# Patient Record
Sex: MALE | Race: WHITE | NOT HISPANIC OR LATINO | Employment: OTHER | URBAN - METROPOLITAN AREA
[De-identification: names, ages, dates, MRNs, and addresses within clinical notes are randomized per-mention and may not be internally consistent; named-entity substitution may affect disease eponyms.]

---

## 2017-04-10 ENCOUNTER — TRANSFERRED RECORDS (OUTPATIENT)
Dept: HEALTH INFORMATION MANAGEMENT | Facility: CLINIC | Age: 72
End: 2017-04-10

## 2017-06-01 ENCOUNTER — TRANSFERRED RECORDS (OUTPATIENT)
Dept: HEALTH INFORMATION MANAGEMENT | Facility: CLINIC | Age: 72
End: 2017-06-01

## 2017-06-13 ENCOUNTER — TRANSFERRED RECORDS (OUTPATIENT)
Dept: HEALTH INFORMATION MANAGEMENT | Facility: CLINIC | Age: 72
End: 2017-06-13

## 2017-06-13 LAB — EJECTION FRACTION: 35

## 2017-07-31 ENCOUNTER — TRANSFERRED RECORDS (OUTPATIENT)
Dept: HEALTH INFORMATION MANAGEMENT | Facility: CLINIC | Age: 72
End: 2017-07-31

## 2017-07-31 LAB
CREAT SERPL-MCNC: 1.29 MG/DL (ref 0.72–1.25)
EJECTION FRACTION: 35
GFR SERPL CREATININE-BSD FRML MDRD: 55 ML/MIN/1.73M2
GLUCOSE SERPL-MCNC: 92 MG/DL (ref 70–100)
POTASSIUM SERPL-SCNC: 4.4 MMOL/L (ref 3.5–5.1)

## 2017-08-08 ENCOUNTER — TELEPHONE (OUTPATIENT)
Dept: FAMILY MEDICINE | Facility: CLINIC | Age: 72
End: 2017-08-08

## 2017-08-08 NOTE — TELEPHONE ENCOUNTER
Reason for Call: THIS WEEK  Day Appointment, Requested Provider:  Ky Major M.D.    PCP: Ky Major    Reason for visit: Patient states nurse at [facility] advised patient to fu on PCP, requesitng to be seen this week to fu on ICD implant and blood clot in shoulder    Duration of symptoms: implant on 7.31  Have you been treated for this in the past? No    Additional comments: Patient was advised that Dr Major is not in clinic today    Can we leave a detailed message on this number? YES    Phone number patient can be reached at: Home number on file [phone] (home)    Best Time:     Call taken on 8/8/2017 at 12:58 PM by Melissa Beaulieu

## 2017-08-08 NOTE — TELEPHONE ENCOUNTER
Spoke with pt an notified him Dr. Major was not able to work him in this week. Scheduled pt with Jeannette Paez 8/16/17. Uyen Gray, CMA

## 2017-08-16 ENCOUNTER — OFFICE VISIT (OUTPATIENT)
Dept: FAMILY MEDICINE | Facility: CLINIC | Age: 72
End: 2017-08-16
Payer: COMMERCIAL

## 2017-08-16 VITALS
TEMPERATURE: 97.2 F | WEIGHT: 282.12 LBS | HEIGHT: 72 IN | SYSTOLIC BLOOD PRESSURE: 132 MMHG | DIASTOLIC BLOOD PRESSURE: 80 MMHG | HEART RATE: 80 BPM | OXYGEN SATURATION: 98 % | BODY MASS INDEX: 38.21 KG/M2

## 2017-08-16 DIAGNOSIS — I82.622 ACUTE DEEP VEIN THROMBOSIS (DVT) OF OTHER VEIN OF LEFT UPPER EXTREMITY (H): ICD-10-CM

## 2017-08-16 DIAGNOSIS — Z95.810 S/P ICD (INTERNAL CARDIAC DEFIBRILLATOR) PROCEDURE: Primary | ICD-10-CM

## 2017-08-16 PROCEDURE — 99495 TRANSJ CARE MGMT MOD F2F 14D: CPT | Performed by: NURSE PRACTITIONER

## 2017-08-16 RX ORDER — ACETAMINOPHEN 325 MG/1
325 TABLET ORAL
Status: ON HOLD | COMMUNITY
Start: 2017-08-01 | End: 2024-06-18

## 2017-08-16 RX ORDER — ISOSORBIDE MONONITRATE 30 MG/1
30 TABLET, EXTENDED RELEASE ORAL
COMMUNITY
Start: 2017-04-10 | End: 2017-12-20

## 2017-08-16 RX ORDER — VALSARTAN 80 MG/1
80 TABLET ORAL
COMMUNITY
Start: 2017-04-10 | End: 2017-12-20

## 2017-08-16 RX ORDER — FUROSEMIDE 40 MG
40 TABLET ORAL
COMMUNITY
Start: 2017-04-10 | End: 2017-12-20

## 2017-08-16 RX ORDER — NITROGLYCERIN 0.4 MG/1
0.4 TABLET SUBLINGUAL
COMMUNITY
Start: 2017-03-23

## 2017-08-16 RX ORDER — METOPROLOL SUCCINATE 50 MG/1
50 TABLET, EXTENDED RELEASE ORAL DAILY
COMMUNITY
Start: 2017-04-10 | End: 2017-12-20

## 2017-08-16 RX ORDER — POTASSIUM CHLORIDE 1500 MG/1
20 TABLET, EXTENDED RELEASE ORAL
COMMUNITY
Start: 2017-04-10 | End: 2017-12-20

## 2017-08-16 NOTE — MR AVS SNAPSHOT
"              After Visit Summary   2017    Ky Martinez    MRN: [id number]           Patient Information     Date Of Birth          1945        Visit Information        Provider Department      2017 3:15 PM Jeannette Paez APRN CNP [facility]        Today's Diagnoses     S/P ICD (internal cardiac defibrillator) procedure    -  1    Acute deep vein thrombosis (DVT) of other vein of left upper extremity (H)           Follow-ups after your visit        Who to contact     If you have questions or need follow up information about today's clinic visit or your schedule please contact [facility] directly at [phone].  Normal or non-critical lab and imaging results will be communicated to you by 8x8 Inchart, letter or phone within 4 business days after the clinic has received the results. If you do not hear from us within 7 days, please contact the clinic through 8x8 Inchart or phone. If you have a critical or abnormal lab result, we will notify you by phone as soon as possible.  Submit refill requests through locr or call your pharmacy and they will forward the refill request to us. Please allow 3 business days for your refill to be completed.          Additional Information About Your Visit        MyChart Information     locr lets you send messages to your doctor, view your test results, renew your prescriptions, schedule appointments and more. To sign up, go to [email or web address] . Click on \"Log in\" on the left side of the screen, which will take you to the Welcome page. Then click on \"Sign up Now\" on the right side of the page.     You will be asked to enter the access code listed below, as well as some personal information. Please follow the directions to create your username and password.     Your access code is: [email or web address]  Expires: 2017  8:42 AM     Your access code will  in 90 days. If you need help or a new code, please call your [facility] [employer] or [phone].        Care EveryWhere ID     This is your Care EveryWhere ID. This could be used by other organizations to access your [place] medical records  [id number]        Your Vitals Were     Pulse Temperature Height Pulse Oximetry BMI (Body Mass Index)       80 97.2  F (36.2  C) (Tympanic) 6' (1.829 m) 98% 38.26 kg/m2        Blood Pressure from Last 3 Encounters:   08/16/17 132/80   11/23/16 124/70   03/21/16 110/76    Weight from Last 3 Encounters:   08/16/17 282 lb 1.9 oz (128 kg)   11/23/16 282 lb (127.9 kg)   03/21/16 279 lb 11.2 oz (126.9 kg)              Today, you had the following     No orders found for display       Primary Care Provider Office Phone # Fax #    Ky Major [phone]       [facility] [address] [place] [address]        Equal Access to Services     NIEVES SOTO : Hadii aad ku hadasho Soomaali, waaxda luqadaha, qaybta kaalmada adeegyada, waxay idiin haykayli tan . So [facility] [phone].    ATENCIÓN: Si habla español, tiene a kimble disposición servicios gratuitos de asistencia lingüística. Llame al [phone].    We comply with applicable federal civil rights laws and [place] laws. We do not discriminate on the basis of race, color, national origin, age, disability sex, sexual orientation or gender identity.            Thank you!     Thank you for choosing [facility]  for your care. Our goal is always to provide you with excellent care. Hearing back from our patients is one way we can continue to improve our services. Please take a few minutes to complete the written survey that you may receive in the mail after your visit with us. Thank you!             Your Updated Medication List - Protect others around you: Learn how to safely use, store and throw away your medicines at www.disposemymeds.org.          This list is accurate as of: 8/16/17 11:59 PM.  Always use your most recent med list.                    Brand Name Dispense Instructions for use Diagnosis    acetaminophen 325 MG tablet    TYLENOL     Take 325 mg by mouth 2 tablets daily        albuterol 108 (90 BASE) MCG/ACT Inhaler    albuterol    1 Inhaler    Inhale 1-2 puffs into the lungs every 4 hours as needed for shortness of breath / dyspnea    Intermittent asthma, uncomplicated       apixaban ANTICOAGULANT 5 MG tablet    ELIQUIS     Take 5 mg by mouth 1 tablet twice daily        atorvastatin 80 MG tablet    LIPITOR    90 tablet    Take 1 tablet (80 mg) by mouth daily    Hyperlipidemia LDL goal <100       DAILY MULTI PO      1 TABLET DAILY        finasteride 5 MG tablet    PROSCAR    90 tablet    Take 1 tablet (5 mg) by mouth daily    Abnormal PSA       furosemide 40 MG tablet    LASIX     Take 40 mg by mouth 1 tablet daily        ibuprofen 200 MG tablet    ADVIL/MOTRIN     Take 400 mg by mouth every 4 hours as needed.        isosorbide mononitrate 30 MG 24 hr tablet    IMDUR     Take 30 mg by mouth 1 tablet daily        metoprolol 50 MG 24 hr tablet    TOPROL-XL     Take 50 mg by mouth 1 tablet daily        nitroGLYcerin 0.4 MG sublingual tablet    NITROSTAT     0.4 mg As needed        potassium chloride 20 MEQ CR tablet   Generic drug:  potassium chloride SA      Take 20 mg by mouth 1 tablet daily        tamsulosin 0.4 MG capsule    FLOMAX    90 capsule    TAKE ONE CAPSULE BY MOUTH ONCE DAILY    Benign prostatic hyperplasia, presence of lower urinary tract symptoms unspecified, unspecified morphology       valsartan 80 MG tablet    DIOVAN     Take 80 mg by mouth 1 tablet daily

## 2017-08-16 NOTE — PROGRESS NOTES
SUBJECTIVE:                                                    Ky Martinez is a 71 year old male who presents to clinic today for the following health issues:          Hospital Follow-up Visit:    Hospital/Nursing Home/ Rehab Facility: [facility]  Date of Admission: 7/31/17  Date of Discharge: 8/1/17; readmit for evaluation of left upper extremity swelling/DVT 8/7/17  Reason(s) for Admission: implant ICD, DVT            Problems taking medications regularly:  None       Medication changes since discharge: did start Eliquis after initial discharge       Problems adhering to non-medication therapy:  None    Summary of hospitalization:  CareEverywhere information obtained and reviewed. The patient has a history of MI in 3/2008. In March 2017, while in [place], he was diagnosed with NSTEMI. He returned home for treatment, follows with [facility]. His echocardiogram revealed ejection fraction of 35%. On 7/31, he was admitted to the hospital for implantation of an ICD. He was kept overnight, there were no events, no arrhythmias. The next morning wound appeared normal, device interrogation was normal, chest x-ray was normal, and he was discharged home. On 8/7 he returned for a one-week follow-up with pain and swelling of the left upper extremity. Ultrasound revealed an extensive occlusive DVT from the left subclavian vein to the axillary vein, brachial and the basilic veins to the level of the antecubital fossa. His aspirin was discontinued, and he was started on Eliquis 5 mg b.i.d. all other medications were continued at same dose  Diagnostic Tests/Treatments reviewed.  Follow up needed: Recheck with primary care, interval follow-up with cardiology  Other Healthcare Providers Involved in Patient s Care:         [facility]  Update since discharge: improved. Pain in the  left upper extremity is minimal, swelling has significantly decreased, nearly completely resolved.    Post Discharge  Medication Reconciliation: discharge medications reconciled, continue medications without change.  Plan of care communicated with patient     Coding guidelines for this visit:  Type of Medical   Decision Making Face-to-Face Visit       within 7 Days of discharge Face-to-Face Visit        within 14 days of discharge   Moderate Complexity [id number]   High Complexity [id number]              Problem list and histories reviewed & adjusted, as indicated.  Additional history: as documented    Patient Active Problem List   Diagnosis     Other acute and subacute form of ischemic heart disease     Cough     Intermittent asthma     Hyperlipidemia LDL goal <100     BPH (benign prostatic hypertrophy)     Morbid obesity (H)     Coronary artery disease involving native coronary artery of native heart without angina pectoris     Acute deep vein thrombosis (DVT) of other vein of left upper extremity (H)     Past Surgical History:   Procedure Laterality Date     Cardiac stent       EXCISE GANGLION WRIST Left 11/26/2014    Procedure: EXCISE GANGLION WRIST;  Surgeon: Gian Haddad MD;  Location: [facility] OR     ICD DEVICE INTERROGATE      2017     ORTHOPEDIC SURGERY       RELEASE CARPAL TUNNEL Left 11/26/2014    Procedure: RELEASE CARPAL TUNNEL;  Surgeon: Gian Haddad MD;  Location: [facility] OR     RELEASE DEQUERVAINS WRIST Left 11/26/2014    Procedure: RELEASE DEQUERVAINS WRIST;  Surgeon: Gian Haddad MD;  Location: [facility] OR       Social History   Substance Use Topics     Smoking status: Never Smoker     Smokeless tobacco: Never Used     Alcohol use 2.0 oz/week     4 Glasses of wine per week     Family History   Problem Relation Age of Onset     Arthritis Mother      DIABETES Father      Allergies Father      sulfa     Eye Disorder Father      cataract     HEART DISEASE Father      by pass         Current Outpatient Prescriptions   Medication Sig Dispense Refill     acetaminophen (TYLENOL) 325 MG tablet Take 325 mg by mouth  2 tablets daily       furosemide (LASIX) 40 MG tablet Take 40 mg by mouth 1 tablet daily       isosorbide mononitrate (IMDUR) 30 MG 24 hr tablet Take 30 mg by mouth 1 tablet daily       potassium chloride SA (POTASSIUM CHLORIDE) 20 MEQ CR tablet Take 20 mg by mouth 1 tablet daily       metoprolol (TOPROL-XL) 50 MG 24 hr tablet Take 50 mg by mouth 1 tablet daily       nitroGLYcerin (NITROSTAT) 0.4 MG sublingual tablet 0.4 mg As needed       apixaban ANTICOAGULANT (ELIQUIS) 5 MG tablet Take 5 mg by mouth 1 tablet twice daily       valsartan (DIOVAN) 80 MG tablet Take 80 mg by mouth 1 tablet daily       finasteride (PROSCAR) 5 MG tablet Take 1 tablet (5 mg) by mouth daily 90 tablet 3     tamsulosin (FLOMAX) 0.4 MG 24 hr capsule TAKE ONE CAPSULE BY MOUTH ONCE DAILY 90 capsule 3     atorvastatin (LIPITOR) 80 MG tablet Take 1 tablet (80 mg) by mouth daily 90 tablet 3     albuterol (ALBUTEROL) 108 (90 BASE) MCG/ACT inhaler Inhale 1-2 puffs into the lungs every 4 hours as needed for shortness of breath / dyspnea 1 Inhaler 1     DAILY MULTI OR 1 TABLET DAILY       ibuprofen (ADVIL,MOTRIN) 200 MG tablet Take 400 mg by mouth every 4 hours as needed.       BP Readings from Last 3 Encounters:   08/16/17 132/80   11/23/16 124/70   03/21/16 110/76    Wt Readings from Last 3 Encounters:   08/16/17 282 lb 1.9 oz (128 kg)   11/23/16 282 lb (127.9 kg)   03/21/16 279 lb 11.2 oz (126.9 kg)                        Reviewed and updated as needed this visit by clinical staffTobacco  Meds  Med Hx  Surg Hx  Fam Hx  Soc Hx      Reviewed and updated as needed this visit by Provider         ROS:  Constitutional, HEENT, cardiovascular, pulmonary, gi and gu systems are negative, except as otherwise noted.      OBJECTIVE:   /80  Pulse 80  Temp 97.2  F (36.2  C) (Tympanic)  Ht 6' (1.829 m)  Wt 282 lb 1.9 oz (128 kg)  SpO2 98%  BMI 38.26 kg/m2  Body mass index is 38.26 kg/(m^2).   GENERAL: healthy, alert and no distress  NECK: no  adenopathy, no asymmetry, masses, or scars and thyroid normal to palpation  RESP: lungs clear to auscultation - no rales, rhonchi or wheezes  CV: regular rate and rhythm, normal S1 S2, no S3 or S4, no murmur, click or rub, no peripheral edema and peripheral pulses strong  MS: No significant edema of the left upper extremity. No increased warmth, some mild tenderness at the antecubital fossa. Easily palpated radial and ulnar pulses. Rapid capillary refill in the nailbeds. Normal range of motion of digits and at elbow.        ASSESSMENT/PLAN:     Problem List Items Addressed This Visit        Medium    Acute deep vein thrombosis (DVT) of other vein of left upper extremity (H)      Other Visit Diagnoses     S/P ICD (internal cardiac defibrillator) procedure    -  Primary         Minimal use of left upper extremity, no lifting more than 2 pounds for 6 weeks, per discharge instructions by cardiology  Low-sodium diet  Patient is advised to schedule appointment with his primary care provider in 1-2 weeks for ongoing surveillance. Follow-up with cardiology per their instructions.    SHANNON Gomez [facility]

## 2017-12-20 ENCOUNTER — OFFICE VISIT (OUTPATIENT)
Dept: FAMILY MEDICINE | Facility: CLINIC | Age: 72
End: 2017-12-20
Payer: COMMERCIAL

## 2017-12-20 VITALS
BODY MASS INDEX: 38.87 KG/M2 | DIASTOLIC BLOOD PRESSURE: 70 MMHG | SYSTOLIC BLOOD PRESSURE: 120 MMHG | OXYGEN SATURATION: 97 % | TEMPERATURE: 97.2 F | HEIGHT: 72 IN | WEIGHT: 287 LBS | HEART RATE: 82 BPM

## 2017-12-20 DIAGNOSIS — I25.10 CORONARY ARTERY DISEASE INVOLVING NATIVE CORONARY ARTERY OF NATIVE HEART WITHOUT ANGINA PECTORIS: Primary | ICD-10-CM

## 2017-12-20 DIAGNOSIS — I82.622 ACUTE DEEP VEIN THROMBOSIS (DVT) OF OTHER VEIN OF LEFT UPPER EXTREMITY (H): ICD-10-CM

## 2017-12-20 DIAGNOSIS — Z95.810 S/P ICD (INTERNAL CARDIAC DEFIBRILLATOR) PROCEDURE: ICD-10-CM

## 2017-12-20 DIAGNOSIS — N40.0 BENIGN PROSTATIC HYPERPLASIA, UNSPECIFIED WHETHER LOWER URINARY TRACT SYMPTOMS PRESENT: ICD-10-CM

## 2017-12-20 DIAGNOSIS — E78.5 HYPERLIPIDEMIA LDL GOAL <100: ICD-10-CM

## 2017-12-20 LAB
ALBUMIN SERPL-MCNC: 3.7 G/DL (ref 3.4–5)
ALP SERPL-CCNC: 109 U/L (ref 40–150)
ALT SERPL W P-5'-P-CCNC: 52 U/L (ref 0–70)
ANION GAP SERPL CALCULATED.3IONS-SCNC: 6 MMOL/L (ref 3–14)
AST SERPL W P-5'-P-CCNC: 27 U/L (ref 0–45)
BILIRUB SERPL-MCNC: 0.9 MG/DL (ref 0.2–1.3)
BUN SERPL-MCNC: 17 MG/DL (ref 7–30)
CALCIUM SERPL-MCNC: 8.5 MG/DL (ref 8.5–10.1)
CHLORIDE SERPL-SCNC: 109 MMOL/L (ref 94–109)
CHOLEST SERPL-MCNC: 119 MG/DL
CO2 SERPL-SCNC: 27 MMOL/L (ref 20–32)
CREAT SERPL-MCNC: 1.32 MG/DL (ref 0.66–1.25)
GFR SERPL CREATININE-BSD FRML MDRD: 53 ML/MIN/1.7M2
GLUCOSE SERPL-MCNC: 97 MG/DL (ref 70–99)
HDLC SERPL-MCNC: 50 MG/DL
LDLC SERPL CALC-MCNC: 44 MG/DL
NONHDLC SERPL-MCNC: 69 MG/DL
POTASSIUM SERPL-SCNC: 4.3 MMOL/L (ref 3.4–5.3)
PROT SERPL-MCNC: 7.3 G/DL (ref 6.8–8.8)
PSA SERPL-ACNC: 1.84 UG/L (ref 0–4)
SODIUM SERPL-SCNC: 142 MMOL/L (ref 133–144)
TRIGL SERPL-MCNC: 126 MG/DL

## 2017-12-20 PROCEDURE — 80053 COMPREHEN METABOLIC PANEL: CPT | Performed by: FAMILY MEDICINE

## 2017-12-20 PROCEDURE — 36415 COLL VENOUS BLD VENIPUNCTURE: CPT | Performed by: FAMILY MEDICINE

## 2017-12-20 PROCEDURE — 99214 OFFICE O/P EST MOD 30 MIN: CPT | Performed by: FAMILY MEDICINE

## 2017-12-20 PROCEDURE — G0103 PSA SCREENING: HCPCS | Performed by: FAMILY MEDICINE

## 2017-12-20 PROCEDURE — 80061 LIPID PANEL: CPT | Performed by: FAMILY MEDICINE

## 2017-12-20 RX ORDER — POTASSIUM CHLORIDE 1500 MG/1
20 TABLET, EXTENDED RELEASE ORAL DAILY
Qty: 90 TABLET | Refills: 3 | Status: SHIPPED | OUTPATIENT
Start: 2017-12-20 | End: 2018-12-24

## 2017-12-20 RX ORDER — VALSARTAN 80 MG/1
80 TABLET ORAL DAILY
Qty: 90 TABLET | Refills: 3 | Status: SHIPPED | OUTPATIENT
Start: 2017-12-20 | End: 2018-12-24 | Stop reason: ALTCHOICE

## 2017-12-20 RX ORDER — METOPROLOL SUCCINATE 50 MG/1
50 TABLET, EXTENDED RELEASE ORAL DAILY
Qty: 90 TABLET | Refills: 3 | Status: SHIPPED | OUTPATIENT
Start: 2017-12-20 | End: 2018-12-24

## 2017-12-20 RX ORDER — ATORVASTATIN CALCIUM 80 MG/1
80 TABLET, FILM COATED ORAL DAILY
Qty: 90 TABLET | Refills: 3 | Status: SHIPPED | OUTPATIENT
Start: 2017-12-20 | End: 2018-12-24

## 2017-12-20 RX ORDER — FINASTERIDE 5 MG/1
5 TABLET, FILM COATED ORAL DAILY
Qty: 90 TABLET | Refills: 3 | Status: SHIPPED | OUTPATIENT
Start: 2017-12-20 | End: 2018-12-24

## 2017-12-20 RX ORDER — ISOSORBIDE MONONITRATE 30 MG/1
30 TABLET, EXTENDED RELEASE ORAL DAILY
Qty: 90 TABLET | Refills: 3 | Status: SHIPPED | OUTPATIENT
Start: 2017-12-20 | End: 2019-01-26

## 2017-12-20 RX ORDER — FUROSEMIDE 40 MG
40 TABLET ORAL DAILY
Qty: 90 TABLET | Refills: 3 | Status: SHIPPED | OUTPATIENT
Start: 2017-12-20 | End: 2018-12-24

## 2017-12-20 RX ORDER — TAMSULOSIN HYDROCHLORIDE 0.4 MG/1
CAPSULE ORAL
Qty: 90 CAPSULE | Refills: 3 | Status: SHIPPED | OUTPATIENT
Start: 2017-12-20 | End: 2018-12-24

## 2017-12-20 NOTE — PROGRESS NOTES
Labs show your PSA was fine at 1.84 it was 3.2 a year ago.  Cholesterol good at 119 with your LDL at 44.  Chemistry panel  is stable blood sugar was fine your kidney function test is off just slightly better than a year ago but a little off from 4 months ago.  I would avoid ibuprofen and Aleve.

## 2017-12-20 NOTE — NURSING NOTE
Chief Complaint   Patient presents with     Recheck Medication       Initial /70 (BP Location: Left arm, Patient Position: Chair, Cuff Size: Adult Large)  Pulse 82  Temp 97.2  F (36.2  C) (Temporal)  Ht 6' (1.829 m)  Wt 287 lb (130.2 kg)  SpO2 97%  BMI 38.92 kg/m2 Estimated body mass index is 38.92 kg/(m^2) as calculated from the following:    Height as of this encounter: 6' (1.829 m).    Weight as of this encounter: 287 lb (130.2 kg).  Medication Reconciliation: complete

## 2017-12-20 NOTE — PROGRESS NOTES
SUBJECTIVE:   Ky Martinez is a 72 year old male who presents to clinic today for the following health issues:  Patient usually sees Dr. Bhakta in [facility]. He was the provider managing his medications for a long time. He is hoping Dr. Major can refill all of his medications for him before he goes to [place]. He toney there.     Medication Followup of All of his medications     Taking Medication as prescribed: yes    Side Effects:  None    Medication Helping Symptoms:  yes             Problem list and histories reviewed & adjusted, as indicated.  Additional history: as documented        Reviewed and updated as needed this visit by clinical staff     Reviewed and updated as needed this visit by Provider        SUBJECTIVE:  Ky  is a 72 year old male who presents for: Follow-up of multiple medical conditions.  He has hypertension, hyperlipidemia, benign prostatic hypertrophy, coronary artery disease including stenting and most recently an ICD implanted.  Had a DVT after his ICD implant last summer in his upper extremity and is on Eliquis.  No bleeding problems.  Eyes any chest pain.  No shortness of breath with activity.  He is on several medications for his prostatic hypertrophy and they seem to be helping.  Needs all of his medications refilled before going to [place] for the winter    Past Medical History:   Diagnosis Date     Hypertension      Myocardial infarction      Past Surgical History:   Procedure Laterality Date     Cardiac stent       EXCISE GANGLION WRIST Left 11/26/2014    Procedure: EXCISE GANGLION WRIST;  Surgeon: Gian Haddad MD;  [facility]     ICD DEVICE INTERROGATE      2017     ORTHOPEDIC SURGERY       RELEASE CARPAL TUNNEL Left 11/26/2014    Procedure: RELEASE CARPAL TUNNEL;  Surgeon: Gian Haddad MD;  Location[facility]     RELEASE DEQUERVAINS WRIST Left 11/26/2014    Procedure: RELEASE DEQUERVAINS WRIST;  Surgeon: Gian Haddad MD;  Location: [facility] OR      Social History   Substance Use Topics     Smoking status: Never Smoker     Smokeless tobacco: Never Used     Alcohol use 2.0 oz/week     4 Glasses of wine per week      Comment: occasional      Current Outpatient Prescriptions   Medication Sig Dispense Refill     furosemide (LASIX) 40 MG tablet Take 1 tablet (40 mg) by mouth daily 1 tablet daily 90 tablet 3     isosorbide mononitrate (IMDUR) 30 MG 24 hr tablet Take 1 tablet (30 mg) by mouth daily 1 tablet daily 90 tablet 3     potassium chloride SA (KLOR-CON) 20 MEQ CR tablet Take 1 tablet (20 mEq) by mouth daily 1 tablet daily 90 tablet 3     metoprolol (TOPROL-XL) 50 MG 24 hr tablet Take 1 tablet (50 mg) by mouth daily 1 tablet daily 90 tablet 3     valsartan (DIOVAN) 80 MG tablet Take 1 tablet (80 mg) by mouth daily 1 tablet daily 90 tablet 3     finasteride (PROSCAR) 5 MG tablet Take 1 tablet (5 mg) by mouth daily 90 tablet 3     tamsulosin (FLOMAX) 0.4 MG capsule TAKE ONE CAPSULE BY MOUTH ONCE DAILY 90 capsule 3     atorvastatin (LIPITOR) 80 MG tablet Take 1 tablet (80 mg) by mouth daily 90 tablet 3     nitroGLYcerin (NITROSTAT) 0.4 MG sublingual tablet 0.4 mg As needed       albuterol (ALBUTEROL) 108 (90 BASE) MCG/ACT inhaler Inhale 1-2 puffs into the lungs every 4 hours as needed for shortness of breath / dyspnea 1 Inhaler 1     ibuprofen (ADVIL,MOTRIN) 200 MG tablet Take 400 mg by mouth every 4 hours as needed.       DAILY MULTI OR 1 TABLET DAILY       acetaminophen (TYLENOL) 325 MG tablet Take 325 mg by mouth 2 tablets daily       [DISCONTINUED] furosemide (LASIX) 40 MG tablet Take 40 mg by mouth 1 tablet daily       [DISCONTINUED] isosorbide mononitrate (IMDUR) 30 MG 24 hr tablet Take 30 mg by mouth 1 tablet daily       [DISCONTINUED] metoprolol (TOPROL-XL) 50 MG 24 hr tablet Take 50 mg by mouth daily 1 tablet daily       [DISCONTINUED] valsartan (DIOVAN) 80 MG tablet Take 80 mg by mouth 1 tablet daily       [DISCONTINUED] atorvastatin (LIPITOR) 80 MG  tablet Take 1 tablet (80 mg) by mouth daily 90 tablet 3       REVIEW OF SYSTEMS:   5 point ROS negative except as noted above in HPI, including Gen., Resp, CV, GI &  system review.     OBJECTIVE:  Vitals: /70 (BP Location: Left arm, Patient Position: Chair, Cuff Size: Adult Large)  Pulse 82  Temp 97.2  F (36.2  C) (Temporal)  Ht 6' (1.829 m)  Wt 287 lb (130.2 kg)  SpO2 97%  BMI 38.92 kg/m2  BMI= Body mass index is 38.92 kg/(m^2).  He is alert and appears well.  Eyes PERRLA.  Neck supple no carotid bruits.  Lungs are clear to auscultation.  Heart regular rhythm S1-S2 no murmur.  Extremities with no edema.  Skin clear.    ASSESSMENT:  #1 coronary artery disease status post stenting #2 status post ICD #3 hyperlipidemia #4 prostatic hypertrophy #5 DVT of the left upper extremity    PLAN:  We will obtain labs today and notify with results.  Renewed all of his medications.  He recently had an ICD interrogation which was good.  We will check his PSA along with his labs he will continue on Proscar and Flomax.        Ky Major MD  [facility]

## 2017-12-20 NOTE — MR AVS SNAPSHOT
"              After Visit Summary   12/20/2017    Ky Martinez    MRN: [id number]           Patient Information     Date Of Birth          1945        Visit Information        Provider Department      12/20/2017 9:20 AM Ky Major MD [facility]        Today's Diagnoses     Coronary artery disease involving native coronary artery of native heart without angina pectoris    -  1    S/P ICD (internal cardiac defibrillator) procedure        Benign prostatic hyperplasia, unspecified whether lower urinary tract symptoms present        Hyperlipidemia LDL goal <100        Acute deep vein thrombosis (DVT) of other vein of left upper extremity (H)           Follow-ups after your visit        Who to contact     If you have questions or need follow up information about today's clinic visit or your schedule please contact [facility] directly at [phone].  Normal or non-critical lab and imaging results will be communicated to you by MyChart, letter or phone within 4 business days after the clinic has received the results. If you do not hear from us within 7 days, please contact the clinic through MyChart or phone. If you have a critical or abnormal lab result, we will notify you by phone as soon as possible.  Submit refill requests through [employer] or call your pharmacy and they will forward the refill request to us. Please allow 3 business days for your refill to be completed.          Additional Information About Your Visit        MyChart Information     [employer] lets you send messages to your doctor, view your test results, renew your prescriptions, schedule appointments and more. To sign up, go to [email or web address] . Click on \"Log in\" on the left side of the screen, which will take you to the Welcome page. Then click on \"Sign up Now\" on the right side of the page.     You will be asked to enter the access code listed below, as well as some personal information. Please " follow the directions to create your username and password.     Your access code is: [email or web address]  Expires: 3/20/2018 11:26 AM     Your access code will  in 90 days. If you need help or a new code, please call your [place] clinic or [phone].        Care EveryWhere ID     This is your Care EveryWhere ID. This could be used by other organizations to access your [place] medical records  [id number]        Your Vitals Were     Pulse Temperature Height Pulse Oximetry BMI (Body Mass Index)       82 97.2  F (36.2  C) (Temporal) 6' (1.829 m) 97% 38.92 kg/m2        Blood Pressure from Last 3 Encounters:   17 120/70   17 132/80   16 124/70    Weight from Last 3 Encounters:   17 287 lb (130.2 kg)   17 282 lb 1.9 oz (128 kg)   16 282 lb (127.9 kg)              We Performed the Following     Comprehensive metabolic panel (BMP + Alb, Alk Phos, ALT, AST, Total. Bili, TP)     Lipid Profile     Prostate spec antigen screen          Today's Medication Changes          These changes are accurate as of: 17 11:26 AM.  If you have any questions, ask your nurse or doctor.               These medicines have changed or have updated prescriptions.        Dose/Directions    furosemide 40 MG tablet   Commonly known as:  LASIX   This may have changed:  when to take this   Used for:  Hyperlipidemia LDL goal <100   Changed by:  Ky Major MD        Dose:  40 mg   Take 1 tablet (40 mg) by mouth daily 1 tablet daily   Quantity:  90 tablet   Refills:  3       isosorbide mononitrate 30 MG 24 hr tablet   Commonly known as:  IMDUR   This may have changed:  when to take this   Used for:  Hyperlipidemia LDL goal <100   Changed by:  Ky Major MD        Dose:  30 mg   Take 1 tablet (30 mg) by mouth daily 1 tablet daily   Quantity:  90 tablet   Refills:  3       potassium chloride SA 20 MEQ CR tablet   Commonly known as:  KLOR-CON   This may have changed:    - how much to take  - when  to take this   Used for:  Hyperlipidemia LDL goal <100   Changed by:  Ky Major MD        Dose:  20 mEq   Take 1 tablet (20 mEq) by mouth daily 1 tablet daily   Quantity:  90 tablet   Refills:  3       valsartan 80 MG tablet   Commonly known as:  DIOVAN   This may have changed:  when to take this   Used for:  Hyperlipidemia LDL goal <100   Changed by:  Ky Major MD        Dose:  80 mg   Take 1 tablet (80 mg) by mouth daily 1 tablet daily   Quantity:  90 tablet   Refills:  3            Where to get your medicines      These medications were sent to [facility] [address] [address]  [address] [facility] [address]     Phone:  [phone]     atorvastatin 80 MG tablet    finasteride 5 MG tablet    furosemide 40 MG tablet    isosorbide mononitrate 30 MG 24 hr tablet    metoprolol 50 MG 24 hr tablet    potassium chloride SA 20 MEQ CR tablet    tamsulosin 0.4 MG capsule    valsartan 80 MG tablet                Primary Care Provider Office Phone # Fax #    Ky Major [phone]       [facility] [address] [place] [address]        Equal Access to Services     [facility] AH: Hadii aad ku hadasho Soomaali, waaxda luqadaha, qaybta kaalmada adeegyada, waxay idiin hayaan adeeg kharash la'kayli . So [facility] [phone].    ATENCIÓN: Si habla español, tiene a kimble disposición servicios gratuitos de asistencia lingüística. [facility] [phone].    We comply with applicable federal civil rights laws and [place] laws. We do not discriminate on the basis of race, color, national origin, age, disability, sex, sexual orientation, or gender identity.            Thank you!     Thank you for choosing [facility]  for your care. Our goal is always to provide you with excellent care. Hearing back from our patients is one way we can continue to improve our services. Please take a few minutes to complete the written survey that  you may receive in the mail after your visit with us. Thank you!             Your Updated Medication List - Protect others around you: Learn how to safely use, store and throw away your medicines at www.disposemymeds.org.          This list is accurate as of: 12/20/17 11:26 AM.  Always use your most recent med list.                   Brand Name Dispense Instructions for use Diagnosis    acetaminophen 325 MG tablet    TYLENOL     Take 325 mg by mouth 2 tablets daily        albuterol 108 (90 BASE) MCG/ACT Inhaler    PROAIR HFA    1 Inhaler    Inhale 1-2 puffs into the lungs every 4 hours as needed for shortness of breath / dyspnea    Intermittent asthma, uncomplicated       atorvastatin 80 MG tablet    LIPITOR    90 tablet    Take 1 tablet (80 mg) by mouth daily    Hyperlipidemia LDL goal <100       DAILY MULTI PO      1 TABLET DAILY        finasteride 5 MG tablet    PROSCAR    90 tablet    Take 1 tablet (5 mg) by mouth daily        furosemide 40 MG tablet    LASIX    90 tablet    Take 1 tablet (40 mg) by mouth daily 1 tablet daily    Hyperlipidemia LDL goal <100       ibuprofen 200 MG tablet    ADVIL/MOTRIN     Take 400 mg by mouth every 4 hours as needed.        isosorbide mononitrate 30 MG 24 hr tablet    IMDUR    90 tablet    Take 1 tablet (30 mg) by mouth daily 1 tablet daily    Hyperlipidemia LDL goal <100       metoprolol 50 MG 24 hr tablet    TOPROL-XL    90 tablet    Take 1 tablet (50 mg) by mouth daily 1 tablet daily    Hyperlipidemia LDL goal <100       nitroGLYcerin 0.4 MG sublingual tablet    NITROSTAT     0.4 mg As needed        potassium chloride SA 20 MEQ CR tablet    KLOR-CON    90 tablet    Take 1 tablet (20 mEq) by mouth daily 1 tablet daily    Hyperlipidemia LDL goal <100       tamsulosin 0.4 MG capsule    FLOMAX    90 capsule    TAKE ONE CAPSULE BY MOUTH ONCE DAILY        valsartan 80 MG tablet    DIOVAN    90 tablet    Take 1 tablet (80 mg) by mouth daily 1 tablet daily    Hyperlipidemia LDL goal  <100

## 2017-12-21 ASSESSMENT — ASTHMA QUESTIONNAIRES: ACT_TOTALSCORE: 25

## 2018-08-30 DIAGNOSIS — I10 BENIGN ESSENTIAL HYPERTENSION: ICD-10-CM

## 2018-08-30 DIAGNOSIS — E78.5 HYPERLIPIDEMIA LDL GOAL <100: Primary | ICD-10-CM

## 2018-08-30 RX ORDER — VALSARTAN 80 MG/1
80 TABLET ORAL DAILY
Qty: 90 TABLET | Refills: 3 | Status: CANCELLED | OUTPATIENT
Start: 2018-08-30

## 2018-08-30 NOTE — TELEPHONE ENCOUNTER
Requested Prescriptions   Pending Prescriptions Disp Refills     valsartan (DIOVAN) 80 MG tablet  Last Written Prescription Date:  12/20/17  Last Fill Quantity: 90,  # refills: 3   Last office visit: 12/20/2017 with prescribing provider:  12/20/17   Future Office Visit:     90 tablet 3     Sig: Take 1 tablet (80 mg) by mouth daily 1 tablet daily    There is no refill protocol information for this order

## 2018-08-31 NOTE — TELEPHONE ENCOUNTER
"Requested Prescriptions   Pending Prescriptions Disp Refills     valsartan (DIOVAN) 80 MG tablet 90 tablet 3     Sig: Take 1 tablet (80 mg) by mouth daily 1 tablet daily    Angiotensin-II Receptors Failed    8/30/2018  2:13 PM       Failed - Normal serum creatinine on file in past 12 months    Recent Labs   Lab Test  12/20/17   1000   12/13/10   1410   CR  1.32*   < >   --    CREAT   --    --   1.1    < > = values in this interval not displayed.            Passed - Blood pressure under 140/90 in past 12 months    BP Readings from Last 3 Encounters:   12/20/17 120/70   08/16/17 132/80   11/23/16 124/70                Passed - Recent (12 mo) or future (30 days) visit within the authorizing provider's specialty    Patient had office visit in the last 12 months or has a visit in the next 30 days with authorizing provider or within the authorizing provider's specialty.  See \"Patient Info\" tab in inbasket, or \"Choose Columns\" in Meds & Orders section of the refill encounter.           Passed - Patient is age 18 or older       Passed - Normal serum potassium on file in past 12 months    Recent Labs   Lab Test  12/20/17   1000   POTASSIUM  4.3                    Patient should have refills to 12/20/2018.  Please call pharmacy to confirm.  Declining.  JACQUE ZieglerN, RN    "

## 2018-09-05 ENCOUNTER — TELEPHONE (OUTPATIENT)
Dept: FAMILY MEDICINE | Facility: CLINIC | Age: 73
End: 2018-09-05

## 2018-09-05 DIAGNOSIS — H91.93 BILATERAL HEARING LOSS, UNSPECIFIED HEARING LOSS TYPE: Primary | ICD-10-CM

## 2018-09-05 NOTE — TELEPHONE ENCOUNTER
Reason for Call: Request for an order or referral:    Order or referral being requested: audiologist to discuss hearing loss and possible hearing aids    Date needed: before my next appointment    Has the patient been seen by the PCP for this problem? YES    Additional comments: none    Phone number Patient can be reached at:  Home number on file [phone] (home)    Best Time:  any    Can we leave a detailed message on this number?  YES    Call taken on 9/5/2018 at 12:01 PM by Skylar Mcclellan

## 2018-09-11 ENCOUNTER — OFFICE VISIT (OUTPATIENT)
Dept: AUDIOLOGY | Facility: CLINIC | Age: 73
End: 2018-09-11
Payer: COMMERCIAL

## 2018-09-11 DIAGNOSIS — H90.3 SENSORINEURAL HEARING LOSS, BILATERAL: Primary | ICD-10-CM

## 2018-09-11 PROCEDURE — 92557 COMPREHENSIVE HEARING TEST: CPT | Performed by: AUDIOLOGIST

## 2018-09-11 PROCEDURE — 99207 ZZC NO CHARGE LOS: CPT | Performed by: AUDIOLOGIST

## 2018-09-11 PROCEDURE — 92550 TYMPANOMETRY & REFLEX THRESH: CPT | Performed by: AUDIOLOGIST

## 2018-09-11 NOTE — MR AVS SNAPSHOT
"              After Visit Summary   2018    Ky Martinez    MRN: [id number]           Patient Information     Date Of Birth          1945        Visit Information        Provider Department      2018 2:00 PM Estela Orozco [facility]        Today's Diagnoses     Sensorineural hearing loss, bilateral    -  1       Follow-ups after your visit        Who to contact     If you have questions or need follow up information about today's clinic visit or your schedule please contact [facility] directly at [phone].  Normal or non-critical lab and imaging results will be communicated to you by [employer], letter or phone within 4 business days after the clinic has received the results. If you do not hear from us within 7 days, please contact the clinic through [employer] or phone. If you have a critical or abnormal lab result, we will notify you by phone as soon as possible.  Submit refill requests through [employer] or call your pharmacy and they will forward the refill request to us. Please allow 3 business days for your refill to be completed.          Additional Information About Your Visit        MyChart Information     [employer] lets you send messages to your doctor, view your test results, renew your prescriptions, schedule appointments and more. To sign up, go to [email or web address] . Click on \"Log in\" on the left side of the screen, which will take you to the Welcome page. Then click on \"Sign up Now\" on the right side of the page.     You will be asked to enter the access code listed below, as well as some personal information. Please follow the directions to create your username and password.     Your access code is: [email or web address]  Expires: 12/10/2018  4:56 PM     Your access code will  in 90 days. If you need help or a new code, please call your [facility] or [phone].        Care EveryWhere ID     This is your Care EveryWhere ID. This " could be used by other organizations to access your [place] medical records  [id number]         Blood Pressure from Last 3 Encounters:   12/20/17 120/70   08/16/17 132/80   11/23/16 124/70    Weight from Last 3 Encounters:   12/20/17 287 lb (130.2 kg)   08/16/17 282 lb 1.9 oz (128 kg)   11/23/16 282 lb (127.9 kg)              We Performed the Following     AUDIOGRAM/TYMPANOGRAM - INTERFACE     COMPREHENSIVE HEARING TEST     TYMPANOMETRY AND REFLEX THRESHOLD MEASUREMENTS        Primary Care Provider Office Phone # Fax #    Ky Major [phone]       [address] [facility] [address]        Equal Access to Services     NIEVES SOTO : Hadii gisele arellanoo Soyvonne, waaxda luqadaha, qaybta kaalmada adeegyada, blayne odonnell. So [facility] [phone].    ATENCIÓN: Si habla español, tiene a kimble disposición servicios gratuitos de asistencia lingüística. Llame al [phone].    We comply with applicable federal civil rights laws and [place] laws. We do not discriminate on the basis of race, color, national origin, age, disability, sex, sexual orientation, or gender identity.            Thank you!     Thank you for choosing [facility]  for your care. Our goal is always to provide you with excellent care. Hearing back from our patients is one way we can continue to improve our services. Please take a few minutes to complete the written survey that you may receive in the mail after your visit with us. Thank you!             Your Updated Medication List - Protect others around you: Learn how to safely use, store and throw away your medicines at www.disposemymeds.org.          This list is accurate as of 9/11/18  4:56 PM.  Always use your most recent med list.                   Brand Name Dispense Instructions for use Diagnosis    acetaminophen 325 MG tablet    TYLENOL     Take 325 mg by mouth 2 tablets daily        albuterol 108 (90 Base) MCG/ACT  inhaler    PROAIR HFA    1 Inhaler    Inhale 1-2 puffs into the lungs every 4 hours as needed for shortness of breath / dyspnea    Intermittent asthma, uncomplicated       atorvastatin 80 MG tablet    LIPITOR    90 tablet    Take 1 tablet (80 mg) by mouth daily    Hyperlipidemia LDL goal <100       DAILY MULTI PO      1 TABLET DAILY        finasteride 5 MG tablet    PROSCAR    90 tablet    Take 1 tablet (5 mg) by mouth daily        furosemide 40 MG tablet    LASIX    90 tablet    Take 1 tablet (40 mg) by mouth daily 1 tablet daily    Hyperlipidemia LDL goal <100       ibuprofen 200 MG tablet    ADVIL/MOTRIN     Take 400 mg by mouth every 4 hours as needed.        isosorbide mononitrate 30 MG 24 hr tablet    IMDUR    90 tablet    Take 1 tablet (30 mg) by mouth daily 1 tablet daily    Hyperlipidemia LDL goal <100       metoprolol succinate 50 MG 24 hr tablet    TOPROL-XL    90 tablet    Take 1 tablet (50 mg) by mouth daily 1 tablet daily    Hyperlipidemia LDL goal <100       nitroGLYcerin 0.4 MG sublingual tablet    NITROSTAT     0.4 mg As needed        potassium chloride SA 20 MEQ CR tablet    KLOR-CON    90 tablet    Take 1 tablet (20 mEq) by mouth daily 1 tablet daily    Hyperlipidemia LDL goal <100       tamsulosin 0.4 MG capsule    FLOMAX    90 capsule    TAKE ONE CAPSULE BY MOUTH ONCE DAILY        valsartan 80 MG tablet    DIOVAN    90 tablet    Take 1 tablet (80 mg) by mouth daily 1 tablet daily    Hyperlipidemia LDL goal <100

## 2018-09-11 NOTE — PROGRESS NOTES
"AUDIOLOGY REPORT    SUBJECTIVE:  Ky Martinez is a 72 year old male who was seen in the Audiology Clinic at the [facility] for audiologic evaluation, referred by LAURENT Major M.D. The patient reports a gradual decrease in hearing \"for a long time\", he observes the left ear is worse than the right ear and reported a long history of firearm exposure without hearing protection while duck hunting.  He reported the most difficulty hearing in groups, background noise, and social situations. The patient denies  bilateral tinnitus, bilateral otalgia, bilateral drainage, bilateral aural fullness and vertigo.  The patient notes difficulty with communication in a variety of listening situations. They were accompanied today by their self.    OBJECTIVE:  Otoscopic exam indicates ears are clear of cerumen bilaterally     Pure Tone Thresholds assessed using conventional audiometry with good  reliability from 250-8000 Hz bilaterally using insert earphones     RIGHT:   normal, mild and rising to normal 3-4k then sloping to moderate hearing loss at 6-8k loss is sensorineural    LEFT:    mild and moderate sensorineural hearing loss    Tympanogram:    RIGHT: normal eardrum mobility    LEFT:   normal eardrum mobility    Reflexes (reported by stimulus ear):  RIGHT: Ipsilateral is present at normal levels  RIGHT: Contralateral is present at normal levels  LEFT:   Ipsilateral is present at normal levels  LEFT:   Contralateral is present at normal levels      Speech Reception Threshold:    RIGHT: 15 dB HL    LEFT:   45 dB HL  Word Recognition Score:     RIGHT: 96% at 55 dB HL using NU-6 recorded word list.    LEFT:   60% at 95 dB HL using NU-6 recorded word list.  QuickSIN:  Speech in Noise Loss: 5.5 dB mild degree of communication difficulty in noise      ASSESSMENT:   Asymmetric sensorineural hearing loss left ear worse than right for tones and speech.    Today s results were discussed " with the patient in detail.     PLAN:  Patient was counseled regarding hearing loss and impact on communication.  Patient is a good candidate for amplification at this time. Handout on good communication strategies, and hearing aid use was given to patient. It is recommended that the patient schedule an ENT consult given difference between ears for tones and speech, then schedule a hearing aid consultation.  Please call this clinic with questions regarding these results or recommendations.        Mallika Gonzales Licensed Audiologist #[id number]

## 2018-09-12 RX ORDER — LOSARTAN POTASSIUM 50 MG/1
50 TABLET ORAL DAILY
Qty: 90 TABLET | Refills: 3 | Status: SHIPPED | OUTPATIENT
Start: 2018-09-12 | End: 2018-12-24

## 2018-09-25 ENCOUNTER — OFFICE VISIT (OUTPATIENT)
Dept: AUDIOLOGY | Facility: CLINIC | Age: 73
End: 2018-09-25
Payer: COMMERCIAL

## 2018-09-25 DIAGNOSIS — H90.3 SENSORINEURAL HEARING LOSS, BILATERAL: Primary | ICD-10-CM

## 2018-09-25 PROCEDURE — 99207 ZZC NO CHARGE LOS: CPT | Performed by: AUDIOLOGIST

## 2018-09-25 PROCEDURE — V5299 HEARING SERVICE: HCPCS | Performed by: AUDIOLOGIST

## 2018-09-25 NOTE — PROGRESS NOTES
Mr. Martinez was scheduled for a hearing aid consultation, but needed ENT consult for medical clearance prior to hearing aid consultation appointment.  His hearing was evaluated 9/11/2018 and showed asymmetric hearing loss for tones and speech.  This was discussed, I apologized for the confusion and accompanied the patient to specialty scheduling where an appointment was scheduled 10/4/2018 with Dr. Godoy with hearing aid consult to follow if medically appropriate.  Mr. Martinez was very patient with the confusion.    Consuelo Gonzales.  [place] Licensed Audiologist #[id number]

## 2018-09-25 NOTE — MR AVS SNAPSHOT
"              After Visit Summary   9/25/2018    Ky Martinez    MRN: [id number]           Patient Information     Date Of Birth          1945        Visit Information        Provider Department      9/25/2018 2:00 PM Estela Orozco, [facility]        Today's Diagnoses     Sensorineural hearing loss, bilateral    -  1       Follow-ups after your visit        Your next 10 appointments already scheduled     Oct 04, 2018 11:30 AM CDT   New Visit with Casa Godoy MD   [facility]    [address] [place] [id number] [phone]            Oct 04, 2018 12:00 PM CDT   Return Visit with Mariza Blanco   [facility] ([address] [id number] [phone]              Who to contact     If you have questions or need follow up information about today's clinic visit or your schedule please contact [facility] directly at [phone].  Normal or non-critical lab and imaging results will be communicated to you by Plurchasehart, letter or phone within 4 business days after the clinic has received the results. If you do not hear from us within 7 days, please contact the clinic through [employer] or phone. If you have a critical or abnormal lab result, we will notify you by phone as soon as possible.  Submit refill requests through [employer] or call your pharmacy and they will forward the refill request to us. Please allow 3 business days for your refill to be completed.          Additional Information About Your Visit        Plurchasehart Information     MaPS lets you send messages to your doctor, view your test results, renew your prescriptions, schedule appointments and more. To sign up, go to [email or web address] . Click on \"Log in\" on the left side of the screen, which will take you to the Welcome page. Then click on \"Sign up Now\" on the right " side of the page.     You will be asked to enter the access code listed below, as well as some personal information. Please follow the directions to create your username and password.     Your access code is: [email or web address]  Expires: 12/10/2018  4:56 PM     Your access code will  in 90 days. If you need help or a new code, please call your [place] clinic or [phone].        Care EveryWhere ID     This is your Care EveryWhere ID. This could be used by other organizations to access your [place] medical records  [id number]         Blood Pressure from Last 3 Encounters:   17 120/70   17 132/80   16 124/70    Weight from Last 3 Encounters:   17 287 lb (130.2 kg)   17 282 lb 1.9 oz (128 kg)   16 282 lb (127.9 kg)              We Performed the Following     HEARING AID CHECK/NO CHARGE        Primary Care Provider Office Phone # Fax #    Ky Major [phone]       [address] [facility] [phone]        Equal Access to Services     [facility]: Hadii aad ku hadasho Sojeannetteali, waaxda luqadaha, qaybta kaalmada adeegyada, blayne ortegan angela tan . So [facility] [phone].    ATENCIÓN: Si habla español, tiene a kimble disposición servicios gratuitos de asistencia lingüística. [facility] [phone].    We comply with applicable federal civil rights laws and [place] laws. We do not discriminate on the basis of race, color, national origin, age, disability, sex, sexual orientation, or gender identity.            Thank you!     Thank you for choosing [facility]  for your care. Our goal is always to provide you with excellent care. Hearing back from our patients is one way we can continue to improve our services. Please take a few minutes to complete the written survey that you may receive in the mail after your visit with us. Thank you!             Your Updated Medication List - Protect others around you: Learn how to  safely use, store and throw away your medicines at www.disposemymeds.org.          This list is accurate as of 9/25/18  2:44 PM.  Always use your most recent med list.                   Brand Name Dispense Instructions for use Diagnosis    acetaminophen 325 MG tablet    TYLENOL     Take 325 mg by mouth 2 tablets daily        albuterol 108 (90 Base) MCG/ACT inhaler    PROAIR HFA    1 Inhaler    Inhale 1-2 puffs into the lungs every 4 hours as needed for shortness of breath / dyspnea    Intermittent asthma, uncomplicated       atorvastatin 80 MG tablet    LIPITOR    90 tablet    Take 1 tablet (80 mg) by mouth daily    Hyperlipidemia LDL goal <100       DAILY MULTI PO      1 TABLET DAILY        finasteride 5 MG tablet    PROSCAR    90 tablet    Take 1 tablet (5 mg) by mouth daily        furosemide 40 MG tablet    LASIX    90 tablet    Take 1 tablet (40 mg) by mouth daily 1 tablet daily    Hyperlipidemia LDL goal <100       ibuprofen 200 MG tablet    ADVIL/MOTRIN     Take 400 mg by mouth every 4 hours as needed.        isosorbide mononitrate 30 MG 24 hr tablet    IMDUR    90 tablet    Take 1 tablet (30 mg) by mouth daily 1 tablet daily    Hyperlipidemia LDL goal <100       losartan 50 MG tablet    COZAAR    90 tablet    Take 1 tablet (50 mg) by mouth daily    Benign essential hypertension       metoprolol succinate 50 MG 24 hr tablet    TOPROL-XL    90 tablet    Take 1 tablet (50 mg) by mouth daily 1 tablet daily    Hyperlipidemia LDL goal <100       nitroGLYcerin 0.4 MG sublingual tablet    NITROSTAT     0.4 mg As needed        potassium chloride SA 20 MEQ CR tablet    KLOR-CON    90 tablet    Take 1 tablet (20 mEq) by mouth daily 1 tablet daily    Hyperlipidemia LDL goal <100       tamsulosin 0.4 MG capsule    FLOMAX    90 capsule    TAKE ONE CAPSULE BY MOUTH ONCE DAILY        valsartan 80 MG tablet    DIOVAN    90 tablet    Take 1 tablet (80 mg) by mouth daily 1 tablet daily    Hyperlipidemia LDL goal <100

## 2018-10-03 NOTE — PROGRESS NOTES
ENT Consultation    Ky Martinez who is a 72 year old male seen in consultation at the request of Mariza Agudelo.      History of Present Illness - Ky Martinez is a 72 year old male who presents for medical clearance for hearing aids. Patient reports that he has had a gradual hearing loss. He has not noticed any tinnitus, vertigo, or dizziness. Patient has done a lot of hunting, he did not wear hearing protection until recently. He is right handed.       Body mass index is 40.14 kg/(m^2).    Weight management plan: Patient was referred to their PCP to discuss a diet and exercise plan.    BP Readings from Last 1 Encounters:   10/04/18 140/78     BP noted to be well controlled today in office.     Ky IS NOT a smoker/uses chewing tobacco.      Past Medical History -   Past Medical History:   Diagnosis Date     Hypertension      Myocardial infarction        Current Medications -   Current Outpatient Prescriptions:      acetaminophen (TYLENOL) 325 MG tablet, Take 325 mg by mouth 2 tablets daily, Disp: , Rfl:      albuterol (ALBUTEROL) 108 (90 BASE) MCG/ACT inhaler, Inhale 1-2 puffs into the lungs every 4 hours as needed for shortness of breath / dyspnea, Disp: 1 Inhaler, Rfl: 1     atorvastatin (LIPITOR) 80 MG tablet, Take 1 tablet (80 mg) by mouth daily, Disp: 90 tablet, Rfl: 3     DAILY MULTI OR, 1 TABLET DAILY, Disp: , Rfl:      finasteride (PROSCAR) 5 MG tablet, Take 1 tablet (5 mg) by mouth daily, Disp: 90 tablet, Rfl: 3     furosemide (LASIX) 40 MG tablet, Take 1 tablet (40 mg) by mouth daily 1 tablet daily, Disp: 90 tablet, Rfl: 3     ibuprofen (ADVIL,MOTRIN) 200 MG tablet, Take 400 mg by mouth every 4 hours as needed., Disp: , Rfl:      isosorbide mononitrate (IMDUR) 30 MG 24 hr tablet, Take 1 tablet (30 mg) by mouth daily 1 tablet daily, Disp: 90 tablet, Rfl: 3     losartan (COZAAR) 50 MG tablet, Take 1 tablet (50 mg) by mouth daily, Disp: 90 tablet, Rfl: 3     metoprolol (TOPROL-XL) 50 MG 24 hr  tablet, Take 1 tablet (50 mg) by mouth daily 1 tablet daily, Disp: 90 tablet, Rfl: 3     nitroGLYcerin (NITROSTAT) 0.4 MG sublingual tablet, 0.4 mg As needed, Disp: , Rfl:      potassium chloride SA (KLOR-CON) 20 MEQ CR tablet, Take 1 tablet (20 mEq) by mouth daily 1 tablet daily, Disp: 90 tablet, Rfl: 3     tamsulosin (FLOMAX) 0.4 MG capsule, TAKE ONE CAPSULE BY MOUTH ONCE DAILY, Disp: 90 capsule, Rfl: 3     valsartan (DIOVAN) 80 MG tablet, Take 1 tablet (80 mg) by mouth daily 1 tablet daily, Disp: 90 tablet, Rfl: 3    Allergies -   Allergies   Allergen Reactions     Pine Shortness Of Breath     Pine Pollen     Lisinopril Cough     No Clinical Screening - See Comments Rash       Social History -   Social History     Social History     Marital status:      Spouse name: N/A     Number of children: N/A     Years of education: N/A     Social History Main Topics     Smoking status: Never Smoker     Smokeless tobacco: Never Used     Alcohol use 2.0 oz/week     4 Glasses of wine per week      Comment: occasional      Drug use: No     Sexual activity: Yes     Partners: Female     Other Topics Concern     Not on file     Social History Narrative       Family History -   Family History   Problem Relation Age of Onset     Arthritis Mother      Diabetes Father      Allergies Father      sulfa     Eye Disorder Father      cataract     HEART DISEASE Father      by pass       Review of Systems - As per HPI and PMHx, otherwise review of system review of the head and neck negative.    Physical Exam  /78  Pulse 86  Wt 134.3 kg (296 lb)  SpO2 96%  BMI 40.14 kg/m2  BMI: Body mass index is 40.14 kg/(m^2).    General - The patient is well nourished and well developed, and appears to have good nutritional status.  Alert and oriented to person and place, answers questions and cooperates with examination appropriately.    SKIN - No suspicious lesions or rashes.  Respiration - No respiratory distress.  Head and Face -  Normocephalic and atraumatic, with no gross asymmetry noted of the contour of the facial features.  The facial nerve is intact, with strong symmetric movements.    Voice and Breathing - The patient was breathing comfortably without the use of accessory muscles. The patients voice was clear and strong, and had appropriate pitch and quality.    Ears - Bilateral pinna and EACs with normal appearing overlying skin. Tympanic membrane intact with good mobility on pneumatic otoscopy bilaterally. Bony landmarks of the ossicular chain are normal. The tympanic membranes are normal in appearance. No retraction, perforation, or masses.  No fluid or purulence was seen in the external canal or the middle ear.     Eyes - Extraocular movements intact.  Sclera were not icteric or injected, conjunctiva were pink and moist.    Mouth - Examination of the oral cavity showed pink, healthy oral mucosa. No lesions or ulcerations noted.  The tongue was mobile and midline, and the dentition were in good condition.      Throat - The walls of the oropharynx were smooth, pink, moist, symmetric, and had no lesions or ulcerations.  The tonsillar pillars and soft palate were symmetric.  The uvula was midline on elevation.    Neck - Normal midline excursion of the laryngotracheal complex during swallowing.  Full range of motion on passive movement.  Palpation of the occipital, submental, submandibular, internal jugular chain, and supraclavicular nodes did not demonstrate any abnormal lymph nodes or masses.  The carotid pulse was palpable bilaterally.  Palpation of the thyroid was soft and smooth, with no nodules or goiter appreciated.  The trachea was mobile and midline.    Nose - External contour is symmetric, no gross deflection or scars.  Nasal mucosa is pink and moist with no abnormal mucus.  The septum was midline and non-obstructive, turbinates of normal size and position.  No polyps, masses, or purulence noted on examination.    Neuro -  Nonfocal neuro exam is normal, CN 2 through 12 intact, normal gait and muscle tone.      Performed in clinic 09/11/2018:  Audiologic Studies - An audiogram and tympanogram were performed today as part of the evaluation and personally reviewed. The tympanogram shows Type A curves on the right and Type A curves on the left, with normal canal volumes and middle ear pressures.  The audiogram showed mild to moderate SNHL with 96% word recognition on the right and moderate SNHL with 60% word recognition on the left.        A/P - Ky Martinez is a 72 year old male with hearing loss. I highly recommend hearing aids, especially on the left. Patient reports that he wishes to pursue hearing aids at this time. He was educated on the small possibility of ocular pathology due to asymmetry on the left side even though there is strong history of noise trauma especially on the left due to hunting shooting without protection.  However due to word recognition difference and pure-tone difference we discussed small possibility of acoustic neuroma.  He understands that at this point does not wish to pursue MRI or any other studies.  We shall therefore follow him carefully he will get another audiogram in a year.      Casa Godoy MD

## 2018-10-04 ENCOUNTER — OFFICE VISIT (OUTPATIENT)
Dept: OTOLARYNGOLOGY | Facility: CLINIC | Age: 73
End: 2018-10-04
Payer: COMMERCIAL

## 2018-10-04 ENCOUNTER — OFFICE VISIT (OUTPATIENT)
Dept: AUDIOLOGY | Facility: CLINIC | Age: 73
End: 2018-10-04
Payer: COMMERCIAL

## 2018-10-04 VITALS
OXYGEN SATURATION: 96 % | WEIGHT: 296 LBS | HEART RATE: 86 BPM | BODY MASS INDEX: 40.14 KG/M2 | SYSTOLIC BLOOD PRESSURE: 140 MMHG | DIASTOLIC BLOOD PRESSURE: 78 MMHG

## 2018-10-04 DIAGNOSIS — H90.3 ASYMMETRICAL SENSORINEURAL HEARING LOSS: Primary | ICD-10-CM

## 2018-10-04 DIAGNOSIS — H90.3 SENSORINEURAL HEARING LOSS, BILATERAL: Primary | ICD-10-CM

## 2018-10-04 PROCEDURE — V5275 EAR IMPRESSION: HCPCS | Mod: LT | Performed by: AUDIOLOGIST

## 2018-10-04 PROCEDURE — 99203 OFFICE O/P NEW LOW 30 MIN: CPT | Performed by: OTOLARYNGOLOGY

## 2018-10-04 PROCEDURE — 99207 ZZC NO CHARGE LOS: CPT | Performed by: AUDIOLOGIST

## 2018-10-04 PROCEDURE — 92590 HC HEARING AID EXAM MONAURAL: CPT | Mod: LT | Performed by: AUDIOLOGIST

## 2018-10-04 NOTE — PROGRESS NOTES
AUDIOLOGY REPORT    SUBJECTIVE: Ky Martinez is a 72 year old male was seen in the Audiology Clinic at  [facility] on 10/04/18 to discuss concerns with hearing and functional communication difficulties. The patient was accompanied by their self. Ky has been seen previously on 09/11/2018, and results revealed a mild rising to normal sensorineural hearing loss 1500 Hz to 3000 Hz right ear and mild to moderate sensorineural hearing loss left ear.  The patient was medically evaluated and determined to be cleared for binaural hearing aids by FE Godoy M.D. Ky notes difficulty with communication in a variety of listening situations, including understanding conversation in background noise, increased volume on the TV, and hearing soft speech in general.    OBJECTIVE:  Patient is a hearing aid candidate. Patient would like to move forward with a hearing aid evaluation today. Therefore, the patient was presented with different options for amplification to help aid in communication. Discussed styles, levels of technology and monaural vs. binaural fitting.     The hearing aid(s) mutually chosen were:  Left: ReSound Linx 3D 5 CHRISTINA  COLOR: Black  BATTERY SIZE: 13  EARMOLD/TIPS: canal  CANAL/ LENGTH: 2    Otoscopy revealed ears are clear of cerumen bilaterally. A left earmold was taken without incident.    ASSESSMENT:   Sensorineural hearing loss bilaterally, left ear worse than right.    Reviewed purchase information and warranty information with patient. The 45 day trial period was explained to patient. The patient was given a copy of the [place] Department of Health consumer brochure on purchasing hearing instruments. Patient risk factors have been provided to the patient in writing prior to the sale of the hearing aid per FDA regulation. The risk factors are also available in the User Instructional Booklet to be presented on the day of the hearing aid fitting. Hearing  aid(s) ordered. Hearing aid evaluation completed.    PLAN: Ky is scheduled to return in 2-3 weeks for a hearing aid fitting and programming. Purchase agreement will be completed on that date. Please contact this clinic with any questions or concerns.      Consuelo Gonzales.  [place] Licensed Audiologist #[id number]

## 2018-10-04 NOTE — MR AVS SNAPSHOT
"              After Visit Summary   10/4/2018    Ky Martinez    MRN: [id number]           Patient Information     Date Of Birth          1945        Visit Information        Provider Department      10/4/2018 11:30 AM Casa Godoy MD [facility]         Follow-ups after your visit        Your next 10 appointments already scheduled     Oct 18, 2018  3:00 PM CDT   Hearing Aid Fitting with Mariza Blanco   [facility] ([facility]    [address] [place] [id number] [phone]              Who to contact     If you have questions or need follow up information about today's clinic visit or your schedule please contact [facility] directly at [phone].  Normal or non-critical lab and imaging results will be communicated to you by MyChart, letter or phone within 4 business days after the clinic has received the results. If you do not hear from us within 7 days, please contact the clinic through MyChart or phone. If you have a critical or abnormal lab result, we will notify you by phone as soon as possible.  Submit refill requests through [employer] or call your pharmacy and they will forward the refill request to us. Please allow 3 business days for your refill to be completed.          Additional Information About Your Visit        Smart Platehart Information     [employer] lets you send messages to your doctor, view your test results, renew your prescriptions, schedule appointments and more. To sign up, go to [email or web address] . Click on \"Log in\" on the left side of the screen, which will take you to the Welcome page. Then click on \"Sign up Now\" on the right side of the page.     You will be asked to enter the access code listed below, as well as some personal information. Please follow the directions to create your username and password.     Your access code is: [email or web address]  Expires: 12/10/2018  4:56 PM     Your access " code will  in 90 days. If you need help or a new code, please call your [place] clinic or [phone].        Care EveryWhere ID     This is your Care EveryWhere ID. This could be used by other organizations to access your [place] medical records  [id number]        Your Vitals Were     Pulse Pulse Oximetry BMI (Body Mass Index)             86 96% 40.14 kg/m2          Blood Pressure from Last 3 Encounters:   10/04/18 140/78   17 120/70   17 132/80    Weight from Last 3 Encounters:   10/04/18 134.3 kg (296 lb)   17 130.2 kg (287 lb)   17 128 kg (282 lb 1.9 oz)              Today, you had the following     No orders found for display       Primary Care Provider Office Phone # Fax #    Ky Major [phone]        [facility] [id number]        Equal Access to Services     [facility] : Hadii aad ku hadasho Soomaali, waaxda luqadaha, qaybta kaalmada adeegyada, waxay jjin hayvalen angela tan . So [facility] [phone].    ATENCIÓN: Si habla español, tiene a kimble disposición servicios gratuitos de asistencia lingüística. Llame al [phone].    We comply with applicable federal civil rights laws and [place] laws. We do not discriminate on the basis of race, color, national origin, age, disability, sex, sexual orientation, or gender identity.            Thank you!     Thank you for choosing [facility]  for your care. Our goal is always to provide you with excellent care. Hearing back from our patients is one way we can continue to improve our services. Please take a few minutes to complete the written survey that you may receive in the mail after your visit with us. Thank you!             Your Updated Medication List - Protect others around you: Learn how to safely use, store and throw away your medicines at www.disposemymeds.org.          This list is accurate as of 10/4/18  1:01 PM.  Always use your most recent med  list.                   Brand Name Dispense Instructions for use Diagnosis    acetaminophen 325 MG tablet    TYLENOL     Take 325 mg by mouth 2 tablets daily        albuterol 108 (90 Base) MCG/ACT inhaler    PROAIR HFA    1 Inhaler    Inhale 1-2 puffs into the lungs every 4 hours as needed for shortness of breath / dyspnea    Intermittent asthma, uncomplicated       atorvastatin 80 MG tablet    LIPITOR    90 tablet    Take 1 tablet (80 mg) by mouth daily    Hyperlipidemia LDL goal <100       DAILY MULTI PO      1 TABLET DAILY        finasteride 5 MG tablet    PROSCAR    90 tablet    Take 1 tablet (5 mg) by mouth daily        furosemide 40 MG tablet    LASIX    90 tablet    Take 1 tablet (40 mg) by mouth daily 1 tablet daily    Hyperlipidemia LDL goal <100       ibuprofen 200 MG tablet    ADVIL/MOTRIN     Take 400 mg by mouth every 4 hours as needed.        isosorbide mononitrate 30 MG 24 hr tablet    IMDUR    90 tablet    Take 1 tablet (30 mg) by mouth daily 1 tablet daily    Hyperlipidemia LDL goal <100       losartan 50 MG tablet    COZAAR    90 tablet    Take 1 tablet (50 mg) by mouth daily    Benign essential hypertension       metoprolol succinate 50 MG 24 hr tablet    TOPROL-XL    90 tablet    Take 1 tablet (50 mg) by mouth daily 1 tablet daily    Hyperlipidemia LDL goal <100       nitroGLYcerin 0.4 MG sublingual tablet    NITROSTAT     0.4 mg As needed        potassium chloride SA 20 MEQ CR tablet    KLOR-CON    90 tablet    Take 1 tablet (20 mEq) by mouth daily 1 tablet daily    Hyperlipidemia LDL goal <100       tamsulosin 0.4 MG capsule    FLOMAX    90 capsule    TAKE ONE CAPSULE BY MOUTH ONCE DAILY        valsartan 80 MG tablet    DIOVAN    90 tablet    Take 1 tablet (80 mg) by mouth daily 1 tablet daily    Hyperlipidemia LDL goal <100

## 2018-10-04 NOTE — MR AVS SNAPSHOT
"              After Visit Summary   10/4/2018    Ky Martinez    MRN: [id number]           Patient Information     Date Of Birth          1945        Visit Information        Provider Department      10/4/2018 12:00 PM Estela Orozco AuD [facility]        Today's Diagnoses     Sensorineural hearing loss, bilateral    -  1       Follow-ups after your visit        Your next 10 appointments already scheduled     Oct 18, 2018  3:00 PM CDT   Hearing Aid Fitting with Mariza Blanco   [facility]    [address] [place] [address] [id number] [phone]              Who to contact     If you have questions or need follow up information about today's clinic visit or your schedule please contact [facility] directly at [phone].  Normal or non-critical lab and imaging results will be communicated to you by Zoomyhart, letter or phone within 4 business days after the clinic has received the results. If you do not hear from us within 7 days, please contact the clinic through Zoomyhart or phone. If you have a critical or abnormal lab result, we will notify you by phone as soon as possible.  Submit refill requests through Qalendra or call your pharmacy and they will forward the refill request to us. Please allow 3 business days for your refill to be completed.          Additional Information About Your Visit        MyChart Information     Qalendra lets you send messages to your doctor, view your test results, renew your prescriptions, schedule appointments and more. To sign up, go to [email or web address] . Click on \"Log in\" on the left side of the screen, which will take you to the Welcome page. Then click on \"Sign up Now\" on the right side of the page.     You will be asked to enter the access code listed below, as well as some personal information. Please follow the directions to create your username and password.   "   Your access code is: [email or web address]  Expires: 12/10/2018  4:56 PM     Your access code will  in 90 days. If you need help or a new code, please call your [place] clinic or [phone].        Care EveryWhere ID     This is your Care EveryWhere ID. This could be used by other organizations to access your [facility] medical records  [id number]         Blood Pressure from Last 3 Encounters:   10/04/18 140/78   17 120/70   17 132/80    Weight from Last 3 Encounters:   10/04/18 296 lb (134.3 kg)   17 287 lb (130.2 kg)   17 282 lb 1.9 oz (128 kg)              We Performed the Following     EAR IMPRESSION, EACH     HEARING AID EXAM MONAURAL        Primary Care Provider Office Phone # Fax #    Ky Major [phone]       [facility] [id number]        Equal Access to Services     HANS SOTO : Hadii aad ku hadasho Soomaali, waaxda luqadaha, qaybta kaalmada adeegyada, waxay idiin hayaan judieeg dawoodaramartin tan . So [facility] [phone].    ATENCIÓN: Si habla español, tiene a kimble disposición servicios gratuitos de asistencia lingüística. Llame al [phone].    We comply with applicable federal civil rights laws and [place] laws. We do not discriminate on the basis of race, color, national origin, age, disability, sex, sexual orientation, or gender identity.            Thank you!     Thank you for choosing [facility]  for your care. Our goal is always to provide you with excellent care. Hearing back from our patients is one way we can continue to improve our services. Please take a few minutes to complete the written survey that you may receive in the mail after your visit with us. Thank you!             Your Updated Medication List - Protect others around you: Learn how to safely use, store and throw away your medicines at www.disposemymeds.org.          This list is accurate as of 10/4/18  4:36 PM.  Always use your most recent med  list.                   Brand Name Dispense Instructions for use Diagnosis    acetaminophen 325 MG tablet    TYLENOL     Take 325 mg by mouth 2 tablets daily        albuterol 108 (90 Base) MCG/ACT inhaler    PROAIR HFA    1 Inhaler    Inhale 1-2 puffs into the lungs every 4 hours as needed for shortness of breath / dyspnea    Intermittent asthma, uncomplicated       atorvastatin 80 MG tablet    LIPITOR    90 tablet    Take 1 tablet (80 mg) by mouth daily    Hyperlipidemia LDL goal <100       DAILY MULTI PO      1 TABLET DAILY        finasteride 5 MG tablet    PROSCAR    90 tablet    Take 1 tablet (5 mg) by mouth daily        furosemide 40 MG tablet    LASIX    90 tablet    Take 1 tablet (40 mg) by mouth daily 1 tablet daily    Hyperlipidemia LDL goal <100       ibuprofen 200 MG tablet    ADVIL/MOTRIN     Take 400 mg by mouth every 4 hours as needed.        isosorbide mononitrate 30 MG 24 hr tablet    IMDUR    90 tablet    Take 1 tablet (30 mg) by mouth daily 1 tablet daily    Hyperlipidemia LDL goal <100       losartan 50 MG tablet    COZAAR    90 tablet    Take 1 tablet (50 mg) by mouth daily    Benign essential hypertension       metoprolol succinate 50 MG 24 hr tablet    TOPROL-XL    90 tablet    Take 1 tablet (50 mg) by mouth daily 1 tablet daily    Hyperlipidemia LDL goal <100       nitroGLYcerin 0.4 MG sublingual tablet    NITROSTAT     0.4 mg As needed        potassium chloride SA 20 MEQ CR tablet    KLOR-CON    90 tablet    Take 1 tablet (20 mEq) by mouth daily 1 tablet daily    Hyperlipidemia LDL goal <100       tamsulosin 0.4 MG capsule    FLOMAX    90 capsule    TAKE ONE CAPSULE BY MOUTH ONCE DAILY        valsartan 80 MG tablet    DIOVAN    90 tablet    Take 1 tablet (80 mg) by mouth daily 1 tablet daily    Hyperlipidemia LDL goal <100

## 2018-10-04 NOTE — LETTER
10/4/2018         RE: Ky Martinez  [id number]  [facility] [address]        Dear Colleague,    Thank you for referring your patient, Ky Martinez, to the [facility]. Please see a copy of my visit note below.    ENT Consultation    Ky Martinez who is a 72 year old male seen in consultation at the request of Mariza Agudelo.      History of Present Illness - Ky Martinez is a 72 year old male who presents for medical clearance for hearing aids. Patient reports that he has had a gradual hearing loss. He has not noticed any tinnitus, vertigo, or dizziness. Patient has done a lot of hunting, he did not wear hearing protection until recently. He is right handed.       Body mass index is 40.14 kg/(m^2).    Weight management plan: Patient was referred to their PCP to discuss a diet and exercise plan.    BP Readings from Last 1 Encounters:   10/04/18 140/78     BP noted to be well controlled today in office.     Ky IS NOT a smoker/uses chewing tobacco.      Past Medical History -   Past Medical History:   Diagnosis Date     Hypertension      Myocardial infarction        Current Medications -   Current Outpatient Prescriptions:      acetaminophen (TYLENOL) 325 MG tablet, Take 325 mg by mouth 2 tablets daily, Disp: , Rfl:      albuterol (ALBUTEROL) 108 (90 BASE) MCG/ACT inhaler, Inhale 1-2 puffs into the lungs every 4 hours as needed for shortness of breath / dyspnea, Disp: 1 Inhaler, Rfl: 1     atorvastatin (LIPITOR) 80 MG tablet, Take 1 tablet (80 mg) by mouth daily, Disp: 90 tablet, Rfl: 3     DAILY MULTI OR, 1 TABLET DAILY, Disp: , Rfl:      finasteride (PROSCAR) 5 MG tablet, Take 1 tablet (5 mg) by mouth daily, Disp: 90 tablet, Rfl: 3     furosemide (LASIX) 40 MG tablet, Take 1 tablet (40 mg) by mouth daily 1 tablet daily, Disp: 90 tablet, Rfl: 3     ibuprofen (ADVIL,MOTRIN) 200 MG tablet, Take 400 mg by mouth every 4 hours as needed., Disp: , Rfl:      isosorbide mononitrate  (IMDUR) 30 MG 24 hr tablet, Take 1 tablet (30 mg) by mouth daily 1 tablet daily, Disp: 90 tablet, Rfl: 3     losartan (COZAAR) 50 MG tablet, Take 1 tablet (50 mg) by mouth daily, Disp: 90 tablet, Rfl: 3     metoprolol (TOPROL-XL) 50 MG 24 hr tablet, Take 1 tablet (50 mg) by mouth daily 1 tablet daily, Disp: 90 tablet, Rfl: 3     nitroGLYcerin (NITROSTAT) 0.4 MG sublingual tablet, 0.4 mg As needed, Disp: , Rfl:      potassium chloride SA (KLOR-CON) 20 MEQ CR tablet, Take 1 tablet (20 mEq) by mouth daily 1 tablet daily, Disp: 90 tablet, Rfl: 3     tamsulosin (FLOMAX) 0.4 MG capsule, TAKE ONE CAPSULE BY MOUTH ONCE DAILY, Disp: 90 capsule, Rfl: 3     valsartan (DIOVAN) 80 MG tablet, Take 1 tablet (80 mg) by mouth daily 1 tablet daily, Disp: 90 tablet, Rfl: 3    Allergies -   Allergies   Allergen Reactions     Pine Shortness Of Breath     Pine Pollen     Lisinopril Cough     No Clinical Screening - See Comments Rash       Social History -   Social History     Social History     Marital status:      Spouse name: N/A     Number of children: N/A     Years of education: N/A     Social History Main Topics     Smoking status: Never Smoker     Smokeless tobacco: Never Used     Alcohol use 2.0 oz/week     4 Glasses of wine per week      Comment: occasional      Drug use: No     Sexual activity: Yes     Partners: Female     Other Topics Concern     Not on file     Social History Narrative       Family History -   Family History   Problem Relation Age of Onset     Arthritis Mother      Diabetes Father      Allergies Father      sulfa     Eye Disorder Father      cataract     HEART DISEASE Father      by pass       Review of Systems - As per HPI and PMHx, otherwise review of system review of the head and neck negative.    Physical Exam  /78  Pulse 86  Wt 134.3 kg (296 lb)  SpO2 96%  BMI 40.14 kg/m2  BMI: Body mass index is 40.14 kg/(m^2).    General - The patient is well nourished and well developed, and appears to  have good nutritional status.  Alert and oriented to person and place, answers questions and cooperates with examination appropriately.    SKIN - No suspicious lesions or rashes.  Respiration - No respiratory distress.  Head and Face - Normocephalic and atraumatic, with no gross asymmetry noted of the contour of the facial features.  The facial nerve is intact, with strong symmetric movements.    Voice and Breathing - The patient was breathing comfortably without the use of accessory muscles. The patients voice was clear and strong, and had appropriate pitch and quality.    Ears - Bilateral pinna and EACs with normal appearing overlying skin. Tympanic membrane intact with good mobility on pneumatic otoscopy bilaterally. Bony landmarks of the ossicular chain are normal. The tympanic membranes are normal in appearance. No retraction, perforation, or masses.  No fluid or purulence was seen in the external canal or the middle ear.     Eyes - Extraocular movements intact.  Sclera were not icteric or injected, conjunctiva were pink and moist.    Mouth - Examination of the oral cavity showed pink, healthy oral mucosa. No lesions or ulcerations noted.  The tongue was mobile and midline, and the dentition were in good condition.      Throat - The walls of the oropharynx were smooth, pink, moist, symmetric, and had no lesions or ulcerations.  The tonsillar pillars and soft palate were symmetric.  The uvula was midline on elevation.    Neck - Normal midline excursion of the laryngotracheal complex during swallowing.  Full range of motion on passive movement.  Palpation of the occipital, submental, submandibular, internal jugular chain, and supraclavicular nodes did not demonstrate any abnormal lymph nodes or masses.  The carotid pulse was palpable bilaterally.  Palpation of the thyroid was soft and smooth, with no nodules or goiter appreciated.  The trachea was mobile and midline.    Nose - External contour is symmetric, no  gross deflection or scars.  Nasal mucosa is pink and moist with no abnormal mucus.  The septum was midline and non-obstructive, turbinates of normal size and position.  No polyps, masses, or purulence noted on examination.    Neuro - Nonfocal neuro exam is normal, CN 2 through 12 intact, normal gait and muscle tone.      Performed in clinic 09/11/2018:  Audiologic Studies - An audiogram and tympanogram were performed today as part of the evaluation and personally reviewed. The tympanogram shows Type A curves on the right and Type A curves on the left, with normal canal volumes and middle ear pressures.  The audiogram showed mild to moderate SNHL with 96% word recognition on the right and moderate SNHL with 60% word recognition on the left.        A/P - Ky Martinez is a 72 year old male with hearing loss. I highly recommend hearing aids, especially on the left. Patient reports that he wishes to pursue hearing aids at this time. He was educated on the small possibility of ocular pathology due to asymmetry on the left side even though there is strong history of noise trauma especially on the left due to hunting shooting without protection.  However due to word recognition difference and pure-tone difference we discussed small possibility of acoustic neuroma.  He understands that at this point does not wish to pursue MRI or any other studies.  We shall therefore follow him carefully he will get another audiogram in a year.      Casa Godoy MD      Again, thank you for allowing me to participate in the care of your patient.        Sincerely,        Casa Godoy MD, MD

## 2018-10-23 ENCOUNTER — OFFICE VISIT (OUTPATIENT)
Dept: AUDIOLOGY | Facility: CLINIC | Age: 73
End: 2018-10-23
Payer: COMMERCIAL

## 2018-10-23 DIAGNOSIS — H90.3 SENSORINEURAL HEARING LOSS, BILATERAL: Primary | ICD-10-CM

## 2018-10-23 PROCEDURE — 92592 HC HEARING AID CHECK, MONAURAL: CPT | Mod: LT | Performed by: AUDIOLOGIST

## 2018-10-23 PROCEDURE — V5257 HEARING AID, DIGIT, MON, BTE: HCPCS | Mod: LT | Performed by: AUDIOLOGIST

## 2018-10-23 PROCEDURE — V5241 DISPENSING FEE, MONAURAL: HCPCS | Mod: LT | Performed by: AUDIOLOGIST

## 2018-10-23 PROCEDURE — V5011 HEARING AID FITTING/CHECKING: HCPCS | Mod: LT | Performed by: AUDIOLOGIST

## 2018-10-23 PROCEDURE — V5264 EAR MOLD/INSERT: HCPCS | Mod: LT | Performed by: AUDIOLOGIST

## 2018-10-23 PROCEDURE — V5020 CONFORMITY EVALUATION: HCPCS | Mod: LT | Performed by: AUDIOLOGIST

## 2018-10-23 PROCEDURE — 99207 ZZC NO CHARGE LOS: CPT | Performed by: AUDIOLOGIST

## 2018-10-23 NOTE — MR AVS SNAPSHOT
"              After Visit Summary   10/23/2018    Ky Martinez    MRN: [id number]           Patient Information     Date Of Birth          1945        Visit Information        Provider Department      10/23/2018 8:00 AM Estela Orozco AuD [facility]        Today's Diagnoses     Sensorineural hearing loss, bilateral    -  1       Follow-ups after your visit        Your next 10 appointments already scheduled     Nov 01, 2018  8:00 AM CDT   Hearing Aid Fitting with Mariza Blanco   [facility]    [address] [place] [address] [id number] [phone]              Who to contact     If you have questions or need follow up information about today's clinic visit or your schedule please contact [facility] directly at [phone].  Normal or non-critical lab and imaging results will be communicated to you by HotDeskhart, letter or phone within 4 business days after the clinic has received the results. If you do not hear from us within 7 days, please contact the clinic through HotDeskhart or phone. If you have a critical or abnormal lab result, we will notify you by phone as soon as possible.  Submit refill requests through [email or web address] or call your pharmacy and they will forward the refill request to us. Please allow 3 business days for your refill to be completed.          Additional Information About Your Visit        MyChart Information     [email or web address] lets you send messages to your doctor, view your test results, renew your prescriptions, schedule appointments and more. To sign up, go to [email or web address] . Click on \"Log in\" on the left side of the screen, which will take you to the Welcome page. Then click on \"Sign up Now\" on the right side of the page.     You will be asked to enter the access code listed below, as well as some personal information. Please follow the directions to create your username and password.   "   Your access code is: [email or web address]  Expires: 12/10/2018  4:56 PM     Your access code will  in 90 days. If you need help or a new code, please call your [facility] clinic or [phone].        Care EveryWhere ID     This is your Care EveryWhere ID. This could be used by other organizations to access your [facility] medical records  [id number]         Blood Pressure from Last 3 Encounters:   10/04/18 140/78   17 120/70   17 132/80    Weight from Last 3 Encounters:   10/04/18 296 lb (134.3 kg)   17 287 lb (130.2 kg)   17 282 lb 1.9 oz (128 kg)              We Performed the Following     DISPENSING FEE, MONAURAL HEARING AID     EAR MOLD/INSERT, NONDISPOSABLE, ANY TYPE     HEARING AID BTE DIGITAL, MONAURAL     HEARING AID CHECK, MONAURAL     HEARING AID CONFORMITY EVALUATION     HEARING AID FIT/ORIENTATION/CHECK        Primary Care Provider Office Phone # Fax #    Ky Major [phone]       [address] [facility] [address]        Equal Access to Services     [facility]: Hadii aad ku hadasho Soomaali, waaxda luqadaha, qaybta kaalmada adeegyada, waxay jjin hayvalen angela tan . So [facility] [phone].    ATENCIÓN: Si habla español, tiene a kimble disposición servicios gratuitos de asistencia lingüística. Llame al [phone].    We comply with applicable federal civil rights laws and [place] laws. We do not discriminate on the basis of race, color, national origin, age, disability, sex, sexual orientation, or gender identity.            Thank you!     Thank you for choosing [facility]  for your care. Our goal is always to provide you with excellent care. Hearing back from our patients is one way we can continue to improve our services. Please take a few minutes to complete the written survey that you may receive in the mail after your visit with us. Thank you!             Your Updated Medication List - Protect others around you:  Learn how to safely use, store and throw away your medicines at www.disposemymeds.org.          This list is accurate as of 10/23/18  9:04 AM.  Always use your most recent med list.                   Brand Name Dispense Instructions for use Diagnosis    acetaminophen 325 MG tablet    TYLENOL     Take 325 mg by mouth 2 tablets daily        albuterol 108 (90 Base) MCG/ACT inhaler    PROAIR HFA    1 Inhaler    Inhale 1-2 puffs into the lungs every 4 hours as needed for shortness of breath / dyspnea    Intermittent asthma, uncomplicated       atorvastatin 80 MG tablet    LIPITOR    90 tablet    Take 1 tablet (80 mg) by mouth daily    Hyperlipidemia LDL goal <100       DAILY MULTI PO      1 TABLET DAILY        finasteride 5 MG tablet    PROSCAR    90 tablet    Take 1 tablet (5 mg) by mouth daily        furosemide 40 MG tablet    LASIX    90 tablet    Take 1 tablet (40 mg) by mouth daily 1 tablet daily    Hyperlipidemia LDL goal <100       ibuprofen 200 MG tablet    ADVIL/MOTRIN     Take 400 mg by mouth every 4 hours as needed.        isosorbide mononitrate 30 MG 24 hr tablet    IMDUR    90 tablet    Take 1 tablet (30 mg) by mouth daily 1 tablet daily    Hyperlipidemia LDL goal <100       losartan 50 MG tablet    COZAAR    90 tablet    Take 1 tablet (50 mg) by mouth daily    Benign essential hypertension       metoprolol succinate 50 MG 24 hr tablet    TOPROL-XL    90 tablet    Take 1 tablet (50 mg) by mouth daily 1 tablet daily    Hyperlipidemia LDL goal <100       nitroGLYcerin 0.4 MG sublingual tablet    NITROSTAT     0.4 mg As needed        potassium chloride SA 20 MEQ CR tablet    KLOR-CON    90 tablet    Take 1 tablet (20 mEq) by mouth daily 1 tablet daily    Hyperlipidemia LDL goal <100       tamsulosin 0.4 MG capsule    FLOMAX    90 capsule    TAKE ONE CAPSULE BY MOUTH ONCE DAILY        valsartan 80 MG tablet    DIOVAN    90 tablet    Take 1 tablet (80 mg) by mouth daily 1 tablet daily    Hyperlipidemia LDL goal  <100

## 2018-10-23 NOTE — PROGRESS NOTES
AUDIOLOGY REPORT    SUBJECTIVE: Ky Martinez, a 72 year old male, was seen in the Audiology Clinic at [facility] today for a Left hearing aid fitting. Previous results have revealed a unilateral mil to moderate sensorineural hearing loss. The patient was given medical clearance to pursue amplification by  FE Godoy MD.      OBJECTIVE:  Prior to fitting, a hearing aid check was performed to ensure device functionality. The hearing aid conformity evaluation was completed.The hearing aids were placed and they provided a good fit. Real-ear-probe-microphone measurements were completed on the KAICORE system and were a good match to NAL-NL2 target with soft sounds audible, moderate sounds comfortable, and loud sounds below discomfort. UCLs are verified through maximum power output measures and demonstrate appropriate limiting of loud inputs. Mr. Martinez was oriented to proper hearing aid use, care, cleaning (no water, dry brush), batteries (size 13, insertion/removal, toxicity, low-battery signal), aid insertion/removal, user booklet, warranty information, storage cases, and other hearing aid details. The patient confirmed understanding of hearing aid use and care, and showed proper insertion of hearing aid and batteries while in the office today. Mr. Martinez reported good volume and sound quality today.    EAR(S) FIT: Left  HEARING AID MODEL NAME:  ReSound Linx 3D  CHRISTINA  HEARING AID STYLE: BTE  EARMOLDS/TIP/ LINK: Silicone canal   SERIAL NUMBERS: Left: [id number]  WARRANTY END DATE: 11/12/2020    CHARGES:   Earmold(s): , Qty 1, $80.00, LT (Left)  Hearing Aid Check: Monaural, 58751, $49.00  Dispensing Fee: Monaural, , $400.00,   Fit/Orientation: Monaural, , $161.00  Hearing Aid Conformity Evaluation: , Qty:1LT  Hearing Aid Digital: Monaural, BTE, .    Patient paid $250 on the day of service.     ASSESSMENT: Monaural ReSound Linx 3D 5  CHRISTINA hearing  aid fitting completed today. Verification measures were performed. The 45 day trial period was explained to patient, and they expressed understanding. Mr. Martinez signed the Hearing Aid Purchase Agreement and was given a copy, as well as details on his hearing aids. Patient was counseled that exact out of pocket amounts cannot be determined for hearing aid claims being sent to insurance. Any insurance coverage information presented to the patient is an estimate only, and is not a guarantee of payment. Patient has been advised to check with their own insurance.    PLAN: Mr. Martinez will return for follow-up in 2-3 weeks for a hearing aid review appointment. Please call this clinic with questions regarding today s appointment.    Mallika Gonzales Licensed Audiologist #[id number]

## 2018-11-01 ENCOUNTER — OFFICE VISIT (OUTPATIENT)
Dept: AUDIOLOGY | Facility: CLINIC | Age: 73
End: 2018-11-01
Payer: COMMERCIAL

## 2018-11-01 DIAGNOSIS — H90.3 SENSORINEURAL HEARING LOSS, BILATERAL: Primary | ICD-10-CM

## 2018-11-01 PROCEDURE — 99207 ZZC NO CHARGE LOS: CPT | Performed by: AUDIOLOGIST

## 2018-11-01 PROCEDURE — V5299 HEARING SERVICE: HCPCS | Performed by: AUDIOLOGIST

## 2018-11-01 NOTE — MR AVS SNAPSHOT
After Visit Summary   11/1/2018    Ky Martinez    MRN: [id number]           Patient Information     Date Of Birth          1945        Visit Information        Provider Department      11/1/2018 8:00 AM Estela Orozco [facility]        Today's Diagnoses     Sensorineural hearing loss, bilateral    -  1       Follow-ups after your visit        Your next 10 appointments already scheduled     Nov 26, 2018  8:00 AM CST   [facility]   [facility]    [address] [place] [address] [id number] [phone]              Who to contact     If you have questions or need follow up information about today's clinic visit or your schedule please contact [facility] directly at [phone].  Normal or non-critical lab and imaging results will be communicated to you by MyChart, letter or phone within 4 business days after the clinic has received the results. If you do not hear from us within 7 days, please contact the clinic through MyChart or phone. If you have a critical or abnormal lab result, we will notify you by phone as soon as possible.  Submit refill requests through CSID or call your pharmacy and they will forward the refill request to us. Please allow 3 business days for your refill to be completed.          Additional Information About Your Visit        Care EveryWhere ID     This is your Care EveryWhere ID. This could be used by other organizations to access your [facility] medical records  [id number]         Blood Pressure from Last 3 Encounters:   10/04/18 140/78   12/20/17 120/70   08/16/17 132/80    Weight from Last 3 Encounters:   10/04/18 296 lb (134.3 kg)   12/20/17 287 lb (130.2 kg)   08/16/17 282 lb 1.9 oz (128 kg)              We Performed the Following     HEARING AID CHECK/NO CHARGE        Primary Care Provider Office Phone # Fax #    Ky Major MD  [phone]       [address] [facility] [id number]        Equal Access to Services     NIEVES SOTO : Hadii aad ku hadnigelnallely Kevin, waarianda premjulia, shylata kajewellda tod, blayne rubio judietelly tse laRoselinekayli mary anne [facility] [phone].    ATENCIÓN: Si habla español, tiene a kimble disposición servicios gratuitos de asistencia lingüística. Adrienne al [phone].    We comply with applicable federal civil rights laws and [place] laws. We do not discriminate on the basis of race, color, national origin, age, disability, sex, sexual orientation, or gender identity.            Thank you!     Thank you for choosing [facility]  for your care. Our goal is always to provide you with excellent care. Hearing back from our patients is one way we can continue to improve our services. Please take a few minutes to complete the written survey that you may receive in the mail after your visit with us. Thank you!             Your Updated Medication List - Protect others around you: Learn how to safely use, store and throw away your medicines at www.disposemymeds.org.          This list is accurate as of 11/1/18 11:59 AM.  Always use your most recent med list.                   Brand Name Dispense Instructions for use Diagnosis    acetaminophen 325 MG tablet    TYLENOL     Take 325 mg by mouth 2 tablets daily        albuterol 108 (90 Base) MCG/ACT inhaler    PROAIR HFA    1 Inhaler    Inhale 1-2 puffs into the lungs every 4 hours as needed for shortness of breath / dyspnea    Intermittent asthma, uncomplicated       atorvastatin 80 MG tablet    LIPITOR    90 tablet    Take 1 tablet (80 mg) by mouth daily    Hyperlipidemia LDL goal <100       DAILY MULTI PO      1 TABLET DAILY        finasteride 5 MG tablet    PROSCAR    90 tablet    Take 1 tablet (5 mg) by mouth daily        furosemide 40 MG tablet    LASIX    90 tablet    Take 1 tablet (40 mg) by mouth daily 1 tablet daily     Hyperlipidemia LDL goal <100       ibuprofen 200 MG tablet    ADVIL/MOTRIN     Take 400 mg by mouth every 4 hours as needed.        isosorbide mononitrate 30 MG 24 hr tablet    IMDUR    90 tablet    Take 1 tablet (30 mg) by mouth daily 1 tablet daily    Hyperlipidemia LDL goal <100       losartan 50 MG tablet    COZAAR    90 tablet    Take 1 tablet (50 mg) by mouth daily    Benign essential hypertension       metoprolol succinate 50 MG 24 hr tablet    TOPROL-XL    90 tablet    Take 1 tablet (50 mg) by mouth daily 1 tablet daily    Hyperlipidemia LDL goal <100       nitroGLYcerin 0.4 MG sublingual tablet    NITROSTAT     0.4 mg As needed        potassium chloride SA 20 MEQ CR tablet    KLOR-CON    90 tablet    Take 1 tablet (20 mEq) by mouth daily 1 tablet daily    Hyperlipidemia LDL goal <100       tamsulosin 0.4 MG capsule    FLOMAX    90 capsule    TAKE ONE CAPSULE BY MOUTH ONCE DAILY        valsartan 80 MG tablet    DIOVAN    90 tablet    Take 1 tablet (80 mg) by mouth daily 1 tablet daily    Hyperlipidemia LDL goal <100

## 2018-11-01 NOTE — PROGRESS NOTES
"AUDIOLOGY REPORT    SUBJECTIVE:Ky Martinez is a 72 year old male who was seen in the Audiology Clinic at the [facility] on 11/1/2018  for a follow-up check regarding the fitting of new hearing aids. Previous results have revealed mild to moderate sensorineural hearing loss left ear (essentially normal hearing except for a notch at 2000 Hz right).  The patient has been seen previously in this clinic and was fit with a left ReSound Linx 3D 5  on 10/23/2018.  Ky reports overall much improved, improved hearing for TV (was missing half of the information before amplification) and in groups/social situations (\"I didn't ear a thing\" before, was at a grand-child's birthday party and \"I didn't miss anything\".). He reported he observes his wife's voice is loud, particularly when he has the hearing aid set so TV is comfortable, then her voice is particularly loud.  He was not able to report examples at this time of other sounds which are also too loud.    OBJECTIVE:   Based on patient report, the following changes were made; no programming changes were made at this visit..    Reviewed 45 day trial period, care, cleaning (no water, dry brush), batteries (size 312) insertion/removal, toxicity, low-battery signal), aid insertion/removal, volume adjustment (if applicable), user booklet, warranty information, storage cases, and other hearing aid details.     ASSESSMENT: A follow-up appointment for hearing aid fitting was completed today. Changes to hearing aid was completed as outlined above.     PLAN:Ky will return for follow-up as needed, in 2-3 weeks for another appointemnt to check on progress. Requested he keep track of situations where he thinks sounds are too loud to help guide programming changes to improve sound quality. Please call this clinic with any questions regarding today s appointment.    Consuelo Gonzales.  [place] Licensed Audiologist #[id number]'    "

## 2018-11-26 ENCOUNTER — OFFICE VISIT (OUTPATIENT)
Dept: AUDIOLOGY | Facility: CLINIC | Age: 73
End: 2018-11-26
Payer: COMMERCIAL

## 2018-11-26 DIAGNOSIS — H90.3 SENSORINEURAL HEARING LOSS, BILATERAL: Primary | ICD-10-CM

## 2018-11-26 PROCEDURE — 99207 ZZC NO CHARGE LOS: CPT | Performed by: AUDIOLOGIST

## 2018-11-26 PROCEDURE — V5299 HEARING SERVICE: HCPCS | Performed by: AUDIOLOGIST

## 2018-11-26 NOTE — MR AVS SNAPSHOT
After Visit Summary   11/26/2018    Ky Martinez    MRN: [id number]           Patient Information     Date Of Birth          1945        Visit Information        Provider Department      11/26/2018 7:30 AM Estela Orozco [facility]        Today's Diagnoses     Sensorineural hearing loss, bilateral    -  1       Follow-ups after your visit        Your next 10 appointments already scheduled     Dec 18, 2018  9:00 AM CST   [facility]    [address] [place] [address] [phone] [phone]              Who to contact     If you have questions or need follow up information about today's clinic visit or your schedule please contact [facility] directly at [phone].  Normal or non-critical lab and imaging results will be communicated to you by MyChart, letter or phone within 4 business days after the clinic has received the results. If you do not hear from us within 7 days, please contact the clinic through MyChart or phone. If you have a critical or abnormal lab result, we will notify you by phone as soon as possible.  Submit refill requests through CSS99 or call your pharmacy and they will forward the refill request to us. Please allow 3 business days for your refill to be completed.          Additional Information About Your Visit        Care EveryWhere ID     This is your Care EveryWhere ID. This could be used by other organizations to access your [facility] medical records  [id number]         Blood Pressure from Last 3 Encounters:   10/04/18 140/78   12/20/17 120/70   08/16/17 132/80    Weight from Last 3 Encounters:   10/04/18 296 lb (134.3 kg)   12/20/17 287 lb (130.2 kg)   08/16/17 282 lb 1.9 oz (128 kg)              We Performed the Following     HEARING AID CHECK/NO CHARGE        Primary Care Provider Office Phone # Fax #    Ky Major,  [phone]       [address] [facility] [id number]        Equal Access to Services     NIEVES SOTO : Hadii aad ku hadnigelnallely Brown, juniquinn lrshreyaha, julien davidmyla baron, blayne jjin hayaan judietelly tse laRoselinekayli odonnell. So [facility] [phone].    ATENCIÓN: Si habla español, tiene a kimble disposición servicios gratuitos de asistencia lingüística. Llame al [phone].    We comply with applicable federal civil rights laws and [place] laws. We do not discriminate on the basis of race, color, national origin, age, disability, sex, sexual orientation, or gender identity.            Thank you!     Thank you for choosing [facility]  for your care. Our goal is always to provide you with excellent care. Hearing back from our patients is one way we can continue to improve our services. Please take a few minutes to complete the written survey that you may receive in the mail after your visit with us. Thank you!             Your Updated Medication List - Protect others around you: Learn how to safely use, store and throw away your medicines at www.disposemymeds.org.          This list is accurate as of 11/26/18  5:11 PM.  Always use your most recent med list.                   Brand Name Dispense Instructions for use Diagnosis    acetaminophen 325 MG tablet    TYLENOL     Take 325 mg by mouth 2 tablets daily        albuterol 108 (90 Base) MCG/ACT inhaler    PROAIR HFA    1 Inhaler    Inhale 1-2 puffs into the lungs every 4 hours as needed for shortness of breath / dyspnea    Intermittent asthma, uncomplicated       atorvastatin 80 MG tablet    LIPITOR    90 tablet    Take 1 tablet (80 mg) by mouth daily    Hyperlipidemia LDL goal <100       DAILY MULTI PO      1 TABLET DAILY        finasteride 5 MG tablet    PROSCAR    90 tablet    Take 1 tablet (5 mg) by mouth daily        furosemide 40 MG tablet    LASIX    90 tablet    Take 1 tablet (40 mg) by mouth daily 1 tablet daily     Hyperlipidemia LDL goal <100       ibuprofen 200 MG tablet    ADVIL/MOTRIN     Take 400 mg by mouth every 4 hours as needed.        isosorbide mononitrate 30 MG 24 hr tablet    IMDUR    90 tablet    Take 1 tablet (30 mg) by mouth daily 1 tablet daily    Hyperlipidemia LDL goal <100       losartan 50 MG tablet    COZAAR    90 tablet    Take 1 tablet (50 mg) by mouth daily    Benign essential hypertension       metoprolol succinate 50 MG 24 hr tablet    TOPROL-XL    90 tablet    Take 1 tablet (50 mg) by mouth daily 1 tablet daily    Hyperlipidemia LDL goal <100       nitroGLYcerin 0.4 MG sublingual tablet    NITROSTAT     0.4 mg As needed        potassium chloride SA 20 MEQ CR tablet    KLOR-CON    90 tablet    Take 1 tablet (20 mEq) by mouth daily 1 tablet daily    Hyperlipidemia LDL goal <100       tamsulosin 0.4 MG capsule    FLOMAX    90 capsule    TAKE ONE CAPSULE BY MOUTH ONCE DAILY        valsartan 80 MG tablet    DIOVAN    90 tablet    Take 1 tablet (80 mg) by mouth daily 1 tablet daily    Hyperlipidemia LDL goal <100

## 2018-11-26 NOTE — PROGRESS NOTES
"AUDIOLOGY REPORT    SUBJECTIVE: Ky Martinez is a 72 year old male who was seen in the Audiology Clinic at the [facility] on 11/26/2018  for a follow-up check regarding the fitting of new hearing aids. Previous results have revealed mild to moderate sensorineural hearing loss left ear (essentially normal hearing except for a notch at 2000 Hz right).  The patient has been seen previously in this clinic and was fit with a left ReSound Linx 3D 5  on 10/23/2018.  Ky reports Thanksgiving with a group of 20 went really well, he intentionally did not have the hearing aid in for a bit to see how he did with and without (understanding speech and tolerance of loud situations.  He reported initially when he put the hearing aid it the room was a \"wow\", decreased volume 3 clicks and was \"much much better\".  As the group got smaller he was able to hear a soft talker across the room (he reported he would not have heard this person before the hearing aid). He reported he observes his wife's voice is loud, particularly when he has the hearing aid set so TV is comfortable, then her voice is particularly loud.  He is able to adjust volume for this and is adjusting overall like being able to hear TV and his wife likes that the volume is down.  The car remains a bit loud and noisy.    OBJECTIVE:   The International Outcome Inventory-Hearing Aids (IOI-HA) was administered today.  It is a questionnaire designed to be generally applicable in evaluating the effectiveness of hearing aid treatments.  The patient s responses to the 7 questions can be compared to normative data relative to how others are performing with their hearing aids, as well as focusing audiologic care and counseling.  This patient scored 29 out of 35 possible.  The patient s average per question score was 4.14 which indicates an above average outcome (average per question score is 3.5).  This patient s Quality of Life score " (Question 7) was 4, which is above average.      Based on patient report, the following changes were made; added a manual program for use in the car (fixed directional, decreased lows below 500 Hz and above 4k then decreased G50 and G80 5 increments added wind noise reduction, activated long hold to change program.    ASSESSMENT: A follow-up appointment for hearing aid fitting was completed today. IOI-HA administered today. Changes to hearing aid was completed as outlined above.     PLAN:Ky will return for follow-up as needebefore he leaves for [place] for the winter before he leaves for [place] for the winter . Please call this clinic with any questions regarding today s appointment.    Mallika Gonzales Licensed Audiologist #[id number]

## 2018-12-24 ENCOUNTER — OFFICE VISIT (OUTPATIENT)
Dept: FAMILY MEDICINE | Facility: CLINIC | Age: 73
End: 2018-12-24
Payer: COMMERCIAL

## 2018-12-24 VITALS
RESPIRATION RATE: 16 BRPM | DIASTOLIC BLOOD PRESSURE: 74 MMHG | HEART RATE: 85 BPM | TEMPERATURE: 97.4 F | HEIGHT: 71 IN | WEIGHT: 295.9 LBS | BODY MASS INDEX: 41.43 KG/M2 | OXYGEN SATURATION: 97 % | SYSTOLIC BLOOD PRESSURE: 136 MMHG

## 2018-12-24 DIAGNOSIS — Z12.5 SCREENING FOR PROSTATE CANCER: ICD-10-CM

## 2018-12-24 DIAGNOSIS — Z00.00 ROUTINE HISTORY AND PHYSICAL EXAMINATION OF ADULT: Primary | ICD-10-CM

## 2018-12-24 DIAGNOSIS — I25.10 CORONARY ARTERY DISEASE INVOLVING NATIVE CORONARY ARTERY OF NATIVE HEART WITHOUT ANGINA PECTORIS: ICD-10-CM

## 2018-12-24 DIAGNOSIS — E78.5 HYPERLIPIDEMIA LDL GOAL <100: ICD-10-CM

## 2018-12-24 DIAGNOSIS — I10 BENIGN ESSENTIAL HYPERTENSION: ICD-10-CM

## 2018-12-24 DIAGNOSIS — Z23 NEED FOR PROPHYLACTIC VACCINATION AND INOCULATION AGAINST INFLUENZA: ICD-10-CM

## 2018-12-24 DIAGNOSIS — N40.0 BENIGN PROSTATIC HYPERPLASIA, UNSPECIFIED WHETHER LOWER URINARY TRACT SYMPTOMS PRESENT: ICD-10-CM

## 2018-12-24 LAB
ALBUMIN SERPL-MCNC: 3.6 G/DL (ref 3.4–5)
ALP SERPL-CCNC: 98 U/L (ref 40–150)
ALT SERPL W P-5'-P-CCNC: 39 U/L (ref 0–70)
ANION GAP SERPL CALCULATED.3IONS-SCNC: 6 MMOL/L (ref 3–14)
AST SERPL W P-5'-P-CCNC: 25 U/L (ref 0–45)
BILIRUB SERPL-MCNC: 0.9 MG/DL (ref 0.2–1.3)
BUN SERPL-MCNC: 22 MG/DL (ref 7–30)
CALCIUM SERPL-MCNC: 8.5 MG/DL (ref 8.5–10.1)
CHLORIDE SERPL-SCNC: 109 MMOL/L (ref 94–109)
CHOLEST SERPL-MCNC: 135 MG/DL
CO2 SERPL-SCNC: 28 MMOL/L (ref 20–32)
CREAT SERPL-MCNC: 1.45 MG/DL (ref 0.66–1.25)
GFR SERPL CREATININE-BSD FRML MDRD: 48 ML/MIN/{1.73_M2}
GLUCOSE SERPL-MCNC: 105 MG/DL (ref 70–99)
HDLC SERPL-MCNC: 49 MG/DL
LDLC SERPL CALC-MCNC: 59 MG/DL
NONHDLC SERPL-MCNC: 86 MG/DL
POTASSIUM SERPL-SCNC: 4 MMOL/L (ref 3.4–5.3)
PROT SERPL-MCNC: 7.2 G/DL (ref 6.8–8.8)
PSA SERPL-ACNC: 1.82 UG/L (ref 0–4)
SODIUM SERPL-SCNC: 143 MMOL/L (ref 133–144)
TRIGL SERPL-MCNC: 134 MG/DL

## 2018-12-24 PROCEDURE — 36415 COLL VENOUS BLD VENIPUNCTURE: CPT | Performed by: FAMILY MEDICINE

## 2018-12-24 PROCEDURE — 99397 PER PM REEVAL EST PAT 65+ YR: CPT | Mod: 25 | Performed by: FAMILY MEDICINE

## 2018-12-24 PROCEDURE — 90662 IIV NO PRSV INCREASED AG IM: CPT | Performed by: FAMILY MEDICINE

## 2018-12-24 PROCEDURE — 80053 COMPREHEN METABOLIC PANEL: CPT | Performed by: FAMILY MEDICINE

## 2018-12-24 PROCEDURE — G0008 ADMIN INFLUENZA VIRUS VAC: HCPCS | Performed by: FAMILY MEDICINE

## 2018-12-24 PROCEDURE — 80061 LIPID PANEL: CPT | Performed by: FAMILY MEDICINE

## 2018-12-24 PROCEDURE — G0103 PSA SCREENING: HCPCS | Performed by: FAMILY MEDICINE

## 2018-12-24 RX ORDER — METOPROLOL SUCCINATE 50 MG/1
50 TABLET, EXTENDED RELEASE ORAL DAILY
Qty: 90 TABLET | Refills: 3 | Status: SHIPPED | OUTPATIENT
Start: 2018-12-24 | End: 2019-12-09

## 2018-12-24 RX ORDER — ISOSORBIDE MONONITRATE 30 MG/1
30 TABLET, EXTENDED RELEASE ORAL DAILY
Qty: 90 TABLET | Refills: 3 | Status: CANCELLED | OUTPATIENT
Start: 2018-12-24

## 2018-12-24 RX ORDER — TAMSULOSIN HYDROCHLORIDE 0.4 MG/1
CAPSULE ORAL
Qty: 90 CAPSULE | Refills: 3 | Status: SHIPPED | OUTPATIENT
Start: 2018-12-24 | End: 2019-12-09

## 2018-12-24 RX ORDER — POTASSIUM CHLORIDE 1500 MG/1
20 TABLET, EXTENDED RELEASE ORAL DAILY
Qty: 90 TABLET | Refills: 3 | Status: SHIPPED | OUTPATIENT
Start: 2018-12-24 | End: 2019-12-09

## 2018-12-24 RX ORDER — LOSARTAN POTASSIUM 50 MG/1
50 TABLET ORAL DAILY
Qty: 90 TABLET | Refills: 3 | Status: SHIPPED | OUTPATIENT
Start: 2018-12-24 | End: 2019-12-09

## 2018-12-24 RX ORDER — ATORVASTATIN CALCIUM 80 MG/1
80 TABLET, FILM COATED ORAL DAILY
Qty: 90 TABLET | Refills: 3 | Status: SHIPPED | OUTPATIENT
Start: 2018-12-24 | End: 2019-12-09

## 2018-12-24 RX ORDER — FUROSEMIDE 40 MG
40 TABLET ORAL DAILY
Qty: 90 TABLET | Refills: 3 | Status: SHIPPED | OUTPATIENT
Start: 2018-12-24 | End: 2019-12-09

## 2018-12-24 RX ORDER — FINASTERIDE 5 MG/1
5 TABLET, FILM COATED ORAL DAILY
Qty: 90 TABLET | Refills: 3 | Status: SHIPPED | OUTPATIENT
Start: 2018-12-24 | End: 2019-12-09

## 2018-12-24 ASSESSMENT — ENCOUNTER SYMPTOMS
HEMATURIA: 0
ABDOMINAL PAIN: 0
COUGH: 0
CONSTIPATION: 0
EYE PAIN: 0
DIARRHEA: 0
DIZZINESS: 0
HEMATOCHEZIA: 0
CHILLS: 0

## 2018-12-24 ASSESSMENT — MIFFLIN-ST. JEOR: SCORE: 2109.06

## 2018-12-24 ASSESSMENT — ACTIVITIES OF DAILY LIVING (ADL): CURRENT_FUNCTION: NO ASSISTANCE NEEDED

## 2018-12-24 NOTE — PROGRESS NOTES

## 2018-12-24 NOTE — PROGRESS NOTES
"SUBJECTIVE:   CC: Ky Martinze is an 73 year old male who presents for preventative health visit.     Annual Wellness Visit     In general, how would you rate your overall health?  Good    Frequency of exercise:  2-3 days/week    Do you usually eat at least 4 servings of fruit and vegetables a day, include whole grains    & fiber and avoid regularly eating high fat or \"junk\" foods?  No    Taking medications regularly:  Yes    Medication side effects:  None    Ability to successfully perform activities of daily living:  No assistance needed    Home Safety:  No safety concerns identified    Hearing Impairment:  Difficulty following a conversation in a noisy restaurant or crowded room    In the past 6 months, have you been bothered by leaking of urine?  No    In general, how would you rate your overall mental or emotional health?  Good    PHQ-2 Total Score: 0    Additional concerns today:  No          Patient would like to get flu shot today and is fasting labs have been placed.    Today's PHQ-2 Score:   PHQ-2 ( 1999 Pf[employer]) 12/24/2018   Q1: Little interest or pleasure in doing things 0   Q2: Feeling down, depressed or hopeless 0   PHQ-2 Score 0   Q1: Little interest or pleasure in doing things Not at all   Q2: Feeling down, depressed or hopeless Not at all   PHQ-2 Score 0       Abuse: Current or Past(Physical, Sexual or Emotional)- No  Do you feel safe in your environment? Yes    Social History     Tobacco Use     Smoking status: Never Smoker     Smokeless tobacco: Never Used   Substance Use Topics     Alcohol use: Yes     Alcohol/week: 2.0 oz     Types: 4 Glasses of wine per week     Comment: occasional      Alcohol Use 12/24/2018   If you drink alcohol do you typically have greater than 3 drinks per day OR greater than 7 drinks per week? No       Last PSA:   PSA   Date Value Ref Range Status   12/20/2017 1.84 0 - 4 ug/L Final     Comment:     Assay Method:  Chemiluminescence using Siemens Vista analyzer " "      Reviewed orders with patient. Reviewed health maintenance and updated orders accordingly - Yes      Reviewed and updated as needed this visit by clinical staff  Tobacco  Allergies  Meds  Med Hx  Surg Hx  Fam Hx  Soc Hx        Reviewed and updated as needed this visit by Provider        Past Medical History:   Diagnosis Date     Hypertension      Myocardial infarction (H)       Past Surgical History:   Procedure Laterality Date     Cardiac stent       EXCISE GANGLION WRIST Left 11/26/2014    Procedure: EXCISE GANGLION WRIST;  Surgeon: Gian Haddad MD;  Location: [facility] OR     ICD DEVICE INTERROGATE      2017     ORTHOPEDIC SURGERY       RELEASE CARPAL TUNNEL Left 11/26/2014    Procedure: RELEASE CARPAL TUNNEL;  Surgeon: Gian Haddad MD;  Location: [facility] OR     RELEASE DEQUERVAINS WRIST Left 11/26/2014    Procedure: RELEASE DEQUERVAINS WRIST;  Surgeon: Gian Haddad MD;  Location: [facility] OR       Review of Systems   Constitutional: Negative for chills.   HENT: Negative for congestion and ear pain.    Eyes: Negative for pain.   Respiratory: Negative for cough.    Cardiovascular: Negative for chest pain.   Gastrointestinal: Negative for abdominal pain, constipation, diarrhea and hematochezia.   Genitourinary: Negative for hematuria.   Neurological: Negative for dizziness.         OBJECTIVE:   /74   Pulse 85   Temp 97.4  F (36.3  C) (Temporal)   Resp 16   Ht 1.795 m (5' 10.67\")   Wt 134.2 kg (295 lb 14.4 oz)   SpO2 97%   BMI 41.66 kg/m      Physical Exam  GENERAL: healthy, alert and no distress  EYES: Eyes grossly normal to inspection, PERRL and conjunctivae and sclerae normal  HENT: ear canals and TM's normal, nose and mouth without ulcers or lesions  NECK: no adenopathy, no asymmetry, masses, or scars and thyroid normal to palpation  RESP: lungs clear to auscultation - no rales, rhonchi or wheezes  CV: regular rate and rhythm, normal S1 S2, no S3 or S4, no murmur, click or rub, no " peripheral edema and peripheral pulses strong  ABDOMEN: soft, nontender, no hepatosplenomegaly, no masses and bowel sounds normal  MS: no gross musculoskeletal defects noted, no edema  SKIN: no suspicious lesions or rashes  NEURO: Normal strength and tone, mentation intact and speech normal  PSYCH: mentation appears normal, affect normal/bright    Diagnostic Test Results:  Results for orders placed or performed in visit on 12/24/18 (from the past 24 hour(s))   Comprehensive metabolic panel (BMP + Alb, Alk Phos, ALT, AST, Total. Bili, TP)   Result Value Ref Range    Sodium 143 133 - 144 mmol/L    Potassium 4.0 3.4 - 5.3 mmol/L    Chloride 109 94 - 109 mmol/L    Carbon Dioxide 28 20 - 32 mmol/L    Anion Gap 6 3 - 14 mmol/L    Glucose 105 (H) 70 - 99 mg/dL    Urea Nitrogen 22 7 - 30 mg/dL    Creatinine 1.45 (H) 0.66 - 1.25 mg/dL    GFR Estimate 48 (L) >60 mL/min/[1.73_m2]    GFR Estimate If Black 55 (L) >60 mL/min/[1.73_m2]    Calcium 8.5 8.5 - 10.1 mg/dL    Bilirubin Total 0.9 0.2 - 1.3 mg/dL    Albumin 3.6 3.4 - 5.0 g/dL    Protein Total 7.2 6.8 - 8.8 g/dL    Alkaline Phosphatase 98 40 - 150 U/L    ALT 39 0 - 70 U/L    AST 25 0 - 45 U/L   Lipid panel reflex to direct LDL Fasting   Result Value Ref Range    Cholesterol 135 <200 mg/dL    Triglycerides 134 <150 mg/dL    HDL Cholesterol 49 >39 mg/dL    LDL Cholesterol Calculated 59 <100 mg/dL    Non HDL Cholesterol 86 <130 mg/dL   PSA, screen   Result Value Ref Range    PSA 1.82 0 - 4 ug/L       ASSESSMENT/PLAN:   1. Routine history and physical examination of adult  Generally healthy leaving for [place] for the winter and several days.  - Comprehensive metabolic panel (BMP + Alb, Alk Phos, ALT, AST, Total. Bili, TP)    2. Hyperlipidemia LDL goal <100  We will notify with results.  - Lipid panel reflex to direct LDL Fasting  - atorvastatin (LIPITOR) 80 MG tablet; Take 1 tablet (80 mg) by mouth daily  Dispense: 90 tablet; Refill: 3  - furosemide (LASIX) 40 MG tablet; Take 1  "tablet (40 mg) by mouth daily 1 tablet daily  Dispense: 90 tablet; Refill: 3  - potassium chloride ER (KLOR-CON) 20 MEQ CR tablet; Take 1 tablet (20 mEq) by mouth daily 1 tablet daily  Dispense: 90 tablet; Refill: 3  - metoprolol succinate ER (TOPROL-XL) 50 MG 24 hr tablet; Take 1 tablet (50 mg) by mouth daily 1 tablet daily  Dispense: 90 tablet; Refill: 3    3. Screening for prostate cancer  Notify with results.  - PSA, screen    4. Benign essential hypertension  Renewed medication will notify with results.  - losartan (COZAAR) 50 MG tablet; Take 1 tablet (50 mg) by mouth daily  Dispense: 90 tablet; Refill: 3    5. Benign prostatic hyperplasia, unspecified whether lower urinary tract symptoms present    - tamsulosin (FLOMAX) 0.4 MG capsule; TAKE ONE CAPSULE BY MOUTH ONCE DAILY  Dispense: 90 capsule; Refill: 3    6. Coronary artery disease involving native coronary artery of native heart without angina pectoris  Follows with cardiology has an ICD.  - finasteride (PROSCAR) 5 MG tablet; Take 1 tablet (5 mg) by mouth daily  Dispense: 90 tablet; Refill: 3    7. Need for prophylactic vaccination and inoculation against influenza    - FLU VACCINE, INCREASED ANTIGEN, PRESV FREE, AGE 65+ [76743]    COUNSELING:   Reviewed preventive health counseling, as reflected in patient instructions       Regular exercise       Healthy diet/nutrition       Colon cancer screening    BP Readings from Last 1 Encounters:   12/24/18 136/74     Estimated body mass index is 41.66 kg/m  as calculated from the following:    Height as of this encounter: 1.795 m (5' 10.67\").    Weight as of this encounter: 134.2 kg (295 lb 14.4 oz).           reports that  has never smoked. he has never used smokeless tobacco.      Counseling Resources:  ATP IV Guidelines  Pooled Cohorts Equation Calculator  FRAX Risk Assessment  ICSI Preventive Guidelines  Dietary Guidelines for Americans, 2010  USDA's MyPlate  ASA Prophylaxis  Lung CA Screening    Ky RON" MD [place]  [facility]

## 2018-12-24 NOTE — PATIENT INSTRUCTIONS
Preventive Health Recommendations:     See your health care provider every year to    Review health changes.     Discuss preventive care.      Review your medicines if your doctor has prescribed any.      Talk with your health care provider about whether you should have a test to screen for prostate cancer (PSA).    Every 3 years, have a diabetes test (fasting glucose). If you are at risk for diabetes, you should have this test more often.    Every 5 years, have a cholesterol test. Have this test more often if you are at risk for high cholesterol or heart disease.     Every 10 years, have a colonoscopy. Or, have a yearly FIT test (stool test). These exams will check for colon cancer.    Talk to with your health care provider about screening for Abdominal Aortic Aneurysm if you have a family history of AAA or have a history of smoking.    Shots:     Get a flu shot each year.     Get a tetanus shot every 10 years.     Talk to your doctor about your pneumonia vaccines. There are now two you should receive - Pneumovax (PPSV 23) and Prevnar (PCV 13).     Talk to your pharmacist about a shingles vaccine.     Talk to your doctor about the hepatitis B vaccine.  Nutrition:     Eat at least 5 servings of fruits and vegetables each day.     Eat whole-grain bread, whole-wheat pasta and brown rice instead of white grains and rice.     Get adequate Calcium and Vitamin D.   Lifestyle    Exercise for at least 150 minutes a week (30 minutes a day, 5 days a week). This will help you control your weight and prevent disease.     Limit alcohol to one drink per day.     No smoking.     Wear sunscreen to prevent skin cancer.    See your dentist every six months for an exam and cleaning.    See your eye doctor every 1 to 2 years to screen for conditions such as glaucoma, macular degeneration, cataracts, etc.    Personalized Prevention Plan  You are due for the preventive services outlined below.  Your care team is available to assist you  in scheduling these services.  If you have already completed any of these items, please share that information with your care team to update in your medical record.  Health Maintenance Due   Topic Date Due     Hepatitis C Screening  11/14/1964     Zoster (Chicken Pox) Vaccine (1 of 2) 11/14/1996     Discuss Advance Directive Planning  11/14/2001     Pneumococcal Vaccine (2 of 2 - PPSV23) 10/29/2016     Asthma Action Plan - yearly  11/23/2017     FALL RISK ASSESSMENT  11/23/2017     Asthma Control Test - every 6 months  06/20/2018     Depression Assessment 2 - yearly  08/16/2018     Flu Vaccine (1) 09/01/2018

## 2018-12-28 ENCOUNTER — TELEPHONE (OUTPATIENT)
Dept: FAMILY MEDICINE | Facility: CLINIC | Age: 73
End: 2018-12-28

## 2018-12-28 NOTE — LETTER
December 31, 2018      Ky Martinez  [address] [place] [address]        Dear ,    We are writing to inform you of your test results.    Labs showed your PSA was normal.  Chemistry panel showed a blood sugar of 105 just borderline.  Your kidney function showed your creatinine off a little bit again it has been that way over the last couple of years.  Cholesterol was good at 135.  Really watch the use of ibuprofen and Aleve probably should eliminate any of those as they can affect the kidney.  We should recheck that when you get back.    Resulted Orders   Comprehensive metabolic panel (BMP + Alb, Alk Phos, ALT, AST, Total. Bili, TP)   Result Value Ref Range    Sodium 143 133 - 144 mmol/L    Potassium 4.0 3.4 - 5.3 mmol/L    Chloride 109 94 - 109 mmol/L    Carbon Dioxide 28 20 - 32 mmol/L    Anion Gap 6 3 - 14 mmol/L    Glucose 105 (H) 70 - 99 mg/dL      Comment:      Fasting specimen    Urea Nitrogen 22 7 - 30 mg/dL    Creatinine 1.45 (H) 0.66 - 1.25 mg/dL    GFR Estimate 48 (L) >60 mL/min/[1.73_m2]      Comment:      Non  GFR Calc  Starting 12/18/2018, serum creatinine based estimated GFR (eGFR) will be   calculated using the Chronic Kidney Disease Epidemiology Collaboration   (CKD-EPI) equation.      GFR Estimate If Black 55 (L) >60 mL/min/[1.73_m2]      Comment:       GFR Calc  Starting 12/18/2018, serum creatinine based estimated GFR (eGFR) will be   calculated using the Chronic Kidney Disease Epidemiology Collaboration   (CKD-EPI) equation.      Calcium 8.5 8.5 - 10.1 mg/dL    Bilirubin Total 0.9 0.2 - 1.3 mg/dL    Albumin 3.6 3.4 - 5.0 g/dL    Protein Total 7.2 6.8 - 8.8 g/dL    Alkaline Phosphatase 98 40 - 150 U/L    ALT 39 0 - 70 U/L    AST 25 0 - 45 U/L   Lipid panel reflex to direct LDL Fasting   Result Value Ref Range    Cholesterol 135 <200 mg/dL    Triglycerides 134 <150 mg/dL      Comment:      Fasting specimen    HDL Cholesterol 49 >39 mg/dL    LDL  Cholesterol Calculated 59 <100 mg/dL      Comment:      Desirable:       <100 mg/dl    Non HDL Cholesterol 86 <130 mg/dL   PSA, screen   Result Value Ref Range    PSA 1.82 0 - 4 ug/L      Comment:      Assay Method:  Chemiluminescence using Siemens Vista analyzer       If you have any questions or concerns, please call the clinic at the number listed above.       Sincerely,        Ky Major MD

## 2018-12-28 NOTE — RESULT ENCOUNTER NOTE
Labs showed your PSA was normal.  Chemistry panel showed a blood sugar of 105 just borderline.  Your kidney function showed your creatinine off a little bit again it has been that way over the last couple of years.  Cholesterol was good at 135.  Really watch the use of ibuprofen and Aleve probably should eliminate any of those as they can affect the kidney.  We should recheck that when you get back.

## 2019-01-26 DIAGNOSIS — E78.5 HYPERLIPIDEMIA LDL GOAL <100: ICD-10-CM

## 2019-01-28 RX ORDER — ISOSORBIDE MONONITRATE 30 MG/1
TABLET, EXTENDED RELEASE ORAL
Qty: 90 TABLET | Refills: 3 | Status: SHIPPED | OUTPATIENT
Start: 2019-01-28 | End: 2019-12-09

## 2019-01-28 NOTE — TELEPHONE ENCOUNTER
"Isosorbide mononitrate  Last Written Prescription Date:  12/20/2017  Last Fill Quantity: 90,  # refills: 3   Last office visit: 12/24/2018 with prescribing provider:      Future Office Visit:      Requested Prescriptions   Pending Prescriptions Disp Refills     isosorbide mononitrate (IMDUR) 30 MG 24 hr tablet [Pharmacy Med Name: ISOSORB MONO ER 30MGTAB] 90 tablet 3     Sig: TAKE ONE TABLET BY MOUTH ONCE DAILY    Nitrates Passed - 1/26/2019 10:55 AM       Passed - Blood pressure under 140/90 in past 12 months    BP Readings from Last 3 Encounters:   12/24/18 136/74   10/04/18 140/78   12/20/17 120/70                Passed - Pt is not on erectile dysfunction medications       Passed - Recent (12 mo) or future (30 days) visit within the authorizing provider's specialty    Patient had office visit in the last 12 months or has a visit in the next 30 days with authorizing provider or within the authorizing provider's specialty.  See \"Patient Info\" tab in inbasket, or \"Choose Columns\" in Meds & Orders section of the refill encounter.             Passed - Medication is active on med list       Passed - Patient is age 18 or older          Prescription approved per [facility] Refill Protocol.      Swati Major RN on 1/28/2019 at 9:05 AM    "

## 2019-01-30 ENCOUNTER — TELEPHONE (OUTPATIENT)
Dept: FAMILY MEDICINE | Facility: CLINIC | Age: 74
End: 2019-01-30

## 2019-01-30 DIAGNOSIS — Z12.11 SPECIAL SCREENING FOR MALIGNANT NEOPLASMS, COLON: Primary | ICD-10-CM

## 2019-01-30 NOTE — TELEPHONE ENCOUNTER
Panel Management Review      Patient has the following on his problem list: None      Composite cancer screening  Chart review shows that this patient is due/due soon for the following Colonoscopy  Summary:    Patient is due/failing the following:   ACT and COLONOSCOPY    Action needed:   Patient needs referral/order: Colonoscopy    Type of outreach:    Phone, spoke to patient.  patient ok with referral placed for colonoscopy    Questions for provider review:    None                                                                                                                                    Ninoska Robb MA     Chart routed to Care Team .

## 2019-01-31 ASSESSMENT — ASTHMA QUESTIONNAIRES: ACT_TOTALSCORE: 24

## 2019-02-01 ENCOUNTER — TELEPHONE (OUTPATIENT)
Dept: FAMILY MEDICINE | Facility: CLINIC | Age: 74
End: 2019-02-01

## 2019-02-01 NOTE — TELEPHONE ENCOUNTER
Called to schedule scope. Patient states they are in [place] and not sure when they will return.  He will call a later day to schedule

## 2019-04-22 ENCOUNTER — OFFICE VISIT (OUTPATIENT)
Dept: FAMILY MEDICINE | Facility: CLINIC | Age: 74
End: 2019-04-22
Payer: COMMERCIAL

## 2019-04-22 VITALS
DIASTOLIC BLOOD PRESSURE: 82 MMHG | TEMPERATURE: 97.8 F | HEIGHT: 71 IN | WEIGHT: 297 LBS | SYSTOLIC BLOOD PRESSURE: 136 MMHG | OXYGEN SATURATION: 94 % | HEART RATE: 78 BPM | BODY MASS INDEX: 41.58 KG/M2 | RESPIRATION RATE: 14 BRPM

## 2019-04-22 DIAGNOSIS — J45.20 MILD INTERMITTENT ASTHMA WITHOUT COMPLICATION: ICD-10-CM

## 2019-04-22 DIAGNOSIS — Z12.11 SCREENING FOR COLON CANCER: ICD-10-CM

## 2019-04-22 DIAGNOSIS — E66.01 MORBID OBESITY (H): ICD-10-CM

## 2019-04-22 DIAGNOSIS — J20.9 ACUTE BRONCHITIS WITH SYMPTOMS > 10 DAYS: Primary | ICD-10-CM

## 2019-04-22 PROBLEM — I21.4 NON-STEMI (NON-ST ELEVATED MYOCARDIAL INFARCTION) (H): Status: ACTIVE | Noted: 2017-03-01

## 2019-04-22 PROCEDURE — 99213 OFFICE O/P EST LOW 20 MIN: CPT | Performed by: FAMILY MEDICINE

## 2019-04-22 RX ORDER — AZITHROMYCIN 250 MG/1
TABLET, FILM COATED ORAL
Qty: 6 TABLET | Refills: 0 | Status: SHIPPED | OUTPATIENT
Start: 2019-04-22 | End: 2019-12-09

## 2019-04-22 RX ORDER — ASPIRIN 81 MG/1
81 TABLET ORAL DAILY
Status: ON HOLD | COMMUNITY
End: 2020-03-29

## 2019-04-22 ASSESSMENT — PAIN SCALES - GENERAL: PAINLEVEL: NO PAIN (0)

## 2019-04-22 ASSESSMENT — MIFFLIN-ST. JEOR: SCORE: 2119.31

## 2019-04-22 NOTE — LETTER
My Asthma Action Plan  Name: Ky Martinez   YOB: 1945  Date: 4/22/2019   My doctor: Marielle Jimenez Mai, MD   My clinic: [facility]        My Control Medicine: { :916531}  My Rescue Medicine: { :631085}  {AAP include Oral Steroid:501078} My Asthma Severity: { :145962}  Avoid your asthma triggers: { :662652}        {Is patient a child or adult?:097155}       GREEN ZONE   Good Control    I feel good    No cough or wheeze    Can work, sleep and play without asthma symptoms       Take your asthma control medicine every day.     1. If exercise triggers your asthma, take your rescue medication    15 minutes before exercise or sports, and    During exercise if you have asthma symptoms  2. Spacer to use with inhaler: If you have a spacer, make sure to use it with your inhaler             YELLOW ZONE Getting Worse  I have ANY of these:    I do not feel good    Cough or wheeze    Chest feels tight    Wake up at night   1. Keep taking your Green Zone medications  2. Start taking your rescue medicine:    every 20 minutes for up to 1 hour. Then every 4 hours for 24-48 hours.  3. If you stay in the Yellow Zone for more than 12-24 hours, contact your doctor.  4. If you do not return to the Green Zone in 12-24 hours or you get worse, start taking your oral steroid medicine if prescribed by your provider.           RED ZONE Medical Alert - Get Help  I have ANY of these:    I feel awful    Medicine is not helping    Breathing getting harder    Trouble walking or talking    Nose opens wide to breathe       1. Take your rescue medicine NOW  2. If your provider has prescribed an oral steroid medicine, start taking it NOW  3. Call your doctor NOW  4. If you are still in the Red Zone after 20 minutes and you have not reached your doctor:    Take your rescue medicine again and    Call 911 or go to the emergency room right away    See your regular doctor within 2 weeks of an Emergency Room or Urgent Care visit for  follow-up treatment.          Annual Reminders:  Meet with Asthma Educator,  Flu Shot in the Fall, consider Pneumonia Vaccination for patients with asthma (aged 19 and older).    Pharmacy:    [facility] [place], [place] - [address]  [facility] [place], [place] - [address]  [facility] - [place], [place] - [address]                      Asthma Triggers  How To Control Things That Make Your Asthma Worse    Triggers are things that make your asthma worse.  Look at the list below to help you find your triggers and what you can do about them.  You can help prevent asthma flare-ups by staying away from your triggers.      Trigger                                                          What you can do   Cigarette Smoke  Tobacco smoke can make asthma worse. Do not allow smoking in your home, car or around you.  Be sure no one smokes at a child s day care or school.  If you smoke, ask your health care provider for ways to help you quit.  Ask family members to quit too.  Ask your health care provider for a referral to Quit Plan to help you quit smoking, or call 7-568-675PLAN.     Colds, Flu, Bronchitis  These are common triggers of asthma. Wash your hands often.  Don t touch your eyes, nose or mouth.  Get a flu shot every year.     Dust Mites  These are tiny bugs that live in cloth or carpet. They are too small to see. Wash sheets and blankets in hot water every week.   Encase pillows and mattress in dust mite proof covers.  Avoid having carpet if you can. If you have carpet, vacuum weekly.   Use a dust mask and HEPA vacuum.   Pollen and Outdoor Mold  Some people are allergic to trees, grass, or weed pollen, or molds. Try to keep your windows closed.  Limit time out doors when pollen count is high.   Ask you health care provider about taking medicine during allergy season.     Animal Dander  Some people are allergic to skin flakes, urine or saliva from pets with fur or  feathers. Keep pets with fur or feathers out of your home.    If you can t keep the pet outdoors, then keep the pet out of your bedroom.  Keep the bedroom door closed.  Keep pets off cloth furniture and away from stuffed toys.     Mice, Rats, and Cockroaches  Some people are allergic to the waste from these pests.   Cover food and garbage.  Clean up spills and food crumbs.  Store grease in the refrigerator.   Keep food out of the bedroom.   Indoor Mold  This can be a trigger if your home has high moisture. Fix leaking faucets, pipes, or other sources of water.   Clean moldy surfaces.  Dehumidify basement if it is damp and smelly.   Smoke, Strong Odors, and Sprays  These can reduce air quality. Stay away from strong odors and sprays, such as perfume, powder, hair spray, paints, smoke incense, paint, cleaning products, candles and new carpet.   Exercise or Sports  Some people with asthma have this trigger. Be active!  Ask your doctor about taking medicine before sports or exercise to prevent symptoms.    Warm up for 5-10 minutes before and after sports or exercise.     Other Triggers of Asthma  Cold air:  Cover your nose and mouth with a scarf.  Sometimes laughing or crying can be a trigger.  Some medicines and food can trigger asthma.

## 2019-04-22 NOTE — PROGRESS NOTES
"  SUBJECTIVE:   Ky Martinez is a 73 year old male who presents to clinic today for the following   health issues:      Acute Illness   Acute illness concerns: cough  Onset: 10 days     Fever: no     Chills/Sweats: no     Headache (location?): no     Sinus Pressure:YES    Conjunctivitis:  no    Ear Pain: no    Rhinorrhea: YES    Congestion: YES    Sore Throat: YES- alittle     Cough: YES    Wheeze: YES-     Decreased Appetite: YES    Nausea: no     Vomiting: no    Diarrhea:  no    Dysuria/Freq.: no    Fatigue/Achiness: YES    Sick/Strep Exposure: no      Therapies Tried and outcome:       Ky is here for a 10-day history is of cold symptoms that is getting worse.  Nursing notes above reviewed and confirmed with patient.  Cough is worse at night that keeps him up at night.  Over-the-counter medications so far have no effect.  Continued to have the runny nose and nasal congestion with scratchy sore throat. Also has some sinus pressure but no headache or dizziness.  No fever or chills.  Coughs are congested in chest and is productive with \"puddy and green\" sputum.  Started wheezing when he coughs however.  No nausea or vomiting.  Tolerate oral intake well and has been drinking a lot of water.  Feeling tired from lack of sleep and hard.  Decreased in appetite but been drinking a lot of water.  No history of asthma or COPD; never smoke.  No sick exposure.  No dyspnea or orthopnea.  Has had similar symptoms in the past and usually resolved with a course of Zithromax.  No other concerns.    Additional history: as documented    Reviewed  and updated as needed this visit by clinical staff  Tobacco  Allergies  Meds  Soc Hx        Reviewed and updated as needed this visit by Provider         Current Outpatient Medications   Medication Sig Dispense Refill     aspirin 81 MG EC tablet Take 81 mg by mouth daily       atorvastatin (LIPITOR) 80 MG tablet Take 1 tablet (80 mg) by mouth daily 90 tablet 3     azithromycin " "(ZITHROMAX) 250 MG tablet Two tablets first day, then one tablet daily for four days. 6 tablet 0     DAILY MULTI OR 1 TABLET DAILY       finasteride (PROSCAR) 5 MG tablet Take 1 tablet (5 mg) by mouth daily 90 tablet 3     furosemide (LASIX) 40 MG tablet Take 1 tablet (40 mg) by mouth daily 1 tablet daily 90 tablet 3     ibuprofen (ADVIL,MOTRIN) 200 MG tablet Take 400 mg by mouth every 4 hours as needed.       isosorbide mononitrate (IMDUR) 30 MG 24 hr tablet TAKE ONE TABLET BY MOUTH ONCE DAILY 90 tablet 3     losartan (COZAAR) 50 MG tablet Take 1 tablet (50 mg) by mouth daily 90 tablet 3     metoprolol succinate ER (TOPROL-XL) 50 MG 24 hr tablet Take 1 tablet (50 mg) by mouth daily 1 tablet daily 90 tablet 3     potassium chloride ER (KLOR-CON) 20 MEQ CR tablet Take 1 tablet (20 mEq) by mouth daily 1 tablet daily 90 tablet 3     tamsulosin (FLOMAX) 0.4 MG capsule TAKE ONE CAPSULE BY MOUTH ONCE DAILY 90 capsule 3     acetaminophen (TYLENOL) 325 MG tablet Take 325 mg by mouth 2 tablets daily       albuterol (ALBUTEROL) 108 (90 BASE) MCG/ACT inhaler Inhale 1-2 puffs into the lungs every 4 hours as needed for shortness of breath / dyspnea (Patient not taking: Reported on 12/24/2018) 1 Inhaler 1     nitroGLYcerin (NITROSTAT) 0.4 MG sublingual tablet 0.4 mg As needed       Allergies   Allergen Reactions     Pine Shortness Of Breath     Pine Pollen  Pine Pollen     Lisinopril Cough     No Clinical Screening - See Comments Rash       ROS:  Constitutional, HEENT, cardiovascular, pulmonary, gi and gu systems are negative, except as otherwise noted.    OBJECTIVE:     /82 (BP Location: Right arm, Patient Position: Sitting, Cuff Size: Adult Large)   Pulse 78   Temp 97.8  F (36.6  C) (Temporal)   Resp 14   Ht 1.803 m (5' 11\")   Wt 134.7 kg (297 lb)   SpO2 94%   BMI 41.42 kg/m    Body mass index is 41.42 kg/m .   GENERAL: healthy, alert and no distress.  Speak in full sentences.  HENT: ear canals and TM's normal.  " Nares are congested with clear drainage.  Oropharynx is pink and moist.  No tonsilar redness, exudate or hypertrophy.  No tender with palpation to the sinuses.  NECK: no adenopathy.  RESP: lungs clear to auscultation - no rales, rhonchi or wheezes.  Good respiratory effort throughout  CV: regular rate and rhythm, no murmur.  No leg swelling  ABDOMEN: soft, non-tender and bowel sounds normal      Diagnostic Test Results:  none     ASSESSMENT/PLAN:       ICD-10-CM    1. Acute bronchitis with symptoms > 10 days J20.9 azithromycin (ZITHROMAX) 250 MG tablet   2. Morbid obesity (H) E66.01    3. Mild intermittent asthma without complication J45.20    4. Screening for colon cancer Z12.11 Fecal colorectal cancer screen (FIT)     Discussed with patient about the nature of the condition.  Continue with OTC Zytec or Benadryl as needed for congestion.  Started him a 5 days course of Zithromax and encourage his to complete the whole course of antibiotic as prescribed.  Encouraged to drink a lot of water.  Call in if not improve or worsening of he symptoms.  ER if develop breathing problem.  All of his questions were answered.     No indication of asthma exacerbation on the physical exam today.  States that he had asthma when he was young and has not been using any inhaler for years.  Does not feel the need to have the inhaler at this time.    He is morbidly obese with a BMI of 41.  Discussed with him about the nature of the condition.  Healthy lifestyle modification with daily exercise and healthy diet discussed.  Recommended to follow-up with his primary care provider for his chronic medical conditions include congestive failure monitoring.    Discussed about colon cancer screening.  He does not want colonoscopy.  He is okay with the FIT test which was ordered.      Marielle Jimenez Mai, MD  [facility]

## 2019-05-02 ENCOUNTER — TELEPHONE (OUTPATIENT)
Dept: FAMILY MEDICINE | Facility: CLINIC | Age: 74
End: 2019-05-02

## 2019-05-02 DIAGNOSIS — R19.5 POSITIVE FIT (FECAL IMMUNOCHEMICAL TEST): Primary | ICD-10-CM

## 2019-05-02 DIAGNOSIS — Z12.11 SCREENING FOR COLON CANCER: ICD-10-CM

## 2019-05-02 LAB — HEMOCCULT STL QL IA: POSITIVE

## 2019-05-02 PROCEDURE — 82274 ASSAY TEST FOR BLOOD FECAL: CPT | Performed by: FAMILY MEDICINE

## 2019-05-02 NOTE — TELEPHONE ENCOUNTER
Patient calling back, gave message below. Patient would like to get the colonoscopy done. Please call when the order is in

## 2019-05-02 NOTE — TELEPHONE ENCOUNTER
----- Message from Marielle Jimenez Mai, MD sent at 5/2/2019  2:29 PM CDT -----  Please let patient know that his FIT test showed positive blood in the stool.  There are several possible causes for blood in the stool, including colon cancer which need to be ruled out.  The next step would be colonoscopy and I strongly recommended to get it done as soon as possible.  If he agrees, please help him to set up for a colonoscopy.  thanks

## 2019-05-02 NOTE — TELEPHONE ENCOUNTER
Lm for pt to call clinic back. Please give msg below. Pended colonoscopy order if pt is agreeable, they will contact him to schedule once order is signed. Uyen Gray CMA

## 2019-05-03 ENCOUNTER — TELEPHONE (OUTPATIENT)
Dept: FAMILY MEDICINE | Facility: CLINIC | Age: 74
End: 2019-05-03

## 2019-05-03 NOTE — LETTER
[address] [address]  [phone]        May 6, 2019    Ky Martinez  [address]  [place] [address]

## 2019-05-03 NOTE — TELEPHONE ENCOUNTER
Left message for patient to return call to schedule EGD/colonoscopy. If Wanda or Marcy are not available, please transfer to same day surgery

## 2019-05-03 NOTE — LETTER

## 2019-05-06 NOTE — TELEPHONE ENCOUNTER
Date of colonoscopy/EGD: 5/22/19  Surgeon: Dr. Encarnacion  Prep:Miralax  Packet:Colonoscopy/EGD instructions mailed to patient's home address.   Date: 5/6/2019      Surgery Scheduler

## 2019-05-22 ENCOUNTER — HOSPITAL ENCOUNTER (OUTPATIENT)
Facility: CLINIC | Age: 74
Discharge: HOME | End: 2019-05-22
Attending: SURGERY | Admitting: SURGERY
Payer: COMMERCIAL

## 2019-05-22 VITALS
TEMPERATURE: 98 F | HEART RATE: 70 BPM | RESPIRATION RATE: 16 BRPM | DIASTOLIC BLOOD PRESSURE: 81 MMHG | SYSTOLIC BLOOD PRESSURE: 129 MMHG | OXYGEN SATURATION: 95 %

## 2019-05-22 LAB — COLONOSCOPY: NORMAL

## 2019-05-22 PROCEDURE — G0500 MOD SEDAT ENDO SERVICE >5YRS: HCPCS | Performed by: SURGERY

## 2019-05-22 PROCEDURE — 99153 MOD SED SAME PHYS/QHP EA: CPT | Performed by: SURGERY

## 2019-05-22 PROCEDURE — 25000128 H RX IP 250 OP 636: Performed by: SURGERY

## 2019-05-22 PROCEDURE — 25000125 ZZHC RX 250: Performed by: SURGERY

## 2019-05-22 PROCEDURE — 88305 TISSUE EXAM BY PATHOLOGIST: CPT | Mod: 26 | Performed by: SURGERY

## 2019-05-22 PROCEDURE — 88305 TISSUE EXAM BY PATHOLOGIST: CPT | Performed by: SURGERY

## 2019-05-22 PROCEDURE — 45380 COLONOSCOPY AND BIOPSY: CPT | Mod: PT | Performed by: SURGERY

## 2019-05-22 RX ORDER — ONDANSETRON 2 MG/ML
4 INJECTION INTRAMUSCULAR; INTRAVENOUS
Status: DISCONTINUED | OUTPATIENT
Start: 2019-05-22 | End: 2019-05-22 | Stop reason: HOSPADM

## 2019-05-22 RX ORDER — NALOXONE HYDROCHLORIDE 0.4 MG/ML
.1-.4 INJECTION, SOLUTION INTRAMUSCULAR; INTRAVENOUS; SUBCUTANEOUS
Status: DISCONTINUED | OUTPATIENT
Start: 2019-05-22 | End: 2019-05-22 | Stop reason: HOSPADM

## 2019-05-22 RX ORDER — ONDANSETRON 2 MG/ML
4 INJECTION INTRAMUSCULAR; INTRAVENOUS EVERY 6 HOURS PRN
Status: DISCONTINUED | OUTPATIENT
Start: 2019-05-22 | End: 2019-05-22 | Stop reason: HOSPADM

## 2019-05-22 RX ORDER — LIDOCAINE 40 MG/G
CREAM TOPICAL
Status: DISCONTINUED | OUTPATIENT
Start: 2019-05-22 | End: 2019-05-22 | Stop reason: HOSPADM

## 2019-05-22 RX ORDER — ONDANSETRON 4 MG/1
4 TABLET, ORALLY DISINTEGRATING ORAL EVERY 6 HOURS PRN
Status: DISCONTINUED | OUTPATIENT
Start: 2019-05-22 | End: 2019-05-22 | Stop reason: HOSPADM

## 2019-05-22 RX ORDER — FENTANYL CITRATE 50 UG/ML
INJECTION, SOLUTION INTRAMUSCULAR; INTRAVENOUS PRN
Status: DISCONTINUED | OUTPATIENT
Start: 2019-05-22 | End: 2019-05-22 | Stop reason: HOSPADM

## 2019-05-22 RX ORDER — FLUMAZENIL 0.1 MG/ML
0.2 INJECTION, SOLUTION INTRAVENOUS
Status: DISCONTINUED | OUTPATIENT
Start: 2019-05-22 | End: 2019-05-22 | Stop reason: HOSPADM

## 2019-05-22 RX ADMIN — LIDOCAINE HYDROCHLORIDE 1 ML: 10 INJECTION, SOLUTION EPIDURAL; INFILTRATION; INTRACAUDAL; PERINEURAL at 10:13

## 2019-05-22 NOTE — LETTER
[facility]           [address] [place] [address]           Tel [phone]  Ky HENNY Martinez  [address] [place] [address]      May 28, 2019    Dear Ky,  This letter is to inform you of the results of your pathology report on your colonoscopy.  If you have questions please feel free to call my assistant  At [phone] .    Your pathology report was:  A. Cecal polyp:   - Tubular adenoma.   - Negative for high grade dysplasia or malignancy.     B. Splenic flexure polyp:   - Tubular adenoma.   - Negative for high grade dysplasia or malignancy.     C. Rectal polyp:   - Tubular adenoma.   - Negative for high grade dysplasia or malignancy.     Showed an Adenomatous polyp. This is a benign (not cancerous) growth; however these can become cancer over time. This polyp is usually removed completely at the time of the biopsy. Because it is an Adenomatous polyp you do have a slight higher risk for colon cancer. This is why you will need a repeat colonoscopy in approximately 5 years.  If you do have further questions please don t hesitate to call my assistant at  .  We do not have someone answering the phone all the time at my assistants number so if leave a message may take a day or so to get back to you.  So if more urgent then call the below number.    To make an appointment call [phone]: .   Sincerely,   Gian Horn D.O.

## 2019-05-22 NOTE — DISCHARGE INSTRUCTIONS
[facility]    Home Care Following Endoscopy          Activity:    You have just undergone an endoscopic procedure usually performed with conscious sedation.  Do not work or operate machinery (including a car) for at least 12 hours.      I encourage you to walk and attempt to pass this air as soon as possible.    Diet:    Return to the diet you were on before your procedure but eat lightly for the first 12-24 hours.    Drink plenty of water.    Resume any regular medications unless otherwise advised by your physician.  Please begin any new medication prescribed as a result of your procedure as directed by your physician.     If you had any biopsy or polyp removed please refrain from aspirin or aspirin products for 2 days.  If on Coumadin please restart as instructed by your physician.   Pain:    You may take Tylenol as needed for pain.  Expected Recovery:    You can expect some mild abdominal fullness and/or discomfort due to the air used to inflate your intestinal tract. It is also normal to have a mild sore throat after upper endoscopy.    Call Your Physician if You Have:      After Colonoscopy:  o Worsening persisting abdominal pain which is worse with activity.  o Fevers (>101 degrees F), chills or shakes.  o Passage of continued blood with bowel movements.     Any questions or concerns about your recovery, please call [phone] or after hours [phone] Nurse Advice Line.    Follow-up Care:    You should receive a call or letter with your results within 1 week. Please call if you have not received a notification of your results.  If asked to return to clinic please make an appointment 1 week after your procedure.  Call [phone].     [facility] Same-Day [facility]   Adult Discharge Orders & Instructions     For 24 hours after surgery    1. Get plenty of rest.  A responsible adult must stay with you for at least 24 hours after you leave the hospital.   2. Do not drive or use heavy  equipment.  If you have weakness or tingling, don't drive or use heavy equipment until this feeling goes away.  3. Do not drink alcohol.  4. Avoid strenuous or risky activities.  Ask for help when climbing stairs.   5. You may feel lightheaded.  If so, sit for a few minutes before standing.  Have someone help you get up.   6. You may have a slight fever. Call the doctor if your fever is over 100 F (37.7 C) (taken under the tongue) or lasts longer than 24 hours.  7. You may have a dry mouth, a sore throat, muscle aches or trouble sleeping.  These should go away after 24 hours.  8. Do not make important or legal decisions.  We don t expect you to have any problems from the surgery or treatment you had today. Just in case, here s what to do if you have pain, upset stomach (nausea), bleeding or infection:  Pain:  Take medicines your doctor has prescribed or over-the-counter medicine they have suggested. Resting and using ice packs can help, too. For surgery on an arm or leg, raise it on a pillow to ease swelling. Call your doctor if these methods don t work.  Copyright [employer], Licensed under [employer]  Upset stomach (nausea):  Take anti-nausea medicine approved by your doctor. Drink clear liquids like apple juice, ginger ale, broth or 7-Up. Be sure to drink enough fluids. Rest can help, too. Move to normal foods when you re ready. Bleeding:  In the first 24 hours, you may see a little blood on your dressing, about the size of a quarter. You don t need to worry about this much blood, but if the blood spot keeps getting bigger:    Put pressure on the wound if you can, AND    Call your doctor.  Copyright [employer], Licensed under [employer]  Fever/Infection: Please call your doctor if you have any of these signs:    Redness    Swelling    Wound feels warm    Pain gets worse    Bad-smelling fluid leaks from wound    Fever or chills  Call your doctor for any of the followin.  It has been  over 8 to 10 hours since surgery and you are still not able to urinate (pass water).    2.  Headache for over 24 hours.    3.  Numbness, tingling or weakness in your legs the day after surgery (if you had spinal anesthesia).    Nurse advice line: [phone]

## 2019-05-26 LAB — COPATH REPORT: NORMAL

## 2019-12-09 ENCOUNTER — OFFICE VISIT (OUTPATIENT)
Dept: FAMILY MEDICINE | Facility: CLINIC | Age: 74
End: 2019-12-09
Payer: COMMERCIAL

## 2019-12-09 VITALS
SYSTOLIC BLOOD PRESSURE: 120 MMHG | TEMPERATURE: 98 F | HEART RATE: 94 BPM | BODY MASS INDEX: 41.14 KG/M2 | WEIGHT: 295 LBS | OXYGEN SATURATION: 99 % | DIASTOLIC BLOOD PRESSURE: 72 MMHG | RESPIRATION RATE: 18 BRPM

## 2019-12-09 DIAGNOSIS — E78.5 HYPERLIPIDEMIA LDL GOAL <100: ICD-10-CM

## 2019-12-09 DIAGNOSIS — Z23 NEED FOR PROPHYLACTIC VACCINATION AND INOCULATION AGAINST INFLUENZA: ICD-10-CM

## 2019-12-09 DIAGNOSIS — I25.10 CORONARY ARTERY DISEASE INVOLVING NATIVE CORONARY ARTERY OF NATIVE HEART WITHOUT ANGINA PECTORIS: ICD-10-CM

## 2019-12-09 DIAGNOSIS — N40.0 BENIGN PROSTATIC HYPERPLASIA, UNSPECIFIED WHETHER LOWER URINARY TRACT SYMPTOMS PRESENT: ICD-10-CM

## 2019-12-09 DIAGNOSIS — I10 BENIGN ESSENTIAL HYPERTENSION: Primary | ICD-10-CM

## 2019-12-09 LAB
ANION GAP SERPL CALCULATED.3IONS-SCNC: 4 MMOL/L (ref 3–14)
BUN SERPL-MCNC: 22 MG/DL (ref 7–30)
CALCIUM SERPL-MCNC: 8.9 MG/DL (ref 8.5–10.1)
CHLORIDE SERPL-SCNC: 108 MMOL/L (ref 94–109)
CO2 SERPL-SCNC: 29 MMOL/L (ref 20–32)
CREAT SERPL-MCNC: 1.4 MG/DL (ref 0.66–1.25)
GFR SERPL CREATININE-BSD FRML MDRD: 49 ML/MIN/{1.73_M2}
GLUCOSE SERPL-MCNC: 94 MG/DL (ref 70–99)
POTASSIUM SERPL-SCNC: 4.2 MMOL/L (ref 3.4–5.3)
SODIUM SERPL-SCNC: 141 MMOL/L (ref 133–144)

## 2019-12-09 PROCEDURE — 90662 IIV NO PRSV INCREASED AG IM: CPT | Performed by: FAMILY MEDICINE

## 2019-12-09 PROCEDURE — 80048 BASIC METABOLIC PNL TOTAL CA: CPT | Performed by: FAMILY MEDICINE

## 2019-12-09 PROCEDURE — 36415 COLL VENOUS BLD VENIPUNCTURE: CPT | Performed by: FAMILY MEDICINE

## 2019-12-09 PROCEDURE — 99214 OFFICE O/P EST MOD 30 MIN: CPT | Mod: 25 | Performed by: FAMILY MEDICINE

## 2019-12-09 PROCEDURE — G0008 ADMIN INFLUENZA VIRUS VAC: HCPCS | Performed by: FAMILY MEDICINE

## 2019-12-09 RX ORDER — ATORVASTATIN CALCIUM 80 MG/1
80 TABLET, FILM COATED ORAL DAILY
Qty: 90 TABLET | Refills: 3 | Status: SHIPPED | OUTPATIENT
Start: 2019-12-09 | End: 2021-01-20

## 2019-12-09 RX ORDER — FINASTERIDE 5 MG/1
5 TABLET, FILM COATED ORAL DAILY
Qty: 90 TABLET | Refills: 3 | Status: SHIPPED | OUTPATIENT
Start: 2019-12-09 | End: 2021-01-20

## 2019-12-09 RX ORDER — ISOSORBIDE MONONITRATE 30 MG/1
30 TABLET, EXTENDED RELEASE ORAL DAILY
Qty: 90 TABLET | Refills: 3 | Status: SHIPPED | OUTPATIENT
Start: 2019-12-09 | End: 2021-01-20

## 2019-12-09 RX ORDER — TAMSULOSIN HYDROCHLORIDE 0.4 MG/1
CAPSULE ORAL
Qty: 90 CAPSULE | Refills: 3 | Status: SHIPPED | OUTPATIENT
Start: 2019-12-09 | End: 2021-01-20

## 2019-12-09 RX ORDER — POTASSIUM CHLORIDE 1500 MG/1
20 TABLET, EXTENDED RELEASE ORAL DAILY
Qty: 90 TABLET | Refills: 3 | Status: SHIPPED | OUTPATIENT
Start: 2019-12-09 | End: 2021-01-20

## 2019-12-09 RX ORDER — METOPROLOL SUCCINATE 50 MG/1
50 TABLET, EXTENDED RELEASE ORAL DAILY
Qty: 90 TABLET | Refills: 3 | Status: SHIPPED | OUTPATIENT
Start: 2019-12-09 | End: 2021-01-20

## 2019-12-09 RX ORDER — FUROSEMIDE 40 MG
40 TABLET ORAL DAILY
Qty: 90 TABLET | Refills: 3 | Status: SHIPPED | OUTPATIENT
Start: 2019-12-09 | End: 2021-01-20

## 2019-12-09 RX ORDER — LOSARTAN POTASSIUM 50 MG/1
50 TABLET ORAL DAILY
Qty: 90 TABLET | Refills: 3 | Status: SHIPPED | OUTPATIENT
Start: 2019-12-09 | End: 2021-01-20

## 2019-12-09 ASSESSMENT — PAIN SCALES - GENERAL: PAINLEVEL: NO PAIN (0)

## 2019-12-09 NOTE — PROGRESS NOTES
Subjective     yK Martinez is a 74 year old male who presents to clinic today for the following health issues:    HPI     Hyperlipidemia Follow-Up      Are you regularly taking any medication or supplement to lower your cholesterol?   yes    Are you having muscle aches or other side effects that you think could be caused by your cholesterol lowering medication?  No    Hypertension Follow-up      Do you check your blood pressure regularly outside of the clinic? Yes     Are you following a low salt diet? Yes    Are your blood pressures ever more than 140 on the top number (systolic) OR more   than 90 on the bottom number (diastolic), for example 140/90? No      SUBJECTIVE:  Ky  is a 74 year old male who presents for: Follow-up of his hypertension and hyperlipidemia needs some medication refills before traveling south to [place] for the winter.  He is too early for most of his blood work.  He is going to come in when he gets back in April for a physical.  He has an ICD in place and this was just interrogated.  He has been feeling well.  He does have dyspnea on exertion if he pushes himself too hard.  He is trying to do more exercise.  He had a coronary artery stent in the past as well.  This followed a non-STEMI.    Past Medical History:   Diagnosis Date     Hypertension      Myocardial infarction (H)      Past Surgical History:   Procedure Laterality Date     Cardiac stent       COLONOSCOPY N/A 5/22/2019    Procedure: Colonoscopy, Polypectomy by Snare;  Surgeon: Gian Horn DO;  Location: [facility]     EXCISE GANGLION WRIST Left 11/26/2014    Procedure: EXCISE GANGLION WRIST;  Surgeon: Gian Haddad MD;  Location: [facility] OR     ICD DEVICE INTERROGATE      2017     ORTHOPEDIC SURGERY       RELEASE CARPAL TUNNEL Left 11/26/2014    Procedure: RELEASE CARPAL TUNNEL;  Surgeon: Gian Haddad MD;  Location: [facility]     RELEASE DEQUERVAINS WRIST Left 11/26/2014    Procedure: RELEASE DEQUERVAINS  WRIST;  Surgeon: Gian Haddad MD;  Location: [facility] OR     Social History     Tobacco Use     Smoking status: Never Smoker     Smokeless tobacco: Never Used   Substance Use Topics     Alcohol use: Yes     Alcohol/week: 3.3 standard drinks     Types: 4 Glasses of wine per week     Comment: occasional      Current Outpatient Medications   Medication Sig Dispense Refill     acetaminophen (TYLENOL) 325 MG tablet Take 325 mg by mouth 2 tablets daily       albuterol (ALBUTEROL) 108 (90 BASE) MCG/ACT inhaler Inhale 1-2 puffs into the lungs every 4 hours as needed for shortness of breath / dyspnea 1 Inhaler 1     aspirin 81 MG EC tablet Take 81 mg by mouth daily       atorvastatin (LIPITOR) 80 MG tablet Take 1 tablet (80 mg) by mouth daily 90 tablet 3     DAILY MULTI OR 1 TABLET DAILY       finasteride (PROSCAR) 5 MG tablet Take 1 tablet (5 mg) by mouth daily 90 tablet 3     furosemide (LASIX) 40 MG tablet Take 1 tablet (40 mg) by mouth daily 1 tablet daily 90 tablet 3     isosorbide mononitrate (IMDUR) 30 MG 24 hr tablet Take 1 tablet (30 mg) by mouth daily 90 tablet 3     losartan (COZAAR) 50 MG tablet Take 1 tablet (50 mg) by mouth daily 90 tablet 3     metoprolol succinate ER (TOPROL-XL) 50 MG 24 hr tablet Take 1 tablet (50 mg) by mouth daily 1 tablet daily 90 tablet 3     potassium chloride ER (KLOR-CON) 20 MEQ CR tablet Take 1 tablet (20 mEq) by mouth daily 1 tablet daily 90 tablet 3     tamsulosin (FLOMAX) 0.4 MG capsule TAKE ONE CAPSULE BY MOUTH ONCE DAILY 90 capsule 3     ibuprofen (ADVIL,MOTRIN) 200 MG tablet Take 400 mg by mouth every 4 hours as needed.       nitroGLYcerin (NITROSTAT) 0.4 MG sublingual tablet 0.4 mg As needed         REVIEW OF SYSTEMS:   5 point ROS negative except as noted above in HPI, including Gen., Resp, CV, GI &  system review.     OBJECTIVE:  Vitals: /72   Pulse 94   Temp 98  F (36.7  C) (Temporal)   Resp 18   Wt 133.8 kg (295 lb)   SpO2 99%   BMI 41.14 kg/m    BMI= Body  mass index is 41.14 kg/m .  He is alert appears well.  HEENT is clear.  Neck supple.  Lungs are clear good air exchange.  Heart with a regular rhythm S1-S2 no murmur, extremities with no edema.  Skin clear.    ASSESSMENT:  #1 hypertension #2 coronary artery disease #3 ischemic cardiomyopathy with ICD #4 hyperlipidemia    PLAN:  We are going to check a basic chemistry panel today as last year his renal function was off just slightly.  We will have her make sure it has not slid even further.  He does take furosemide daily for dependent edema.  He does not take nonsteroidals.  Renewed all of his medications.  Will notify him with the results and if we need to make any adjustments because of his renal function.  Otherwise he will return in April for a full physical and all of his lab work.  Discussed exercise with him up to the limits of feeling uncomfortable and the importance of weight loss.    Weight management plan: Discussed healthy diet and exercise guidelines    Ky Major MD  [facility]

## 2019-12-09 NOTE — PROGRESS NOTES
Prior to immunization administration, verified patients identity using patient s name and date of birth. Please see Immunization Activity for additional information.     Screening Questionnaire for Adult Immunization    Are you sick today?   No   Do you have allergies to medications, food, a vaccine component or latex?   No   Have you ever had a serious reaction after receiving a vaccination?   No   Do you have a long-term health problem with heart disease, lung disease, asthma, kidney disease, metabolic disease (e.g. diabetes), anemia, or other blood disorder?   No   Do you have cancer, leukemia, HIV/AIDS, or any other immune system problem?   No   In the past 3 months, have you taken medications that affect  your immune system, such as prednisone, other steroids, or anticancer drugs; drugs for the treatment of rheumatoid arthritis, Crohn s disease, or psoriasis; or have you had radiation treatments?   No   Have you had a seizure, or a brain or other nervous system problem?   No   During the past year, have you received a transfusion of blood or blood     products, or been given immune (gamma) globulin or antiviral drug?   No   For women: Are you pregnant or is there a chance you could become        pregnant during the next month?   No   Have you received any vaccinations in the past 4 weeks?   No     Immunization questionnaire answers were all negative.        Per orders of Dr. Major, injection of flu  given by Diane Bajwa MA. Patient instructed to remain in clinic for 15 minutes afterwards, and to report any adverse reaction to me immediately.       Screening performed by Diane Bawja MA on 12/9/2019 at 1:42 PM.

## 2019-12-10 ASSESSMENT — ASTHMA QUESTIONNAIRES: ACT_TOTALSCORE: 25

## 2019-12-12 ENCOUNTER — TELEPHONE (OUTPATIENT)
Dept: FAMILY MEDICINE | Facility: CLINIC | Age: 74
End: 2019-12-12

## 2019-12-12 NOTE — TELEPHONE ENCOUNTER
Patient was informed that his kidney function is stable, a little bit better than last year. It is still a little bit off. Stated understanding, was thankful for the call.    Suha Rich CMA

## 2019-12-12 NOTE — TELEPHONE ENCOUNTER
----- Message from Ky Major MD sent at 12/12/2019 12:42 PM CST -----  Your kidney function is stable a little bit better than last year still a little bit off.

## 2020-03-27 ENCOUNTER — APPOINTMENT (OUTPATIENT)
Dept: GENERAL RADIOLOGY | Facility: CLINIC | Age: 75
DRG: 175 | End: 2020-03-27
Attending: EMERGENCY MEDICINE
Payer: COMMERCIAL

## 2020-03-27 ENCOUNTER — APPOINTMENT (OUTPATIENT)
Dept: CT IMAGING | Facility: CLINIC | Age: 75
DRG: 175 | End: 2020-03-27
Attending: EMERGENCY MEDICINE
Payer: COMMERCIAL

## 2020-03-27 ENCOUNTER — HOSPITAL ENCOUNTER (INPATIENT)
Facility: CLINIC | Age: 75
LOS: 3 days | Discharge: HOME | DRG: 175 | End: 2020-03-30
Attending: EMERGENCY MEDICINE | Admitting: FAMILY MEDICINE
Payer: COMMERCIAL

## 2020-03-27 DIAGNOSIS — I26.99 ACUTE PULMONARY EMBOLISM, UNSPECIFIED PULMONARY EMBOLISM TYPE, UNSPECIFIED WHETHER ACUTE COR PULMONALE PRESENT (H): ICD-10-CM

## 2020-03-27 DIAGNOSIS — R79.1 ABNORMAL COAGULATION PROFILE: ICD-10-CM

## 2020-03-27 DIAGNOSIS — N26.1 ATROPHY OF LEFT KIDNEY: ICD-10-CM

## 2020-03-27 DIAGNOSIS — R06.02 SHORTNESS OF BREATH: ICD-10-CM

## 2020-03-27 PROBLEM — I50.22 CHRONIC SYSTOLIC HEART FAILURE (H): Status: ACTIVE | Noted: 2017-04-10

## 2020-03-27 PROBLEM — Z95.810 S/P ICD (INTERNAL CARDIAC DEFIBRILLATOR) PROCEDURE: Status: ACTIVE | Noted: 2017-08-19

## 2020-03-27 PROBLEM — E66.01 MORBID OBESITY (H): Status: ACTIVE | Noted: 2019-04-22

## 2020-03-27 PROBLEM — R09.02 HYPOXEMIA: Status: ACTIVE | Noted: 2020-03-27

## 2020-03-27 PROBLEM — I82.622 ACUTE DEEP VEIN THROMBOSIS (DVT) OF OTHER VEIN OF LEFT UPPER EXTREMITY (H): Status: ACTIVE | Noted: 2017-08-19

## 2020-03-27 PROBLEM — R60.0 BILATERAL LEG EDEMA: Status: ACTIVE | Noted: 2020-03-27

## 2020-03-27 PROBLEM — I25.5 ISCHEMIC CARDIOMYOPATHY: Status: ACTIVE | Noted: 2017-07-31

## 2020-03-27 LAB
ALBUMIN SERPL-MCNC: 3.7 G/DL (ref 3.4–5)
ALP SERPL-CCNC: 109 U/L (ref 40–150)
ALT SERPL W P-5'-P-CCNC: 39 U/L (ref 0–70)
ANION GAP SERPL CALCULATED.3IONS-SCNC: 6 MMOL/L (ref 3–14)
APTT PPP: 30 SEC (ref 22–37)
AST SERPL W P-5'-P-CCNC: 25 U/L (ref 0–45)
BASE DEFICIT BLDV-SCNC: 1.3 MMOL/L
BASOPHILS # BLD AUTO: 0 10E9/L (ref 0–0.2)
BASOPHILS NFR BLD AUTO: 0.4 %
BILIRUB SERPL-MCNC: 0.8 MG/DL (ref 0.2–1.3)
BUN SERPL-MCNC: 24 MG/DL (ref 7–30)
CALCIUM SERPL-MCNC: 8.4 MG/DL (ref 8.5–10.1)
CHLORIDE SERPL-SCNC: 110 MMOL/L (ref 94–109)
CO2 SERPL-SCNC: 23 MMOL/L (ref 20–32)
CREAT SERPL-MCNC: 1.19 MG/DL (ref 0.66–1.25)
D DIMER PPP FEU-MCNC: 5.5 UG/ML FEU (ref 0–0.5)
DIFFERENTIAL METHOD BLD: ABNORMAL
EOSINOPHIL NFR BLD AUTO: 2.2 %
ERYTHROCYTE [DISTWIDTH] IN BLOOD BY AUTOMATED COUNT: 13.2 % (ref 10–15)
GFR SERPL CREATININE-BSD FRML MDRD: 60 ML/MIN/{1.73_M2}
GLUCOSE SERPL-MCNC: 153 MG/DL (ref 70–99)
HCO3 BLDV-SCNC: 23 MMOL/L (ref 21–28)
HCT VFR BLD AUTO: 40.8 % (ref 40–53)
HGB BLD-MCNC: 14.4 G/DL (ref 13.3–17.7)
IMM GRANULOCYTES # BLD: 0 10E9/L (ref 0–0.4)
IMM GRANULOCYTES NFR BLD: 0.6 %
INR PPP: 1.13 (ref 0.86–1.14)
LYMPHOCYTES # BLD AUTO: 1 10E9/L (ref 0.8–5.3)
LYMPHOCYTES NFR BLD AUTO: 13.6 %
MCH RBC QN AUTO: 34 PG (ref 26.5–33)
MCHC RBC AUTO-ENTMCNC: 35.3 G/DL (ref 31.5–36.5)
MCV RBC AUTO: 97 FL (ref 78–100)
MONOCYTES # BLD AUTO: 0.7 10E9/L (ref 0–1.3)
MONOCYTES NFR BLD AUTO: 9.5 %
NEUTROPHILS # BLD AUTO: 5.3 10E9/L (ref 1.6–8.3)
NEUTROPHILS NFR BLD AUTO: 73.7 %
NRBC # BLD AUTO: 0 10*3/UL
NRBC BLD AUTO-RTO: 0 /100
NT-PROBNP SERPL-MCNC: 268 PG/ML (ref 0–900)
O2/TOTAL GAS SETTING VFR VENT: ABNORMAL %
PCO2 BLDV: 36 MM HG (ref 40–50)
PH BLDV: 7.41 PH (ref 7.32–7.43)
PLATELET # BLD AUTO: 168 10E9/L (ref 150–450)
PO2 BLDV: 47 MM HG (ref 25–47)
POTASSIUM SERPL-SCNC: 4 MMOL/L (ref 3.4–5.3)
PROT SERPL-MCNC: 7.2 G/DL (ref 6.8–8.8)
RADIOLOGIST FLAGS: ABNORMAL
RBC # BLD AUTO: 4.23 10E12/L (ref 4.4–5.9)
SODIUM SERPL-SCNC: 139 MMOL/L (ref 133–144)
TROPONIN I SERPL-MCNC: <0.015 UG/L (ref 0–0.04)
WBC # BLD AUTO: 7.2 10E9/L (ref 4–11)

## 2020-03-27 PROCEDURE — 85303 CLOT INHIBIT PROT C ACTIVITY: CPT | Performed by: EMERGENCY MEDICINE

## 2020-03-27 PROCEDURE — 85306 CLOT INHIBIT PROT S FREE: CPT | Performed by: EMERGENCY MEDICINE

## 2020-03-27 PROCEDURE — 85610 PROTHROMBIN TIME: CPT | Performed by: EMERGENCY MEDICINE

## 2020-03-27 PROCEDURE — 80053 COMPREHEN METABOLIC PANEL: CPT | Performed by: EMERGENCY MEDICINE

## 2020-03-27 PROCEDURE — 25800030 ZZH RX IP 258 OP 636: Performed by: FAMILY MEDICINE

## 2020-03-27 PROCEDURE — 93010 ELECTROCARDIOGRAM REPORT: CPT | Mod: Z6 | Performed by: EMERGENCY MEDICINE

## 2020-03-27 PROCEDURE — 25000128 H RX IP 250 OP 636: Performed by: EMERGENCY MEDICINE

## 2020-03-27 PROCEDURE — 96376 TX/PRO/DX INJ SAME DRUG ADON: CPT | Performed by: EMERGENCY MEDICINE

## 2020-03-27 PROCEDURE — 81240 F2 GENE: CPT | Performed by: EMERGENCY MEDICINE

## 2020-03-27 PROCEDURE — 85025 COMPLETE CBC W/AUTO DIFF WBC: CPT | Performed by: EMERGENCY MEDICINE

## 2020-03-27 PROCEDURE — 71275 CT ANGIOGRAPHY CHEST: CPT

## 2020-03-27 PROCEDURE — 96365 THER/PROPH/DIAG IV INF INIT: CPT | Mod: 59 | Performed by: EMERGENCY MEDICINE

## 2020-03-27 PROCEDURE — 83880 ASSAY OF NATRIURETIC PEPTIDE: CPT | Performed by: EMERGENCY MEDICINE

## 2020-03-27 PROCEDURE — 99207 ZZC CDG-MDM COMPONENT: MEETS MODERATE - UP CODED: CPT | Performed by: FAMILY MEDICINE

## 2020-03-27 PROCEDURE — 99285 EMERGENCY DEPT VISIT HI MDM: CPT | Mod: 25 | Performed by: EMERGENCY MEDICINE

## 2020-03-27 PROCEDURE — 82803 BLOOD GASES ANY COMBINATION: CPT | Performed by: EMERGENCY MEDICINE

## 2020-03-27 PROCEDURE — 85300 ANTITHROMBIN III ACTIVITY: CPT | Performed by: EMERGENCY MEDICINE

## 2020-03-27 PROCEDURE — 25000128 H RX IP 250 OP 636: Performed by: FAMILY MEDICINE

## 2020-03-27 PROCEDURE — 84484 ASSAY OF TROPONIN QUANT: CPT | Performed by: EMERGENCY MEDICINE

## 2020-03-27 PROCEDURE — 81241 F5 GENE: CPT | Performed by: EMERGENCY MEDICINE

## 2020-03-27 PROCEDURE — 71046 X-RAY EXAM CHEST 2 VIEWS: CPT | Mod: TC

## 2020-03-27 PROCEDURE — 93005 ELECTROCARDIOGRAM TRACING: CPT | Performed by: EMERGENCY MEDICINE

## 2020-03-27 PROCEDURE — 85379 FIBRIN DEGRADATION QUANT: CPT | Performed by: EMERGENCY MEDICINE

## 2020-03-27 PROCEDURE — 12000000 ZZH R&B MED SURG/OB

## 2020-03-27 PROCEDURE — 85730 THROMBOPLASTIN TIME PARTIAL: CPT | Performed by: EMERGENCY MEDICINE

## 2020-03-27 PROCEDURE — 25000125 ZZHC RX 250: Performed by: EMERGENCY MEDICINE

## 2020-03-27 PROCEDURE — 99223 1ST HOSP IP/OBS HIGH 75: CPT | Mod: AI | Performed by: FAMILY MEDICINE

## 2020-03-27 RX ORDER — ONDANSETRON 2 MG/ML
4 INJECTION INTRAMUSCULAR; INTRAVENOUS EVERY 6 HOURS PRN
Status: DISCONTINUED | OUTPATIENT
Start: 2020-03-27 | End: 2020-03-30 | Stop reason: HOSPADM

## 2020-03-27 RX ORDER — NALOXONE HYDROCHLORIDE 0.4 MG/ML
.1-.4 INJECTION, SOLUTION INTRAMUSCULAR; INTRAVENOUS; SUBCUTANEOUS
Status: DISCONTINUED | OUTPATIENT
Start: 2020-03-27 | End: 2020-03-30 | Stop reason: HOSPADM

## 2020-03-27 RX ORDER — IOPAMIDOL 755 MG/ML
500 INJECTION, SOLUTION INTRAVASCULAR ONCE
Status: COMPLETED | OUTPATIENT
Start: 2020-03-27 | End: 2020-03-27

## 2020-03-27 RX ORDER — ONDANSETRON 4 MG/1
4 TABLET, ORALLY DISINTEGRATING ORAL EVERY 6 HOURS PRN
Status: DISCONTINUED | OUTPATIENT
Start: 2020-03-27 | End: 2020-03-30 | Stop reason: HOSPADM

## 2020-03-27 RX ORDER — FUROSEMIDE 10 MG/ML
40 INJECTION INTRAMUSCULAR; INTRAVENOUS EVERY 12 HOURS
Status: DISCONTINUED | OUTPATIENT
Start: 2020-03-27 | End: 2020-03-29

## 2020-03-27 RX ORDER — HEPARIN SODIUM 10000 [USP'U]/100ML
0-3500 INJECTION, SOLUTION INTRAVENOUS CONTINUOUS
Status: DISCONTINUED | OUTPATIENT
Start: 2020-03-27 | End: 2020-03-29

## 2020-03-27 RX ORDER — METOPROLOL SUCCINATE 50 MG/1
50 TABLET, EXTENDED RELEASE ORAL DAILY
Status: DISCONTINUED | OUTPATIENT
Start: 2020-03-28 | End: 2020-03-30 | Stop reason: HOSPADM

## 2020-03-27 RX ORDER — ATORVASTATIN CALCIUM 40 MG/1
80 TABLET, FILM COATED ORAL DAILY
Status: DISCONTINUED | OUTPATIENT
Start: 2020-03-28 | End: 2020-03-30 | Stop reason: HOSPADM

## 2020-03-27 RX ORDER — LIDOCAINE 40 MG/G
CREAM TOPICAL
Status: DISCONTINUED | OUTPATIENT
Start: 2020-03-27 | End: 2020-03-30 | Stop reason: HOSPADM

## 2020-03-27 RX ORDER — FINASTERIDE 5 MG/1
5 TABLET, FILM COATED ORAL DAILY
Status: DISCONTINUED | OUTPATIENT
Start: 2020-03-28 | End: 2020-03-30 | Stop reason: HOSPADM

## 2020-03-27 RX ORDER — ACETAMINOPHEN 325 MG/1
325-650 TABLET ORAL EVERY 4 HOURS PRN
Status: DISCONTINUED | OUTPATIENT
Start: 2020-03-27 | End: 2020-03-30 | Stop reason: HOSPADM

## 2020-03-27 RX ORDER — PROCHLORPERAZINE 25 MG
12.5 SUPPOSITORY, RECTAL RECTAL EVERY 12 HOURS PRN
Status: DISCONTINUED | OUTPATIENT
Start: 2020-03-27 | End: 2020-03-30 | Stop reason: HOSPADM

## 2020-03-27 RX ORDER — POTASSIUM CHLORIDE 1500 MG/1
20 TABLET, EXTENDED RELEASE ORAL DAILY
Status: DISCONTINUED | OUTPATIENT
Start: 2020-03-28 | End: 2020-03-30 | Stop reason: HOSPADM

## 2020-03-27 RX ORDER — HYDROMORPHONE HYDROCHLORIDE 1 MG/ML
0.2 INJECTION, SOLUTION INTRAMUSCULAR; INTRAVENOUS; SUBCUTANEOUS
Status: DISCONTINUED | OUTPATIENT
Start: 2020-03-27 | End: 2020-03-30 | Stop reason: HOSPADM

## 2020-03-27 RX ORDER — ALBUTEROL SULFATE 90 UG/1
1-2 AEROSOL, METERED RESPIRATORY (INHALATION) EVERY 4 HOURS PRN
Status: DISCONTINUED | OUTPATIENT
Start: 2020-03-27 | End: 2020-03-30 | Stop reason: HOSPADM

## 2020-03-27 RX ORDER — TAMSULOSIN HYDROCHLORIDE 0.4 MG/1
0.4 CAPSULE ORAL DAILY
Status: DISCONTINUED | OUTPATIENT
Start: 2020-03-28 | End: 2020-03-30 | Stop reason: HOSPADM

## 2020-03-27 RX ORDER — PROCHLORPERAZINE MALEATE 5 MG
5 TABLET ORAL EVERY 6 HOURS PRN
Status: DISCONTINUED | OUTPATIENT
Start: 2020-03-27 | End: 2020-03-30 | Stop reason: HOSPADM

## 2020-03-27 RX ORDER — LOSARTAN POTASSIUM 50 MG/1
50 TABLET ORAL DAILY
Status: DISCONTINUED | OUTPATIENT
Start: 2020-03-28 | End: 2020-03-30 | Stop reason: HOSPADM

## 2020-03-27 RX ORDER — ISOSORBIDE MONONITRATE 30 MG/1
30 TABLET, EXTENDED RELEASE ORAL DAILY
Status: DISCONTINUED | OUTPATIENT
Start: 2020-03-28 | End: 2020-03-30 | Stop reason: HOSPADM

## 2020-03-27 RX ORDER — SODIUM CHLORIDE 9 MG/ML
INJECTION, SOLUTION INTRAVENOUS CONTINUOUS
Status: DISCONTINUED | OUTPATIENT
Start: 2020-03-27 | End: 2020-03-30

## 2020-03-27 RX ORDER — AMOXICILLIN 250 MG
1 CAPSULE ORAL 2 TIMES DAILY PRN
Status: DISCONTINUED | OUTPATIENT
Start: 2020-03-27 | End: 2020-03-30 | Stop reason: HOSPADM

## 2020-03-27 RX ORDER — AMOXICILLIN 250 MG
2 CAPSULE ORAL 2 TIMES DAILY PRN
Status: DISCONTINUED | OUTPATIENT
Start: 2020-03-27 | End: 2020-03-30 | Stop reason: HOSPADM

## 2020-03-27 RX ADMIN — SODIUM CHLORIDE 500 ML: 9 INJECTION, SOLUTION INTRAVENOUS at 19:47

## 2020-03-27 RX ADMIN — HEPARIN SODIUM 1800 UNITS/HR: 10000 INJECTION, SOLUTION INTRAVENOUS at 17:58

## 2020-03-27 RX ADMIN — FUROSEMIDE 40 MG: 10 INJECTION, SOLUTION INTRAVENOUS at 19:47

## 2020-03-27 RX ADMIN — IOPAMIDOL 80 ML: 755 INJECTION, SOLUTION INTRAVENOUS at 15:53

## 2020-03-27 RX ADMIN — SODIUM CHLORIDE 70 ML: 9 INJECTION, SOLUTION INTRAVENOUS at 15:53

## 2020-03-27 ASSESSMENT — ENCOUNTER SYMPTOMS
SHORTNESS OF BREATH: 1
WHEEZING: 0
PALPITATIONS: 0

## 2020-03-27 ASSESSMENT — MIFFLIN-ST. JEOR
SCORE: 2113
SCORE: 2121.11

## 2020-03-27 ASSESSMENT — ACTIVITIES OF DAILY LIVING (ADL)
RETIRED_COMMUNICATION: 0-->UNDERSTANDS/COMMUNICATES WITHOUT DIFFICULTY
ADLS_ACUITY_SCORE: 13
COGNITION: 0 - NO COGNITION ISSUES REPORTED
AMBULATION: 0-->INDEPENDENT
BATHING: 0-->INDEPENDENT
DRESS: 0-->INDEPENDENT
RETIRED_EATING: 0-->INDEPENDENT
TRANSFERRING: 0-->INDEPENDENT
SWALLOWING: 0-->SWALLOWS FOODS/LIQUIDS WITHOUT DIFFICULTY
TOILETING: 0-->INDEPENDENT
FALL_HISTORY_WITHIN_LAST_SIX_MONTHS: NO

## 2020-03-27 NOTE — ED PROVIDER NOTES
History   No chief complaint on file.    HPI  Ky Martinez is a 74 year old male with significant past medical history for ischemic heart myopathy, status post ICD, systolic heart failure( EF 35% 2017), hypertension, morbid obesity, hyperlipidemia presents with complaints of dyspnea.  Patient reports he recently traveled by car from [place] where he had spent a few months over the winter.  He has had no previous history for DVT or pulmonary embolism.  Denies any pleuritic type chest discomfort.  Denies any chest discomfort.  No recognition for any palpitations.  Admits to skipping multiple doses of his furosemide to limit the frequency of rest stops for urination.  He did this because he was trying to expedite getting home with the current coronavirus pandemic.  Did not want to enter possible contaminated public restrooms.  He does not check his weight.  Has noted some increased leg edema.  Describes orthopnea but also having exertional dyspnea.     Allergies:  Allergies   Allergen Reactions     Pine Shortness Of Breath     Pine Pollen  Pine Pollen     Lisinopril Cough     No Clinical Screening - See Comments Rash       Problem List:    Patient Active Problem List    Diagnosis Date Noted     Morbid obesity (H) 04/22/2019     Priority: Medium     Acute deep vein thrombosis (DVT) of other vein of left upper extremity (H) 08/19/2017     Priority: Medium     S/P ICD (internal cardiac defibrillator) procedure 08/19/2017     Priority: Medium     Ischemic cardiomyopathy 07/31/2017     Priority: Medium     Chronic systolic heart failure (H) 04/10/2017     Priority: Medium     Overview:   03/2017 in [place]. LVEF 30-40% by TTE       Non-STEMI (non-ST elevated myocardial infarction) (H) 03/01/2017     Priority: Medium     Coronary artery disease involving native coronary artery of native heart without angina pectoris 03/21/2016     Priority: Medium     Benign prostatic hyperplasia 10/29/2015     Priority: Medium      Intermittent asthma 06/17/2011     Priority: Medium     Hyperlipidemia 08/14/2009     Priority: Medium     Hypertension 08/14/2009     Priority: Medium     Coronary atherosclerosis 08/14/2009     Priority: Medium     Overview:   03/2008 Inferior STEMI, RCA  3.0 x 24 mm [place] JEFF  03/2017 Angiogram in [place], Non-STEMI (patent RCA stent, mild non-obstructive CAD to pLAD and LCx)        Other acute and subacute form of ischemic heart disease 04/07/2008     Priority: Medium        Past Medical History:    Past Medical History:   Diagnosis Date     Hypertension      Myocardial infarction (H)        Past Surgical History:    Past Surgical History:   Procedure Laterality Date     Cardiac stent       COLONOSCOPY N/A 5/22/2019    Procedure: Colonoscopy, Polypectomy by Snare;  Surgeon: Gian Horn DO;  Location: [facility]     EXCISE GANGLION WRIST Left 11/26/2014    Procedure: EXCISE GANGLION WRIST;  Surgeon: Gian Haddad MD;  Location: [facility]     ICD DEVICE INTERROGATE      2017     ORTHOPEDIC SURGERY       RELEASE CARPAL TUNNEL Left 11/26/2014    Procedure: RELEASE CARPAL TUNNEL;  Surgeon: Gian Haddad MD;  Location: [facility]     RELEASE DEQUERVAINS WRIST Left 11/26/2014    Procedure: RELEASE DEQUERVAINS WRIST;  Surgeon: Gian Haddad MD;  Location: [facility]       Family History:    Family History   Problem Relation Age of Onset     Arthritis Mother      Diabetes Father      Allergies Father         sulfa     Eye Disorder Father         cataract     Heart Disease Father         by pass       Social History:  Marital Status:   [2]  Social History     Tobacco Use     Smoking status: Never Smoker     Smokeless tobacco: Never Used   Substance Use Topics     Alcohol use: Yes     Alcohol/week: 3.3 standard drinks     Types: 4 Glasses of wine per week     Comment: occasional      Drug use: No        Medications:    acetaminophen (TYLENOL) 325 MG tablet  albuterol (ALBUTEROL) 108 (90 BASE)  MCG/ACT inhaler  aspirin 81 MG EC tablet  atorvastatin (LIPITOR) 80 MG tablet  DAILY MULTI OR  finasteride (PROSCAR) 5 MG tablet  furosemide (LASIX) 40 MG tablet  ibuprofen (ADVIL,MOTRIN) 200 MG tablet  isosorbide mononitrate (IMDUR) 30 MG 24 hr tablet  losartan (COZAAR) 50 MG tablet  metoprolol succinate ER (TOPROL-XL) 50 MG 24 hr tablet  nitroGLYcerin (NITROSTAT) 0.4 MG sublingual tablet  potassium chloride ER (KLOR-CON) 20 MEQ CR tablet  tamsulosin (FLOMAX) 0.4 MG capsule          Review of Systems   Respiratory: Positive for shortness of breath. Negative for wheezing.    Cardiovascular: Positive for leg swelling. Negative for chest pain and palpitations.   All other systems reviewed and are negative.      Physical Exam          Physical Exam  Vitals signs and nursing note reviewed.   Constitutional:       Appearance: He is obese. He is not ill-appearing.   HENT:      Head: Normocephalic and atraumatic.      Right Ear: External ear normal.      Left Ear: External ear normal.      Nose: Nose normal.      Mouth/Throat:      Mouth: Mucous membranes are moist.      Pharynx: No oropharyngeal exudate.   Eyes:      General: Lids are normal. No scleral icterus.     Conjunctiva/sclera: Conjunctivae normal.      Pupils: Pupils are equal, round, and reactive to light.      Funduscopic exam:     Right eye: No hemorrhage.         Left eye: No hemorrhage.   Neck:      Musculoskeletal: Neck supple.      Vascular: No carotid bruit or JVD.      Trachea: Trachea normal.   Cardiovascular:      Rate and Rhythm: Regular rhythm. Tachycardia present.  No extrasystoles are present.     Heart sounds: Normal heart sounds, S1 normal and S2 normal. No murmur.   Pulmonary:      Effort: Pulmonary effort is normal. No respiratory distress.      Breath sounds: Normal breath sounds. No wheezing, rhonchi or rales.   Chest:      Chest wall: No tenderness.   Abdominal:      General: Bowel sounds are normal. There is no distension.      Palpations:  Abdomen is soft. There is no hepatomegaly or splenomegaly.      Tenderness: There is no abdominal tenderness. There is no rebound.      Hernia: No hernia is present.      Comments: Morbid obesity   Musculoskeletal: Normal range of motion.      Right lower leg: Edema present.      Left lower leg: Edema (ankle edema bilateral) present.   Lymphadenopathy:      Cervical: No cervical adenopathy.   Skin:     General: Skin is warm and dry.      Capillary Refill: Capillary refill takes less than 2 seconds.      Coloration: Skin is not pale.      Findings: No rash.   Neurological:      Mental Status: He is alert and oriented to person, place, and time.      Sensory: No sensory deficit.      Deep Tendon Reflexes: Reflexes are normal and symmetric.   Psychiatric:         Speech: Speech normal.         Behavior: Behavior normal.         ED Course        Procedures          EKG:  Interpretation by Bobo Major DO.   Indication:  Sinus  Rhythm   Low voltage in precordial leads.    -Nonspecific QRS widening.    -Old inferior-apical infarct.     ABNORMAL EKG         Results for orders placed or performed during the hospital encounter of 03/27/20 (from the past 24 hour(s))   CBC with platelets differential   Result Value Ref Range    WBC 7.2 4.0 - 11.0 10e9/L    RBC Count 4.23 (L) 4.4 - 5.9 10e12/L    Hemoglobin 14.4 13.3 - 17.7 g/dL    Hematocrit 40.8 40.0 - 53.0 %    MCV 97 78 - 100 fl    MCH 34.0 (H) 26.5 - 33.0 pg    MCHC 35.3 31.5 - 36.5 g/dL    RDW 13.2 10.0 - 15.0 %    Platelet Count 168 150 - 450 10e9/L    Diff Method Automated Method     % Neutrophils 73.7 %    % Lymphocytes 13.6 %    % Monocytes 9.5 %    % Eosinophils 2.2 %    % Basophils 0.4 %    % Immature Granulocytes 0.6 %    Nucleated RBCs 0 0 /100    Absolute Neutrophil 5.3 1.6 - 8.3 10e9/L    Absolute Lymphocytes 1.0 0.8 - 5.3 10e9/L    Absolute Monocytes 0.7 0.0 - 1.3 10e9/L    Absolute Basophils 0.0 0.0 - 0.2 10e9/L    Abs Immature Granulocytes 0.0 0 - 0.4  10e9/L    Absolute Nucleated RBC 0.0    Comprehensive metabolic panel   Result Value Ref Range    Sodium 139 133 - 144 mmol/L    Potassium 4.0 3.4 - 5.3 mmol/L    Chloride 110 (H) 94 - 109 mmol/L    Carbon Dioxide 23 20 - 32 mmol/L    Anion Gap 6 3 - 14 mmol/L    Glucose 153 (H) 70 - 99 mg/dL    Urea Nitrogen 24 7 - 30 mg/dL    Creatinine 1.19 0.66 - 1.25 mg/dL    GFR Estimate 60 (L) >60 mL/min/[1.73_m2]    GFR Estimate If Black 70 >60 mL/min/[1.73_m2]    Calcium 8.4 (L) 8.5 - 10.1 mg/dL    Bilirubin Total 0.8 0.2 - 1.3 mg/dL    Albumin 3.7 3.4 - 5.0 g/dL    Protein Total 7.2 6.8 - 8.8 g/dL    Alkaline Phosphatase 109 40 - 150 U/L    ALT 39 0 - 70 U/L    AST 25 0 - 45 U/L   Troponin I   Result Value Ref Range    Troponin I ES <0.015 0.000 - 0.045 ug/L   D dimer quantitative   Result Value Ref Range    D Dimer 5.5 (H) 0.0 - 0.50 ug/ml FEU   Blood gas venous   Result Value Ref Range    Ph Venous 7.41 7.32 - 7.43 pH    PCO2 Venous 36 (L) 40 - 50 mm Hg    PO2 Venous 47 25 - 47 mm Hg    Bicarbonate Venous 23 21 - 28 mmol/L    Base Deficit Venous 1.3 mmol/L    FIO2 21%    Nt probnp inpatient (BNP)   Result Value Ref Range    N-Terminal Pro BNP Inpatient 268 0 - 900 pg/mL   XR Chest 2 Views    Narrative    CHEST TWO VIEWS 3/27/2020 2:51 PM     HISTORY: Dyspnea with history for <EF HF.    COMPARISON: June 21, 2011       Impression    IMPRESSION: New cardiac device noted on the left. There are no acute  infiltrates. The cardiac silhouette is not enlarged. Pulmonary  vasculature is unremarkable.    CALVIN RUBIN MD   CT Chest Pulmonary Embolism w Contrast   Result Value Ref Range    Radiologist flags Pulmonary embolism (AA)     Narrative    CT CHEST PULMONARY EMBOLISM WITH CONTRAST  3/27/2020 4:12 PM     HISTORY: Elevated D-dimer, recent travel.    COMPARISON: None.    TECHNIQUE: Volumetric helical acquisition of CT images of the chest  from the lung apices to the kidneys were acquired after the  administration of 80mL,  Isovue-370  IV contrast. Radiation dose for  this scan was reduced using automated exposure control, adjustment of  the mA and/or kV according to patient size, or iterative  reconstruction technique.    FINDINGS: Positive study for pulmonary emboli. Fairly extensive right  lower lobe pulmonary emboli. Mild right upper lobe and right middle  lobe pulmonary emboli. Minimal left upper lobe pulmonary emboli. The  heart minimally generous on the right side, right heart strain could  be present. Lungs are clear of infiltrate. Thoracic aorta is  atherosclerotic without evidence of dissection or aneurysm. There is  no pleural or pericardial effusion.  There is no pneumothorax. Adrenal  glands are normal. Severe left renal atrophy probably due to  ureteropelvic junction obstruction, incompletely seen. Remainder of  the visualized upper abdomen is unremarkable.      Impression    IMPRESSION:   1. Positive study for bilateral pulmonary emboli right worse than  left.  2. No thoracic aortic dissection or aneurysm.   3. Severe left renal atrophy probably due to ureteropelvic junction  obstruction, incompletely seen.    [Critical Result: Pulmonary embolism]    Finding was identified on 3/27/2020 4:16 PM.     Dr. RAGHAVENDRA GALVAN was contacted by me at 3/27/2020 4:25 PM and  verbalized understanding of the critical finding.     CALVIN RUBIN MD       Medications - No data to display    Assessments & Plan (with Medical Decision Making)  Tarik is a 74-year-old male with known history for ischemic cardiomyopathy, decreased ejection fraction heart failure presents with dyspnea with exertion and orthopnea.  Patient's last echocardiogram was completed 2017.  At that time it showed EF of 35%.  No associated valvular heart disease.  Has been compliant on medications up until recent.  During his travel by car from [place] to [place] he temporarily stopped his furosemide 40 mg daily.  Did so because of the coronavirus- avoid frequent  rest stops where he might come in contact with contaminated bathrooms.  Denies chest discomfort.  Has had no fever chills or night sweats.  Denies any cough.  Has noted some increased edema in the lower extremities.  He has noted no calf swelling or tenderness.  Describes no pleuritic type chest discomfort.  Example he reports that he was simply moving his suitcases from the car to his house and felt quite dyspneic with this level of activity.  Also much less tolerant of short distance walks.  3:35 PM  Reviewed labs with patient.   D-dimer elevated (5.5)  Plan: CT PE ordered.   4:51 PM  CT is positive for PE.  Affecting bilateral lung fields with most clot burden affecting the right lower lobe.  Patient initially presented with oxygen saturation of 95% and being mildly tachypneic in triage.  For comfort measures he was placed on oxygen 2 L nasal cannula.  Patient is currently being assessed per RN by turning off oxygen observing sedentary and then having patient ambulate to see if he becomes hypoxic.  He remains hypertensive.  Contacted echocardiogram department.  They are closed for the weekend and cannot be completed.  No way to rule out right sided heart strain.  CT does show that the right heart appears to be enlarged suggestive for pulmonary hypertension.  4:54 PM  Nursing notified me that his oxygen saturation dropped to 88% with a short distance walk to the bathroom.  Placed back on 2 L nasal cannula.  Patient became more dyspneic with respiratory rate climbing to 26 breaths/min.  I recommend hospital observation and start anticoagulation therapy. Discuss with Kelsey Carlson MD.   5:13 PM  Initiate hypercoagulable work-up.  Genetic form I reviewed with patient and signed.  Started heparin high intensity protocol for PE               I have reviewed the nursing notes.    I have reviewed the findings, diagnosis, plan and need for follow up with the patient.      New Prescriptions    No medications on file        Final diagnoses:   Shortness of breath   Acute pulmonary embolism, unspecified pulmonary embolism type, unspecified whether acute cor pulmonale present (H)   Atrophy of left kidney       3/27/2020   [facility] EMERGENCY DEPARTMENT     Bobo Major,   03/27/20 0414

## 2020-03-27 NOTE — H&P
[facility]    History and Physical - Hospitalist Service       Date of Admission:  3/27/2020    Assessment & Plan   Ky Martinez is a 74 year old male with a known history of ischemic cardiomyopathy with EF of 30 to 40% from last echocardiogram in 2017 with DVT in 2017 following surgery who recently had a prolonged road trip from [place] admitted on 3/27/2020. He presented to the emergency room with a 2-3-week history of shortness of breath with exertion with associated bilateral lower leg swelling.  Patient admits that he had not been weighing himself daily and had not been following his usual diet for the past few months while wintering in [place] and thought he was getting out of shape.  His symptoms significantly worsened following his 3-day long road trip back to [place], during which time he traveled 8 to 12 hours each day with minimal stops and did not take his Lasix.  Since his arrival back to [place] 3 days ago his shortness of breath with activity has significantly worsened and he was unable to walk even 150 feet without needing 3 to 5 minutes to recover.  Today he became short of breath even at rest and was unable to recover and his wife convinced him to come into the emergency room for further assessment.  Patient denies any fever or cough.  In the emergency room his O2 saturation was initially 92% on RA but patient was very dyspneic with tachpnea and patient was placed on 2L NC with improvement in work of breathing.  Work up in the ED has revealed elevated D dimer with numerous bilateral PE, worse in the right than left lung seen on CTA, with concern for right heart size increase concerning for possible right sided heart strain.  Although BNP was normal and chest x-ray clear, there is clinical evidence of edema and possible mild volume overload.  Given this is his second clot in recent years, hypercoagulability work up has been performed and patient has been started on a  heparin drip.  Decision has been made to admit patient for ongoing evaluation and work up.      Principal Problem:    Acute pulmonary embolism - bilateral with possible right sided heart strain on CT    Assessment: CTA has shown numerous pulmonary emboli worse on the right than left with concern for increased right-sided heart size and possible cardiac strain in a patient with respiratory distress and a recent prolonged road trip with minimal stop    Plan: Continue with heparin drip, admit to inpatient status and monitor closely for hemodynamic stability.  If patient continues to be stable after 24 to 48 hours of heparin drip, would consider transitioning to oral anticoagulation for ongoing treatment.  Hypercoagulability work-up is currently in process given patient's second extensive clot in the past 3 years.      Active Problems:    Acute respiratory distress; Hypoxemia, shortness of breath    Assessment: Developing over the past 2 to 3 weeks but significantly worsening in the past 5 to 6 days in patient extensive PEs but at risk for CHF exacerbation as well.  At this time it is unclear how much of this is secondary to pulmonary emboli and how much may be secondary to an underlying CHF exacerbation in a patient who had not been weighing himself daily, not been following recommended diet, and is at higher risk for CHF exacerbation.      Plan: We will admit and proceed with heparin drip for treatment of pulmonary emboli and perform echocardiogram when available.  We will also initiate Lasix IV diuresis with close monitoring of renal function and weight stability to see if this improves patient's bilateral leg edema and respiratory symptoms.  We will continue with O2 supplementation for signs of respiratory distress and wean as able.      Ischemic cardiomyopathy; Chronic systolic and diastolic heart failure (H)    Assessment: patient has known grade I diastolic dysfunction and EF of 30-40% on last echo in 2017.   Patient has not been weighing himself daily, has noticed increased swelling in bilateral legs for the past few weeks.     Plan: will give Lasix 40 mg IV every 12 hours and recheck renal function tomorrow, assess daily weights and proceed with echo when available.       Bilateral leg edema - right more than left    Assessment: Worsening over the past 2 to 3 weeks.  Possibly secondary to underlying DVT versus CHF exacerbation/volume overload    Plan: Proceed with ultrasound of bilateral lower legs to evaluate for DVT, proceed with IV Lasix as outlined above and monitor closely for resolution      Intermittent asthma    Assessment: Previous history with patient having a as needed albuterol inhaler, he has not had any recent wheezing and has not needed his inhaler prior to this admission no wheezing on exam during this evaluation.      Plan: Continue with PRN albuterol during this stay, no steroids are indicated at this time      Coronary artery disease involving native coronary artery of native heart without angina pectoris    Assessment: s/p RCA stent in 2008, repeat antiogram in 2017 following non-stemi showed RCA stent patent and no severe narrowing present.  Patient has not been having any angina or symptoms concerning for chest pain in recent weeks.  He has not needed to use any of his PRN nitro    Plan: continue home regimen of Imdur, Toprol XL, losartan and lasix and monitor closely       History of acute deep vein thrombosis (DVT) of other vein of left upper extremity (H)    Assessment: occurring in 2017 following ICD placement and patient was on Eliquis for several months.     Plan: proceed with heparin drip, and coagulopathy testing as outlined above      S/P ICD (internal cardiac defibrillator) procedure    Assessment: previous and stable    Plan: no acute intervention needed      Hypertension    Assessment: normally well controlled with home regimen of Losartan, Toprol XL and Lasix.  Blood pressures normal  to slightly hypertensive in the ED    Plan: Will continue home regimen for now with next doses due tomorrow morning apart from increase IV Lasix diuresis as above but monitor closely for any concerns of hypotension going forward.        Benign prostatic hyperplasia    Assessment: On Proscar and Flomax    Plan: Continue home regimen without change      Morbid obesity (H)    Assessment: Chronic and stable    Plan: No acute intervention is needed       Diet: cardiac diet - low salt  DVT Prophylaxis: Heparin drip  Gallardo Catheter: not present  Code Status: Full code    Disposition Plan   Expected discharge: 2 - 3 days, recommended to prior living arrangement once patient remains hemodynamically stable, echocardiogram has been performed, more aggressive diuresis suscessfully achieved, oxygen needs resolve, etc.  Entered: Jimena Carlson MD 03/27/2020, 5:50 PM     The patient's care was discussed with the Patient.    Jimena Carlson MD  [facility]    ______________________________________________________________________    Chief Complaint   Shortness of breath with activity for severe weeks but worsening significantly since driving back from [place] earlier this week and today feeling SOB even at rest and unable to recover.    History is obtained from the patient    History of Present Illness   Ky Martinez is a 74 year old male who presents with a 2 to 3-week history of worsening shortness of breath with exertion in association with worsening bilateral leg swelling seen more in the right than left leg went on a 3-day road trip traveling 8 to 12 hours with minimal stop each dayfrom [place] to [place] with significant worsening of his shortness of breath over the past 3 days since returning to [place] and this morning was short of breath even at rest.  He presents to the emergency room and is now being admitted with plan as outlined above.    Review of Systems     CONSTITUTIONAL: NEGATIVE for fever, chills, change in weight  INTEGUMENTARY/SKIN: NEGATIVE for worrisome rashes, moles or lesions  EYES: NEGATIVE for vision changes or irritation  ENT/MOUTH: NEGATIVE for ear, mouth and throat problems  RESP:positive for shortness of breath with exertion for approximately 2-3 weeks but severely worsening in the past few days and now SOB even at rest, no cough, no wheezing  CV: positive for swelling of bilateral lower legs for the past 2-3 weeks, worse in the right than the left.  No chest pain or angina symptoms.  No palpitation.  Patient has not been weighing himself faithfully for several months.    GI: NEGATIVE for nausea, abdominal pain, heartburn, or change in bowel habits   male :ongoing chronic prostate issues unchanged from baseline  MUSCULOSKELETAL: NEGATIVE for significant arthralgias or myalgia  NEURO: no dizziness, patient reports he does feel weak and slightly lightheaded if he tried to push himself when the SOB starts but resolves in 2-3 minutes once he rests  PSYCHIATRIC: NEGATIVE for changes in mood or affect    Past Medical History    I have reviewed this patient's medical history and updated it with pertinent information if needed.   Past Medical History:   Diagnosis Date     Hypertension      Myocardial infarction (H)        Past Surgical History   I have reviewed this patient's surgical history and updated it with pertinent information if needed.  Past Surgical History:   Procedure Laterality Date     Cardiac stent       COLONOSCOPY N/A 5/22/2019    Procedure: Colonoscopy, Polypectomy by Snare;  Surgeon: Gian Horn DO;  Location: [facility]     EXCISE GANGLION WRIST Left 11/26/2014    Procedure: EXCISE GANGLION WRIST;  Surgeon: Gian Haddad MD;  Location: [facility]     ICD DEVICE INTERROGATE      2017     ORTHOPEDIC SURGERY       RELEASE CARPAL TUNNEL Left 11/26/2014    Procedure: RELEASE CARPAL TUNNEL;  Surgeon: Gian Haddad MD;   Location: [facility]     RELEASE DEQUERVAINS WRIST Left 2014    Procedure: RELEASE DEQUERVAINS WRIST;  Surgeon: Gian Haddad MD;  Location: [facility]       Social History   I have reviewed this patient's social history and updated it with pertinent information if needed.  Social History     Tobacco Use     Smoking status: Never Smoker     Smokeless tobacco: Never Used   Substance Use Topics     Alcohol use: Yes     Alcohol/week: 3.3 standard drinks     Types: 4 Glasses of wine per week     Comment: occasional      Drug use: No       Family History   I have reviewed this patient's family history and updated it with pertinent information if needed.   Family History   Problem Relation Age of Onset     Arthritis Mother      Diabetes Father      Allergies Father         sulfa     Eye Disorder Father         cataract     Heart Disease Father         by pass     Prior to Admission Medications   Prior to Admission Medications   Prescriptions Last Dose Informant Patient Reported? Taking?   DAILY MULTI OR 3/27/2020 at 0800  Yes Yes   Si TABLET DAILY   acetaminophen (TYLENOL) 325 MG tablet More than a month at Unknown time  Yes No   Sig: Take 325 mg by mouth 2 tablets daily   albuterol (ALBUTEROL) 108 (90 BASE) MCG/ACT inhaler Past Month at Unknown time  No Yes   Sig: Inhale 1-2 puffs into the lungs every 4 hours as needed for shortness of breath / dyspnea   aspirin 81 MG EC tablet 3/27/2020 at 0800  Yes Yes   Sig: Take 81 mg by mouth daily   atorvastatin (LIPITOR) 80 MG tablet 3/27/2020 at 0800  No Yes   Sig: Take 1 tablet (80 mg) by mouth daily   finasteride (PROSCAR) 5 MG tablet 3/27/2020 at 0800  No Yes   Sig: Take 1 tablet (5 mg) by mouth daily   furosemide (LASIX) 40 MG tablet 3/27/2020 at 0800  No Yes   Sig: Take 1 tablet (40 mg) by mouth daily 1 tablet daily   ibuprofen (ADVIL,MOTRIN) 200 MG tablet More than a month at Unknown time  Yes No   Sig: Take 400 mg by mouth every 4 hours as needed.   isosorbide  mononitrate (IMDUR) 30 MG 24 hr tablet 3/27/2020 at 0800  No Yes   Sig: Take 1 tablet (30 mg) by mouth daily   losartan (COZAAR) 50 MG tablet 3/27/2020 at 0800  No Yes   Sig: Take 1 tablet (50 mg) by mouth daily   metoprolol succinate ER (TOPROL-XL) 50 MG 24 hr tablet 3/27/2020 at 0800  No Yes   Sig: Take 1 tablet (50 mg) by mouth daily 1 tablet daily   nitroGLYcerin (NITROSTAT) 0.4 MG sublingual tablet   Yes Yes   Si.4 mg As needed   potassium chloride ER (KLOR-CON) 20 MEQ CR tablet 3/27/2020 at 0800  No Yes   Sig: Take 1 tablet (20 mEq) by mouth daily 1 tablet daily   tamsulosin (FLOMAX) 0.4 MG capsule 3/27/2020 at 0800  No Yes   Sig: TAKE ONE CAPSULE BY MOUTH ONCE DAILY      Facility-Administered Medications: None     Allergies   Allergies   Allergen Reactions     Pine Shortness Of Breath     Pine Pollen  Pine Pollen  Pine Pollen     No Clinical Screening - See Comments Rash     Other reaction(s): Rash     No Known Allergies Other (See Comments)       Physical Exam   Vital Signs: Temp: 97.6  F (36.4  C) Temp src: Oral BP: (!) 143/90 Pulse: 69 Heart Rate: 69 Resp: 24 SpO2: 95 % O2 Device: Nasal cannula Oxygen Delivery: 2 LPM  Weight: 295 lbs 0 oz    Constitutional: awake, alert, cooperative, no apparent distress, and appears stated age  Eyes: Lids and lashes normal, pupils equal, round and reactive to light, extra ocular muscles intact, sclera clear, conjunctiva normal  ENT: normocepalic, without obvious abnormality, atramatic, Nares normal, septum midline, no lesions, no drainage, throat non-erythematous and non-inflamed   Respiratory: Patient has ongoing tachypnea in the mid 20 range and does get winded even with conversation while on 1 L nasal cannula currently.  Decreased air movement diffusely, no wheezing or crackles noted  Cardiovascular: Regular rate and rhythm without murmur  GI: Bowel sounds present, abdomen is soft and nontender.  Of note patient does have decreased abdominal muscle tone on the  right side of the abdomen from a remote history of polio in childhood this is unchanged from his baseline -   Skin: no redness, warmth, or swelling and no rashes  Musculoskeletal: Patient does have trace to 1+ pitting edema in bilateral lower extremities, no tenderness on palpation, no erythema noted  Neurologic: Awake, alert, oriented to name, place and time.     Data   Data reviewed today: I reviewed all medications, new labs and imaging results over the last 24 hours.    Recent Labs   Lab 03/27/20  1739 03/27/20  1435   WBC  --  7.2   HGB  --  14.4   MCV  --  97   PLT  --  168   INR 1.13  --    NA  --  139   POTASSIUM  --  4.0   CHLORIDE  --  110*   CO2  --  23   BUN  --  24   CR  --  1.19   ANIONGAP  --  6   AUGUSTUS  --  8.4*   GLC  --  153*   ALBUMIN  --  3.7   PROTTOTAL  --  7.2   BILITOTAL  --  0.8   ALKPHOS  --  109   ALT  --  39   AST  --  25   TROPI  --  <0.015     Recent Results (from the past 24 hour(s))   XR Chest 2 Views    Narrative    CHEST TWO VIEWS 3/27/2020 2:51 PM     HISTORY: Dyspnea with history for <EF HF.    COMPARISON: June 21, 2011       Impression    IMPRESSION: New cardiac device noted on the left. There are no acute  infiltrates. The cardiac silhouette is not enlarged. Pulmonary  vasculature is unremarkable.    CALVIN RUBIN MD   CT Chest Pulmonary Embolism w Contrast   Result Value    Radiologist flags Pulmonary embolism (AA)    Narrative    CT CHEST PULMONARY EMBOLISM WITH CONTRAST  3/27/2020 4:12 PM     HISTORY: Elevated D-dimer, recent travel.    COMPARISON: None.    TECHNIQUE: Volumetric helical acquisition of CT images of the chest  from the lung apices to the kidneys were acquired after the  administration of 80mL, Isovue-370  IV contrast. Radiation dose for  this scan was reduced using automated exposure control, adjustment of  the mA and/or kV according to patient size, or iterative  reconstruction technique.    FINDINGS: Positive study for pulmonary emboli. Fairly extensive  right  lower lobe pulmonary emboli. Mild right upper lobe and right middle  lobe pulmonary emboli. Minimal left upper lobe pulmonary emboli. The  heart minimally generous on the right side, right heart strain could  be present. Lungs are clear of infiltrate. Thoracic aorta is  atherosclerotic without evidence of dissection or aneurysm. There is  no pleural or pericardial effusion.  There is no pneumothorax. Adrenal  glands are normal. Severe left renal atrophy probably due to  ureteropelvic junction obstruction, incompletely seen. Remainder of  the visualized upper abdomen is unremarkable.      Impression    IMPRESSION:   1. Positive study for bilateral pulmonary emboli right worse than  left.  2. No thoracic aortic dissection or aneurysm.   3. Severe left renal atrophy probably due to ureteropelvic junction  obstruction, incompletely seen.    [Critical Result: Pulmonary embolism]    Finding was identified on 3/27/2020 4:16 PM.     Dr. RAGHAVENDRA GALVAN was contacted by me at 3/27/2020 4:25 PM and  verbalized understanding of the critical finding.     CALVIN RUBIN MD

## 2020-03-27 NOTE — ED TRIAGE NOTES
States feeling soa past few weeks. Getting worse.States noted some swelling in ankles. No fever,cough, rash, muscle pain or chest pain. Pt has an ICD. Dr Major in room assessing pt. States not a covid 19 PUI.

## 2020-03-27 NOTE — ED NOTES
ED Nursing criteria listed below was addressed during verbal handoff:     Abnormal vitals: Yes  Abnormal results: Yes  Med Reconciliation completed: Yes  Meds given in ED: Yes  Any Overdue Meds: N/A  Core Measures: N/A  Isolation: N/A  Special needs: N/A  Skin assessment: Yes    Observation Patient  Education provided: N/A

## 2020-03-27 NOTE — PHARMACY-ANTICOAGULATION SERVICE
Pharmacy to dose Heparin High intensity.    Initiated DVT/PE heparin protocol using MED High intensity DVT/PE order set.    Platelet levels had been obtained and resulted prior to initiation.    Javy Monte RPH,PharmD.........March 27, 2020 5:45 PM

## 2020-03-27 NOTE — ED NOTES
Pt walked to BR with nurse with oximeter on, 4 minutes after O2 was discontinued per Dr Gloria request. Pts sats were 90% before leaving room and 88% upon returning to room. Resp labored after walk. O2 restarted at 2 L NC. Sats back up to 95%. Dr Major notified

## 2020-03-28 ENCOUNTER — APPOINTMENT (OUTPATIENT)
Dept: ULTRASOUND IMAGING | Facility: CLINIC | Age: 75
DRG: 175 | End: 2020-03-28
Attending: FAMILY MEDICINE
Payer: COMMERCIAL

## 2020-03-28 PROBLEM — N26.1 ATROPHY OF LEFT KIDNEY: Status: ACTIVE | Noted: 2020-03-28

## 2020-03-28 LAB
ANION GAP SERPL CALCULATED.3IONS-SCNC: 7 MMOL/L (ref 3–14)
AT III ACT/NOR PPP CHRO: 104 % (ref 85–135)
BASE DEFICIT BLDV-SCNC: 1.2 MMOL/L
BUN SERPL-MCNC: 24 MG/DL (ref 7–30)
CALCIUM SERPL-MCNC: 8.4 MG/DL (ref 8.5–10.1)
CHLORIDE SERPL-SCNC: 108 MMOL/L (ref 94–109)
CO2 SERPL-SCNC: 23 MMOL/L (ref 20–32)
CREAT SERPL-MCNC: 1.24 MG/DL (ref 0.66–1.25)
ERYTHROCYTE [DISTWIDTH] IN BLOOD BY AUTOMATED COUNT: 13.3 % (ref 10–15)
GFR SERPL CREATININE-BSD FRML MDRD: 57 ML/MIN/{1.73_M2}
GLUCOSE SERPL-MCNC: 95 MG/DL (ref 70–99)
HCO3 BLDV-SCNC: 23 MMOL/L (ref 21–28)
HCT VFR BLD AUTO: 41.6 % (ref 40–53)
HGB BLD-MCNC: 14.3 G/DL (ref 13.3–17.7)
LMWH PPP CHRO-ACNC: 1.27 IU/ML
LMWH PPP CHRO-ACNC: 1.75 IU/ML
LMWH PPP CHRO-ACNC: 1.83 IU/ML
MCH RBC QN AUTO: 33.6 PG (ref 26.5–33)
MCHC RBC AUTO-ENTMCNC: 34.4 G/DL (ref 31.5–36.5)
MCV RBC AUTO: 98 FL (ref 78–100)
O2/TOTAL GAS SETTING VFR VENT: 28 %
PCO2 BLDV: 37 MM HG (ref 40–50)
PH BLDV: 7.41 PH (ref 7.32–7.43)
PLATELET # BLD AUTO: 170 10E9/L (ref 150–450)
PO2 BLDV: 70 MM HG (ref 25–47)
POTASSIUM SERPL-SCNC: 3.9 MMOL/L (ref 3.4–5.3)
RBC # BLD AUTO: 4.26 10E12/L (ref 4.4–5.9)
SODIUM SERPL-SCNC: 138 MMOL/L (ref 133–144)
WBC # BLD AUTO: 7.4 10E9/L (ref 4–11)

## 2020-03-28 PROCEDURE — 93970 EXTREMITY STUDY: CPT

## 2020-03-28 PROCEDURE — 99207 ZZC CDG-MDM COMPONENT: MEETS MODERATE - UP CODED: CPT | Performed by: FAMILY MEDICINE

## 2020-03-28 PROCEDURE — 80048 BASIC METABOLIC PNL TOTAL CA: CPT | Performed by: FAMILY MEDICINE

## 2020-03-28 PROCEDURE — 82803 BLOOD GASES ANY COMBINATION: CPT | Performed by: FAMILY MEDICINE

## 2020-03-28 PROCEDURE — 36415 COLL VENOUS BLD VENIPUNCTURE: CPT | Performed by: FAMILY MEDICINE

## 2020-03-28 PROCEDURE — 85027 COMPLETE CBC AUTOMATED: CPT | Performed by: FAMILY MEDICINE

## 2020-03-28 PROCEDURE — 25000128 H RX IP 250 OP 636: Performed by: FAMILY MEDICINE

## 2020-03-28 PROCEDURE — 25000132 ZZH RX MED GY IP 250 OP 250 PS 637: Performed by: FAMILY MEDICINE

## 2020-03-28 PROCEDURE — 85520 HEPARIN ASSAY: CPT | Performed by: FAMILY MEDICINE

## 2020-03-28 PROCEDURE — 12000000 ZZH R&B MED SURG/OB

## 2020-03-28 PROCEDURE — 99233 SBSQ HOSP IP/OBS HIGH 50: CPT | Performed by: FAMILY MEDICINE

## 2020-03-28 RX ORDER — DABIGATRAN ETEXILATE 150 MG/1
150 CAPSULE ORAL 2 TIMES DAILY
Qty: 60 CAPSULE | Refills: 0 | Status: SHIPPED | OUTPATIENT
Start: 2020-03-28 | End: 2020-03-29

## 2020-03-28 RX ADMIN — TAMSULOSIN HYDROCHLORIDE 0.4 MG: 0.4 CAPSULE ORAL at 07:53

## 2020-03-28 RX ADMIN — HEPARIN SODIUM 1350 UNITS/HR: 10000 INJECTION, SOLUTION INTRAVENOUS at 23:42

## 2020-03-28 RX ADMIN — FINASTERIDE 5 MG: 5 TABLET, FILM COATED ORAL at 07:54

## 2020-03-28 RX ADMIN — METOPROLOL SUCCINATE 50 MG: 50 TABLET, EXTENDED RELEASE ORAL at 07:53

## 2020-03-28 RX ADMIN — ATORVASTATIN CALCIUM 80 MG: 40 TABLET, FILM COATED ORAL at 07:54

## 2020-03-28 RX ADMIN — FUROSEMIDE 40 MG: 10 INJECTION, SOLUTION INTRAVENOUS at 18:44

## 2020-03-28 RX ADMIN — POTASSIUM CHLORIDE 20 MEQ: 1500 TABLET, EXTENDED RELEASE ORAL at 07:54

## 2020-03-28 RX ADMIN — ISOSORBIDE MONONITRATE 30 MG: 30 TABLET, EXTENDED RELEASE ORAL at 07:54

## 2020-03-28 RX ADMIN — HEPARIN SODIUM 1650 UNITS/HR: 10000 INJECTION, SOLUTION INTRAVENOUS at 05:10

## 2020-03-28 RX ADMIN — LOSARTAN POTASSIUM 50 MG: 50 TABLET, FILM COATED ORAL at 07:53

## 2020-03-28 RX ADMIN — FUROSEMIDE 40 MG: 10 INJECTION, SOLUTION INTRAVENOUS at 07:07

## 2020-03-28 ASSESSMENT — ACTIVITIES OF DAILY LIVING (ADL)
ADLS_ACUITY_SCORE: 11

## 2020-03-28 ASSESSMENT — MIFFLIN-ST. JEOR: SCORE: 2089.81

## 2020-03-28 NOTE — PLAN OF CARE
Heparin infusing at 1650/hr after holding for 1 hour and decreasing by 150. Next check will be this morning. Pt denies any shortness of breath. Lungs are clear throughout. He has been on 1L nasal cannula while asleep as sats decrease into the upper 80's at times. Tele has been SR. No complaints of pain.

## 2020-03-28 NOTE — PLAN OF CARE
VSS. Pt on room air with sats maintaining. Up to the bathroom independently with good urine output of 1550ml. No complaints of pain. Skin intact. =2/=3 edema in BLE at ankles and calves. IV fluids running at 10ml/hr with Heparin at 1800 units an hour. Recheck heparin Xa level at midnight.  Will continue to monitor.

## 2020-03-28 NOTE — PROVIDER NOTIFICATION
DATE:  3/28/2020   TIME OF RECEIPT FROM LAB:  1827  LAB TEST:  Xa  LAB VALUE:  1.27 (trending down, titrated per protocol)  RESULTS GIVEN WITH READ-BACK TO (PROVIDER):  Dr. Carlson  TIME LAB VALUE REPORTED TO PROVIDER:   1827

## 2020-03-28 NOTE — PROGRESS NOTES
[facility]    Medicine Progress Note - Hospitalist Service       Date of Admission:  3/27/2020  Assessment & Plan     Ky Martinez is a 74 year old male with a known history of ischemic cardiomyopathy with EF of 30 to 40% from last echocardiogram in 2017 with DVT in 2017 following surgery who recently had a prolonged road trip from [place] admitted on 3/27/2020 with bilateral PE and concern for possible CHF exacerbation. He presented to the emergency room with a 2-3-week history of shortness of breath with exertion with associated bilateral lower leg swelling.  Patient admits that he had not been weighing himself daily and had not been following his usual diet for the past few months while wintering in [place] and thought he was getting out of shape.  His symptoms significantly worsened following his 3-day long road trip back to [place], during which time he traveled 8 to 12 hours each day with minimal stops and did not take his Lasix.  Since his arrival back to [place] 3 days prior to this presentation his shortness of breath worsened until it was present even at rest and his wife convinced him to come into the emergency room for further assessment.  Patient denies any fever or cough.  In the emergency room, work up revealed elevated D dimer with numerous bilateral PE, worse in the right than left lung seen on CTA, with concern for right heart size increase concerning for possible right sided heart strain.  Although BNP was normal and chest x-ray clear, there is clinical evidence of edema and possible mild volume overload.  Patient was admitted, placed on a heparin drip and given cautious IV Lasix diuresis with patient noting improvement in his respiratory symptoms overnight and he has been weaned down to room air this morning at rest but still gets winded with activity.  He denies any new symptoms.    Principal Problem:    Acute pulmonary embolism - bilateral with possible right sided  heart strain on CT    Assessment: CTA has shown numerous pulmonary emboli worse on the right than left with concern for increased right-sided heart size and possible cardiac strain in a patient with respiratory distress and a recent prolonged road trip with minimal stops.  Bilateral leg ultrasound negative for clot    Plan: Continue with heparin drip and monitor closely for hemodynamic stability.  If patient continues to be stable after 48 hours of heparin drip, would consider transitioning to oral anticoagulation for ongoing treatment - discussed and patient would like one of the newer anticoagulation options.  Xarelto is best covered by his insurance company.  Hypercoagulability work-up is currently in process given patient's second extensive clot in the past 3 years.  Echocardiogram will be performed on Monday when available to reassess right sided heart strain from PEs.    Active Problems:    Acute respiratory distress; Hypoxemia, shortness of breath    Assessment: Developing over the past 2 to 3 weeks but significantly worsening in the past 5 to 6 days in patient extensive PEs but at risk for CHF exacerbation as well with patient improving following IV diuresis and initiation of heparin drip..      Plan:  Continue with heparin drip for treatment of pulmonary emboli and perform echocardiogram when available.  Continue with Lasix IV diuresis with close monitoring of renal function and weight stability with hopeful transition to PO diuretics tomorrow if patient continues to improve.  Will have nursing staff assess oxygenation while walking today.      Ischemic cardiomyopathy; Chronic systolic and diastolic heart failure (H)    Assessment: patient has known grade I diastolic dysfunction and EF of 30-40% on last echo in 2017.  Patient has not been weighing himself daily, has noticed increased swelling in bilateral legs for the past few weeks but improving with IV diuresis     Plan: Continue give Lasix 40 mg IV every  12 hours and recheck renal function tomorrow, assess daily weights and proceed with echo when available. Hopeful transition to PO Lasix tomorrow.      Bilateral leg edema - right more than left    Assessment: Worsening over the past 2 to 3 weeks.  Possibly secondary to underlying DVT versus CHF exacerbation/volume overload.  Significantly improved following IV diuresis overnight.  Ultrasound of the legs negative for DVT bilaterally    Plan: proceed with ongoing IV Lasix as outlined above and monitor closely for resolution      Left kidney atrophy    Assessment:  Noted on CTA of the chest in patient with known ureteropelvic junction obstruction identified in 2010, which is suspected to be the etiology for atrophy.      Plan:   Discussed with Dr. Schwartz, urology, who was involved in the patient's care in 2010 when ureteropelvic junction obstruction was first identified.  No acute intervention is needed.  Patient may benefit from a renogram to assess renal function this can be done as an outpatient after the global pandemic has settled down.  At this time, as the patient is asymptomatic, Dr. Schwartz would not recommend consideration of stenting or other intervention.      Intermittent asthma    Assessment: Previous history with patient having a as needed albuterol inhaler, no concern for acute flare    Plan: Continue with PRN albuterol during this stay, no steroids are indicated at this time      Coronary artery disease involving native coronary artery of native heart without angina pectoris    Assessment: s/p RCA stent in 2008, repeat antiogram in 2017 following non-stemi showed RCA stent patent and no severe narrowing present.  Patient has not been having any angina or symptoms concerning for chest pain in recent weeks.  He has not needed to use any of his PRN nitro    Plan: continue home regimen of Imdur, Toprol XL, losartan and lasix and monitor closely       History of acute deep vein thrombosis (DVT) of other  vein of left upper extremity (H)    Assessment: occurring in 2017 following ICD placement and patient was on Eliquis for several months.     Plan: proceed with heparin drip, and coagulopathy testing as outlined above.        S/P ICD (internal cardiac defibrillator) procedure    Assessment: previous and stable    Plan: no acute intervention needed      Hypertension    Assessment: normally well controlled with home regimen of Losartan, Toprol XL and Lasix.  Blood pressures normal to slightly hypertensive in the ED    Plan: Will continue home regimen for now with next doses due tomorrow morning apart from increase IV Lasix diuresis as above and monitor for stability       Benign prostatic hyperplasia    Assessment: On Proscar and Flomax    Plan: Continue home regimen without change      Morbid obesity (H)    Assessment: Chronic and stable    Plan: No acute intervention is needed       Diet: Combination Diet Low Saturated Fat Na <2400mg Diet    DVT Prophylaxis: heparin drip  Gallardo Catheter: not present  Code Status: Full Code      Disposition Plan   Expected discharge: 2 days, recommended to prior living arrangement once adequate pain management/ tolerating PO medications and echocardiogram complete, patient transitioned to PO anticoagulation, weight stable on PO diuretics, oxygen needs at time of discharge known.  Entered: Jimena Carlson MD 03/28/2020, 7:19 AM       The patient's care was discussed with the Bedside Nurse, Care Coordinator/ and Patient.    Jimena Carlson MD  Hospitalist Service  [facility]    ______________________________________________________________________    Interval History   Patient's blood pressures have remained stable, was able to wean to RA for a few hours last evening without increased work of breathing.  Did require 1L while sleeping secondary to hypoxemia.  Patient's shortness of breath while at rest has improved but  patient is still feeling winded with activity but much improved from yesterday.  No new concerns or symptoms.  No new nursing concerns.    Data reviewed today: I reviewed all medications, new labs and imaging results over the last 24 hours.    Physical Exam   Vital Signs: Temp: 96.4  F (35.8  C) Temp src: Oral BP: (!) 148/85 Pulse: 71 Heart Rate: 72 Resp: 18 SpO2: 94 % O2 Device: Nasal cannula Oxygen Delivery: 1 LPM  Weight: 288 lbs 1.6 oz  Constitutional: awake, alert, cooperative, no apparent distress at rest, and appears stated age  Respiratory: No increased work of breathing at rest and patient is able to talk in complete sentences, good air exchange, clear to auscultation bilaterally, no crackles or wheezing  Cardiovascular: Normal apical impulse, regular rate and rhythm  GI: bowel sounds present, abdomen soft and non-tender  Skin: no redness, warmth, or swelling and no rashes  Musculoskeletal: no lower extremity pitting edema present - the edema noted yesterday has completely resolved  Neurologic: Awake, alert, oriented to name, place and time.      Data   Recent Labs   Lab 03/28/20  0601 03/27/20  1739 03/27/20  1435   WBC 7.4  --  7.2   HGB 14.3  --  14.4   MCV 98  --  97     --  168   INR  --  1.13  --      --  139   POTASSIUM 3.9  --  4.0   CHLORIDE 108  --  110*   CO2 23  --  23   BUN 24  --  24   CR 1.24  --  1.19   ANIONGAP 7  --  6   AUGUSTUS 8.4*  --  8.4*   GLC 95  --  153*   ALBUMIN  --   --  3.7   PROTTOTAL  --   --  7.2   BILITOTAL  --   --  0.8   ALKPHOS  --   --  109   ALT  --   --  39   AST  --   --  25   TROPI  --   --  <0.015

## 2020-03-28 NOTE — PROGRESS NOTES
S-(situation): Patient arrives to room 270 via cart from ED @ 1900.    B-(background): [facility]    A-(assessment): pt A/Ox3, slight shortness of breath up to BR, O2 sats 95% RA, denies pain.    R-(recommendations): Orders reviewed with pt. Will monitor patient per MD orders.     Inpatient nursing criteria listed below were met:    Health care directives status obtained and documented: Yes  SCD's Documented: No. Hep gtt protocol  Skin issues/needs documented:NA  Isolation needs addressed, if appropriate: NA  Fall Prevention: Care plan updated, Education given and documented Yes  MRSA swab completed for ICU admissions only: NA  Care Plan initiated: Yes  Education Assessment documented:Yes  Education Documented (Pre-existing chronic infection such as, MRSA/VRE need education on admission): Yes  Admission Medication Reconciliation completed: Yes  New medication patient education completed and documented (Possible Side Effects of Common Medications handout): Yes  Home medications if not able to send immediately home with family stored here: NA  Reminder note placed in discharge instructions: NA  Discharge planning review completed (admission navigator) Yes

## 2020-03-28 NOTE — PROVIDER NOTIFICATION
DATE:  3/28/2020   TIME OF RECEIPT FROM LAB:  0134  LAB TEST:  Heparin Xa  LAB VALUE:  1.83  RESULTS GIVEN WITH READ-BACK TO (PROVIDER):  Koch  TIME LAB VALUE REPORTED TO PROVIDER:   0135    Using Heparin protocol for dose changes.

## 2020-03-28 NOTE — PROGRESS NOTES
/71   Pulse 71   Temp 96.2  F (35.7  C) (Oral)   Resp 18   Ht 1.829 m (6')   Wt 130.7 kg (288 lb 1.6 oz)   SpO2 92%   BMI 39.07 kg/m      VSS, afebrile, on RA during the day.  SATs in mid 90s.  Denies pain or shortness of breath.  Lungs are clear/diminished.  Patient walked in halls and SATs were 91% on RA.  Trace to no edema in BLE.  SR on Tele.  Good urine output.  Heparin gtt held for 1 hour and decreased to 1500 units/hr.  Recheck Xa 1.27.  Heparin stopped for one hour and decreased to 1350 units/hour.  Continue to titrate heparin gtt, and continue to monitor.

## 2020-03-29 LAB
ANION GAP SERPL CALCULATED.3IONS-SCNC: 8 MMOL/L (ref 3–14)
BASE DEFICIT BLDV-SCNC: 0.4 MMOL/L
BUN SERPL-MCNC: 31 MG/DL (ref 7–30)
CALCIUM SERPL-MCNC: 8.7 MG/DL (ref 8.5–10.1)
CHLORIDE SERPL-SCNC: 108 MMOL/L (ref 94–109)
CO2 SERPL-SCNC: 23 MMOL/L (ref 20–32)
CREAT SERPL-MCNC: 1.42 MG/DL (ref 0.66–1.25)
GFR SERPL CREATININE-BSD FRML MDRD: 49 ML/MIN/{1.73_M2}
GLUCOSE SERPL-MCNC: 96 MG/DL (ref 70–99)
HCO3 BLDV-SCNC: 24 MMOL/L (ref 21–28)
LMWH PPP CHRO-ACNC: 0.49 IU/ML
LMWH PPP CHRO-ACNC: 0.74 IU/ML
LMWH PPP CHRO-ACNC: 1.04 IU/ML
O2/TOTAL GAS SETTING VFR VENT: 21 %
PCO2 BLDV: 36 MM HG (ref 40–50)
PH BLDV: 7.42 PH (ref 7.32–7.43)
PLATELET # BLD AUTO: 186 10E9/L (ref 150–450)
PO2 BLDV: 51 MM HG (ref 25–47)
POTASSIUM SERPL-SCNC: 4.2 MMOL/L (ref 3.4–5.3)
SODIUM SERPL-SCNC: 139 MMOL/L (ref 133–144)

## 2020-03-29 PROCEDURE — 25800030 ZZH RX IP 258 OP 636: Performed by: FAMILY MEDICINE

## 2020-03-29 PROCEDURE — 85520 HEPARIN ASSAY: CPT | Performed by: FAMILY MEDICINE

## 2020-03-29 PROCEDURE — 85049 AUTOMATED PLATELET COUNT: CPT | Performed by: FAMILY MEDICINE

## 2020-03-29 PROCEDURE — 36415 COLL VENOUS BLD VENIPUNCTURE: CPT | Performed by: FAMILY MEDICINE

## 2020-03-29 PROCEDURE — 12000000 ZZH R&B MED SURG/OB

## 2020-03-29 PROCEDURE — 99232 SBSQ HOSP IP/OBS MODERATE 35: CPT | Performed by: FAMILY MEDICINE

## 2020-03-29 PROCEDURE — 25000132 ZZH RX MED GY IP 250 OP 250 PS 637: Performed by: FAMILY MEDICINE

## 2020-03-29 PROCEDURE — 80048 BASIC METABOLIC PNL TOTAL CA: CPT | Performed by: FAMILY MEDICINE

## 2020-03-29 PROCEDURE — 82803 BLOOD GASES ANY COMBINATION: CPT | Performed by: FAMILY MEDICINE

## 2020-03-29 RX ORDER — FUROSEMIDE 40 MG
40 TABLET ORAL DAILY
Status: DISCONTINUED | OUTPATIENT
Start: 2020-03-30 | End: 2020-03-30 | Stop reason: HOSPADM

## 2020-03-29 RX ORDER — WARFARIN SODIUM 5 MG/1
5 TABLET ORAL
Status: COMPLETED | OUTPATIENT
Start: 2020-03-29 | End: 2020-03-29

## 2020-03-29 RX ORDER — HEPARIN SODIUM 10000 [USP'U]/100ML
0-3500 INJECTION, SOLUTION INTRAVENOUS CONTINUOUS
Status: DISCONTINUED | OUTPATIENT
Start: 2020-03-29 | End: 2020-03-30

## 2020-03-29 RX ORDER — HEPARIN SODIUM 10000 [USP'U]/100ML
0-3500 INJECTION, SOLUTION INTRAVENOUS CONTINUOUS
Status: ACTIVE | OUTPATIENT
Start: 2020-03-29 | End: 2020-03-29

## 2020-03-29 RX ADMIN — TAMSULOSIN HYDROCHLORIDE 0.4 MG: 0.4 CAPSULE ORAL at 08:20

## 2020-03-29 RX ADMIN — ATORVASTATIN CALCIUM 80 MG: 40 TABLET, FILM COATED ORAL at 08:20

## 2020-03-29 RX ADMIN — LOSARTAN POTASSIUM 50 MG: 50 TABLET, FILM COATED ORAL at 08:20

## 2020-03-29 RX ADMIN — FINASTERIDE 5 MG: 5 TABLET, FILM COATED ORAL at 08:20

## 2020-03-29 RX ADMIN — SODIUM CHLORIDE 500 ML: 9 INJECTION, SOLUTION INTRAVENOUS at 21:58

## 2020-03-29 RX ADMIN — POTASSIUM CHLORIDE 20 MEQ: 1500 TABLET, EXTENDED RELEASE ORAL at 08:20

## 2020-03-29 RX ADMIN — WARFARIN SODIUM 5 MG: 5 TABLET ORAL at 21:02

## 2020-03-29 RX ADMIN — METOPROLOL SUCCINATE 50 MG: 50 TABLET, EXTENDED RELEASE ORAL at 08:20

## 2020-03-29 RX ADMIN — ISOSORBIDE MONONITRATE 30 MG: 30 TABLET, EXTENDED RELEASE ORAL at 08:20

## 2020-03-29 ASSESSMENT — ACTIVITIES OF DAILY LIVING (ADL)
ADLS_ACUITY_SCORE: 11

## 2020-03-29 ASSESSMENT — MIFFLIN-ST. JEOR: SCORE: 2084.37

## 2020-03-29 NOTE — PROGRESS NOTES
[facility]    Medicine Progress Note - Hospitalist Service       Date of Admission:  3/27/2020  Assessment & Plan     Ky Martinez is a 74 year old male with a known history of ischemic cardiomyopathy with EF of 30 to 40% from last echocardiogram in 2017 with DVT in 2017 following surgery who recently had a prolonged road trip from [place] admitted on 3/27/2020 with bilateral PE and concern for possible CHF exacerbation. He presented to the emergency room with a 2-3-week history of shortness of breath with exertion with associated bilateral lower leg swelling.  Patient admits that he had not been weighing himself daily and had not been following his usual diet for the past few months while wintering in [place] and thought he was getting out of shape.  His symptoms significantly worsened following his 3-day long road trip back to [place], during which time he traveled 8 to 12 hours each day with minimal stops and did not take his Lasix.  Since his arrival back to [place] 3 days prior to this presentation his shortness of breath worsened until it was present even at rest and his wife convinced him to come into the emergency room for further assessment.  Patient denies any fever or cough.  In the emergency room, work up revealed elevated D dimer with numerous bilateral PE, worse in the right than left lung seen on CTA, with concern for right heart size increase concerning for possible right sided heart strain.  Although BNP was normal and chest x-ray clear, there is clinical evidence of edema and possible mild volume overload.  Patient was admitted, placed on a heparin drip and given cautious IV Lasix diuresis with patient noting improvement in his respiratory symptoms and has been on RA for the past 24 hours with improved activity tolerance today. He denies any new symptoms.    Principal Problem:    Acute pulmonary embolism - bilateral with possible right sided heart strain on CT     Assessment: CTA has shown numerous pulmonary emboli worse on the right than left with concern for increased right-sided heart size and possible cardiac strain in a patient with respiratory distress and a recent prolonged road trip with minimal stops.  Bilateral leg ultrasound negative for clot    Plan: Continue with heparin drip and monitor closely for hemodynamic stability.  Will transition to Xarelto this evening based on patient's insurance coverage.  Hypercoagulability work-up is currently in process given patient's second extensive clot in the past 3 years.  Echocardiogram will be performed tomorrow when available to reassess right sided heart strain from PEs.  Hopeful discharge home tomorrow.      Active Problems:    Acute respiratory distress; Hypoxemia, shortness of breath    Assessment: Developing over the past 2 to 3 weeks but significantly worsening in the past 5 to 6 days in patient extensive PEs but at risk for CHF exacerbation as well with patient improving following IV diuresis and initiation of heparin drip.  Patient now on RA for the past 24 hours    Plan:  Continue with heparin drip for treatment of pulmonary emboli until this evening and transition to Xarelto as above and perform echocardiogram when available.  Will discontinue IV Lasix and start previous normal home regimen tomorrow morning.       Ischemic cardiomyopathy; Chronic systolic and diastolic heart failure (H)    Assessment: patient has known grade I diastolic dysfunction and EF of 30-40% on last echo in 2017.  Patient has not been weighing himself daily, has noticed increased swelling in bilateral legs for the past few weeks but improving with IV diuresis and patient with 9 lb total weight loss since admission.    Plan: Transition to PO Lasix, echocardiogram tomorrow to assess cardiac function further.        Bilateral leg edema - right more than left    Assessment: Worsening over the past 2 to 3 weeks without DVT present and edema  resolved with IV diuresis     Plan: transition to PO Lasix and monitor for ongoing resolution       Left kidney atrophy    Assessment:  Noted on CTA of the chest in patient with known ureteropelvic junction obstruction identified in 2010, which is suspected to be the etiology for atrophy.      Plan:   Discussed with Dr. Schwartz, urology, during this stay who was involved in the patient's care in 2010 when ureteropelvic junction obstruction was first identified.  No acute intervention is needed.  Patient may benefit from a renogram to assess renal function this can be done as an outpatient after the global pandemic has settled down.  At this time, as the patient is asymptomatic, Dr. Schwartz would not recommend consideration of stenting or other intervention.      CKD stage 3    Assessment:  Baseline creatinine in recent months has been 1.4, creatinine initially was less in the 1.1-1.2 range however with diuresis and return to euvolemic status is now back into the 1.4 range.  Concern for one non-functioning kidney secondary to atrophy as above    Plan:  Will transition back to PO Lasix at previous home regimen and recheck kidney function tomorrow to ensure ongoing stability.          Intermittent asthma    Assessment: Previous history with patient having a as needed albuterol inhaler, no concern for acute flare    Plan: Continue with PRN albuterol during this stay, no steroids are indicated at this time      Coronary artery disease involving native coronary artery of native heart without angina pectoris    Assessment: s/p RCA stent in 2008, repeat antiogram in 2017 following non-stemi showed RCA stent patent and no severe narrowing present.  Patient has not been having any angina or symptoms concerning for chest pain in recent weeks.  He has not needed to use any of his PRN nitro    Plan: continue home regimen of Imdur, Toprol XL, losartan and lasix and monitor closely       History of acute deep vein thrombosis (DVT)  of other vein of left upper extremity (H)    Assessment: occurring in 2017 following ICD placement and patient was on Eliquis for several months.     Plan: proceed with heparin drip and transition to Xarelto, and coagulopathy testing as outlined above.        S/P ICD (internal cardiac defibrillator) procedure    Assessment: previous and stable    Plan: no acute intervention needed      Hypertension    Assessment: normally well controlled with home regimen of Losartan, Toprol XL and Lasix.  Blood pressures normal to slightly hypertensive in the ED    Plan: Will continue home regimen with transition back to PO Lasix as above      Benign prostatic hyperplasia    Assessment: On Proscar and Flomax    Plan: Continue home regimen without change      Morbid obesity (H)    Assessment: Chronic and stable    Plan: No acute intervention is needed     Diet: Combination Diet Low Saturated Fat Na <2400mg Diet    DVT Prophylaxis: heparin drip  Gallardo Catheter: not present  Code Status: Full Code      Disposition Plan   Expected discharge: Tomorrow, recommended to prior living arrangement once transition to oral anticoagulation goes well, echocardiogram completed, weaned off oxygen.  Entered: Jimena Carlson MD 03/29/2020, 8:50 AM       The patient's care was discussed with the Bedside Nurse and Patient.    Jimena Carlson MD  Hospitalist Service  [facility]    ______________________________________________________________________    Interval History   Patient continues to improve - was able to go without oxygen overnight while deeply sleeping and has tolerated slow walking with oxygen only dipping into the upper 80s to low 90s with shortness of breath sensation much improved.  Other vital signs stable.  Energy and appetite improving.  Swelling of ankles completely resolved.  No new concerns    Data reviewed today: I reviewed all medications, new labs and imaging results over the  last 24 hours.    Physical Exam   Vital Signs: Temp: 96.7  F (35.9  C) Temp src: Oral BP: 124/81 Pulse: 74 Heart Rate: 77 Resp: 16 SpO2: 94 % O2 Device: None (Room air)    Weight: 286 lbs 14.4 oz  Constitutional: awake, alert, cooperative, no apparent distress, and appears stated age  Respiratory: No increased work of breathing, good air exchange, clear to auscultation bilaterally, no crackles or wheezing  Cardiovascular: Normal apical impulse, regular rate and rhythm, normal S1 and S2, no S3 or S4, and no murmur noted  GI: bowel sounds present, abdomen soft and non-tender  Skin: no redness, warmth, or swelling and no rashes  Musculoskeletal: no lower extremity pitting edema present  Neurologic: Awake, alert, oriented to name, place and time.     Data   Recent Labs   Lab 03/29/20  0640 03/28/20  0601 03/27/20  1739 03/27/20  1435   WBC  --  7.4  --  7.2   HGB  --  14.3  --  14.4   MCV  --  98  --  97    170  --  168   INR  --   --  1.13  --     138  --  139   POTASSIUM 4.2 3.9  --  4.0   CHLORIDE 108 108  --  110*   CO2 23 23  --  23   BUN 31* 24  --  24   CR 1.42* 1.24  --  1.19   ANIONGAP 8 7  --  6   AUGUSTUS 8.7 8.4*  --  8.4*   GLC 96 95  --  153*   ALBUMIN  --   --   --  3.7   PROTTOTAL  --   --   --  7.2   BILITOTAL  --   --   --  0.8   ALKPHOS  --   --   --  109   ALT  --   --   --  39   AST  --   --   --  25   TROPI  --   --   --  <0.015

## 2020-03-29 NOTE — PROGRESS NOTES
VSS, afebrile.  A&O x4.  Denies pain or SOB.  Up independent in room and occasionally will walk in the halls.  Heparin gtt titrated per protocol.  Plan to start xaralto and stop heparin gtt at 1930 this evening.  Lungs clear.  Continue to monitor.

## 2020-03-29 NOTE — PLAN OF CARE
Patient denies any shortness of breath with activity. Lungs are clear. He has been on room air with sats in the mid 90's. Heparin infusing at 1200u/hr. Next check will be this morning. Tele has been SR. He is up in the room independently. He is voiding in good amounts. No  edema present in lower extremities.

## 2020-03-30 ENCOUNTER — APPOINTMENT (OUTPATIENT)
Dept: CARDIOLOGY | Facility: CLINIC | Age: 75
DRG: 175 | End: 2020-03-30
Attending: FAMILY MEDICINE
Payer: COMMERCIAL

## 2020-03-30 ENCOUNTER — TELEPHONE (OUTPATIENT)
Dept: FAMILY MEDICINE | Facility: CLINIC | Age: 75
End: 2020-03-30

## 2020-03-30 VITALS
TEMPERATURE: 97.2 F | BODY MASS INDEX: 39.22 KG/M2 | SYSTOLIC BLOOD PRESSURE: 136 MMHG | OXYGEN SATURATION: 94 % | HEIGHT: 72 IN | DIASTOLIC BLOOD PRESSURE: 70 MMHG | WEIGHT: 289.6 LBS | RESPIRATION RATE: 20 BRPM | HEART RATE: 69 BPM

## 2020-03-30 DIAGNOSIS — Z86.711 HISTORY OF PULMONARY EMBOLISM: ICD-10-CM

## 2020-03-30 DIAGNOSIS — Z79.01 LONG TERM CURRENT USE OF ANTICOAGULANTS WITH INR GOAL OF 2.0-3.0: Primary | ICD-10-CM

## 2020-03-30 PROBLEM — I26.99 ACUTE PULMONARY EMBOLISM, UNSPECIFIED PULMONARY EMBOLISM TYPE, UNSPECIFIED WHETHER ACUTE COR PULMONALE PRESENT (H): Status: ACTIVE | Noted: 2020-03-30

## 2020-03-30 LAB
ANION GAP SERPL CALCULATED.3IONS-SCNC: 7 MMOL/L (ref 3–14)
BASE DEFICIT BLDV-SCNC: 1.5 MMOL/L
BUN SERPL-MCNC: 31 MG/DL (ref 7–30)
CALCIUM SERPL-MCNC: 8.3 MG/DL (ref 8.5–10.1)
CHLORIDE SERPL-SCNC: 109 MMOL/L (ref 94–109)
CO2 SERPL-SCNC: 23 MMOL/L (ref 20–32)
CREAT SERPL-MCNC: 1.26 MG/DL (ref 0.66–1.25)
ERYTHROCYTE [DISTWIDTH] IN BLOOD BY AUTOMATED COUNT: 13.4 % (ref 10–15)
GFR SERPL CREATININE-BSD FRML MDRD: 56 ML/MIN/{1.73_M2}
GLUCOSE SERPL-MCNC: 91 MG/DL (ref 70–99)
HCO3 BLDV-SCNC: 23 MMOL/L (ref 21–28)
HCT VFR BLD AUTO: 41 % (ref 40–53)
HGB BLD-MCNC: 14 G/DL (ref 13.3–17.7)
INR PPP: 1.25 (ref 0.86–1.14)
LMWH PPP CHRO-ACNC: 0.4 IU/ML
MCH RBC QN AUTO: 33.5 PG (ref 26.5–33)
MCHC RBC AUTO-ENTMCNC: 34.1 G/DL (ref 31.5–36.5)
MCV RBC AUTO: 98 FL (ref 78–100)
O2/TOTAL GAS SETTING VFR VENT: 21 %
PCO2 BLDV: 37 MM HG (ref 40–50)
PH BLDV: 7.4 PH (ref 7.32–7.43)
PLATELET # BLD AUTO: 173 10E9/L (ref 150–450)
PLATELET # BLD AUTO: 173 10E9/L (ref 150–450)
PO2 BLDV: 77 MM HG (ref 25–47)
POTASSIUM SERPL-SCNC: 4 MMOL/L (ref 3.4–5.3)
PROT C ACT/NOR PPP CHRO: 79 % (ref 70–170)
PROT S FREE AG ACT/NOR PPP IA: 112 % (ref 70–148)
RBC # BLD AUTO: 4.18 10E12/L (ref 4.4–5.9)
SODIUM SERPL-SCNC: 139 MMOL/L (ref 133–144)
WBC # BLD AUTO: 6.7 10E9/L (ref 4–11)

## 2020-03-30 PROCEDURE — 99239 HOSP IP/OBS DSCHRG MGMT >30: CPT | Performed by: FAMILY MEDICINE

## 2020-03-30 PROCEDURE — 85610 PROTHROMBIN TIME: CPT | Performed by: FAMILY MEDICINE

## 2020-03-30 PROCEDURE — 85027 COMPLETE CBC AUTOMATED: CPT | Performed by: FAMILY MEDICINE

## 2020-03-30 PROCEDURE — 80048 BASIC METABOLIC PNL TOTAL CA: CPT | Performed by: FAMILY MEDICINE

## 2020-03-30 PROCEDURE — 40000264 ECHOCARDIOGRAM COMPLETE

## 2020-03-30 PROCEDURE — 25500064 ZZH RX 255 OP 636: Performed by: INTERNAL MEDICINE

## 2020-03-30 PROCEDURE — 25000132 ZZH RX MED GY IP 250 OP 250 PS 637: Performed by: FAMILY MEDICINE

## 2020-03-30 PROCEDURE — 25000128 H RX IP 250 OP 636: Performed by: FAMILY MEDICINE

## 2020-03-30 PROCEDURE — 82803 BLOOD GASES ANY COMBINATION: CPT | Performed by: FAMILY MEDICINE

## 2020-03-30 PROCEDURE — 93306 TTE W/DOPPLER COMPLETE: CPT | Mod: 26 | Performed by: INTERNAL MEDICINE

## 2020-03-30 PROCEDURE — 85049 AUTOMATED PLATELET COUNT: CPT | Performed by: FAMILY MEDICINE

## 2020-03-30 PROCEDURE — 85520 HEPARIN ASSAY: CPT | Performed by: FAMILY MEDICINE

## 2020-03-30 PROCEDURE — 36415 COLL VENOUS BLD VENIPUNCTURE: CPT | Performed by: FAMILY MEDICINE

## 2020-03-30 RX ORDER — WARFARIN SODIUM 5 MG/1
5 TABLET ORAL
Status: DISCONTINUED | OUTPATIENT
Start: 2020-03-30 | End: 2020-03-30 | Stop reason: HOSPADM

## 2020-03-30 RX ORDER — WARFARIN SODIUM 5 MG/1
5 TABLET ORAL DAILY
Qty: 30 TABLET | Refills: 0 | Status: SHIPPED | OUTPATIENT
Start: 2020-03-30 | End: 2020-04-16

## 2020-03-30 RX ADMIN — LOSARTAN POTASSIUM 50 MG: 50 TABLET, FILM COATED ORAL at 07:53

## 2020-03-30 RX ADMIN — FINASTERIDE 5 MG: 5 TABLET, FILM COATED ORAL at 07:52

## 2020-03-30 RX ADMIN — FUROSEMIDE 40 MG: 40 TABLET ORAL at 07:54

## 2020-03-30 RX ADMIN — METOPROLOL SUCCINATE 50 MG: 50 TABLET, EXTENDED RELEASE ORAL at 07:52

## 2020-03-30 RX ADMIN — HEPARIN SODIUM 1100 UNITS/HR: 10000 INJECTION, SOLUTION INTRAVENOUS at 00:53

## 2020-03-30 RX ADMIN — POTASSIUM CHLORIDE 20 MEQ: 1500 TABLET, EXTENDED RELEASE ORAL at 07:54

## 2020-03-30 RX ADMIN — HUMAN ALBUMIN MICROSPHERES AND PERFLUTREN 5 ML: 10; .22 INJECTION, SOLUTION INTRAVENOUS at 09:58

## 2020-03-30 RX ADMIN — ISOSORBIDE MONONITRATE 30 MG: 30 TABLET, EXTENDED RELEASE ORAL at 07:53

## 2020-03-30 RX ADMIN — ATORVASTATIN CALCIUM 80 MG: 40 TABLET, FILM COATED ORAL at 07:53

## 2020-03-30 RX ADMIN — TAMSULOSIN HYDROCHLORIDE 0.4 MG: 0.4 CAPSULE ORAL at 07:52

## 2020-03-30 RX ADMIN — ENOXAPARIN SODIUM 135 MG: 150 INJECTION SUBCUTANEOUS at 10:15

## 2020-03-30 ASSESSMENT — ACTIVITIES OF DAILY LIVING (ADL)
ADLS_ACUITY_SCORE: 11

## 2020-03-30 ASSESSMENT — MIFFLIN-ST. JEOR: SCORE: 2096.62

## 2020-03-30 NOTE — PROGRESS NOTES
Patient admitted with pulmonary embolism, feeling better, was to be transitioned to Xarelto this evening off of IV heparin infusion.  After investigating costs of oral meds like Xarelto, he has decided now to be started on Coumadin.  We discussed that he will need to be on some bridging until he is therapeutic whether staying in the hospital with IV heparin versus starting Lovenox.  Discussed that Lovenox can be expensive as well.  Tonight we will start Coumadin, continue the IV heparin and will have pharmacy investigate options tomorrow.  Potentially could go home on Lovenox bridging.  Electronically signed by ANDREWS Koch on March 29, 2020

## 2020-03-30 NOTE — PROGRESS NOTES
S-(situation): Patient discharged to home via Ambulation  with spouse    B-(background): PE    A-(assessment): Pt is A&O.  VSS  Afebrile. Denies discomfort.  Lung sounds clear.  Sats good on R/A.  Taught how to give him his lovenox shot.    R-(recommendations): Discharge instructions reviewed with Pt. Listed belongings gathered and returned to patient.          Discharge Nursing Criteria:     Care Plan and Patient education resolved: Yes    New Medications- pt has been educated about purpose and side effects: Yes    Vaccines  Influenza status verified at discharge:  Yes    MISC  Prescriptions if needed, hard copies sent with patient  No  Home and hospital aquired medications returned to patient: NA  Medication Bin checked and emptied on discharge Yes  Patient reports post-discharge pain management plan is effective:Yes

## 2020-03-30 NOTE — PHARMACY-ANTICOAGULATION SERVICE
Clinical Pharmacy - Warfarin Dosing Consult     Pharmacy has been consulted to manage this patient s warfarin therapy.  Indication: DVT/ PE Treatment  Therapy Goal: INR 2-3  Provider/Team: N/A  OP Anticoag Clinic: New Start  Warfarin Prior to Admission: No  Warfarin PTA Regimen: New start for DVT/PE treatment  Significant drug interactions: heparin bridge to goal INR,    INR   Date Value Ref Range Status   03/30/2020 1.25 (H) 0.86 - 1.14 Final   03/27/2020 1.13 0.86 - 1.14 Final       Recommend warfarin 5 mg today.  Pharmacy will monitor Ky Martinez daily and order warfarin doses to achieve specified goal.      Please contact pharmacy as soon as possible if the warfarin needs to be held for a procedure or if the warfarin goals change.

## 2020-03-30 NOTE — TELEPHONE ENCOUNTER
Please assist in setting up a lab appt for his INR Wed/Thursday this week when you call for your Hospital Follow-up as well. Thanks! Jessica Chavez RN, BSN

## 2020-03-30 NOTE — PLAN OF CARE
VSS. Afebrile. Denies any SOB or chest pain. Denies any pain/discomfort. LS clear but decreased on L side. Pulmonary hygiene promoted. 1+ bilateral lower extremity edema remains to feet. No other complaints at this time.

## 2020-03-30 NOTE — TELEPHONE ENCOUNTER
Patient called to schedule an appointment for a hospital follow-up or appeared on a report showing that they were recently discharged from the hospital.    Patient was admitted to [facility]  Discharged date: 03/30/20  Reason for hospital admission:  PE  Does patient have future appointment scheduled with provider? No  Date of future appointment:        This information will be used to help the care team plan for the patients upcoming visit.  The triage RN may determine that a follow up call is necessary and reach out to the patient via the phone number listed in the chart.     Please route this message on routine priority to the Triage RN pool.

## 2020-03-30 NOTE — DISCHARGE SUMMARY
[facility]  Hospitalist Discharge Summary       Date of Admission:  3/27/2020  Date of Discharge:  3/30/2020  Discharging Provider: Jimena Carlson MD  Discharge Diagnoses   Principal Problem:    Acute pulmonary embolism - bilateral with possible right sided heart strain on CT  Active Problems:    Hypoxemia    Intermittent asthma    Hyperlipidemia    Benign prostatic hyperplasia    Coronary artery disease involving native coronary artery of native heart without angina pectoris    Acute deep vein thrombosis (DVT) of other vein of left upper extremity (H)    S/P ICD (internal cardiac defibrillator) procedure    Chronic systolic heart failure (H)    Hypertension    Ischemic cardiomyopathy    Coronary atherosclerosis    Morbid obesity (H)    Bilateral leg edema - right more than left    Shortness of breath    Pulmonary emboli (H)    Atrophy of left kidney    Follow-ups Needed After Discharge   Follow-up Appointments     Follow-up and recommended labs and tests       Follow up with the Coumadin Clinic as scheduled.  Follow up with primary care provider, Ky Major, for a telephone   evaluation within 7 days for hospital follow up.             Unresulted Labs Ordered in the Past 30 Days of this Admission     Date and Time Order Name Status Description    3/27/2020 1711 Factor 2 and 5 mutation analysis In process       These results will be followed up by Dr. Major, PCP    Discharge Disposition   Discharged to home  Condition at discharge: Stable    Hospital Course   Ky Martinez is a 74 year old male with a known history of ischemic cardiomyopathy with EF of 30 to 40% from last echocardiogram in 2017 with DVT in 2017 following surgery who recently had a prolonged road trip from [place] admitted on 3/27/2020 with bilateral PE and concern for possible CHF exacerbation. He presented to the emergency room with a 2-3-week history of shortness of breath with exertion with  associated bilateral lower leg swelling.  Patient admits that he had not been weighing himself daily and had not been following his usual diet for the past few months while wintering in [place] and thought he was getting out of shape.  His symptoms significantly worsened following his 3-day long road trip back to [place], during which time he traveled 8 to 12 hours each day with minimal stops and did not take his Lasix.  Since his arrival back to [place] 3 days prior to this presentation his shortness of breath worsened until it was present even at rest and his wife convinced him to come into the emergency room for further assessment.  Patient denies any fever or cough.  In the emergency room, work up revealed elevated D dimer with numerous bilateral PE, worse in the right than left lung seen on CTA, with concern for right heart size increase concerning for possible right sided heart strain.  Although BNP was normal and chest x-ray clear, there is clinical evidence of edema and possible mild volume overload.  Patient was admitted, placed on a heparin drip and given cautious IV Lasix diuresis with patient noting improvement in his respiratory symptoms and has been on RA for the past 24 hours with improved activity tolerance today. He denies any new symptoms.  He has been transitioned to Lovenox and Coumadin and is discharged home in stable condition.      Principal Problem:    Acute pulmonary embolism - bilateral with possible right sided heart strain on CT    Assessment: CTA has shown numerous pulmonary emboli worse on the right than left with concern for increased right-sided heart size and possible cardiac strain in a patient with respiratory distress and a recent prolonged road trip with minimal stops.  Bilateral leg ultrasound negative for clot    Plan: We will discharge with ongoing Lovenox and Coumadin, with Coumadin clinic recommended tomorrow if stable to further assist in Coumadin management going forward.   Genetic work-up is in process and will be followed up on an outpatient basis    Active Problems:    Acute respiratory distress; Hypoxemia, shortness of breath    Assessment: Developing over the past 2 to 3 weeks but significantly worsening in the past 5 to 6 days in patient extensive PEs but at risk for CHF exacerbation as well with patient improving following IV diuresis and initiation of heparin drip.  Patient now on RA for the past 24 hours    Plan: Patient will discharge with ongoing Coumadin and Lovenox as above.  He will restart his daily Lasix but will start weighing himself daily and inform his provider if he gains more than 2 pounds in a day or 5 pounds in a week      Ischemic cardiomyopathy; Chronic systolic and diastolic heart failure (H)    Assessment: patient has known grade I diastolic dysfunction and EF of 30-40% on last echo in 2017.  Patient has not been weighing himself daily, has noticed increased swelling in bilateral legs for the past few weeks but improving with IV diuresis and patient with 9 lb total weight loss since admission.    Plan: Patient will continue his home regimen of Lasix but begin weighing himself daily and inform his provider if he has weight gain as above.      Bilateral leg edema - right more than left    Assessment: Worsening over the past 2 to 3 weeks without DVT present and edema resolved with IV diuresis    Plan:  discharge with ongoing Lasix as above      Left kidney atrophy    Assessment:  Noted on CTA of the chest in patient with known ureteropelvic junction obstruction identified in 2010, which is suspected to be the etiology for atrophy.      Plan:   Discussed with Dr. Schwartz, urology, during this stay who was involved in the patient's care in 2010 when ureteropelvic junction obstruction was first identified.  No acute intervention is needed.  Patient may benefit from a renogram to assess renal function this can be done as an outpatient after the global pandemic has  settled down.  At this time, as the patient is asymptomatic, Dr. Schwartz would not recommend consideration of stenting or other intervention.      CKD stage 3    Assessment:  Baseline creatinine in recent months has been 1.4, creatinine initially was less in the 1.1-1.2 range however with diuresis and return to euvolemic status has been in the 1.2-1.4 range.  Concern for one non-functioning kidney secondary to atrophy as above    Plan: Patient would benefit from ongoing intermittent monitoring of his creatinine to ensure ongoing stabilization given information as above      Intermittent asthma    Assessment: Previous history with patient having a as needed albuterol inhaler, no concern for acute flare    Plan: Continue with PRN albuterol during this stay, no steroids are indicated at this time      Coronary artery disease involving native coronary artery of native heart without angina pectoris    Assessment: s/p RCA stent in 2008, repeat antiogram in 2017 following non-stemi showed RCA stent patent and no severe narrowing present.  Patient has not been having any angina or symptoms concerning for chest pain in recent weeks.  He has not needed to use any of his PRN nitro    Plan: continue home regimen of Imdur, Toprol XL, losartan and lasix at time of discharge      History of acute deep vein thrombosis (DVT) of other vein of left upper extremity (H)    Assessment: occurring in 2017 following ICD placement and patient was on Eliquis for several months.     Plan: Genetic work-up is in process and will be followed up on an outpatient basis      S/P ICD (internal cardiac defibrillator) procedure    Assessment: previous and stable    Plan: no acute intervention needed      Hypertension    Assessment: normally well controlled with home regimen of Losartan, Toprol XL and Lasix.      Plan: Discharge home without change to home regimen      Benign prostatic hyperplasia    Assessment: On Proscar and Flomax    Plan: Continue  home regimen without change at time of discharge      Morbid obesity (H)    Assessment: Chronic and stable    Plan: No acute intervention is needed but patient would benefit from weight loss going forward and is very motivated to do so.    Consultations This Hospital Stay   PHARMACY TO DOSE HEPARIN  PHARMACY TO DOSE WARFARIN    Code Status   Full Code    Time Spent on this Encounter   I, Jimena Carlson MD, personally saw the patient today and spent greater than 30 minutes discharging this patient.       Jimena Carlson MD  [facility]  ______________________________________________________________________    Physical Exam   Vital Signs: Temp: 97.2  F (36.2  C) Temp src: Oral BP: 136/70 Pulse: 69 Heart Rate: 73 Resp: 20 SpO2: 94 % O2 Device: None (Room air)    Weight: 289 lbs 9.6 oz  Constitutional: awake, alert, cooperative, no apparent distress, and appears stated age  Respiratory: No increased work of breathing, good air exchange, clear to auscultation bilaterally, no crackles or wheezing  Cardiovascular: Normal apical impulse, regular rate and rhythm, normal S1 and S2, no S3 or S4, and no murmur noted  GI: No scars, normal bowel sounds, soft, non-distended, non-tender, no masses palpated, no hepatosplenomegally  Skin: no redness, warmth, or swelling and no rashes  Musculoskeletal: no lower extremity pitting edema present  Neurologic: Awake, alert, oriented to name, place and time.        Primary Care Physician   Ky Major    Discharge Orders      ANTICOAGULATION CLINIC REFERRAL      Reason for your hospital stay    1.  Blood clots in both of your lungs - these are already showing signs of improving and your echocardiogram does not show any signs of ongoing heart strain from these clots.  You have been started on Coumadin to help keep your blood thin and avoid future clot going forward.  You will leaving on the Lovenox injections until your Coumadin  level gets at goal.  Please follow up with Dr. Major to discuss the results of your genetic testing to see if you are genetically predisposed to have clots again in the future and how that might affect how long Coumadin is recommended.  2.  Congestive heart failure exacerbation - causing swelling in your legs and possibly increases your shortness of breath.  The extra fluid has been removed by IV diuretic medications during this stay.  Please remember to weigh yourself daily and inform Dr. Major if you gain more than 2 lbs in one day or 5 lbs in a week.    Enjoy going home!!     Follow-up and recommended labs and tests     Follow up with the Coumadin Clinic as scheduled.  Follow up with primary care provider, Ky Major, for a telephone evaluation within 7 days for hospital follow up.     Activity    Your activity upon discharge: activity as tolerated     Diet    Follow this diet upon discharge: Low salt diet       Significant Results and Procedures   Results for orders placed or performed during the hospital encounter of 03/27/20   XR Chest 2 Views    Narrative    CHEST TWO VIEWS 3/27/2020 2:51 PM     HISTORY: Dyspnea with history for <EF HF.    COMPARISON: June 21, 2011       Impression    IMPRESSION: New cardiac device noted on the left. There are no acute  infiltrates. The cardiac silhouette is not enlarged. Pulmonary  vasculature is unremarkable.    CALVIN RUBIN MD   CT Chest Pulmonary Embolism w Contrast     Value    Radiologist flags Pulmonary embolism (AA)    Narrative    CT CHEST PULMONARY EMBOLISM WITH CONTRAST  3/27/2020 4:12 PM     HISTORY: Elevated D-dimer, recent travel.    COMPARISON: None.    TECHNIQUE: Volumetric helical acquisition of CT images of the chest  from the lung apices to the kidneys were acquired after the  administration of 80mL, Isovue-370  IV contrast. Radiation dose for  this scan was reduced using automated exposure control, adjustment of  the mA and/or kV according to  patient size, or iterative  reconstruction technique.    FINDINGS: Positive study for pulmonary emboli. Fairly extensive right  lower lobe pulmonary emboli. Mild right upper lobe and right middle  lobe pulmonary emboli. Minimal left upper lobe pulmonary emboli. The  heart minimally generous on the right side, right heart strain could  be present. Lungs are clear of infiltrate. Thoracic aorta is  atherosclerotic without evidence of dissection or aneurysm. There is  no pleural or pericardial effusion.  There is no pneumothorax. Adrenal  glands are normal. Severe left renal atrophy probably due to  ureteropelvic junction obstruction, incompletely seen. Remainder of  the visualized upper abdomen is unremarkable.      Impression    IMPRESSION:   1. Positive study for bilateral pulmonary emboli right worse than  left.  2. No thoracic aortic dissection or aneurysm.   3. Severe left renal atrophy probably due to ureteropelvic junction  obstruction, incompletely seen.    [Critical Result: Pulmonary embolism]    Finding was identified on 3/27/2020 4:16 PM.     Dr. RAGHAVENDRA GALVAN was contacted by me at 3/27/2020 4:25 PM and  verbalized understanding of the critical finding.     CALVIN RUBIN MD   US Lower Extremity Venous Duplex Bilateral    Narrative    VENOUS ULTRASOUND BOTH LEGS  3/28/2020 1:06 PM     HISTORY: patient with numerous bilateral PE, 2-3 week history of  worsening leg swelling (R<L) - evaluate for DVT     COMPARISON: None.     TECHNIQUE: Examination of the deep veins was completed with graded  compression and color flow Doppler with spectral wave form analysis.     FINDINGS: There is no evidence of thrombus in the common femoral,  femoral, or popliteal veins of either lower extremity. There is no  evidence of thrombus within the calf veins.      Impression    IMPRESSION: No evidence of deep venous thrombosis within either lower  extremity.    CALVIN CLAROS MD   Echocardiogram Complete    Narrative     [id number]  [id number]  [id number]  [id number]^CHRISTIANO^MAX^BALJIT           [facility]  Echocardiography Laboratory  [address] [place] [address]        Name: KY YANES  MRN: [id number]  : 1945  Study Date: 2020 09:41 AM  Age: 74 yrs  Gender: Male  Patient Location: [facility]  Reason For Study: Pulmonary Emboli  History: CHF,CAD,Asthma,Hyperlipidemia,Pulmonary  Embolus,Obesity,DVT,ICD,NSTEMI  Ordering Physician: MAX ALVARADO  Referring Physician: Ky Major MD  Performed By: Yu Alvarez     BSA: 2.5 m2  Height: 72 in  Weight: 295 lb  HR: 68  BP: 147/78 mmHg  _____________________________________________________________________________  __        Procedure  Complete Portable Echo Adult. Optison (NDC #8457-7147) given intravenously.  _____________________________________________________________________________  __        Interpretation Summary     1. The left ventricle is normal in size. The visual ejection fraction is  estimated at 40%. Inferior hypokinesis. Septal wall motion abnormality may  reflect pacemaker activation.  2. The right ventricle is normal in structure, function and size.  3. No valve disease.  4. The ascending aorta is Mildly dilated. Sinus of Valsalva 4.1cm, ascending  aorta 3.7cm.     Echo from 3/2016 showed EF 40-45% with inferolateral hypokinesis, aorta 3.6cm.  _____________________________________________________________________________  __        Left Ventricle  The left ventricle is normal in size. There is mild concentric left  ventricular hypertrophy. The visual ejection fraction is estimated at 40%.  Grade I or early diastolic dysfunction. Inferior hypokinesis. Septal wall  motion abnormality may reflect pacemaker activation.     Right Ventricle  The right ventricle is normal in structure, function and size. There is a  pacemaker lead in the right ventricle.     Atria  Normal left atrial size. Right atrial size is normal.  There is no atrial shunt  seen.     Mitral Valve  There is no mitral regurgitation noted.        Tricuspid Valve  No tricuspid regurgitation. Right ventricular systolic pressure could not be  approximated due to inadequate tricuspid regurgitation.     Aortic Valve  The aortic valve is normal in structure and function.     Pulmonic Valve  The pulmonic valve is normal in structure and function.     Vessels  The ascending aorta is Mildly dilated. The inferior vena cava was normal in  size with preserved respiratory variability.     Pericardium  There is no pericardial effusion.        Rhythm  Sinus rhythm was noted.  _____________________________________________________________________________  __  MMode/2D Measurements & Calculations  IVSd: 1.5 cm     LVIDd: 4.6 cm  LVIDs: 4.0 cm  LVPWd: 1.4 cm  FS: 14.4 %  LV mass(C)d: 278.1 grams  LV mass(C)dI: 110.7 grams/m2  Ao root diam: 4.1 cm  LA dimension: 3.8 cm  asc Aorta Diam: 3.7 cm  LA/Ao: 0.94  RWT: 0.60        Doppler Measurements & Calculations  MV E max patricia: 44.1 cm/sec  MV A max patricia: 60.8 cm/sec  MV E/A: 0.72  MV dec slope: 133.5 cm/sec2  MV dec time: 0.36 sec  E/E' av.8  Lateral E/e': 6.3  Medial E/e': 9.2              _____________________________________________________________________________  __        Report approved by: Jeffrey Hinojosa 2020 11:53 AM            Discharge Medications   Current Discharge Medication List      START taking these medications    Details   enoxaparin ANTICOAGULANT (LOVENOX) 150 MG/ML syringe Inject 0.9 mLs (135 mg) Subcutaneous every 12 hours for 7 days  Qty: 14 mL, Refills: 0    Associated Diagnoses: Acute pulmonary embolism, unspecified pulmonary embolism type, unspecified whether acute cor pulmonale present (H)      warfarin ANTICOAGULANT (COUMADIN) 5 MG tablet Take 1 tablet (5 mg) by mouth daily Or as directed by the Coumadin Clinic  Qty: 30 tablet, Refills: 0    Associated Diagnoses: Acute pulmonary embolism,  unspecified pulmonary embolism type, unspecified whether acute cor pulmonale present (H)         CONTINUE these medications which have NOT CHANGED    Details   albuterol (ALBUTEROL) 108 (90 BASE) MCG/ACT inhaler Inhale 1-2 puffs into the lungs every 4 hours as needed for shortness of breath / dyspnea  Qty: 1 Inhaler, Refills: 1    Associated Diagnoses: Intermittent asthma, uncomplicated      atorvastatin (LIPITOR) 80 MG tablet Take 1 tablet (80 mg) by mouth daily  Qty: 90 tablet, Refills: 3    Associated Diagnoses: Hyperlipidemia LDL goal <100      DAILY MULTI OR 1 TABLET DAILY      finasteride (PROSCAR) 5 MG tablet Take 1 tablet (5 mg) by mouth daily  Qty: 90 tablet, Refills: 3    Associated Diagnoses: Coronary artery disease involving native coronary artery of native heart without angina pectoris      furosemide (LASIX) 40 MG tablet Take 1 tablet (40 mg) by mouth daily 1 tablet daily  Qty: 90 tablet, Refills: 3    Associated Diagnoses: Hyperlipidemia LDL goal <100      isosorbide mononitrate (IMDUR) 30 MG 24 hr tablet Take 1 tablet (30 mg) by mouth daily  Qty: 90 tablet, Refills: 3    Associated Diagnoses: Hyperlipidemia LDL goal <100      losartan (COZAAR) 50 MG tablet Take 1 tablet (50 mg) by mouth daily  Qty: 90 tablet, Refills: 3    Associated Diagnoses: Benign essential hypertension      metoprolol succinate ER (TOPROL-XL) 50 MG 24 hr tablet Take 1 tablet (50 mg) by mouth daily 1 tablet daily  Qty: 90 tablet, Refills: 3    Associated Diagnoses: Hyperlipidemia LDL goal <100      nitroGLYcerin (NITROSTAT) 0.4 MG sublingual tablet 0.4 mg As needed      potassium chloride ER (KLOR-CON) 20 MEQ CR tablet Take 1 tablet (20 mEq) by mouth daily 1 tablet daily  Qty: 90 tablet, Refills: 3    Associated Diagnoses: Hyperlipidemia LDL goal <100      tamsulosin (FLOMAX) 0.4 MG capsule TAKE ONE CAPSULE BY MOUTH ONCE DAILY  Qty: 90 capsule, Refills: 3    Associated Diagnoses: Benign prostatic hyperplasia, unspecified whether  lower urinary tract symptoms present      acetaminophen (TYLENOL) 325 MG tablet Take 325 mg by mouth 2 tablets daily         STOP taking these medications       aspirin 81 MG EC tablet Comments:   Reason for Stopping:         ibuprofen (ADVIL,MOTRIN) 200 MG tablet Comments:   Reason for Stopping:             Allergies   Allergies   Allergen Reactions     Pine Shortness Of Breath     Pine Pollen  Pine Pollen  Pine Pollen     No Clinical Screening - See Comments Rash     Other reaction(s): Rash     No Known Allergies Other (See Comments)

## 2020-03-30 NOTE — PLAN OF CARE
Vss. Pt received first dose of Coumadin this evening. Heparin gtt infusing at 1100 units/hour. Pt denies pain. Lungs are clear, denies SOA. Telemetry is NSR. Will continue with plan of care, monitor labs, heparin gtt, telemetry.

## 2020-03-30 NOTE — PHARMACY-ANTICOAGULATION SERVICE
Clinical Pharmacy - Warfarin Dosing Consult     Pharmacy has been consulted to manage this patient s warfarin therapy.  Indication: DVT/ PE Treatment  Therapy Goal: INR 2-3  Provider/Team: N/A  OP Anticoag Clinic: New Start  Warfarin Prior to Admission: No  Warfarin PTA Regimen: New start for DVT/PE treatment  Significant drug interactions: heparin bridge to goal INR,    INR   Date Value Ref Range Status   03/27/2020 1.13 0.86 - 1.14 Final   03/27/2008 0.99 0.86 - 1.14 Final     Comment:     Results questionable due to hemolysis       Recommend warfarin 5 mg tonight.  Pharmacy will monitor Ky Martinez daily and order warfarin doses to achieve specified goal.      Please contact pharmacy as soon as possible if the warfarin needs to be held for a procedure or if the warfarin goals change.

## 2020-03-31 NOTE — TELEPHONE ENCOUNTER
"Hospital/TCU/ED for chronic condition Discharge Protocol    \"Hi, my name is Swati Major RN, a registered nurse, and I am calling from [facility].  I am calling to follow up and see how things are going for you after your recent emergency visit/hospital/TCU stay.\"    Tell me how you are doing now that you are home?\" I feel better than I have felt for years.      Discharge Instructions    \"Let's review your discharge instructions.  What is/are the follow-up recommendations?  Pt. Response: INR draw    \"Has an appointment with your primary care provider been scheduled?\"   No (schedule appointment)    \"When you see the provider, I would recommend that you bring your medications with you.\"    Medications    \"Tell me what changed about your medicines when you discharged?\"    Changes to chronic meds?    0-1    \"What questions do you have about your medications?\"    None     New diagnoses of heart failure, COPD, diabetes, or MI?    No          On warfarin: \"Were you given any recommendations for follow-up with the anticoagulation clinic?\" scheduled for lab only at [facility] for inr    Post Discharge Medication Reconciliation Status: discharge medications reconciled, continue medications without change.    Was MTM referral placed (*Make sure to put transitions as reason for referral)?   No    Call Summary    \"What questions or concerns do you have about your recent visit and your follow-up care?\"     none    \"If you have questions or things don't continue to improve, we encourage you contact us through the main clinic number (give number).  Even if the clinic is not open, triage nurses are available 24/7 to help you.     We would like you to know that our clinic has extended hours (provide information).  We also have urgent care (provide details on closest location and hours/contact info)\"      \"Thank you for your time and take care!\"             "

## 2020-03-31 NOTE — TELEPHONE ENCOUNTER
Patient scheduled for INR draw in [facility] tomorrow. Plans to get a call from INR RN with plan.

## 2020-04-01 ENCOUNTER — ANTICOAGULATION THERAPY VISIT (OUTPATIENT)
Dept: ANTICOAGULATION | Facility: CLINIC | Age: 75
End: 2020-04-01

## 2020-04-01 DIAGNOSIS — I26.99 ACUTE PULMONARY EMBOLISM, UNSPECIFIED PULMONARY EMBOLISM TYPE, UNSPECIFIED WHETHER ACUTE COR PULMONALE PRESENT (H): ICD-10-CM

## 2020-04-01 DIAGNOSIS — Z86.711 PERSONAL HISTORY OF PE (PULMONARY EMBOLISM): ICD-10-CM

## 2020-04-01 DIAGNOSIS — Z79.01 LONG TERM (CURRENT) USE OF ANTICOAGULANTS: Primary | ICD-10-CM

## 2020-04-01 LAB
CAPILLARY BLOOD COLLECTION: NORMAL
INR PPP: 1.2 (ref 0.86–1.14)

## 2020-04-01 PROCEDURE — 99207 ZZC NO CHARGE NURSE ONLY: CPT | Performed by: FAMILY MEDICINE

## 2020-04-01 PROCEDURE — 85610 PROTHROMBIN TIME: CPT | Performed by: FAMILY MEDICINE

## 2020-04-01 PROCEDURE — 36416 COLLJ CAPILLARY BLOOD SPEC: CPT | Performed by: FAMILY MEDICINE

## 2020-04-01 NOTE — PROGRESS NOTES
ANTICOAGULATION INITIAL CLINIC VISIT    Patient Name:  Ky Martinez  Date:  2020  Referred by: Dr. Carlson  Contact Type:  Telephone    SUBJECTIVE:  Coumadin education was completed today.  Topics covered include:  -Introduction to coumadin  -Proper Administration  -INR Testing  -Sign/Symptoms of Bleeding  -Signs/Symptoms of Clot Formation or Stroke  -Dietary Intake of Vitamin K  -Drug Interactions  -Anticoagulation Identification (bracelet, necklace or wallet card)  -Future Surgery  -Effects of Alcohol, Tobacco, and Exercise on Coumadin    Coumadin Education Booklet and Coumadin Identification Wallet Card were given to the patient.    Patient Findings     Comments:   Discharged from hosp Mon, will continue lovenox. Completed consult over the phone        Clinical Outcomes     Comments:   Discharged from hosp Mon, will continue lovenox. Completed consult over the phone          OBJECTIVE    INR   Date Value Ref Range Status   2020 1.20 (H) 0.86 - 1.14 Final     Comment:     This test is intended for monitoring Coumadin therapy.  Results are not   accurate in patients with prolonged INR due to factor deficiency.         ASSESSMENT / PLAN  INR assessment SUB    Recheck INR In: 2 DAYS    INR Location Outside lab      Anticoagulation Summary  As of 2020    INR goal:   2.0-3.0   TTR:   --   INR used for dosin.20! (2020)   Warfarin maintenance plan:   5 mg (5 mg x 1) every day   Full warfarin instructions:   5 mg every day   Weekly warfarin total:   35 mg   Plan last modified:   Lucia Canales, RN (2020)   Next INR check:   4/3/2020   Target end date:   2020    Indications    Acute pulmonary embolism  unspecified pulmonary embolism type  unspecified whether acute cor pulmonale present (H) [I26.99]             Anticoagulation Episode Summary     INR check location:       Preferred lab:       Send INR reminders to:   [facility] [facility]    Comments:         Anticoagulation Care Providers      Provider Role Specialty Phone number    Jimena Carlson MD Referring [facility] [phone]            See the Encounter Report to view Anticoagulation Flowsheet and Dosing Calendar (Go to Encounters tab in chart review, and find the Anticoagulation Therapy Visit)    Dosage adjustment made based on physician directed care plan.    Lucia Canales RN

## 2020-04-03 ENCOUNTER — ANTICOAGULATION THERAPY VISIT (OUTPATIENT)
Dept: ANTICOAGULATION | Facility: CLINIC | Age: 75
End: 2020-04-03

## 2020-04-03 DIAGNOSIS — Z86.711 HISTORY OF PULMONARY EMBOLISM: ICD-10-CM

## 2020-04-03 DIAGNOSIS — I26.99 ACUTE PULMONARY EMBOLISM, UNSPECIFIED PULMONARY EMBOLISM TYPE, UNSPECIFIED WHETHER ACUTE COR PULMONALE PRESENT (H): ICD-10-CM

## 2020-04-03 DIAGNOSIS — Z79.01 LONG TERM CURRENT USE OF ANTICOAGULANTS WITH INR GOAL OF 2.0-3.0: ICD-10-CM

## 2020-04-03 LAB
CAPILLARY BLOOD COLLECTION: NORMAL
INR PPP: 2.1 (ref 0.86–1.14)

## 2020-04-03 PROCEDURE — 99207 ZZC NO CHARGE NURSE ONLY: CPT | Performed by: FAMILY MEDICINE

## 2020-04-03 PROCEDURE — 85610 PROTHROMBIN TIME: CPT | Performed by: FAMILY MEDICINE

## 2020-04-03 PROCEDURE — 36416 COLLJ CAPILLARY BLOOD SPEC: CPT | Performed by: FAMILY MEDICINE

## 2020-04-03 NOTE — PROGRESS NOTES
ANTICOAGULATION FOLLOW-UP CLINIC VISIT    Patient Name:  Ky Martinez  Date:  4/3/2020  Contact Type:  Telephone    SUBJECTIVE:  Patient Findings     Comments:   The patient was assessed for diet, medication, and activity level changes, missed or extra doses, bruising or bleeding, with no problem findings.  Ashleigh Savage RN          Clinical Outcomes     Comments:   The patient was assessed for diet, medication, and activity level changes, missed or extra doses, bruising or bleeding, with no problem findings.  Ashleigh Savage RN             OBJECTIVE    INR   Date Value Ref Range Status   2020 2.10 (H) 0.86 - 1.14 Final     Comment:     This test is intended for monitoring Coumadin therapy.  Results are not   accurate in patients with prolonged INR due to factor deficiency.         ASSESSMENT / PLAN  INR assessment THER    Recheck INR In: 4 DAYS    INR Location Outside lab      Anticoagulation Summary  As of 4/3/2020    INR goal:   2.0-3.0   TTR:   --   INR used for dosin.10 (4/3/2020)   Warfarin maintenance plan:   2.5 mg (5 mg x 0.5) every Tue, Thu, Sat; 5 mg (5 mg x 1) all other days   Full warfarin instructions:   2.5 mg every Tue, Thu, Sat; 5 mg all other days   Weekly warfarin total:   27.5 mg   Plan last modified:   Ashleigh Savage RN (4/3/2020)   Next INR check:   2020   Target end date:   2020    Indications    Acute pulmonary embolism  unspecified pulmonary embolism type  unspecified whether acute cor pulmonale present (H) [I26.99]             Anticoagulation Episode Summary     INR check location:       Preferred lab:       Send INR reminders to:   [facility]    Comments:   5 mg tabs, PM dose. Take multivit      Anticoagulation Care Providers     Provider Role Specialty Phone number    Jimena Carlson MD Referring [facility] [phone]            See the Encounter Report to view Anticoagulation Flowsheet and Dosing Calendar (Go to Encounters tab  in chart review, and find the Anticoagulation Therapy Visit)    Dosage adjustment made based on physician directed care plan.      Ashleigh Savage RN

## 2020-04-07 ENCOUNTER — ANTICOAGULATION THERAPY VISIT (OUTPATIENT)
Dept: ANTICOAGULATION | Facility: CLINIC | Age: 75
End: 2020-04-07

## 2020-04-07 DIAGNOSIS — I26.99 ACUTE PULMONARY EMBOLISM, UNSPECIFIED PULMONARY EMBOLISM TYPE, UNSPECIFIED WHETHER ACUTE COR PULMONALE PRESENT (H): ICD-10-CM

## 2020-04-07 DIAGNOSIS — Z79.01 LONG TERM CURRENT USE OF ANTICOAGULANTS WITH INR GOAL OF 2.0-3.0: ICD-10-CM

## 2020-04-07 DIAGNOSIS — Z86.711 HISTORY OF PULMONARY EMBOLISM: ICD-10-CM

## 2020-04-07 LAB
CAPILLARY BLOOD COLLECTION: NORMAL
INR BLD: 2.4 (ref 0.86–1.14)

## 2020-04-07 PROCEDURE — 99207 ZZC NO CHARGE NURSE ONLY: CPT | Performed by: FAMILY MEDICINE

## 2020-04-07 PROCEDURE — 85610 PROTHROMBIN TIME: CPT | Mod: QW | Performed by: FAMILY MEDICINE

## 2020-04-07 PROCEDURE — 36416 COLLJ CAPILLARY BLOOD SPEC: CPT | Performed by: FAMILY MEDICINE

## 2020-04-07 NOTE — PROGRESS NOTES
ANTICOAGULATION FOLLOW-UP CLINIC VISIT    Patient Name:  Ky Martinez  Date:  2020  Contact Type:  Telephone    SUBJECTIVE:  Patient Findings     Comments:   The patient was assessed for diet, medication, and activity level changes, missed or extra doses, bruising or bleeding, with no problem findings.  Ashleigh Savage RN          Clinical Outcomes     Negatives:   Major bleeding event, Thromboembolic event, Anticoagulation-related hospital admission, Anticoagulation-related ED visit, Anticoagulation-related fatality    Comments:   The patient was assessed for diet, medication, and activity level changes, missed or extra doses, bruising or bleeding, with no problem findings.  Ashleigh Savage RN             OBJECTIVE    INR Point of Care   Date Value Ref Range Status   2020 2.4 (H) 0.86 - 1.14 Final     Comment:     This test is intended for monitoring Coumadin therapy.  Results are not   accurate in patients with prolonged INR due to factor deficiency.         ASSESSMENT / PLAN  INR assessment THER    Recheck INR In: 1 WEEK    INR Location Outside lab      Anticoagulation Summary  As of 2020    INR goal:   2.0-3.0   TTR:   --   INR used for dosin.4 (2020)   Warfarin maintenance plan:   2.5 mg (5 mg x 0.5) every Mon; 5 mg (5 mg x 1) all other days   Full warfarin instructions:   2.5 mg every Mon; 5 mg all other days   Weekly warfarin total:   32.5 mg   Plan last modified:   Ashleigh Savage RN (2020)   Next INR check:   2020   Target end date:   2020    Indications    Acute pulmonary embolism  unspecified pulmonary embolism type  unspecified whether acute cor pulmonale present (H) [I26.99]             Anticoagulation Episode Summary     INR check location:       Preferred lab:       Send INR reminders to:   [facility] [facility]    Comments:   5 mg tabs, PM dose. Take multivit      Anticoagulation Care Providers     Provider Role Specialty Phone number    Aldo  Jimena Elizabeth MD [facility] [phone]            See the Encounter Report to view Anticoagulation Flowsheet and Dosing Calendar (Go to Encounters tab in chart review, and find the Anticoagulation Therapy Visit)    Dosage adjustment made based on physician directed care plan.      Ashleigh Savage RN

## 2020-04-09 LAB — COPATH REPORT: NORMAL

## 2020-04-09 NOTE — PROGRESS NOTES
"Ky Martinez  Gender: male  : 1945  [address]  [place] [id number] [phone] (home)     Medical Record: [id number]  Pharmacy:    [facility] [place], [place] - [address]  [facility] [place], [place] - [address] [facility]  [facility] - [place], [place] - [address]  Primary Care Provider: Ky Major    Parent's names are: Data Unavailable (mother) and Data Unavailable (father).      [facility]  2020     Discharge Phone Call:  Key Words/Key Times      Introduction - AIDET (Acknowledge, Introduce, Duration, Explanation)      Empathy-   We are calling to see how you are since your recent stay in the hospital?     Call back COMMENTS: \"Wonderful!\"      Clinical Questions -  (f/u appts, medication side effects/purpose, ability to care for self at home) \"For your safety, it is important to us that you understand the purpose and side effects of your medications, can you tell me what your new medications are?\"     Call back COMMENTS: Ha an appt for a yearly physical which was rescheduled due to Covid 19. He is  asked about renewing his Coumadin prescription and was encouraged to email his physician through My Chart which he is on.      Staff Recognition -  We like to recognize staff and physicians who have done an excellent job.  Do you remember any people from your care team that you would like recognize?     Call back COMMENTS: \"Dr. Carlson is just a gem. The whole nursing staff was great---I couldn't be more pleased.\"      Very Good Care -  We want to provide very good care to all patients.  How was your care?     Call back COMMENTS: \"Wonderful. Very attentive.\"      Opportunities for Improvement -  Our goal is to be the best.  Do you have any suggestions for things that we could improve upon?     Call back COMMENTS: \"At some point I would have liked a shower, but everything else was good.\"      Thank You       For " "CHF Patients Only:  Teach Back Questions Answered Correctly:    1. What is the name of your  water pill ? \"Furosemide\"  2. What weight gain should you report to your doctor? Not sure; had been weighing self daily and was reminded of weight gains to report.  3. What foods should you avoid? \"Vitamin K\" due to Coumadin and \"Salty foods\"  4. What symptoms would you report to your doctor? \"Swelling and shortness of breath\".  CHF COMMENTS:              "

## 2020-04-13 DIAGNOSIS — J45.20 INTERMITTENT ASTHMA, UNCOMPLICATED: ICD-10-CM

## 2020-04-13 NOTE — TELEPHONE ENCOUNTER
Reason for Call:  Medication or medication refill:    Do you use a [facility]?  Name of the pharmacy and phone number for the current request:  [facility] - [phone]    Name of the medication requested: albuterol (ALBUTEROL) 108 (90 BASE) MCG/ACT inhaler    Other request: would like to  tomorrow 4/14 when he is in for lab draw. Please have team member address in Dr Major's absence. Pt had recent hospitalization for lung issues.     Can we leave a detailed message on this number? YES    Phone number patient can be reached at: Home number on file [phone] (home)    Best Time: any time    Call taken on 4/13/2020 at 10:26 AM by Nancy Agosto

## 2020-04-14 ENCOUNTER — ANTICOAGULATION THERAPY VISIT (OUTPATIENT)
Dept: ANTICOAGULATION | Facility: CLINIC | Age: 75
End: 2020-04-14

## 2020-04-14 DIAGNOSIS — Z79.01 LONG TERM CURRENT USE OF ANTICOAGULANTS WITH INR GOAL OF 2.0-3.0: ICD-10-CM

## 2020-04-14 DIAGNOSIS — Z86.711 HISTORY OF PULMONARY EMBOLISM: ICD-10-CM

## 2020-04-14 DIAGNOSIS — I26.99 ACUTE PULMONARY EMBOLISM, UNSPECIFIED PULMONARY EMBOLISM TYPE, UNSPECIFIED WHETHER ACUTE COR PULMONALE PRESENT (H): ICD-10-CM

## 2020-04-14 LAB — INR BLD: 2.3 (ref 0.86–1.14)

## 2020-04-14 PROCEDURE — 36416 COLLJ CAPILLARY BLOOD SPEC: CPT | Performed by: FAMILY MEDICINE

## 2020-04-14 PROCEDURE — 99207 ZZC NO CHARGE NURSE ONLY: CPT | Performed by: FAMILY MEDICINE

## 2020-04-14 PROCEDURE — 85610 PROTHROMBIN TIME: CPT | Mod: QW | Performed by: FAMILY MEDICINE

## 2020-04-14 RX ORDER — ALBUTEROL SULFATE 90 UG/1
1-2 AEROSOL, METERED RESPIRATORY (INHALATION) EVERY 4 HOURS PRN
Qty: 1 INHALER | Refills: 1 | Status: SHIPPED | OUTPATIENT
Start: 2020-04-14 | End: 2023-07-10

## 2020-04-14 NOTE — PROGRESS NOTES
ANTICOAGULATION FOLLOW-UP CLINIC VISIT    Patient Name:  Ky Martinez  Date:  2020  Contact Type:  Telephone    SUBJECTIVE:  Patient Findings     Comments:   Left a detailed message on VM with this information, pt is advised to call back if pt has any questions, missed doses or concerns such as symptoms of bleeding, clotting, infection, change in diet or other.            Clinical Outcomes     Comments:   Left a detailed message on VM with this information, pt is advised to call back if pt has any questions, missed doses or concerns such as symptoms of bleeding, clotting, infection, change in diet or other.               OBJECTIVE    INR Point of Care   Date Value Ref Range Status   2020 2.3 (H) 0.86 - 1.14 Final     Comment:     This test is intended for monitoring Coumadin therapy.  Results are not   accurate in patients with prolonged INR due to factor deficiency.         ASSESSMENT / PLAN  INR assessment THER    Recheck INR In: 2 WEEKS    INR Location Outside lab      Anticoagulation Summary  As of 2020    INR goal:   2.0-3.0   TTR:   100.0 % (5 d)   INR used for dosin.3 (2020)   Warfarin maintenance plan:   2.5 mg (5 mg x 0.5) every Mon; 5 mg (5 mg x 1) all other days   Full warfarin instructions:   2.5 mg every Mon; 5 mg all other days   Weekly warfarin total:   32.5 mg   No change documented:   Lucia Canales, RN   Plan last modified:   Ashleigh Savage, RN (2020)   Next INR check:   2020   Target end date:   2020    Indications    Acute pulmonary embolism  unspecified pulmonary embolism type  unspecified whether acute cor pulmonale present (H) [I26.99]             Anticoagulation Episode Summary     INR check location:       Preferred lab:       Send INR reminders to:   [facility]    Comments:   5 mg tabs, PM dose. Take multivit      Anticoagulation Care Providers     Provider Role Specialty Phone number    Jimena Carlson MD Referring [facility] [phone]            See the Encounter Report to view Anticoagulation Flowsheet and Dosing Calendar (Go to Encounters tab in chart review, and find the Anticoagulation Therapy Visit)    Dosage adjustment made based on physician directed care plan.    Lucia Canales RN

## 2020-04-14 NOTE — TELEPHONE ENCOUNTER
Prescription approved per [facility] Refill Protocol.  Swati Major RN

## 2020-04-16 ENCOUNTER — TELEPHONE (OUTPATIENT)
Dept: FAMILY MEDICINE | Facility: CLINIC | Age: 75
End: 2020-04-16

## 2020-04-16 DIAGNOSIS — I26.99 ACUTE PULMONARY EMBOLISM, UNSPECIFIED PULMONARY EMBOLISM TYPE, UNSPECIFIED WHETHER ACUTE COR PULMONALE PRESENT (H): ICD-10-CM

## 2020-04-16 RX ORDER — WARFARIN SODIUM 5 MG/1
5 TABLET ORAL DAILY
Qty: 90 TABLET | Refills: 1 | Status: SHIPPED | OUTPATIENT
Start: 2020-04-16 | End: 2020-07-01

## 2020-04-16 NOTE — TELEPHONE ENCOUNTER
Reason for Call:  Medication or medication refill:    Do you use a [facility]?  Name of the pharmacy and phone number for the current request:  [facility] - [phone]    Name of the medication requested: warfarin ANTICOAGULANT (COUMADIN) 5 MG tablet    Other request: Patient is wondering if he can get this in a 90 day supply, his INRs have been good and would like to keep getting this refilled.     Can we leave a detailed message on this number? YES    Phone number patient can be reached at: Home number on file [phone] (home)    Best Time: Anytime     Call taken on 4/16/2020 at 2:00 PM by Miranda Seipiel Vaccari

## 2020-04-28 ENCOUNTER — ANTICOAGULATION THERAPY VISIT (OUTPATIENT)
Dept: ANTICOAGULATION | Facility: CLINIC | Age: 75
End: 2020-04-28

## 2020-04-28 ENCOUNTER — TELEPHONE (OUTPATIENT)
Dept: ANTICOAGULATION | Facility: CLINIC | Age: 75
End: 2020-04-28

## 2020-04-28 DIAGNOSIS — Z79.01 LONG TERM CURRENT USE OF ANTICOAGULANTS WITH INR GOAL OF 2.0-3.0: ICD-10-CM

## 2020-04-28 DIAGNOSIS — I26.99 ACUTE PULMONARY EMBOLISM, UNSPECIFIED PULMONARY EMBOLISM TYPE, UNSPECIFIED WHETHER ACUTE COR PULMONALE PRESENT (H): ICD-10-CM

## 2020-04-28 DIAGNOSIS — Z86.711 HISTORY OF PULMONARY EMBOLISM: ICD-10-CM

## 2020-04-28 LAB
CAPILLARY BLOOD COLLECTION: NORMAL
INR BLD: 4.1 (ref 0.86–1.14)

## 2020-04-28 PROCEDURE — 85610 PROTHROMBIN TIME: CPT | Mod: QW | Performed by: FAMILY MEDICINE

## 2020-04-28 PROCEDURE — 36416 COLLJ CAPILLARY BLOOD SPEC: CPT | Performed by: FAMILY MEDICINE

## 2020-04-28 PROCEDURE — 99207 ZZC NO CHARGE NURSE ONLY: CPT | Performed by: FAMILY MEDICINE

## 2020-04-28 NOTE — PROGRESS NOTES
ANTICOAGULATION FOLLOW-UP CLINIC VISIT    Patient Name:  Ky Martinez  Date:  2020  Contact Type:  Telephone    SUBJECTIVE:  Patient Findings     Positives:   Change in medications (Pt changed his multivit from one with 70% vit K, to 30%. Likely why his INR is up)             OBJECTIVE    INR Point of Care   Date Value Ref Range Status   2020 4.1 (H) 0.86 - 1.14 Final     Comment:     This test is intended for monitoring Coumadin therapy.  Results are not   accurate in patients with prolonged INR due to factor deficiency.         ASSESSMENT / PLAN  INR assessment SUPRA    Recheck INR In: 9 DAYS    INR Location Outside lab      Anticoagulation Summary  As of 2020    INR goal:   2.0-3.0   TTR:   56.0 % (2.7 wk)   INR used for dosin.1! (2020)   Warfarin maintenance plan:   2.5 mg (5 mg x 0.5) every Mon, Fri; 5 mg (5 mg x 1) all other days   Full warfarin instructions:   : Hold; Otherwise 2.5 mg every Mon, Fri; 5 mg all other days   Weekly warfarin total:   30 mg   Plan last modified:   Lucia Canales RN (2020)   Next INR check:   2020   Target end date:   2020    Indications    Acute pulmonary embolism  unspecified pulmonary embolism type  unspecified whether acute cor pulmonale present (H) [I26.99]             Anticoagulation Episode Summary     INR check location:       Preferred lab:       Send INR reminders to:   [facility]    Comments:   5 mg tabs, PM dose. Take multivit      Anticoagulation Care Providers     Provider Role Specialty Phone number    Jimena Carlson MD Referring [facility] [phone]            See the Encounter Report to view Anticoagulation Flowsheet and Dosing Calendar (Go to Encounters tab in chart review, and find the Anticoagulation Therapy Visit)    Dosage adjustment made based on physician directed care plan.    Lucia Canales RN

## 2020-04-28 NOTE — TELEPHONE ENCOUNTER
I have attempted to contact this patient by phone, left message to return call at [phone] or [phone].  Will try again later.         Lucia Canales RN- Coumadin Clinic RN

## 2020-05-07 ENCOUNTER — ANTICOAGULATION THERAPY VISIT (OUTPATIENT)
Dept: ANTICOAGULATION | Facility: CLINIC | Age: 75
End: 2020-05-07

## 2020-05-07 DIAGNOSIS — I26.99 ACUTE PULMONARY EMBOLISM, UNSPECIFIED PULMONARY EMBOLISM TYPE, UNSPECIFIED WHETHER ACUTE COR PULMONALE PRESENT (H): ICD-10-CM

## 2020-05-07 DIAGNOSIS — Z79.01 LONG TERM CURRENT USE OF ANTICOAGULANTS WITH INR GOAL OF 2.0-3.0: ICD-10-CM

## 2020-05-07 DIAGNOSIS — Z86.711 HISTORY OF PULMONARY EMBOLISM: ICD-10-CM

## 2020-05-07 LAB
CAPILLARY BLOOD COLLECTION: NORMAL
INR BLD: 1.7 (ref 0.86–1.14)

## 2020-05-07 PROCEDURE — 85610 PROTHROMBIN TIME: CPT | Mod: QW | Performed by: FAMILY MEDICINE

## 2020-05-07 PROCEDURE — 99207 ZZC NO CHARGE NURSE ONLY: CPT | Performed by: FAMILY MEDICINE

## 2020-05-07 PROCEDURE — 36416 COLLJ CAPILLARY BLOOD SPEC: CPT | Performed by: FAMILY MEDICINE

## 2020-05-07 NOTE — PROGRESS NOTES
ANTICOAGULATION FOLLOW-UP CLINIC VISIT    Patient Name:  Ky Martinez  Date:  2020  Contact Type:  Telephone    SUBJECTIVE:  Patient Findings     Comments:   Pt held his doses MF vs taking 1/2 tablet, so will be sure to follow instructions         Clinical Outcomes     Comments:   Pt held his doses MF vs taking 1/2 tablet, so will be sure to follow instructions            OBJECTIVE    INR Point of Care   Date Value Ref Range Status   2020 1.7 (H) 0.86 - 1.14 Final     Comment:     This test is intended for monitoring Coumadin therapy.  Results are not   accurate in patients with prolonged INR due to factor deficiency.         ASSESSMENT / PLAN  INR assessment SUB    Recheck INR In: 2 WEEKS    INR Location Outside lab      Anticoagulation Summary  As of 2020    INR goal:   2.0-3.0   TTR:   51.5 % (4 wk)   INR used for dosin.7! (2020)   Warfarin maintenance plan:   2.5 mg (5 mg x 0.5) every Mon, Fri; 5 mg (5 mg x 1) all other days   Full warfarin instructions:   : 5 mg; Otherwise 2.5 mg every Mon, Fri; 5 mg all other days   Weekly warfarin total:   30 mg   Plan last modified:   Lucia Canales RN (2020)   Next INR check:   2020   Target end date:   2020    Indications    Acute pulmonary embolism  unspecified pulmonary embolism type  unspecified whether acute cor pulmonale present (H) [I26.99]             Anticoagulation Episode Summary     INR check location:       Preferred lab:       Send INR reminders to:   [facility] [place]    Comments:   5 mg tabs, PM dose. Take multivit      Anticoagulation Care Providers     Provider Role Specialty Phone number    Jimena Carlson MD Referring [facility] [phone]            See the Encounter Report to view Anticoagulation Flowsheet and Dosing Calendar (Go to Encounters tab in chart review, and find the Anticoagulation Therapy Visit)    Dosage adjustment made based on physician directed care plan.    Lucia Canales  RN

## 2020-05-21 ENCOUNTER — ANTICOAGULATION THERAPY VISIT (OUTPATIENT)
Dept: ANTICOAGULATION | Facility: CLINIC | Age: 75
End: 2020-05-21

## 2020-05-21 DIAGNOSIS — Z79.01 LONG TERM CURRENT USE OF ANTICOAGULANTS WITH INR GOAL OF 2.0-3.0: ICD-10-CM

## 2020-05-21 DIAGNOSIS — I26.99 ACUTE PULMONARY EMBOLISM, UNSPECIFIED PULMONARY EMBOLISM TYPE, UNSPECIFIED WHETHER ACUTE COR PULMONALE PRESENT (H): ICD-10-CM

## 2020-05-21 DIAGNOSIS — Z86.711 HISTORY OF PULMONARY EMBOLISM: ICD-10-CM

## 2020-05-21 LAB
CAPILLARY BLOOD COLLECTION: NORMAL
INR BLD: 2.9 (ref 0.86–1.14)

## 2020-05-21 PROCEDURE — 36416 COLLJ CAPILLARY BLOOD SPEC: CPT | Performed by: FAMILY MEDICINE

## 2020-05-21 PROCEDURE — 85610 PROTHROMBIN TIME: CPT | Mod: QW | Performed by: FAMILY MEDICINE

## 2020-05-21 PROCEDURE — 99207 ZZC NO CHARGE NURSE ONLY: CPT | Performed by: FAMILY MEDICINE

## 2020-05-21 NOTE — PROGRESS NOTES
ANTICOAGULATION FOLLOW-UP CLINIC VISIT    Patient Name:  Ky Martinez  Date:  2020  Contact Type:  Telephone    SUBJECTIVE:  Patient Findings     Comments:   Left a detailed message on VM with this information, pt is advised to call back if pt has any questions, missed doses or concerns such as symptoms of bleeding, clotting, infection, change in diet or other.          Clinical Outcomes     Comments:   Left a detailed message on VM with this information, pt is advised to call back if pt has any questions, missed doses or concerns such as symptoms of bleeding, clotting, infection, change in diet or other.             OBJECTIVE    Recent labs: (last 7 days)     20  1037   INR 2.9*       ASSESSMENT / PLAN  INR assessment THER    Recheck INR In: 3 WEEKS    INR Location Outside lab      Anticoagulation Summary  As of 2020    INR goal:   2.0-3.0   TTR:   59.2 % (1.4 mo)   INR used for dosin.9 (2020)   Warfarin maintenance plan:   2.5 mg (5 mg x 0.5) every Mon, Fri; 5 mg (5 mg x 1) all other days   Full warfarin instructions:   2.5 mg every Mon, Fri; 5 mg all other days   Weekly warfarin total:   30 mg   No change documented:   Lucia Canales, RN   Plan last modified:   Lucia Canales, RN (2020)   Next INR check:   2020   Target end date:   2020    Indications    Acute pulmonary embolism  unspecified pulmonary embolism type  unspecified whether acute cor pulmonale present (H) [I26.99]             Anticoagulation Episode Summary     INR check location:       Preferred lab:       Send INR reminders to:   [facility] [place]    Comments:   5 mg tabs, PM dose. Take multivit      Anticoagulation Care Providers     Provider Role Specialty Phone number    Jimena Carlson MD Referring [facility] [phone]            See the Encounter Report to view Anticoagulation Flowsheet and Dosing Calendar (Go to Encounters tab in chart review, and find the Anticoagulation Therapy  Visit)    Dosage adjustment made based on physician directed care plan.    Lucia Canales RN

## 2020-06-11 ENCOUNTER — ANTICOAGULATION THERAPY VISIT (OUTPATIENT)
Dept: ANTICOAGULATION | Facility: CLINIC | Age: 75
End: 2020-06-11

## 2020-06-11 DIAGNOSIS — Z86.711 HISTORY OF PULMONARY EMBOLISM: ICD-10-CM

## 2020-06-11 DIAGNOSIS — I26.99 ACUTE PULMONARY EMBOLISM, UNSPECIFIED PULMONARY EMBOLISM TYPE, UNSPECIFIED WHETHER ACUTE COR PULMONALE PRESENT (H): ICD-10-CM

## 2020-06-11 DIAGNOSIS — Z79.01 LONG TERM CURRENT USE OF ANTICOAGULANTS WITH INR GOAL OF 2.0-3.0: ICD-10-CM

## 2020-06-11 LAB
CAPILLARY BLOOD COLLECTION: NORMAL
INR BLD: 4.1 (ref 0.86–1.14)

## 2020-06-11 PROCEDURE — 36416 COLLJ CAPILLARY BLOOD SPEC: CPT | Performed by: FAMILY MEDICINE

## 2020-06-11 PROCEDURE — 99207 ZZC NO CHARGE NURSE ONLY: CPT | Performed by: FAMILY MEDICINE

## 2020-06-11 PROCEDURE — 85610 PROTHROMBIN TIME: CPT | Mod: QW | Performed by: FAMILY MEDICINE

## 2020-06-11 NOTE — PROGRESS NOTES
ANTICOAGULATION FOLLOW-UP CLINIC VISIT    Patient Name:  Ky Martinez  Date:  2020  Contact Type:  Telephone    SUBJECTIVE:  Patient Findings     Comments:   Pt reports that he has been taking a vitamin that has less vit K, 75% vs 30%, he will go back to taking the 75% vit K vitamin.         Clinical Outcomes     Comments:   Pt reports that he has been taking a vitamin that has less vit K, 75% vs 30%, he will go back to taking the 75% vit K vitamin.            OBJECTIVE    Recent labs: (last 7 days)     20  1044   INR 4.1*       ASSESSMENT / PLAN  INR assessment SUPRA    Recheck INR In: 2 WEEKS    INR Location Outside lab      Anticoagulation Summary  As of 2020    INR goal:   2.0-3.0   TTR:   42.4 % (2.1 mo)   INR used for dosin.1! (2020)   Warfarin maintenance plan:   2.5 mg (5 mg x 0.5) every Mon, Fri; 5 mg (5 mg x 1) all other days   Full warfarin instructions:   : 2.5 mg; Otherwise 2.5 mg every Mon, Fri; 5 mg all other days   Weekly warfarin total:   30 mg   Plan last modified:   Lucia aCnales RN (2020)   Next INR check:   2020   Target end date:   2020    Indications    Acute pulmonary embolism  unspecified pulmonary embolism type  unspecified whether acute cor pulmonale present (H) [I26.99]             Anticoagulation Episode Summary     INR check location:       Preferred lab:       Send INR reminders to:   [facility] [place]    Comments:   5 mg tabs, PM dose. Take multivit      Anticoagulation Care Providers     Provider Role Specialty Phone number    Jimena Carlson MD Referring [facility] [phone]            See the Encounter Report to view Anticoagulation Flowsheet and Dosing Calendar (Go to Encounters tab in chart review, and find the Anticoagulation Therapy Visit)    Dosage adjustment made based on physician directed care plan.    Lucia Canales, BASSEM

## 2020-07-01 ENCOUNTER — ANTICOAGULATION THERAPY VISIT (OUTPATIENT)
Dept: ANTICOAGULATION | Facility: CLINIC | Age: 75
End: 2020-07-01

## 2020-07-01 ENCOUNTER — TELEPHONE (OUTPATIENT)
Dept: ANTICOAGULATION | Facility: CLINIC | Age: 75
End: 2020-07-01

## 2020-07-01 DIAGNOSIS — Z79.01 LONG TERM CURRENT USE OF ANTICOAGULANTS WITH INR GOAL OF 2.0-3.0: ICD-10-CM

## 2020-07-01 DIAGNOSIS — Z86.711 HISTORY OF PULMONARY EMBOLISM: ICD-10-CM

## 2020-07-01 DIAGNOSIS — I26.99 ACUTE PULMONARY EMBOLISM, UNSPECIFIED PULMONARY EMBOLISM TYPE, UNSPECIFIED WHETHER ACUTE COR PULMONALE PRESENT (H): ICD-10-CM

## 2020-07-01 LAB
CAPILLARY BLOOD COLLECTION: NORMAL
INR BLD: 2.4 (ref 0.86–1.14)

## 2020-07-01 PROCEDURE — 36416 COLLJ CAPILLARY BLOOD SPEC: CPT | Performed by: FAMILY MEDICINE

## 2020-07-01 PROCEDURE — 99207 ZZC NO CHARGE NURSE ONLY: CPT | Performed by: FAMILY MEDICINE

## 2020-07-01 PROCEDURE — 85610 PROTHROMBIN TIME: CPT | Mod: QW | Performed by: FAMILY MEDICINE

## 2020-07-01 RX ORDER — WARFARIN SODIUM 5 MG/1
5 TABLET ORAL DAILY
Qty: 80 TABLET | Refills: 0 | Status: SHIPPED | OUTPATIENT
Start: 2020-07-01 | End: 2020-09-30

## 2020-07-01 NOTE — TELEPHONE ENCOUNTER
Attempted to contact pt regarding INR of 2.4 from 7/1. VM left to call [facility] back to discuss.    Ashleigh Savage RN

## 2020-07-01 NOTE — PROGRESS NOTES
ANTICOAGULATION FOLLOW-UP CLINIC VISIT    Patient Name:  Ky Martinez  Date:  2020  Contact Type:  Telephone    SUBJECTIVE:  Patient Findings     Comments:   The patient was assessed for diet, medication, and activity level changes, missed or extra doses, bruising or bleeding, with no problem findings.  Ashleigh Savage RN          Clinical Outcomes     Negatives:   Major bleeding event, Thromboembolic event, Anticoagulation-related hospital admission, Anticoagulation-related ED visit, Anticoagulation-related fatality    Comments:   The patient was assessed for diet, medication, and activity level changes, missed or extra doses, bruising or bleeding, with no problem findings.  Ashleigh Savage RN             OBJECTIVE    Recent labs: (last 7 days)     20  0843   INR 2.4*       ASSESSMENT / PLAN  INR assessment THER    Recheck INR In: 4 WEEKS    INR Location Outside lab      Anticoagulation Summary  As of 2020    INR goal:   2.0-3.0   TTR:   40.7 % (2.8 mo)   INR used for dosin.4 (2020)   Warfarin maintenance plan:   2.5 mg (5 mg x 0.5) every Mon, Fri; 5 mg (5 mg x 1) all other days   Full warfarin instructions:   2.5 mg every Mon, Fri; 5 mg all other days   Weekly warfarin total:   30 mg   No change documented:   Ashleigh Savage RN   Plan last modified:   Lucia Canales RN (2020)   Next INR check:   2020   Target end date:   2020    Indications    Acute pulmonary embolism  unspecified pulmonary embolism type  unspecified whether acute cor pulmonale present (H) [I26.99]             Anticoagulation Episode Summary     INR check location:       Preferred lab:       Send INR reminders to:   [facility] [place]    Comments:   5 mg tabs, PM dose. Take multivit      Anticoagulation Care Providers     Provider Role Specialty Phone number    Jimena Carlson MD Referring [facility] [phone]            See the Encounter Report to view Anticoagulation  Flowsheet and Dosing Calendar (Go to Encounters tab in chart review, and find the Anticoagulation Therapy Visit)    Dosage adjustment made based on physician directed care plan.      Ashleigh Savage RN

## 2020-07-30 ENCOUNTER — ANTICOAGULATION THERAPY VISIT (OUTPATIENT)
Dept: ANTICOAGULATION | Facility: CLINIC | Age: 75
End: 2020-07-30

## 2020-07-30 DIAGNOSIS — I26.99 ACUTE PULMONARY EMBOLISM, UNSPECIFIED PULMONARY EMBOLISM TYPE, UNSPECIFIED WHETHER ACUTE COR PULMONALE PRESENT (H): ICD-10-CM

## 2020-07-30 DIAGNOSIS — Z79.01 LONG TERM CURRENT USE OF ANTICOAGULANTS WITH INR GOAL OF 2.0-3.0: ICD-10-CM

## 2020-07-30 DIAGNOSIS — Z86.711 HISTORY OF PULMONARY EMBOLISM: ICD-10-CM

## 2020-07-30 LAB
CAPILLARY BLOOD COLLECTION: NORMAL
INR BLD: 2.4 (ref 0.86–1.14)

## 2020-07-30 PROCEDURE — 85610 PROTHROMBIN TIME: CPT | Mod: QW | Performed by: FAMILY MEDICINE

## 2020-07-30 PROCEDURE — 99207 ZZC NO CHARGE NURSE ONLY: CPT | Performed by: FAMILY MEDICINE

## 2020-07-30 PROCEDURE — 36416 COLLJ CAPILLARY BLOOD SPEC: CPT | Performed by: FAMILY MEDICINE

## 2020-07-30 NOTE — PROGRESS NOTES
ANTICOAGULATION FOLLOW-UP CLINIC VISIT    Patient Name:  Ky Martinez  Date:  2020  Contact Type:  Telephone    SUBJECTIVE:  Patient Findings     Comments:   Left a detailed message on VM with this information, pt is advised to call back if pt has any questions, missed doses or concerns such as symptoms of bleeding, clotting, infection, change in diet or other.          Clinical Outcomes     Comments:   Left a detailed message on VM with this information, pt is advised to call back if pt has any questions, missed doses or concerns such as symptoms of bleeding, clotting, infection, change in diet or other.             OBJECTIVE    Recent labs: (last 7 days)     20  1037   INR 2.4*       ASSESSMENT / PLAN  INR assessment THER    Recheck INR In: 4 WEEKS    INR Location Outside lab      Anticoagulation Summary  As of 2020    INR goal:   2.0-3.0   TTR:   56.0 % (3.7 mo)   INR used for dosin.4 (2020)   Warfarin maintenance plan:   2.5 mg (5 mg x 0.5) every Mon, Fri; 5 mg (5 mg x 1) all other days   Full warfarin instructions:   2.5 mg every Mon, Fri; 5 mg all other days   Weekly warfarin total:   30 mg   No change documented:   Lucia Canales, RN   Plan last modified:   Lucia Canales, RN (2020)   Next INR check:   2020   Target end date:   2020    Indications    Acute pulmonary embolism  unspecified pulmonary embolism type  unspecified whether acute cor pulmonale present (H) [I26.99]             Anticoagulation Episode Summary     INR check location:       Preferred lab:       Send INR reminders to:   [facility]    Comments:   5 mg tabs, PM dose. Take multivit      Anticoagulation Care Providers     Provider Role Specialty Phone number    Jimena Carlson MD Referring [facility] [phone]            See the Encounter Report to view Anticoagulation Flowsheet and Dosing Calendar (Go to Encounters tab in chart review, and find the Anticoagulation Therapy  Visit)    Dosage adjustment made based on physician directed care plan.    Lucia Canales RN

## 2020-08-28 ENCOUNTER — ANTICOAGULATION THERAPY VISIT (OUTPATIENT)
Dept: ANTICOAGULATION | Facility: CLINIC | Age: 75
End: 2020-08-28

## 2020-08-28 DIAGNOSIS — Z86.711 HISTORY OF PULMONARY EMBOLISM: ICD-10-CM

## 2020-08-28 DIAGNOSIS — Z79.01 LONG TERM CURRENT USE OF ANTICOAGULANTS WITH INR GOAL OF 2.0-3.0: ICD-10-CM

## 2020-08-28 DIAGNOSIS — I26.99 ACUTE PULMONARY EMBOLISM, UNSPECIFIED PULMONARY EMBOLISM TYPE, UNSPECIFIED WHETHER ACUTE COR PULMONALE PRESENT (H): ICD-10-CM

## 2020-08-28 LAB
CAPILLARY BLOOD COLLECTION: NORMAL
INR BLD: 2.5 (ref 0.86–1.14)

## 2020-08-28 PROCEDURE — 85610 PROTHROMBIN TIME: CPT | Mod: QW | Performed by: FAMILY MEDICINE

## 2020-08-28 PROCEDURE — 99207 ZZC NO CHARGE NURSE ONLY: CPT | Performed by: FAMILY MEDICINE

## 2020-08-28 PROCEDURE — 36416 COLLJ CAPILLARY BLOOD SPEC: CPT | Performed by: FAMILY MEDICINE

## 2020-09-29 DIAGNOSIS — I26.99 ACUTE PULMONARY EMBOLISM, UNSPECIFIED PULMONARY EMBOLISM TYPE, UNSPECIFIED WHETHER ACUTE COR PULMONALE PRESENT (H): ICD-10-CM

## 2020-09-30 RX ORDER — WARFARIN SODIUM 5 MG/1
TABLET ORAL
Qty: 80 TABLET | Refills: 0 | Status: SHIPPED | OUTPATIENT
Start: 2020-09-30 | End: 2020-12-29

## 2020-10-01 ENCOUNTER — ANTICOAGULATION THERAPY VISIT (OUTPATIENT)
Dept: ANTICOAGULATION | Facility: OTHER | Age: 75
End: 2020-10-01
Payer: COMMERCIAL

## 2020-10-01 DIAGNOSIS — Z79.01 LONG TERM CURRENT USE OF ANTICOAGULANTS WITH INR GOAL OF 2.0-3.0: ICD-10-CM

## 2020-10-01 DIAGNOSIS — I26.99 ACUTE PULMONARY EMBOLISM, UNSPECIFIED PULMONARY EMBOLISM TYPE, UNSPECIFIED WHETHER ACUTE COR PULMONALE PRESENT (H): ICD-10-CM

## 2020-10-01 DIAGNOSIS — Z86.711 HISTORY OF PULMONARY EMBOLISM: ICD-10-CM

## 2020-10-01 LAB
CAPILLARY BLOOD COLLECTION: NORMAL
INR BLD: 2.5 (ref 0.86–1.14)

## 2020-10-01 PROCEDURE — 99207 PR NO CHARGE NURSE ONLY: CPT | Performed by: FAMILY MEDICINE

## 2020-10-01 PROCEDURE — 85610 PROTHROMBIN TIME: CPT | Performed by: FAMILY MEDICINE

## 2020-10-01 PROCEDURE — 36416 COLLJ CAPILLARY BLOOD SPEC: CPT | Performed by: FAMILY MEDICINE

## 2020-10-01 NOTE — PROGRESS NOTES
ANTICOAGULATION FOLLOW-UP CLINIC VISIT    Patient Name:  Ky Martinez  Date:  10/1/2020  Contact Type:  Telephone    SUBJECTIVE:  Patient Findings     Comments:  I left a detailed voicemail with the orders below. I have also requested a call back if there have been any missed doses, concerns, illness, fever, or if there have been any changes in medications, activity level, or diet          Clinical Outcomes     Negatives:  Major bleeding event, Thromboembolic event, Anticoagulation-related hospital admission, Anticoagulation-related ED visit, Anticoagulation-related fatality    Comments:  I left a detailed voicemail with the orders below. I have also requested a call back if there have been any missed doses, concerns, illness, fever, or if there have been any changes in medications, activity level, or diet             OBJECTIVE    Recent labs: (last 7 days)     10/01/20  1044   INR 2.5*       ASSESSMENT / PLAN  INR assessment THER    Recheck INR In: 6 WEEKS    INR Location Outside lab      Anticoagulation Summary  As of 10/1/2020    INR goal:  2.0-3.0   TTR:  71.8 % (5.8 mo)   INR used for dosin.5 (10/1/2020)   Warfarin maintenance plan:  2.5 mg (5 mg x 0.5) every Mon, Fri; 5 mg (5 mg x 1) all other days   Full warfarin instructions:  2.5 mg every Mon, Fri; 5 mg all other days   Weekly warfarin total:  30 mg   No change documented:  Jessica Chavez, RN   Plan last modified:  Lucia Canales, RN (2020)   Next INR check:  2020   Target end date:  2020    Indications    Acute pulmonary embolism  unspecified pulmonary embolism type  unspecified whether acute cor pulmonale present (H) [I26.99]             Anticoagulation Episode Summary     INR check location:      Preferred lab:      Send INR reminders to:  [facility]    Comments:  5 mg tabs, PM dose. Take multivit      Anticoagulation Care Providers     Provider Role Specialty Phone number    Jimena Carlson MD Referring [facility] [phone]            See the Encounter Report to view Anticoagulation Flowsheet and Dosing Calendar (Go to Encounters tab in chart review, and find the Anticoagulation Therapy Visit)    Dosage adjustment made based on physician directed care plan.    Jessica Chavez RN

## 2020-10-08 ENCOUNTER — OFFICE VISIT (OUTPATIENT)
Dept: ORTHOPEDICS | Facility: CLINIC | Age: 75
End: 2020-10-08
Payer: COMMERCIAL

## 2020-10-08 ENCOUNTER — ANCILLARY PROCEDURE (OUTPATIENT)
Dept: GENERAL RADIOLOGY | Facility: CLINIC | Age: 75
End: 2020-10-08
Attending: ORTHOPAEDIC SURGERY
Payer: COMMERCIAL

## 2020-10-08 VITALS
SYSTOLIC BLOOD PRESSURE: 124 MMHG | HEIGHT: 72 IN | DIASTOLIC BLOOD PRESSURE: 62 MMHG | WEIGHT: 289.4 LBS | BODY MASS INDEX: 39.2 KG/M2

## 2020-10-08 DIAGNOSIS — M25.561 RIGHT KNEE PAIN: ICD-10-CM

## 2020-10-08 DIAGNOSIS — M17.11 PRIMARY OSTEOARTHRITIS OF RIGHT KNEE: Primary | ICD-10-CM

## 2020-10-08 PROCEDURE — 99203 OFFICE O/P NEW LOW 30 MIN: CPT | Performed by: ORTHOPAEDIC SURGERY

## 2020-10-08 PROCEDURE — 73564 X-RAY EXAM KNEE 4 OR MORE: CPT | Mod: RT | Performed by: RADIOLOGY

## 2020-10-08 ASSESSMENT — PAIN SCALES - GENERAL: PAINLEVEL: MODERATE PAIN (4)

## 2020-10-08 ASSESSMENT — MIFFLIN-ST. JEOR: SCORE: 2095.71

## 2020-10-08 NOTE — PROGRESS NOTES
ORTHOPEDIC CONSULT      Chief Complaint: Ky Martinez is a 74 year old male who is being seen for   Chief Complaint   Patient presents with     Knee Pain     right knee pain     Consult       History of Present Illness:   Complains approximately 1 week worth of anterior lateral knee discomfort.  Initially was a very sharp pain.  Got to the point where he is having difficulty walking.  No specific injuries.  It occurred when he is walking.  He was using a brace cane and ice.  Since then the pain has improved.  He continues to have some swelling.  Pain was nonradiating.  Rates it as mild to moderate at times.  He has not been taking any medications for the pain.  He is on Coumadin.  He denies any previous issues with his knees.  No previous surgeries or injections.    Patient's past medical, surgical, social and family histories reviewed.     Past Medical History:   Diagnosis Date     Hypertension      Myocardial infarction (H)        Past Surgical History:   Procedure Laterality Date     Cardiac stent       COLONOSCOPY N/A 5/22/2019    Procedure: Colonoscopy, Polypectomy by Snare;  Surgeon: Gian Horn DO;  [facility]: [facility]     EXCISE GANGLION WRIST Left 11/26/2014    Procedure: EXCISE GANGLION WRIST;  Surgeon: Gian Haddad MD;  Location: [facility]     ICD DEVICE INTERROGATE      2017     ORTHOPEDIC SURGERY       RELEASE CARPAL TUNNEL Left 11/26/2014    Procedure: RELEASE CARPAL TUNNEL;  Surgeon: Gian Haddad MD;  Location: [facility]     RELEASE DEQUERVAINS WRIST Left 11/26/2014    Procedure: RELEASE DEQUERVAINS WRIST;  Surgeon: Gian Haddad MD;  Location: [facility]       Medications:       acetaminophen (TYLENOL) 325 MG tablet, Take 325 mg by mouth 2 tablets daily       albuterol (PROAIR HFA) 108 (90 Base) MCG/ACT inhaler, Inhale 1-2 puffs into the lungs every 4 hours as needed for shortness of breath / dyspnea       atorvastatin (LIPITOR) 80 MG tablet, Take 1 tablet (80 mg) by mouth  daily       DAILY MULTI OR, 1 TABLET DAILY       finasteride (PROSCAR) 5 MG tablet, Take 1 tablet (5 mg) by mouth daily       furosemide (LASIX) 40 MG tablet, Take 1 tablet (40 mg) by mouth daily 1 tablet daily       isosorbide mononitrate (IMDUR) 30 MG 24 hr tablet, Take 1 tablet (30 mg) by mouth daily       losartan (COZAAR) 50 MG tablet, Take 1 tablet (50 mg) by mouth daily       metoprolol succinate ER (TOPROL-XL) 50 MG 24 hr tablet, Take 1 tablet (50 mg) by mouth daily 1 tablet daily       nitroGLYcerin (NITROSTAT) 0.4 MG sublingual tablet, 0.4 mg As needed       potassium chloride ER (KLOR-CON) 20 MEQ CR tablet, Take 1 tablet (20 mEq) by mouth daily 1 tablet daily       tamsulosin (FLOMAX) 0.4 MG capsule, TAKE ONE CAPSULE BY MOUTH ONCE DAILY       warfarin ANTICOAGULANT (COUMADIN) 5 MG tablet, Take 2.5 mg on Mon, Fri and 5 mg all other days, or as directed by the Coumadin Clinic.    No current facility-administered medications on file prior to visit.       Allergies   Allergen Reactions     Pine Shortness Of Breath     Pine Pollen  Pine Pollen  Pine Pollen     No Clinical Screening - See Comments Rash     Other reaction(s): Rash     No Known Allergies Other (See Comments)       Social History     Occupational History     Not on file   Tobacco Use     Smoking status: Never Smoker     Smokeless tobacco: Never Used   Substance and Sexual Activity     Alcohol use: Yes     Alcohol/week: 3.3 standard drinks     Types: 4 Glasses of wine per week     Comment: occasional      Drug use: No     Sexual activity: Yes     Partners: Female       Family History   Problem Relation Age of Onset     Arthritis Mother      Diabetes Father      Allergies Father         sulfa     Eye Disorder Father         cataract     Heart Disease Father         by pass       REVIEW OF SYSTEMS  10 point review systems performed otherwise negative as noted as per history of present illness.    Physical Exam:  Vitals: /62   Ht 1.829 m (6')    Wt 131.3 kg (289 lb 6.4 oz)   BMI 39.25 kg/m    BMI= Body mass index is 39.25 kg/m .  Constitutional: healthy, alert and no acute distress   Psychiatric: mentation appears normal and affect normal/bright  NEURO: no focal deficits  RESP: Normal with easy respirations and no use of accessory muscles to breathe, no audible wheezing or retractions  CV: RLE: no edema         Regular rate and rhythm by palpation  SKIN: No erythema, rashes, excoriation, or breakdown. No evidence of infection.   JOINT/EXTREMITIES:right knee: 0-125 degrees active motion.  Patella tracks midline.  Small effusion.  There is some tenderness along the lateral patellar facet.  There is no joint line pain.  No gross instability with varus and valgus testing.  Negative Lockman.     GAIT: Not tested    Diagnostic Modalities:  right knee X-ray: No fractures or dislocations.  There is minimal medial joint space narrowing.  Patellofemoral shows some mild narrowing as well with some small rimming osteophytes.  Independent visualization of the images was performed.      Impression: right knee primary osteoarthritis-early    Plan:  All of the above pertinent physical exam and imaging modalities findings was reviewed with Ky.    I reviewed his radiographs.  I discussed his options.  He is had a week where the pain is already started to improve.  Radiographs are consistent showing some early osteoarthritis particular along his patellofemoral compartment which is consistent with his clinical exam.  He is on Coumadin.  So we will avoid any anti-inflammatories.  I discussed intra-articular steroid injection physical therapy.  He is declined these at this point.  He certainly can call back and we can order at any time.  We also discussed weight loss.  He understands this as well.      Return to clinic PRN, or sooner as needed for changes.  Re-x-ray on return: No    Chavo Hernandez D.O.

## 2020-10-08 NOTE — LETTER
10/8/2020         RE: Ky Martinez  [id number] [place] [address]        Dear Colleague,    Thank you for referring your patient, Ky Martinez, to the [facility]. Please see a copy of my visit note below.    ORTHOPEDIC CONSULT      Chief Complaint: Ky Martinez is a 74 year old male who is being seen for   Chief Complaint   Patient presents with     Knee Pain     right knee pain     Consult       History of Present Illness:   Complains approximately 1 week worth of anterior lateral knee discomfort.  Initially was a very sharp pain.  Got to the point where he is having difficulty walking.  No specific injuries.  It occurred when he is walking.  He was using a brace cane and ice.  Since then the pain has improved.  He continues to have some swelling.  Pain was nonradiating.  Rates it as mild to moderate at times.  He has not been taking any medications for the pain.  He is on Coumadin.  He denies any previous issues with his knees.  No previous surgeries or injections.    Patient's past medical, surgical, social and family histories reviewed.     Past Medical History:   Diagnosis Date     Hypertension      Myocardial infarction (H)        Past Surgical History:   Procedure Laterality Date     Cardiac stent       COLONOSCOPY N/A 5/22/2019    Procedure: Colonoscopy, Polypectomy by Snare;  Surgeon: Gian Horn DO;  [facility]     EXCISE GANGLION WRIST Left 11/26/2014    Procedure: EXCISE GANGLION WRIST;  Surgeon: Gian Haddad MD;  [facility]     ICD DEVICE INTERROGATE      2017     ORTHOPEDIC SURGERY       RELEASE CARPAL TUNNEL Left 11/26/2014    Procedure: RELEASE CARPAL TUNNEL;  Surgeon: Gain Haddad MD;  [facility]     RELEASE DEQUERVAINS WRIST Left 11/26/2014    Procedure: RELEASE DEQUERVAINS WRIST;  Surgeon: Gian Haddad MD;  [facility]       Medications:       acetaminophen (TYLENOL) 325 MG tablet, Take 325  mg by mouth 2 tablets daily       albuterol (PROAIR HFA) 108 (90 Base) MCG/ACT inhaler, Inhale 1-2 puffs into the lungs every 4 hours as needed for shortness of breath / dyspnea       atorvastatin (LIPITOR) 80 MG tablet, Take 1 tablet (80 mg) by mouth daily       DAILY MULTI OR, 1 TABLET DAILY       finasteride (PROSCAR) 5 MG tablet, Take 1 tablet (5 mg) by mouth daily       furosemide (LASIX) 40 MG tablet, Take 1 tablet (40 mg) by mouth daily 1 tablet daily       isosorbide mononitrate (IMDUR) 30 MG 24 hr tablet, Take 1 tablet (30 mg) by mouth daily       losartan (COZAAR) 50 MG tablet, Take 1 tablet (50 mg) by mouth daily       metoprolol succinate ER (TOPROL-XL) 50 MG 24 hr tablet, Take 1 tablet (50 mg) by mouth daily 1 tablet daily       nitroGLYcerin (NITROSTAT) 0.4 MG sublingual tablet, 0.4 mg As needed       potassium chloride ER (KLOR-CON) 20 MEQ CR tablet, Take 1 tablet (20 mEq) by mouth daily 1 tablet daily       tamsulosin (FLOMAX) 0.4 MG capsule, TAKE ONE CAPSULE BY MOUTH ONCE DAILY       warfarin ANTICOAGULANT (COUMADIN) 5 MG tablet, Take 2.5 mg on Mon, Fri and 5 mg all other days, or as directed by the Coumadin Clinic.    No current facility-administered medications on file prior to visit.       Allergies   Allergen Reactions     Pine Shortness Of Breath     Pine Pollen  Pine Pollen  Pine Pollen     No Clinical Screening - See Comments Rash     Other reaction(s): Rash     No Known Allergies Other (See Comments)       Social History     Occupational History     Not on file   Tobacco Use     Smoking status: Never Smoker     Smokeless tobacco: Never Used   Substance and Sexual Activity     Alcohol use: Yes     Alcohol/week: 3.3 standard drinks     Types: 4 Glasses of wine per week     Comment: occasional      Drug use: No     Sexual activity: Yes     Partners: Female       Family History   Problem Relation Age of Onset     Arthritis Mother      Diabetes Father      Allergies Father         sulfa     Eye  Disorder Father         cataract     Heart Disease Father         by pass       REVIEW OF SYSTEMS  10 point review systems performed otherwise negative as noted as per history of present illness.    Physical Exam:  Vitals: /62   Ht 1.829 m (6')   Wt 131.3 kg (289 lb 6.4 oz)   BMI 39.25 kg/m    BMI= Body mass index is 39.25 kg/m .  Constitutional: healthy, alert and no acute distress   Psychiatric: mentation appears normal and affect normal/bright  NEURO: no focal deficits  RESP: Normal with easy respirations and no use of accessory muscles to breathe, no audible wheezing or retractions  CV: RLE: no edema         Regular rate and rhythm by palpation  SKIN: No erythema, rashes, excoriation, or breakdown. No evidence of infection.   JOINT/EXTREMITIES:right knee: 0-125 degrees active motion.  Patella tracks midline.  Small effusion.  There is some tenderness along the lateral patellar facet.  There is no joint line pain.  No gross instability with varus and valgus testing.  Negative Lockman.     GAIT: Not tested    Diagnostic Modalities:  right knee X-ray: No fractures or dislocations.  There is minimal medial joint space narrowing.  Patellofemoral shows some mild narrowing as well with some small rimming osteophytes.  Independent visualization of the images was performed.      Impression: right knee primary osteoarthritis-early    Plan:  All of the above pertinent physical exam and imaging modalities findings was reviewed with Ky.    I reviewed his radiographs.  I discussed his options.  He is had a week where the pain is already started to improve.  Radiographs are consistent showing some early osteoarthritis particular along his patellofemoral compartment which is consistent with his clinical exam.  He is on Coumadin.  So we will avoid any anti-inflammatories.  I discussed intra-articular steroid injection physical therapy.  He is declined these at this point.  He certainly can call back and we can order  at any time.  We also discussed weight loss.  He understands this as well.      Return to clinic PRN, or sooner as needed for changes.  Re-x-ray on return: No    Chavo Hernandez D.O.      Again, thank you for allowing me to participate in the care of your patient.        Sincerely,        Nathan Hernandez, DO

## 2020-11-19 ENCOUNTER — ANTICOAGULATION THERAPY VISIT (OUTPATIENT)
Dept: ANTICOAGULATION | Facility: OTHER | Age: 75
End: 2020-11-19

## 2020-11-19 DIAGNOSIS — I26.99 ACUTE PULMONARY EMBOLISM, UNSPECIFIED PULMONARY EMBOLISM TYPE, UNSPECIFIED WHETHER ACUTE COR PULMONALE PRESENT (H): ICD-10-CM

## 2020-11-19 DIAGNOSIS — Z86.711 HISTORY OF PULMONARY EMBOLISM: ICD-10-CM

## 2020-11-19 DIAGNOSIS — Z79.01 LONG TERM CURRENT USE OF ANTICOAGULANTS WITH INR GOAL OF 2.0-3.0: ICD-10-CM

## 2020-11-19 LAB
CAPILLARY BLOOD COLLECTION: NORMAL
INR BLD: 2.4 (ref 0.86–1.14)

## 2020-11-19 PROCEDURE — 36416 COLLJ CAPILLARY BLOOD SPEC: CPT | Performed by: FAMILY MEDICINE

## 2020-11-19 PROCEDURE — 99207 PR NO CHARGE NURSE ONLY: CPT | Performed by: FAMILY MEDICINE

## 2020-11-19 PROCEDURE — 85610 PROTHROMBIN TIME: CPT | Performed by: FAMILY MEDICINE

## 2020-11-19 NOTE — PROGRESS NOTES
ANTICOAGULATION FOLLOW-UP CLINIC VISIT    Patient Name:  Ky Martinez  Date:  2020  Contact Type:  Telephone    SUBJECTIVE:  Patient Findings     Comments:  The patient was assessed for diet, medication, and activity level changes, missed or extra doses, bruising or bleeding, with no problem findings.          Clinical Outcomes     Negatives:  Major bleeding event, Thromboembolic event, Anticoagulation-related hospital admission, Anticoagulation-related ED visit, Anticoagulation-related fatality    Comments:  The patient was assessed for diet, medication, and activity level changes, missed or extra doses, bruising or bleeding, with no problem findings.             OBJECTIVE    Recent labs: (last 7 days)     20  1045   INR 2.4*       ASSESSMENT / PLAN  INR assessment THER    Recheck INR In: 6 WEEKS    INR Location Outside lab      Anticoagulation Summary  As of 2020    INR goal:  2.0-3.0   TTR:  78.0 % (7.5 mo)   INR used for dosin.4 (2020)   Warfarin maintenance plan:  2.5 mg (5 mg x 0.5) every Mon, Fri; 5 mg (5 mg x 1) all other days   Full warfarin instructions:  2.5 mg every Mon, Fri; 5 mg all other days   Weekly warfarin total:  30 mg   No change documented:  Jessica Chavez RN   Plan last modified:  Lucia Canales, RN (2020)   Next INR check:  2020   Target end date:  2020    Indications    Acute pulmonary embolism  unspecified pulmonary embolism type  unspecified whether acute cor pulmonale present (H) [I26.99]             Anticoagulation Episode Summary     INR check location:      Preferred lab:      Send INR reminders to:  [facility]    Comments:  5 mg tabs, PM dose. Take multivit      Anticoagulation Care Providers     Provider Role Specialty Phone number    Jimena Carlson MD Referring Family Medicine [phone]            See the Encounter Report to view Anticoagulation Flowsheet and Dosing Calendar (Go to Encounters tab in chart  review, and find the Anticoagulation Therapy Visit)    Dosage adjustment made based on physician directed care plan.    Jessica Chavez RN

## 2020-11-23 ENCOUNTER — TRANSFERRED RECORDS (OUTPATIENT)
Dept: HEALTH INFORMATION MANAGEMENT | Facility: CLINIC | Age: 75
End: 2020-11-23

## 2020-12-29 DIAGNOSIS — I26.99 ACUTE PULMONARY EMBOLISM, UNSPECIFIED PULMONARY EMBOLISM TYPE, UNSPECIFIED WHETHER ACUTE COR PULMONALE PRESENT (H): ICD-10-CM

## 2020-12-29 RX ORDER — WARFARIN SODIUM 5 MG/1
TABLET ORAL
Qty: 80 TABLET | Refills: 0 | Status: SHIPPED | OUTPATIENT
Start: 2020-12-29 | End: 2021-03-26

## 2020-12-30 ENCOUNTER — ANTICOAGULATION THERAPY VISIT (OUTPATIENT)
Dept: ANTICOAGULATION | Facility: CLINIC | Age: 75
End: 2020-12-30

## 2020-12-30 DIAGNOSIS — Z79.01 LONG TERM CURRENT USE OF ANTICOAGULANTS WITH INR GOAL OF 2.0-3.0: ICD-10-CM

## 2020-12-30 DIAGNOSIS — I26.99 ACUTE PULMONARY EMBOLISM, UNSPECIFIED PULMONARY EMBOLISM TYPE, UNSPECIFIED WHETHER ACUTE COR PULMONALE PRESENT (H): ICD-10-CM

## 2020-12-30 DIAGNOSIS — Z86.711 HISTORY OF PULMONARY EMBOLISM: ICD-10-CM

## 2020-12-30 LAB
CAPILLARY BLOOD COLLECTION: NORMAL
INR BLD: 3.1 (ref 0.86–1.14)

## 2020-12-30 PROCEDURE — 36416 COLLJ CAPILLARY BLOOD SPEC: CPT | Performed by: FAMILY MEDICINE

## 2020-12-30 PROCEDURE — 85610 PROTHROMBIN TIME: CPT | Performed by: FAMILY MEDICINE

## 2020-12-30 NOTE — PROGRESS NOTES
ANTICOAGULATION MANAGEMENT     Patient Name:  Ky Martinez  Date:  12/30/2020    ASSESSMENT /SUBJECTIVE:    Today's INR result of 3.1 is supratherapeutic. Goal INR of 2.0-3.0      Warfarin dose taken: Warfarin taken as instructed    Diet: Decreased greens/vitamin K in diet which may be affecting INR; plans to resume previous intake    Medication changes/ interactions: No new medications/supplements affecting INR    Previous INR: Therapeutic     S/S of bleeding or thromboembolism: No    New injury or illness: No    Upcoming surgery, procedure or cardioversion: No    Additional findings: Pt might be leaving for [place] in Feb, could take orders in case needed, otherwise could go 8 weeks      PLAN:    Telephone call with Ky regarding INR result and instructed:     Warfarin Dosing Instructions: Continue your current warfarin dose 2.5 mg Mon, Fri and 5 mg all other days    Instructed patient to follow up no later than: 5 weeks  Lab visit scheduled    Education provided: None required      Pt verbalizes understanding and agrees to warfarin dosing plan.    Instructed to call the Anticoagulation Clinic for any changes, questions or concerns. (#[phone])        Lucia Canales RN      OBJECTIVE:  Recent labs: (last 7 days)     12/30/20  1406   INR 3.1*         INR assessment SUPRA    Recheck INR In: 4 WEEKS    INR Location Outside lab      Anticoagulation Summary  As of 12/30/2020    INR goal:  2.0-3.0   TTR:  79.2 % (8.8 mo)   INR used for dosing:  3.1 (12/30/2020)   Warfarin maintenance plan:  2.5 mg (5 mg x 0.5) every Mon, Fri; 5 mg (5 mg x 1) all other days   Full warfarin instructions:  2.5 mg every Mon, Fri; 5 mg all other days   Weekly warfarin total:  30 mg   No change documented:  Lucia Canales, RN   Plan last modified:  Lucia Canales, RN (4/28/2020)   Next INR check:  1/28/2021   Target end date:  9/30/2020    Indications    Acute pulmonary embolism  unspecified pulmonary embolism type  unspecified whether  acute cor pulmonale present (H) [I26.99]             Anticoagulation Episode Summary     INR check location:      Preferred lab:      Send INR reminders to:  [facility]    Comments:  5 mg tabs, PM dose. Take multivit      Anticoagulation Care Providers     Provider Role Specialty Phone number    Jimena Carlson MD Referring Family Medicine [phone]

## 2021-01-19 DIAGNOSIS — I10 BENIGN ESSENTIAL HYPERTENSION: ICD-10-CM

## 2021-01-19 DIAGNOSIS — E78.5 HYPERLIPIDEMIA LDL GOAL <100: ICD-10-CM

## 2021-01-19 DIAGNOSIS — N40.0 BENIGN PROSTATIC HYPERPLASIA, UNSPECIFIED WHETHER LOWER URINARY TRACT SYMPTOMS PRESENT: ICD-10-CM

## 2021-01-19 DIAGNOSIS — I25.10 CORONARY ARTERY DISEASE INVOLVING NATIVE CORONARY ARTERY OF NATIVE HEART WITHOUT ANGINA PECTORIS: ICD-10-CM

## 2021-01-20 RX ORDER — POTASSIUM CHLORIDE 1500 MG/1
TABLET, EXTENDED RELEASE ORAL
Qty: 90 TABLET | Refills: 0 | Status: SHIPPED | OUTPATIENT
Start: 2021-01-20 | End: 2021-05-06

## 2021-01-20 RX ORDER — ISOSORBIDE MONONITRATE 30 MG/1
TABLET, EXTENDED RELEASE ORAL
Qty: 90 TABLET | Refills: 0 | Status: SHIPPED | OUTPATIENT
Start: 2021-01-20 | End: 2021-05-06

## 2021-01-20 RX ORDER — METOPROLOL SUCCINATE 50 MG/1
TABLET, EXTENDED RELEASE ORAL
Qty: 90 TABLET | Refills: 0 | Status: SHIPPED | OUTPATIENT
Start: 2021-01-20 | End: 2021-05-06

## 2021-01-20 RX ORDER — FINASTERIDE 5 MG/1
TABLET, FILM COATED ORAL
Qty: 90 TABLET | Refills: 0 | Status: SHIPPED | OUTPATIENT
Start: 2021-01-20 | End: 2021-05-06

## 2021-01-20 RX ORDER — ATORVASTATIN CALCIUM 80 MG/1
TABLET, FILM COATED ORAL
Qty: 90 TABLET | Refills: 0 | Status: SHIPPED | OUTPATIENT
Start: 2021-01-20 | End: 2021-05-06

## 2021-01-20 RX ORDER — LOSARTAN POTASSIUM 50 MG/1
TABLET ORAL
Qty: 90 TABLET | Refills: 0 | Status: SHIPPED | OUTPATIENT
Start: 2021-01-20 | End: 2021-05-06

## 2021-01-20 RX ORDER — TAMSULOSIN HYDROCHLORIDE 0.4 MG/1
CAPSULE ORAL
Qty: 90 CAPSULE | Refills: 0 | Status: SHIPPED | OUTPATIENT
Start: 2021-01-20 | End: 2021-05-06

## 2021-01-20 RX ORDER — FUROSEMIDE 40 MG
TABLET ORAL
Qty: 90 TABLET | Refills: 0 | Status: SHIPPED | OUTPATIENT
Start: 2021-01-20 | End: 2021-05-06

## 2021-01-20 NOTE — TELEPHONE ENCOUNTER
LMTCB, please assist pt to set up physical. Refills filled for 90 days.................Jerry Montalvo LPN,   January 20, 2021,      11:13 AM,   [facility]

## 2021-01-20 NOTE — TELEPHONE ENCOUNTER
Medication is being filled for 1 time refill only due to:  Patient needs to be seen because it has been more than one year since last visit.     Routing to team to set up appointment for patient for physical/medication check.  Patient has been given #90 to get to appointment per triage protocol.    Rosa Elena Shultz, JACQUEN, RN  [facility]

## 2021-01-28 ENCOUNTER — HOSPITAL ENCOUNTER (OUTPATIENT)
Dept: GENERAL RADIOLOGY | Facility: CLINIC | Age: 76
Discharge: HOME | End: 2021-01-28
Attending: FAMILY MEDICINE | Admitting: FAMILY MEDICINE
Payer: COMMERCIAL

## 2021-01-28 ENCOUNTER — TELEPHONE (OUTPATIENT)
Dept: FAMILY MEDICINE | Facility: CLINIC | Age: 76
End: 2021-01-28

## 2021-01-28 ENCOUNTER — ANCILLARY PROCEDURE (OUTPATIENT)
Dept: GENERAL RADIOLOGY | Facility: CLINIC | Age: 76
End: 2021-01-28
Attending: ORTHOPAEDIC SURGERY
Payer: COMMERCIAL

## 2021-01-28 ENCOUNTER — OFFICE VISIT (OUTPATIENT)
Dept: FAMILY MEDICINE | Facility: CLINIC | Age: 76
End: 2021-01-28
Payer: COMMERCIAL

## 2021-01-28 ENCOUNTER — ANTICOAGULATION THERAPY VISIT (OUTPATIENT)
Dept: ANTICOAGULATION | Facility: CLINIC | Age: 76
End: 2021-01-28

## 2021-01-28 ENCOUNTER — OFFICE VISIT (OUTPATIENT)
Dept: ORTHOPEDICS | Facility: CLINIC | Age: 76
End: 2021-01-28
Payer: COMMERCIAL

## 2021-01-28 VITALS
WEIGHT: 291.7 LBS | BODY MASS INDEX: 40.84 KG/M2 | HEIGHT: 71 IN | SYSTOLIC BLOOD PRESSURE: 124 MMHG | DIASTOLIC BLOOD PRESSURE: 60 MMHG

## 2021-01-28 VITALS
HEART RATE: 97 BPM | BODY MASS INDEX: 40.63 KG/M2 | HEIGHT: 71 IN | RESPIRATION RATE: 16 BRPM | DIASTOLIC BLOOD PRESSURE: 64 MMHG | OXYGEN SATURATION: 96 % | WEIGHT: 290.2 LBS | SYSTOLIC BLOOD PRESSURE: 118 MMHG | TEMPERATURE: 99 F

## 2021-01-28 DIAGNOSIS — Z86.711 HISTORY OF PULMONARY EMBOLISM: ICD-10-CM

## 2021-01-28 DIAGNOSIS — S49.91XA SHOULDER INJURY, RIGHT, INITIAL ENCOUNTER: Primary | ICD-10-CM

## 2021-01-28 DIAGNOSIS — I26.99 ACUTE PULMONARY EMBOLISM, UNSPECIFIED PULMONARY EMBOLISM TYPE, UNSPECIFIED WHETHER ACUTE COR PULMONALE PRESENT (H): ICD-10-CM

## 2021-01-28 DIAGNOSIS — S49.91XA SHOULDER INJURY, RIGHT, INITIAL ENCOUNTER: ICD-10-CM

## 2021-01-28 DIAGNOSIS — Z00.01 ENCOUNTER FOR GENERAL ADULT MEDICAL EXAMINATION WITH ABNORMAL FINDINGS: ICD-10-CM

## 2021-01-28 DIAGNOSIS — M25.511 RIGHT SHOULDER PAIN: ICD-10-CM

## 2021-01-28 DIAGNOSIS — M19.011 PRIMARY OSTEOARTHRITIS OF RIGHT SHOULDER: Primary | ICD-10-CM

## 2021-01-28 DIAGNOSIS — Z79.01 LONG TERM CURRENT USE OF ANTICOAGULANTS WITH INR GOAL OF 2.0-3.0: ICD-10-CM

## 2021-01-28 DIAGNOSIS — E78.00 PURE HYPERCHOLESTEROLEMIA: ICD-10-CM

## 2021-01-28 DIAGNOSIS — Z12.5 SCREENING FOR PROSTATE CANCER: ICD-10-CM

## 2021-01-28 DIAGNOSIS — I10 ESSENTIAL HYPERTENSION: ICD-10-CM

## 2021-01-28 LAB
ALBUMIN SERPL-MCNC: 3.6 G/DL (ref 3.4–5)
ALP SERPL-CCNC: 115 U/L (ref 40–150)
ALT SERPL W P-5'-P-CCNC: 36 U/L (ref 0–70)
ANION GAP SERPL CALCULATED.3IONS-SCNC: 3 MMOL/L (ref 3–14)
AST SERPL W P-5'-P-CCNC: 18 U/L (ref 0–45)
BILIRUB SERPL-MCNC: 1.1 MG/DL (ref 0.2–1.3)
BUN SERPL-MCNC: 21 MG/DL (ref 7–30)
CALCIUM SERPL-MCNC: 9.2 MG/DL (ref 8.5–10.1)
CAPILLARY BLOOD COLLECTION: NORMAL
CHLORIDE SERPL-SCNC: 110 MMOL/L (ref 94–109)
CHOLEST SERPL-MCNC: 122 MG/DL
CO2 SERPL-SCNC: 29 MMOL/L (ref 20–32)
CREAT SERPL-MCNC: 1.25 MG/DL (ref 0.66–1.25)
GFR SERPL CREATININE-BSD FRML MDRD: 56 ML/MIN/{1.73_M2}
GLUCOSE SERPL-MCNC: 102 MG/DL (ref 70–99)
HDLC SERPL-MCNC: 52 MG/DL
INR BLD: 2.5 (ref 0.86–1.14)
LDLC SERPL CALC-MCNC: 48 MG/DL
NONHDLC SERPL-MCNC: 70 MG/DL
POTASSIUM SERPL-SCNC: 4.7 MMOL/L (ref 3.4–5.3)
PROT SERPL-MCNC: 7.5 G/DL (ref 6.8–8.8)
PSA SERPL-ACNC: 1.41 UG/L (ref 0–4)
SODIUM SERPL-SCNC: 142 MMOL/L (ref 133–144)
TRIGL SERPL-MCNC: 111 MG/DL

## 2021-01-28 PROCEDURE — 20610 DRAIN/INJ JOINT/BURSA W/O US: CPT | Mod: RT | Performed by: ORTHOPAEDIC SURGERY

## 2021-01-28 PROCEDURE — 73030 X-RAY EXAM OF SHOULDER: CPT | Mod: RT

## 2021-01-28 PROCEDURE — 99214 OFFICE O/P EST MOD 30 MIN: CPT | Mod: 25 | Performed by: FAMILY MEDICINE

## 2021-01-28 PROCEDURE — 80061 LIPID PANEL: CPT | Performed by: FAMILY MEDICINE

## 2021-01-28 PROCEDURE — 99213 OFFICE O/P EST LOW 20 MIN: CPT | Mod: 25 | Performed by: ORTHOPAEDIC SURGERY

## 2021-01-28 PROCEDURE — 80053 COMPREHEN METABOLIC PANEL: CPT | Performed by: FAMILY MEDICINE

## 2021-01-28 PROCEDURE — 85610 PROTHROMBIN TIME: CPT | Performed by: FAMILY MEDICINE

## 2021-01-28 PROCEDURE — 36415 COLL VENOUS BLD VENIPUNCTURE: CPT | Performed by: FAMILY MEDICINE

## 2021-01-28 PROCEDURE — 73030 X-RAY EXAM OF SHOULDER: CPT | Mod: TC | Performed by: RADIOLOGY

## 2021-01-28 PROCEDURE — G0103 PSA SCREENING: HCPCS | Performed by: FAMILY MEDICINE

## 2021-01-28 PROCEDURE — G0438 PPPS, INITIAL VISIT: HCPCS | Performed by: FAMILY MEDICINE

## 2021-01-28 RX ORDER — TRIAMCINOLONE ACETONIDE 40 MG/ML
40 INJECTION, SUSPENSION INTRA-ARTICULAR; INTRAMUSCULAR ONCE
Status: DISCONTINUED | OUTPATIENT
Start: 2021-01-28 | End: 2024-02-29

## 2021-01-28 RX ORDER — HYDROCODONE BITARTRATE AND ACETAMINOPHEN 5; 325 MG/1; MG/1
1 TABLET ORAL EVERY 6 HOURS PRN
Qty: 30 TABLET | Refills: 0 | Status: SHIPPED | OUTPATIENT
Start: 2021-01-28 | End: 2021-11-12

## 2021-01-28 ASSESSMENT — ENCOUNTER SYMPTOMS
FREQUENCY: 0
PALPITATIONS: 0
ABDOMINAL PAIN: 0
NERVOUS/ANXIOUS: 0
DYSURIA: 0
WEAKNESS: 0
DIZZINESS: 0
HEADACHES: 0
EYE PAIN: 0
COUGH: 0
SORE THROAT: 0
PARESTHESIAS: 0
MYALGIAS: 0
DIARRHEA: 0
HEMATOCHEZIA: 0
ARTHRALGIAS: 0
JOINT SWELLING: 0
HEARTBURN: 0
CHILLS: 0
NAUSEA: 0
SHORTNESS OF BREATH: 1
HEMATURIA: 0
CONSTIPATION: 0
FEVER: 0

## 2021-01-28 ASSESSMENT — ASTHMA QUESTIONNAIRES
QUESTION_3 LAST FOUR WEEKS HOW OFTEN DID YOUR ASTHMA SYMPTOMS (WHEEZING, COUGHING, SHORTNESS OF BREATH, CHEST TIGHTNESS OR PAIN) WAKE YOU UP AT NIGHT OR EARLIER THAN USUAL IN THE MORNING: NOT AT ALL
QUESTION_4 LAST FOUR WEEKS HOW OFTEN HAVE YOU USED YOUR RESCUE INHALER OR NEBULIZER MEDICATION (SUCH AS ALBUTEROL): NOT AT ALL
QUESTION_5 LAST FOUR WEEKS HOW WOULD YOU RATE YOUR ASTHMA CONTROL: COMPLETELY CONTROLLED
QUESTION_2 LAST FOUR WEEKS HOW OFTEN HAVE YOU HAD SHORTNESS OF BREATH: NOT AT ALL
QUESTION_1 LAST FOUR WEEKS HOW MUCH OF THE TIME DID YOUR ASTHMA KEEP YOU FROM GETTING AS MUCH DONE AT WORK, SCHOOL OR AT HOME: NONE OF THE TIME
ACT_TOTALSCORE: 25

## 2021-01-28 ASSESSMENT — ACTIVITIES OF DAILY LIVING (ADL): CURRENT_FUNCTION: NO ASSISTANCE NEEDED

## 2021-01-28 ASSESSMENT — MIFFLIN-ST. JEOR
SCORE: 2077.33
SCORE: 2070.53

## 2021-01-28 ASSESSMENT — PAIN SCALES - GENERAL
PAINLEVEL: SEVERE PAIN (7)
PAINLEVEL: SEVERE PAIN (6)

## 2021-01-28 NOTE — TELEPHONE ENCOUNTER
Patient was informed that his labs from today showed that PSA and chemistry panel was normal. His cholesterol was good at 122.    Suha Rich CMA

## 2021-01-28 NOTE — PROGRESS NOTES
"SUBJECTIVE:   Ky Martinez is a 75 year old male who presents for Preventive Visit.    Patient has been advised of split billing requirements and indicates understanding: Yes   Are you in the first 12 months of your Medicare coverage?  No    Healthy Habits:     In general, how would you rate your overall health?  Good    Duration of exercise:  Less than 15 minutes    Do you usually eat at least 4 servings of fruit and vegetables a day, include whole grains    & fiber and avoid regularly eating high fat or \"junk\" foods?  Yes    Taking medications regularly:  Yes    Medication side effects:  None    Ability to successfully perform activities of daily living:  No assistance needed    Home Safety:  No safety concerns identified    Hearing Impairment:  Need to ask people to speak up or repeat themselves    In the past 6 months, have you been bothered by leaking of urine?  No    In general, how would you rate your overall mental or emotional health?  Excellent      PHQ-2 Total Score: 0    Additional concerns today:  No    He does have a very significant concern today.  He fell down some steps and injured his right shoulder.  It is very painful.  He cannot abduct it.  Trouble sleeping at night    Some pain going down the right arm.    Also needs refills on his medication which were accomplished last week of Lipitor Lasix Imdur losartan metoprolol and potassium and Flomax.  He is on warfarin because he had a pulmonary embolism.  He is in need of lab work and following these medications.  Do you feel safe in your environment? Yes    Have you ever done Advance Care Planning? (For example, a Health Directive, POLST, or a discussion with a medical provider or your loved ones about your wishes): No, advance care planning information given to patient to review.  Patient declined advance care planning discussion at this time.      Fall risk  Fallen 2 or more times in the past year?: No  Any fall with injury in the past year?: " No    Cognitive Screening   1) Repeat 3 items (Leader, Season, Table)    2) Clock draw: NORMAL  3) 3 item recall: Recalls 3 objects  Results: NORMAL clock, 1-2 items recalled: COGNITIVE IMPAIRMENT LESS LIKELY    Mini-CogTM Copyright JASON Thomas. Licensed by the author for use in [facility]; reprinted with permission (linda@[facility]). All rights reserved.      Do you have sleep apnea, excessive snoring or daytime drowsiness?: no    Reviewed and updated as needed this visit by clinical staff  Tobacco  Allergies  Meds      Soc Hx        Reviewed and updated as needed this visit by Provider                Social History     Tobacco Use     Smoking status: Never Smoker     Smokeless tobacco: Never Used   Substance Use Topics     Alcohol use: Yes     Alcohol/week: 3.3 standard drinks     Types: 4 Glasses of wine per week     Comment: occasional      If you drink alcohol do you typically have >3 drinks per day or >7 drinks per week? No    Alcohol Use 1/28/2021   Prescreen: >3 drinks/day or >7 drinks/week? No   Prescreen: >3 drinks/day or >7 drinks/week? -   No flowsheet data found.      Current providers sharing in care for this patient include:   Patient Care Team:  Ky Major MD as PCP - General  Ky Major MD as Assigned PCP  Nathan Hernandez DO as Assigned Musculoskeletal Provider    The following health maintenance items are reviewed in Epic and correct as of today:  Health Maintenance   Topic Date Due     ADVANCE CARE PLANNING  1945     HEPATITIS C SCREENING  11/14/1964     ZOSTER IMMUNIZATION (1 of 2) 11/14/1996     Pneumococcal Vaccine: 65+ Years (2 of 2 - PPSV23) 10/29/2016     MEDICARE ANNUAL WELLNESS VISIT  12/24/2019     FALL RISK ASSESSMENT  01/30/2020     ASTHMA ACTION PLAN  04/22/2020     ASTHMA CONTROL TEST  06/09/2020     LIPID  12/24/2023     COLORECTAL CANCER SCREENING  05/22/2024     DTAP/TDAP/TD IMMUNIZATION (2 - Td) 10/28/2026     PHQ-2  Completed      "INFLUENZA VACCINE  Completed     AORTIC ANEURYSM SCREENING (SYSTEM ASSIGNED)  Completed     Pneumococcal Vaccine: Pediatrics (0 to 5 Years) and At-Risk Patients (6 to 64 Years)  Aged Out     IPV IMMUNIZATION  Aged Out     MENINGITIS IMMUNIZATION  Aged Out     HEPATITIS B IMMUNIZATION  Aged Out           Review of Systems   Constitutional: Negative for chills and fever.   HENT: Positive for hearing loss. Negative for congestion, ear pain and sore throat.    Eyes: Negative for pain and visual disturbance.   Respiratory: Positive for shortness of breath. Negative for cough.    Cardiovascular: Negative for chest pain, palpitations and peripheral edema.   Gastrointestinal: Negative for abdominal pain, constipation, diarrhea, heartburn, hematochezia and nausea.   Genitourinary: Negative for discharge, dysuria, frequency, genital sores, hematuria and urgency.   Musculoskeletal: Negative for arthralgias, joint swelling and myalgias.   Skin: Negative for rash.   Neurological: Negative for dizziness, weakness, headaches and paresthesias.   Psychiatric/Behavioral: Negative for mood changes. The patient is not nervous/anxious.          OBJECTIVE:   /64   Pulse 97   Temp 99  F (37.2  C) (Temporal)   Resp 16   Ht 1.791 m (5' 10.5\")   Wt 131.6 kg (290 lb 3.2 oz)   SpO2 96%   BMI 41.05 kg/m   Estimated body mass index is 41.05 kg/m  as calculated from the following:    Height as of this encounter: 1.791 m (5' 10.5\").    Weight as of this encounter: 131.6 kg (290 lb 3.2 oz).  Physical Exam  GENERAL: healthy, alert and no distress  EYES: Eyes grossly normal to inspection, PERRL and conjunctivae and sclerae normal  HENT: ear canals and TM's normal, nose and mouth without ulcers or lesions  NECK: no adenopathy, no asymmetry, masses, or scars and thyroid normal to palpation  RESP: lungs clear to auscultation - no rales, rhonchi or wheezes  CV: regular rate and rhythm, normal S1 S2, no S3 or S4, no murmur, click or rub, no " peripheral edema and peripheral pulses strong  ABDOMEN: soft, nontender, no hepatosplenomegaly, no masses and bowel sounds normal  MS: Tender to palpation over the right shoulder especially anterior distally.  Very limited range of motion without pain.  Palpation along the clavicle is normal.  X-ray shows calcifications within the joint and loss of joint space.  SKIN: no suspicious lesions or rashes  NEURO: Normal strength and tone, mentation intact and speech normal  PSYCH: mentation appears normal, affect normal/bright    Diagnostic Test Results:  Labs reviewed in Epic  Results for orders placed or performed in visit on 01/28/21   X-ray rt shoulder 2 view     Status: None    Narrative    Examination:  XR SHOULDER 2 VIEW RIGHT    Date:  1/28/2021 3:06 PM     Clinical Information: Right shoulder pain.     Comparison: 1/28/2021.      Impression    Impression:    1.  No change from the prior exam. Severe/endstage degenerative  arthritic changes in the glenohumeral joint, with complete joint space  loss, subchondral sclerosis, marginal osteophytes. No fracture. Normal  joint alignment.    2.  Normal AC joint alignment, with mild arthritic changes.    CLARISSA PEREZ MD   Results for orders placed or performed in visit on 01/28/21   XR Shoulder Right G/E 3 Views     Status: None    Narrative    Examination:  XR SHOULDER RT G/E 3 VW    Date:  1/28/2021 11:44 AM     Clinical Information: Shoulder injury, right, initial encounter.     Comparison: none.      Impression    Impression:    1.  Advanced degenerative arthritic changes in the glenohumeral joint,  with complete joint space loss, bone-on-bone articulation, and  moderate-sized osteophytes. Moderate amount of ossific debris is  present about the humeral head.    2.  No fracture or bone lesion. Joint alignment remains normal.    3.  Mild arthrosis of the acromioclavicular joint.    CLARISSA PEREZ MD   Lipid panel reflex to direct LDL Fasting     Status:  None   Result Value Ref Range    Cholesterol 122 <200 mg/dL    Triglycerides 111 <150 mg/dL    HDL Cholesterol 52 >39 mg/dL    LDL Cholesterol Calculated 48 <100 mg/dL    Non HDL Cholesterol 70 <130 mg/dL   Comprehensive metabolic panel     Status: Abnormal   Result Value Ref Range    Sodium 142 133 - 144 mmol/L    Potassium 4.7 3.4 - 5.3 mmol/L    Chloride 110 (H) 94 - 109 mmol/L    Carbon Dioxide 29 20 - 32 mmol/L    Anion Gap 3 3 - 14 mmol/L    Glucose 102 (H) 70 - 99 mg/dL    Urea Nitrogen 21 7 - 30 mg/dL    Creatinine 1.25 0.66 - 1.25 mg/dL    GFR Estimate 56 (L) >60 mL/min/[1.73_m2]    GFR Estimate If Black 65 >60 mL/min/[1.73_m2]    Calcium 9.2 8.5 - 10.1 mg/dL    Bilirubin Total 1.1 0.2 - 1.3 mg/dL    Albumin 3.6 3.4 - 5.0 g/dL    Protein Total 7.5 6.8 - 8.8 g/dL    Alkaline Phosphatase 115 40 - 150 U/L    ALT 36 0 - 70 U/L    AST 18 0 - 45 U/L   PSA, screen     Status: None   Result Value Ref Range    PSA 1.41 0 - 4 ug/L   Results for orders placed or performed in visit on 01/28/21   INR point of care     Status: Abnormal   Result Value Ref Range    INR Point of Care 2.5 (H) 0.86 - 1.14   Capillary Blood Collection     Status: None   Result Value Ref Range    Capillary Blood Collection Capillary collection performed        ASSESSMENT / PLAN:   1. Encounter for general adult medical examination with abnormal findings  Generally healthy health maintenance wise see discussion below.  No cardiac issues has an implantable ICD    2. Shoulder injury, right, initial encounter  We will send him to orthopedics in consult.  Put him on some Vicodin.  He has an implantable ICD so an MRI is probably not possible on his shoulder.  - XR Shoulder Right G/E 3 Views  - HYDROcodone-acetaminophen (NORCO) 5-325 MG tablet; Take 1 tablet by mouth every 6 hours as needed for pain  Dispense: 30 tablet; Refill: 0  - Orthopedic & Spine  Referral; Future    3. Pure hypercholesterolemia  Renewed his medication we will notify  "him with lab results.  - Lipid panel reflex to direct LDL Fasting    4. Essential hypertension  Renewed his medication we will notify with lab results.  - Comprehensive metabolic panel    5. Screening for prostate cancer  We will notify.  - PSA, screen    Patient has been advised of split billing requirements and indicates understanding: Yes  COUNSELING:  Reviewed preventive health counseling, as reflected in patient instructions       Regular exercise       Healthy diet/nutrition       Vision screening    Estimated body mass index is 41.05 kg/m  as calculated from the following:    Height as of this encounter: 1.791 m (5' 10.5\").    Weight as of this encounter: 131.6 kg (290 lb 3.2 oz).    Weight management plan: Discussed healthy diet and exercise guidelines    He reports that he has never smoked. He has never used smokeless tobacco.      Appropriate preventive services were discussed with this patient, including applicable screening as appropriate for cardiovascular disease, diabetes, osteopenia/osteoporosis, and glaucoma.  As appropriate for age/gender, discussed screening for colorectal cancer, prostate cancer, breast cancer, and cervical cancer. Checklist reviewing preventive services available has been given to the patient.    Reviewed patients plan of care and provided an AVS. The Basic Care Plan (routine screening as documented in Health Maintenance) for Ky meets the Care Plan requirement. This Care Plan has been established and reviewed with the Patient.    Counseling Resources:  ATP IV Guidelines  Pooled Cohorts Equation Calculator  Breast Cancer Risk Calculator  Breast Cancer: Medication to Reduce Risk  FRAX Risk Assessment  ICSI Preventive Guidelines  Dietary Guidelines for Americans, 2010  [employer]'s MyPlate  ASA Prophylaxis  Lung CA Screening    Ky Major MD  [facility]    Identified Health Risks:  "

## 2021-01-28 NOTE — PATIENT INSTRUCTIONS
Patient Education   Personalized Prevention Plan  You are due for the preventive services outlined below.  Your care team is available to assist you in scheduling these services.  If you have already completed any of these items, please share that information with your care team to update in your medical record.  Health Maintenance Due   Topic Date Due     Hepatitis C Screening  11[birth year]14/1963     Zoster (Shingles) Vaccine (1 of 2) 11/14/1996     Pneumococcal Vaccine (2 of 2 - PPSV23) 10/29/2016     FALL RISK ASSESSMENT  01/30/2020     Asthma Action Plan - yearly  04/22/2020     Asthma Control Test  06/09/2020       Signs of Hearing Loss      Hearing much better with one ear can be a sign of hearing loss.   Hearing loss is a problem shared by many people. In fact, it is one of the most common health problems, particularly as people age. Most people age 65 and older have some hearing loss. By age 80, almost everyone does. Hearing loss often occurs slowly over the years. So you may not realize your hearing has gotten worse.  Have your hearing checked  Call your healthcare provider if you:    Have to strain to hear normal conversation    Have to watch other people s faces very carefully to follow what they re saying    Need to ask people to repeat what they ve said    Often misunderstand what people are saying    Turn the volume of the television or radio up so high that others complain    Feel that people are mumbling when they re talking to you    Find that the effort to hear leaves you feeling tired and irritated    Notice, when using the phone, that you hear better with one ear than the other  [employer] last reviewed this educational content on 1/1/2020 2000-2020 [employer]. [address] [place] [address], [place] [address]. All rights reserved. This information is not intended as a substitute for professional medical care. Always follow your healthcare professional's instructions.

## 2021-01-28 NOTE — RESULT ENCOUNTER NOTE
Labs from today showed a normal PSA your chemistry panel was normal your cholesterol was good at 122

## 2021-01-28 NOTE — PROGRESS NOTES
ANTICOAGULATION MANAGEMENT     Patient Name:  Ky Martinez  Date:  2021    ASSESSMENT /SUBJECTIVE:    Today's INR result of 2.5 is therapeutic. Goal INR of 2.0-3.0      Warfarin dose taken: Warfarin taken as instructed    Diet: No new diet changes affecting INR    Medication changes/ interactions: No new medications/supplements affecting INR    Previous INR: Supratherapeutic     S/S of bleeding or thromboembolism: No    New injury or illness: No    Upcoming surgery, procedure or cardioversion: No    Additional findings: None      PLAN:    Telephone call with Ky regarding INR result and instructed:     Warfarin Dosing Instructions: Continue your current warfarin dose 2.5 mg Mon, Fri and 5 mg all other days    Instructed patient to follow up no later than: 6 weeks  Lab visit scheduled    Education provided: None required      Harry (spouse) verbalizes understanding and agrees to warfarin dosing plan.    Instructed to call the Anticoagulation Clinic for any changes, questions or concerns. (#[phone])        Jessica Chavez RN      OBJECTIVE:  Recent labs: (last 7 days)     21  1052   INR 2.5*         No question data found.  Anticoagulation Summary  As of 2021    INR goal:  2.0-3.0   TTR:  79.6 % (9.8 mo)   INR used for dosin.5 (2021)   Warfarin maintenance plan:  2.5 mg (5 mg x 0.5) every Mon, Fri; 5 mg (5 mg x 1) all other days   Full warfarin instructions:  2.5 mg every Mon, Fri; 5 mg all other days   Weekly warfarin total:  30 mg   No change documented:  Jessica Chavez RN   Plan last modified:  Lucia Canales RN (2020)   Next INR check:  3/11/2021   Target end date:  2020    Indications    Acute pulmonary embolism  unspecified pulmonary embolism type  unspecified whether acute cor pulmonale present (H) [I26.99]             Anticoagulation Episode Summary     INR check location:      Preferred lab:      Send INR reminders to:  [facility]    Comments:  5 mg  tabs, PM dose. Take multivit      Anticoagulation Care Providers     Provider Role Specialty Phone number    Jimena Carlson MD [facility] [phone]

## 2021-01-28 NOTE — PROGRESS NOTES
Clinic Administered Medication Documentation      Injection Documentation     Injection was administered by provider (please see MAR for given by information). Please see MAR and medication order for additional information.     Site: Joint injection   Medication Used: kenalog    Expiration Date:            The following medication was given by SHANNON Escalante, CNP, DNP:     MEDICATION: Kenalog 40mg/1ml  ROUTE: Joint Injection  SITE: right shoulder  DOSE: 1 mL  LOT #: [id number]  : [employer]  EXPIRATION DATE:  09-  NDC: [address]-3

## 2021-01-28 NOTE — LETTER
"    1/28/2021         RE: Ky Martinez  [address] [place] [address]        Dear Colleague,    Thank you for referring your patient, Ky Martinez, to the [facility]. Please see a copy of my visit note below.    Office Visit-Follow up    Chief Complaint: Ky Martinez is a 75 year old male who is being seen for   Chief Complaint   Patient presents with     Shoulder Pain     Consult     Fall     01-       History of Present Illness:   Today's visit:  Seen previously for other issues. First time for the right shoulder. States has had many years of chronic pain and lack of internal rotation to his shoulder. Mostly though it is a mild dull ache, and still has good use and function. However 4 days ago while going down stairs, he fell backwards, landing on his buttock slid down 4 stairs. Thinks when attempting to brace himself with his right arm, \"max it\" and had immediate pain. Over the last 4 days has had much more than usual pain, decreased motion, and difficulty with any motion and activity with the shoulder.  Described as a lateral shoulder severe pain. Non-radiating. No previous surgeries to the shoulder, no previous significant injuries.  Treatments tried: icing, rest, activity modification, tylenol and his PCP sent him Norco to use.      On coumadin due to blood clots discovered April of 2020.     October 8, 2020 visit:  Complains approximately 1 week worth of anterior lateral knee discomfort.  Initially was a very sharp pain.  Got to the point where he is having difficulty walking.  No specific injuries.  It occurred when he is walking.  He was using a brace cane and ice.  Since then the pain has improved.  He continues to have some swelling.  Pain was nonradiating.  Rates it as mild to moderate at times.  He has not been taking any medications for the pain.  He is on Coumadin.  He denies any previous issues with his knees.  No previous surgeries or " "injections.    Social History     Occupational History     Not on file   Tobacco Use     Smoking status: Never Smoker     Smokeless tobacco: Never Used   Substance and Sexual Activity     Alcohol use: Yes     Alcohol/week: 3.3 standard drinks     Types: 4 Glasses of wine per week     Comment: occasional      Drug use: No     Sexual activity: Yes     Partners: Female       REVIEW OF SYSTEMS  General: negative for, night sweats, dizziness, fatigue  Resp: No shortness of breath and no cough  CV: negative for chest pain, syncope or near-syncope  GI: negative for nausea, vomiting and diarrhea  : negative for dysuria and hematuria  Musculoskeletal: as above  Neurologic: negative for syncope   Hematologic: negative for bleeding disorder    Physical Exam:  Vitals: /60   Ht 1.791 m (5' 10.5\")   Wt 132.3 kg (291 lb 11.2 oz)   BMI 41.26 kg/m    BMI= Body mass index is 41.26 kg/m .  Constitutional: healthy, alert and no acute distress   Psychiatric: mentation appears normal and affect normal/bright  NEURO: no focal deficits  RESP: Normal with easy respirations and no use of accessory muscles to breathe, no audible wheezing or retractions  CV: RUE:  no edema  SKIN: No erythema, rashes, excoriation, or breakdown. No evidence of infection.   JOINT/EXTREMITIES:right shoulder: no effusion, deformity. AROM 80/90/15/s.p. PROM stopped at 140/140 due to pain. Soft endpoints. Profoundly weak and painful supraspinatus. Due to pain and weakness unable to fully test subscapularis and infraspinatus. +Cooper.  Tender along lateral and anterior shoulder. No focal bicipital grove or AC joint tenderness.    GAIT: not tested             Diagnostic Modalities:  right shoulder X-ray: bone on bone  glenohumeral articular space loss, with osteophyte formation, femoral head deformity, ossifications near the humeral head. No fracture, dislocation and or lesion.   Independent visualization of the images was performed.      Impression: right " shoulder primary osteoarthritis    Plan:  All of the above pertinent physical exam and imaging modalities findings was reviewed with Ky. Discussed with patient. No dislocation on exam or imaging. No fractures identified. Likely an exacerbation of the arthritis to his shoulder due to the fall causing pain and lack of motion, however also possible has a rotator cuff rupture as well, however with the advanced osteoarthritis do not recommend further imaging at this time.   Discussed options. Recommended conservative therapy to start with.  May need a shoulder replacement in the future if conservative therapy fails to improve symptoms and deemed medically safe to be off the coumadin.  He is agreeable to injection and physical therapy. Referral provided.     The patient was counseled about an  injection, including discussion of risks (including infection), contents of the injection, rationale for performing the injection, and expected benefits of the injection. The skin was prepped with alcohol and betadine and then utilizing sterile technique an injection of the right shoulder glenohumeral joint from the posterior approach was performed. The injection consisted 1ml of Kenalog (40mg per 1 ml) mixed with 3ml of 0.5% Marcaine. The patient tolerated the injection well, and there were no complications. The injection site was covered with a Band-Aid. The injection was performed by SHANNON Lomeli, CNP, DNP      If any concerns for infection seek immediate medical evaluation either in the clinic or the ED.         Return to clinic 4-6  Weeks if not improving, or sooner as needed for changes.  Re-x-ray on return: No    Scribed by:  SHANNON Lomeli CNP  5:48 PM  1/28/2021  The information in this document, created by a scribe for me, accurately reflects the services I personally performed and the decisions made by me. I have reviewed and approved this document for accuracy    Nathan Hernandez DO         Clinic Administered  Medication Documentation      Injection Documentation     Injection was administered by provider (please see MAR for given by information). Please see MAR and medication order for additional information.     Site: Joint injection   Medication Used: kenalog    Expiration Date:            The following medication was given by Joni Pitts, SHANNON, CNP, DNP:     MEDICATION: Kenalog 40mg/1ml  ROUTE: Joint Injection  SITE: right shoulder  DOSE: 1 mL  LOT #: [id number]  : [employer]  EXPIRATION DATE:  09-  NDC: [address]-6                    Again, thank you for allowing me to participate in the care of your patient.        Sincerely,        Nathan Hernandez, DO

## 2021-01-28 NOTE — TELEPHONE ENCOUNTER
----- Message from Ky Major MD sent at 1/28/2021  1:24 PM CST -----  Labs from today showed a normal PSA your chemistry panel was normal your cholesterol was good at 122

## 2021-01-28 NOTE — PROGRESS NOTES
"Office Visit-Follow up    Chief Complaint: Ky Martinez is a 75 year old male who is being seen for   Chief Complaint   Patient presents with     Shoulder Pain     Consult     Fall     01-       History of Present Illness:   Today's visit:  Seen previously for other issues. First time for the right shoulder. States has had many years of chronic pain and lack of internal rotation to his shoulder. Mostly though it is a mild dull ache, and still has good use and function. However 4 days ago while going down stairs, he fell backwards, landing on his buttock slid down 4 stairs. Thinks when attempting to brace himself with his right arm, \"max it\" and had immediate pain. Over the last 4 days has had much more than usual pain, decreased motion, and difficulty with any motion and activity with the shoulder.  Described as a lateral shoulder severe pain. Non-radiating. No previous surgeries to the shoulder, no previous significant injuries.  Treatments tried: icing, rest, activity modification, tylenol and his PCP sent him Norco to use.      On coumadin due to blood clots discovered April of 2020.     October 8, 2020 visit:  Complains approximately 1 week worth of anterior lateral knee discomfort.  Initially was a very sharp pain.  Got to the point where he is having difficulty walking.  No specific injuries.  It occurred when he is walking.  He was using a brace cane and ice.  Since then the pain has improved.  He continues to have some swelling.  Pain was nonradiating.  Rates it as mild to moderate at times.  He has not been taking any medications for the pain.  He is on Coumadin.  He denies any previous issues with his knees.  No previous surgeries or injections.    Social History     Occupational History     Not on file   Tobacco Use     Smoking status: Never Smoker     Smokeless tobacco: Never Used   Substance and Sexual Activity     Alcohol use: Yes     Alcohol/week: 3.3 standard drinks     Types: 4 Glasses " "of wine per week     Comment: occasional      Drug use: No     Sexual activity: Yes     Partners: Female       REVIEW OF SYSTEMS  General: negative for, night sweats, dizziness, fatigue  Resp: No shortness of breath and no cough  CV: negative for chest pain, syncope or near-syncope  GI: negative for nausea, vomiting and diarrhea  : negative for dysuria and hematuria  Musculoskeletal: as above  Neurologic: negative for syncope   Hematologic: negative for bleeding disorder    Physical Exam:  Vitals: /60   Ht 1.791 m (5' 10.5\")   Wt 132.3 kg (291 lb 11.2 oz)   BMI 41.26 kg/m    BMI= Body mass index is 41.26 kg/m .  Constitutional: healthy, alert and no acute distress   Psychiatric: mentation appears normal and affect normal/bright  NEURO: no focal deficits  RESP: Normal with easy respirations and no use of accessory muscles to breathe, no audible wheezing or retractions  CV: RUE:  no edema  SKIN: No erythema, rashes, excoriation, or breakdown. No evidence of infection.   JOINT/EXTREMITIES:right shoulder: no effusion, deformity. AROM 80/90/15/s.p. PROM stopped at 140/140 due to pain. Soft endpoints. Profoundly weak and painful supraspinatus. Due to pain and weakness unable to fully test subscapularis and infraspinatus. +Cooper.  Tender along lateral and anterior shoulder. No focal bicipital grove or AC joint tenderness.    GAIT: not tested             Diagnostic Modalities:  right shoulder X-ray: bone on bone  glenohumeral articular space loss, with osteophyte formation, femoral head deformity, ossifications near the humeral head. No fracture, dislocation and or lesion.   Independent visualization of the images was performed.      Impression: right shoulder primary osteoarthritis    Plan:  All of the above pertinent physical exam and imaging modalities findings was reviewed with Ky. Discussed with patient. No dislocation on exam or imaging. No fractures identified. Likely an exacerbation of the arthritis " to his shoulder due to the fall causing pain and lack of motion, however also possible has a rotator cuff rupture as well, however with the advanced osteoarthritis do not recommend further imaging at this time.   Discussed options. Recommended conservative therapy to start with.  May need a shoulder replacement in the future if conservative therapy fails to improve symptoms and deemed medically safe to be off the coumadin.  He is agreeable to injection and physical therapy. Referral provided.     The patient was counseled about an  injection, including discussion of risks (including infection), contents of the injection, rationale for performing the injection, and expected benefits of the injection. The skin was prepped with alcohol and betadine and then utilizing sterile technique an injection of the right shoulder glenohumeral joint from the posterior approach was performed. The injection consisted 1ml of Kenalog (40mg per 1 ml) mixed with 3ml of 0.5% Marcaine. The patient tolerated the injection well, and there were no complications. The injection site was covered with a Band-Aid. The injection was performed by SHANNON Lomeli, CNP, [facility]      If any concerns for infection seek immediate medical evaluation either in the clinic or the ED.         Return to clinic 4-6  Weeks if not improving, or sooner as needed for changes.  Re-x-ray on return: No    Scribed by:  SHANNON Lomeli CNP  5:48 PM  1/28/2021  The information in this document, created by a scribe for me, accurately reflects the services I personally performed and the decisions made by me. I have reviewed and approved this document for accuracy    Nathan Hernandez DO

## 2021-01-29 ASSESSMENT — ASTHMA QUESTIONNAIRES: ACT_TOTALSCORE: 25

## 2021-03-05 ENCOUNTER — TELEPHONE (OUTPATIENT)
Dept: ANTICOAGULATION | Facility: CLINIC | Age: 76
End: 2021-03-05

## 2021-03-05 ENCOUNTER — ANTICOAGULATION THERAPY VISIT (OUTPATIENT)
Dept: ANTICOAGULATION | Facility: CLINIC | Age: 76
End: 2021-03-05

## 2021-03-05 DIAGNOSIS — I48.0 PAROXYSMAL ATRIAL FIBRILLATION WITH RAPID VENTRICULAR RESPONSE (H): ICD-10-CM

## 2021-03-05 DIAGNOSIS — Z86.711 HISTORY OF PULMONARY EMBOLISM: ICD-10-CM

## 2021-03-05 DIAGNOSIS — I48.0 PAROXYSMAL ATRIAL FIBRILLATION (H): ICD-10-CM

## 2021-03-05 DIAGNOSIS — I26.99 ACUTE PULMONARY EMBOLISM, UNSPECIFIED PULMONARY EMBOLISM TYPE, UNSPECIFIED WHETHER ACUTE COR PULMONALE PRESENT (H): Primary | ICD-10-CM

## 2021-03-05 DIAGNOSIS — Z79.01 LONG TERM CURRENT USE OF ANTICOAGULANTS WITH INR GOAL OF 2.0-3.0: ICD-10-CM

## 2021-03-05 DIAGNOSIS — I26.99 ACUTE PULMONARY EMBOLISM, UNSPECIFIED PULMONARY EMBOLISM TYPE, UNSPECIFIED WHETHER ACUTE COR PULMONALE PRESENT (H): ICD-10-CM

## 2021-03-05 LAB
CAPILLARY BLOOD COLLECTION: NORMAL
INR BLD: 2.6 (ref 0.86–1.14)

## 2021-03-05 PROCEDURE — 36416 COLLJ CAPILLARY BLOOD SPEC: CPT | Performed by: FAMILY MEDICINE

## 2021-03-05 PROCEDURE — 85610 PROTHROMBIN TIME: CPT | Performed by: FAMILY MEDICINE

## 2021-03-05 NOTE — TELEPHONE ENCOUNTER
ANTICOAGULATION MANAGEMENT      Ky Martinez due for annual renewal of referral to anticoagulation monitoring. Order pended for your review and signature.      ANTICOAGULATION SUMMARY      Warfarin indication(s)     Atrial fibrillation  PE    Heart valve present?  NO       Current goal range   INR: 2.0-3.0     Goal appropriate for indication? Yes, INR 2-3 appropriate for hx of DVT, PE, hypercoagulable state, Afib, LVAD, or bileaflet AVR without risk factors     Current duration of therapy Indefinite/long term therapy   Time in Therapeutic Range (TTR)  (Goal > 60%) 79.2 %       Office visit with referring provider's group within last year yes on 1/28/21       Lucia Canales RN

## 2021-03-05 NOTE — PROGRESS NOTES
ANTICOAGULATION MANAGEMENT     Patient Name:  Ky Martinez  Date:  3/5/2021    ASSESSMENT /SUBJECTIVE:    Today's INR result of 2.6 is therapeutic. Goal INR of 2.0-3.0      Warfarin dose taken: Warfarin taken as instructed    Diet: No new diet changes affecting INR    Medication changes/ interactions: No new medications/supplements affecting INR    Previous INR: Therapeutic     S/S of bleeding or thromboembolism: No    New injury or illness: No    Upcoming surgery, procedure or cardioversion: No    Additional findings: None      PLAN:    Telephone call with Ky regarding INR result and instructed:     Warfarin Dosing Instructions: Continue your current warfarin dose 2.5 mg Mon, Fri and 5 mg all other days    Instructed patient to follow up no later than: 6 weeks  Lab visit scheduled    Education provided: None required      Pt verbalizes understanding and agrees to warfarin dosing plan.    Instructed to call the Anticoagulation Clinic for any changes, questions or concerns. (#[phone])        Lucia Canales RN      OBJECTIVE:  Recent labs: (last 7 days)     03/05/21  1037   INR 2.6*         No question data found.  Anticoagulation Summary  As of 3/5/2021    INR goal:  2.0-3.0   TTR:  81.8 % (11 mo)   INR used for dosing:     Plan last modified:  Lucia Canales, RN (4/28/2020)   Next INR check:     Target end date:  9/30/2020    Indications    Acute pulmonary embolism  unspecified pulmonary embolism type  unspecified whether acute cor pulmonale present (H) [I26.99]  Paroxysmal atrial fibrillation with rapid ventricular response (H) [I48.0]             Anticoagulation Episode Summary     INR check location:      Preferred lab:      Send INR reminders to:  [facility]    Comments:  5 mg tabs, PM dose. Takes multivit      Anticoagulation Care Providers     Provider Role Specialty Phone number    Ky Major MD [facility] [phone]

## 2021-03-26 DIAGNOSIS — I26.99 ACUTE PULMONARY EMBOLISM, UNSPECIFIED PULMONARY EMBOLISM TYPE, UNSPECIFIED WHETHER ACUTE COR PULMONALE PRESENT (H): ICD-10-CM

## 2021-03-26 RX ORDER — WARFARIN SODIUM 5 MG/1
TABLET ORAL
Qty: 80 TABLET | Refills: 0 | Status: SHIPPED | OUTPATIENT
Start: 2021-03-26 | End: 2021-06-21

## 2021-04-16 ENCOUNTER — ANTICOAGULATION THERAPY VISIT (OUTPATIENT)
Dept: ANTICOAGULATION | Facility: CLINIC | Age: 76
End: 2021-04-16

## 2021-04-16 DIAGNOSIS — I26.99 ACUTE PULMONARY EMBOLISM, UNSPECIFIED PULMONARY EMBOLISM TYPE, UNSPECIFIED WHETHER ACUTE COR PULMONALE PRESENT (H): ICD-10-CM

## 2021-04-16 DIAGNOSIS — Z79.01 LONG TERM CURRENT USE OF ANTICOAGULANTS WITH INR GOAL OF 2.0-3.0: ICD-10-CM

## 2021-04-16 DIAGNOSIS — I48.0 PAROXYSMAL ATRIAL FIBRILLATION WITH RAPID VENTRICULAR RESPONSE (H): ICD-10-CM

## 2021-04-16 DIAGNOSIS — I48.0 PAROXYSMAL ATRIAL FIBRILLATION (H): ICD-10-CM

## 2021-04-16 DIAGNOSIS — Z86.711 HISTORY OF PULMONARY EMBOLISM: ICD-10-CM

## 2021-04-16 LAB
CAPILLARY BLOOD COLLECTION: NORMAL
INR BLD: 2.7 (ref 0.86–1.14)

## 2021-04-16 PROCEDURE — 85610 PROTHROMBIN TIME: CPT | Performed by: FAMILY MEDICINE

## 2021-04-16 PROCEDURE — 36416 COLLJ CAPILLARY BLOOD SPEC: CPT | Performed by: FAMILY MEDICINE

## 2021-04-16 NOTE — PROGRESS NOTES
ANTICOAGULATION MANAGEMENT     Patient Name:  Ky Martinez  Date:  2021    ASSESSMENT /SUBJECTIVE:    Today's INR result of 2.7 is therapeutic. Goal INR of 2.0-3.0      Warfarin dose taken: LM    Diet: LM    Medication changes/ interactions: LM    Previous INR: Therapeutic     S/S of bleeding or thromboembolism: No    New injury or illness: No    Upcoming surgery, procedure or cardioversion: No    Additional findings: None      PLAN:    Detailed message left for Ky regarding INR result and instructed:     Warfarin Dosing Instructions: Continue your current warfarin dose 2.5 mg Mon, Fri and 5 mg all other days    Instructed patient to follow up no later than: 6 weeks  Left detailed message with recommended recheck date    Education provided: Please call back if any changes to your diet, medications or how you've been taking warfarin        Instructed to call the Anticoagulation Clinic for any changes, questions or concerns. (#[phone])        Lucia Canales RN      OBJECTIVE:  Recent labs: (last 7 days)     21  1036   INR 2.7*         No question data found.  Anticoagulation Summary  As of 2021    INR goal:  2.0-3.0   TTR:  83.5 % (1 y)   INR used for dosin.7 (2021)   Warfarin maintenance plan:  2.5 mg (5 mg x 0.5) every Mon, Fri; 5 mg (5 mg x 1) all other days   Full warfarin instructions:  2.5 mg every Mon, Fri; 5 mg all other days   Weekly warfarin total:  30 mg   No change documented:  Lucia Canales RN   Plan last modified:  Lucia Canales RN (2020)   Next INR check:  2021   Target end date:  2020    Indications    Acute pulmonary embolism  unspecified pulmonary embolism type  unspecified whether acute cor pulmonale present (H) [I26.99]  Paroxysmal atrial fibrillation with rapid ventricular response (H) [I48.0]  Paroxysmal atrial fibrillation (H) [I48.0]             Anticoagulation Episode Summary     INR check location:      Preferred lab:      Send INR  reminders to:  [facility]    Comments:  5 mg tabs, PM dose. Takes multivit      Anticoagulation Care Providers     Provider Role Specialty Phone number    Ky Major MD Referring Family Practice [phone]

## 2021-05-06 DIAGNOSIS — I25.10 CORONARY ARTERY DISEASE INVOLVING NATIVE CORONARY ARTERY OF NATIVE HEART WITHOUT ANGINA PECTORIS: ICD-10-CM

## 2021-05-06 DIAGNOSIS — N40.0 BENIGN PROSTATIC HYPERPLASIA, UNSPECIFIED WHETHER LOWER URINARY TRACT SYMPTOMS PRESENT: ICD-10-CM

## 2021-05-06 DIAGNOSIS — I10 BENIGN ESSENTIAL HYPERTENSION: ICD-10-CM

## 2021-05-06 DIAGNOSIS — E78.5 HYPERLIPIDEMIA LDL GOAL <100: ICD-10-CM

## 2021-05-06 RX ORDER — POTASSIUM CHLORIDE 1500 MG/1
20 TABLET, EXTENDED RELEASE ORAL DAILY
Qty: 90 TABLET | Refills: 0 | Status: SHIPPED | OUTPATIENT
Start: 2021-05-06 | End: 2021-08-06

## 2021-05-06 RX ORDER — ISOSORBIDE MONONITRATE 30 MG/1
30 TABLET, EXTENDED RELEASE ORAL DAILY
Qty: 90 TABLET | Refills: 0 | Status: SHIPPED | OUTPATIENT
Start: 2021-05-06 | End: 2021-08-06

## 2021-05-06 RX ORDER — LOSARTAN POTASSIUM 50 MG/1
50 TABLET ORAL DAILY
Qty: 90 TABLET | Refills: 0 | Status: SHIPPED | OUTPATIENT
Start: 2021-05-06 | End: 2021-08-06

## 2021-05-06 RX ORDER — FUROSEMIDE 40 MG
40 TABLET ORAL DAILY
Qty: 90 TABLET | Refills: 0 | Status: SHIPPED | OUTPATIENT
Start: 2021-05-06 | End: 2021-08-06

## 2021-05-06 RX ORDER — FINASTERIDE 5 MG/1
5 TABLET, FILM COATED ORAL DAILY
Qty: 90 TABLET | Refills: 0 | Status: SHIPPED | OUTPATIENT
Start: 2021-05-06 | End: 2021-08-06

## 2021-05-06 RX ORDER — METOPROLOL SUCCINATE 50 MG/1
50 TABLET, EXTENDED RELEASE ORAL DAILY
Qty: 90 TABLET | Refills: 0 | Status: SHIPPED | OUTPATIENT
Start: 2021-05-06 | End: 2021-08-06

## 2021-05-06 RX ORDER — ATORVASTATIN CALCIUM 80 MG/1
80 TABLET, FILM COATED ORAL DAILY
Qty: 90 TABLET | Refills: 0 | Status: SHIPPED | OUTPATIENT
Start: 2021-05-06 | End: 2021-08-06

## 2021-05-06 RX ORDER — TAMSULOSIN HYDROCHLORIDE 0.4 MG/1
0.4 CAPSULE ORAL DAILY
Qty: 90 CAPSULE | Refills: 0 | Status: SHIPPED | OUTPATIENT
Start: 2021-05-06 | End: 2021-08-06

## 2021-06-03 ENCOUNTER — ANTICOAGULATION THERAPY VISIT (OUTPATIENT)
Dept: ANTICOAGULATION | Facility: CLINIC | Age: 76
End: 2021-06-03

## 2021-06-03 DIAGNOSIS — Z79.01 LONG TERM CURRENT USE OF ANTICOAGULANTS WITH INR GOAL OF 2.0-3.0: ICD-10-CM

## 2021-06-03 DIAGNOSIS — I48.0 PAROXYSMAL ATRIAL FIBRILLATION (H): ICD-10-CM

## 2021-06-03 DIAGNOSIS — I48.0 PAROXYSMAL ATRIAL FIBRILLATION WITH RAPID VENTRICULAR RESPONSE (H): ICD-10-CM

## 2021-06-03 DIAGNOSIS — I26.99 ACUTE PULMONARY EMBOLISM, UNSPECIFIED PULMONARY EMBOLISM TYPE, UNSPECIFIED WHETHER ACUTE COR PULMONALE PRESENT (H): ICD-10-CM

## 2021-06-03 DIAGNOSIS — Z86.711 HISTORY OF PULMONARY EMBOLISM: ICD-10-CM

## 2021-06-03 LAB
CAPILLARY BLOOD COLLECTION: NORMAL
INR BLD: 2.6 (ref 0.86–1.14)

## 2021-06-03 PROCEDURE — 85610 PROTHROMBIN TIME: CPT | Performed by: FAMILY MEDICINE

## 2021-06-03 PROCEDURE — 36416 COLLJ CAPILLARY BLOOD SPEC: CPT | Performed by: FAMILY MEDICINE

## 2021-06-03 NOTE — PROGRESS NOTES
ANTICOAGULATION MANAGEMENT     Patient Name:  Ky Martinez  Date:  6/3/2021    ASSESSMENT /SUBJECTIVE:    Today's INR result of 2.6 is therapeutic. Goal INR of 2.0-3.0      Warfarin dose taken: Warfarin taken as instructed    Diet: No new diet changes affecting INR    Medication changes/ interactions: No new medications/supplements affecting INR    Previous INR: Therapeutic     S/S of bleeding or thromboembolism: No    New injury or illness: No    Upcoming surgery, procedure or cardioversion: No    Additional findings: None      PLAN:    Warfarin Dosing Instructions: Continue your current warfarin dose 2.5 mg MF and 5 mg all other days    Instructed patient to follow up no later than: 6 weeks  Patient elected to schedule next visit on his own    Education provided: None required    Telephone call with Ky whom verbalizes understanding and agrees to plan    Instructed to call the Anticoagulation Clinic for any changes, questions or concerns. (#[phone])        Jessica Chavez RN      OBJECTIVE:  Recent labs: (last 7 days)     21  1113   INR 2.6*         INR assessment THER    Recheck INR In: 6 WEEKS    INR Location Outside lab      Anticoagulation Summary  As of 6/3/2021    INR goal:  2.0-3.0   TTR:  91.2 % (1 y)   INR used for dosin.6 (6/3/2021)   Warfarin maintenance plan:  2.5 mg (5 mg x 0.5) every Mon, Fri; 5 mg (5 mg x 1) all other days   Full warfarin instructions:  2.5 mg every Mon, Fri; 5 mg all other days   Weekly warfarin total:  30 mg   No change documented:  Jessica Chavez RN   Plan last modified:  Lucia Canales RN (2020)   Next INR check:  7/15/2021   Priority:  Maintenance   Target end date:  2020    Indications    Acute pulmonary embolism  unspecified pulmonary embolism type  unspecified whether acute cor pulmonale present (H) [I26.99]  Paroxysmal atrial fibrillation with rapid ventricular response (H) [I48.0]  Paroxysmal atrial fibrillation (H) [I48.0]              Anticoagulation Episode Summary     INR check location:      Preferred lab:      Send INR reminders to:  [facility]    Comments:  5 mg tabs, PM dose. Takes multivit      Anticoagulation Care Providers     Provider Role Specialty Phone number    Ky Major MD Referring [facility] [phone]

## 2021-06-21 DIAGNOSIS — I26.99 ACUTE PULMONARY EMBOLISM, UNSPECIFIED PULMONARY EMBOLISM TYPE, UNSPECIFIED WHETHER ACUTE COR PULMONALE PRESENT (H): ICD-10-CM

## 2021-06-21 RX ORDER — WARFARIN SODIUM 5 MG/1
TABLET ORAL
Qty: 80 TABLET | Refills: 0 | Status: SHIPPED | OUTPATIENT
Start: 2021-06-21 | End: 2021-09-20

## 2021-07-16 ENCOUNTER — LAB (OUTPATIENT)
Dept: LAB | Facility: CLINIC | Age: 76
End: 2021-07-16
Payer: COMMERCIAL

## 2021-07-16 ENCOUNTER — ANTICOAGULATION THERAPY VISIT (OUTPATIENT)
Dept: ANTICOAGULATION | Facility: CLINIC | Age: 76
End: 2021-07-16

## 2021-07-16 DIAGNOSIS — I48.0 PAROXYSMAL ATRIAL FIBRILLATION (H): ICD-10-CM

## 2021-07-16 DIAGNOSIS — I26.99 ACUTE PULMONARY EMBOLISM, UNSPECIFIED PULMONARY EMBOLISM TYPE, UNSPECIFIED WHETHER ACUTE COR PULMONALE PRESENT (H): Primary | ICD-10-CM

## 2021-07-16 DIAGNOSIS — Z79.01 LONG TERM CURRENT USE OF ANTICOAGULANTS WITH INR GOAL OF 2.0-3.0: ICD-10-CM

## 2021-07-16 DIAGNOSIS — Z86.711 HISTORY OF PULMONARY EMBOLISM: ICD-10-CM

## 2021-07-16 DIAGNOSIS — I48.0 PAROXYSMAL ATRIAL FIBRILLATION WITH RAPID VENTRICULAR RESPONSE (H): ICD-10-CM

## 2021-07-16 LAB — INR BLD: 3 (ref 0.9–1.1)

## 2021-07-16 PROCEDURE — 36416 COLLJ CAPILLARY BLOOD SPEC: CPT

## 2021-07-16 PROCEDURE — 85610 PROTHROMBIN TIME: CPT

## 2021-07-16 NOTE — PROGRESS NOTES
ANTICOAGULATION MANAGEMENT     Ky Martinez 75 year old male is on warfarin with therapeutic INR result. (Goal INR 2.0-3.0)    Recent labs: (last 7 days)     07/16/21  1330   INR 3.0*       ASSESSMENT     Source(s): Patient/Caregiver Call       Warfarin doses taken: Warfarin taken as instructed    Diet: No new diet changes identified    New illness, injury, or hospitalization: No    Medication/supplement changes: None noted    Signs or symptoms of bleeding or clotting: No    Previous INR: Therapeutic last 2(+) visits    Additional findings: None     PLAN     Recommended plan for no diet, medication or health factor changes affecting INR     Dosing Instructions: Continue your current warfarin dose with next INR in 6 weeks       Summary  As of 7/16/2021    Full warfarin instructions:  2.5 mg every Mon, Fri; 5 mg all other days   Next INR check:  8/26/2021             Telephone call with Ky who verbalizes understanding and agrees to plan    Lab visit scheduled    Education provided: None required    Plan made per [facility] anticoagulation protocol    Ashleigh Savage, RN  Anticoagulation Clinic  7/16/2021    _______________________________________________________________________     Anticoagulation Episode Summary     Current INR goal:  2.0-3.0   TTR:  97.1 % (1 y)   Target end date:  9/30/2020   Send INR reminders to:  [facility]    Indications    Acute pulmonary embolism  unspecified pulmonary embolism type  unspecified whether acute cor pulmonale present (H) [I26.99]  Paroxysmal atrial fibrillation with rapid ventricular response (H) [I48.0]  Paroxysmal atrial fibrillation (H) [I48.0]           Comments:  5 mg tabs, PM dose. Takes multivit         Anticoagulation Care Providers     Provider Role Specialty Phone number    Ky Major MD Referring [facility] [phone]

## 2021-08-12 NOTE — TELEPHONE ENCOUNTER
Saint Alexius Noted. INR order signed  Ashleigh Savage RN     Todd Huerta DO  Hennepin County Medical Center Epic        Todd Huerta DO  [facility]

## 2021-09-02 ENCOUNTER — LAB (OUTPATIENT)
Dept: LAB | Facility: CLINIC | Age: 76
End: 2021-09-02
Payer: COMMERCIAL

## 2021-09-02 ENCOUNTER — ANTICOAGULATION THERAPY VISIT (OUTPATIENT)
Dept: ANTICOAGULATION | Facility: CLINIC | Age: 76
End: 2021-09-02

## 2021-09-02 DIAGNOSIS — Z79.01 LONG TERM CURRENT USE OF ANTICOAGULANTS WITH INR GOAL OF 2.0-3.0: ICD-10-CM

## 2021-09-02 DIAGNOSIS — I26.99 ACUTE PULMONARY EMBOLISM, UNSPECIFIED PULMONARY EMBOLISM TYPE, UNSPECIFIED WHETHER ACUTE COR PULMONALE PRESENT (H): Primary | ICD-10-CM

## 2021-09-02 DIAGNOSIS — I48.0 PAROXYSMAL ATRIAL FIBRILLATION WITH RAPID VENTRICULAR RESPONSE (H): ICD-10-CM

## 2021-09-02 DIAGNOSIS — I48.0 PAROXYSMAL ATRIAL FIBRILLATION (H): ICD-10-CM

## 2021-09-02 DIAGNOSIS — Z86.711 HISTORY OF PULMONARY EMBOLISM: ICD-10-CM

## 2021-09-02 LAB — INR BLD: 2.8 (ref 0.9–1.1)

## 2021-09-02 PROCEDURE — 85610 PROTHROMBIN TIME: CPT

## 2021-09-02 PROCEDURE — 36416 COLLJ CAPILLARY BLOOD SPEC: CPT

## 2021-09-02 NOTE — PROGRESS NOTES
ANTICOAGULATION MANAGEMENT     Ky Martinez 75 year old male is on warfarin with therapeutic INR result. (Goal INR 2.0-3.0)    Recent labs: (last 7 days)     09/02/21  1140   INR 2.8*       ASSESSMENT     Source(s): Chart Review I left a detailed voicemail with the orders below. I have also requested a call back if there have been any missed doses, concerns, illness, fever, or if there have been any changes in medications, activity level, or diet        Warfarin doses taken: Warfarin taken as instructed    Diet: No new diet changes identified    New illness, injury, or hospitalization: No    Medication/supplement changes: None noted    Signs or symptoms of bleeding or clotting: No    Previous INR: Therapeutic last 2(+) visits    Additional findings: None     PLAN     Recommended plan for no diet, medication or health factor changes affecting INR     Dosing Instructions: Continue your current warfarin dose with next INR in 6 weeks       Summary  As of 9/2/2021    Full warfarin instructions:  2.5 mg every Mon, Fri; 5 mg all other days   Next INR check:  10/14/2021             Detailed voice message left for Ky with dosing instructions and follow up date.     Contact [phone]  to schedule and with any changes, questions or concerns.     Education provided: Please call back if any changes to your diet, medications or how you've been taking warfarin    Plan made per [facility] anticoagulation protocol    Jessica Chavez, RN  Anticoagulation Clinic  9/2/2021    _______________________________________________________________________     Anticoagulation Episode Summary     Current INR goal:  2.0-3.0   TTR:  97.1 % (1 y)   Target end date:  9/30/2020   Send INR reminders to:  NIK HOLLY WARE    Indications    Acute pulmonary embolism  unspecified pulmonary embolism type  unspecified whether acute cor pulmonale present (H) [I26.99]  Paroxysmal atrial fibrillation with rapid ventricular response (H) [I48.0]  Paroxysmal  atrial fibrillation (H) [I48.0]           Comments:  5 mg tabs, PM dose. Takes multivit         Anticoagulation Care Providers     Provider Role Specialty Phone number    Ky Major MD Referring Family Practice [phone]

## 2021-09-20 DIAGNOSIS — I26.99 ACUTE PULMONARY EMBOLISM, UNSPECIFIED PULMONARY EMBOLISM TYPE, UNSPECIFIED WHETHER ACUTE COR PULMONALE PRESENT (H): ICD-10-CM

## 2021-09-20 RX ORDER — WARFARIN SODIUM 5 MG/1
TABLET ORAL
Qty: 80 TABLET | Refills: 0 | Status: SHIPPED | OUTPATIENT
Start: 2021-09-20 | End: 2021-12-02

## 2021-09-20 NOTE — TELEPHONE ENCOUNTER
Requested Prescriptions   Pending Prescriptions Disp Refills     warfarin ANTICOAGULANT (COUMADIN) 5 MG tablet 80 tablet 0     Sig: TAKE 1/2 TABLET (2.5MG) BY MOUTH ON MONDAY, FRIDAY AND 1 TABLET (5MG) ALL OTHER DAYS OR AS DIRECTED BY THE [facility]

## 2021-10-21 ENCOUNTER — LAB (OUTPATIENT)
Dept: LAB | Facility: CLINIC | Age: 76
End: 2021-10-21
Payer: COMMERCIAL

## 2021-10-21 ENCOUNTER — ANTICOAGULATION THERAPY VISIT (OUTPATIENT)
Dept: ANTICOAGULATION | Facility: CLINIC | Age: 76
End: 2021-10-21

## 2021-10-21 DIAGNOSIS — Z79.01 LONG TERM CURRENT USE OF ANTICOAGULANTS WITH INR GOAL OF 2.0-3.0: ICD-10-CM

## 2021-10-21 DIAGNOSIS — I48.0 PAROXYSMAL ATRIAL FIBRILLATION (H): ICD-10-CM

## 2021-10-21 DIAGNOSIS — Z86.711 HISTORY OF PULMONARY EMBOLISM: ICD-10-CM

## 2021-10-21 DIAGNOSIS — I26.99 ACUTE PULMONARY EMBOLISM, UNSPECIFIED PULMONARY EMBOLISM TYPE, UNSPECIFIED WHETHER ACUTE COR PULMONALE PRESENT (H): Primary | ICD-10-CM

## 2021-10-21 DIAGNOSIS — I48.0 PAROXYSMAL ATRIAL FIBRILLATION WITH RAPID VENTRICULAR RESPONSE (H): ICD-10-CM

## 2021-10-21 LAB — INR BLD: 2.4 (ref 0.9–1.1)

## 2021-10-21 PROCEDURE — 36416 COLLJ CAPILLARY BLOOD SPEC: CPT

## 2021-10-21 PROCEDURE — 85610 PROTHROMBIN TIME: CPT

## 2021-10-21 NOTE — PROGRESS NOTES
ANTICOAGULATION MANAGEMENT     Ky Martinez 75 year old male is on warfarin with therapeutic INR result. (Goal INR 2.0-3.0)    Recent labs: (last 7 days)     10/21/21  1047   INR 2.4*       ASSESSMENT     Source(s): Chart Review and Patient/Caregiver Call       Warfarin doses taken: Warfarin taken as instructed    Diet: No new diet changes identified    New illness, injury, or hospitalization: No    Medication/supplement changes: None noted    Signs or symptoms of bleeding or clotting: No    Previous INR: Therapeutic last 2(+) visits    Additional findings: Pt leaving for [place] early Dec. Orders mailed to pt.      PLAN     Recommended plan for no diet, medication or health factor changes affecting INR     Dosing Instructions: Continue your current warfarin dose with next INR in 6 weeks       Summary  As of 10/21/2021    Full warfarin instructions:  2.5 mg every Mon, Fri; 5 mg all other days   Next INR check:  12/2/2021             Telephone call with Ky who verbalizes understanding and agrees to plan    Lab visit scheduled    Education provided: None required    Plan made per [facility] anticoagulation protocol    Lucia Canales, RN  Anticoagulation Clinic  10/21/2021    _______________________________________________________________________     Anticoagulation Episode Summary     Current INR goal:  2.0-3.0   TTR:  97.1 % (1 y)   Target end date:  9/30/2020   Send INR reminders to:  BRANDY MEHTA    Indications    Acute pulmonary embolism  unspecified pulmonary embolism type  unspecified whether acute cor pulmonale present (H) [I26.99]  Paroxysmal atrial fibrillation with rapid ventricular response (H) [I48.0]  Paroxysmal atrial fibrillation (H) [I48.0]           Comments:  5 mg tabs, PM dose. Takes multivit         Anticoagulation Care Providers     Provider Role Specialty Phone number    Ky Major MD Referring [facility] [phone]

## 2021-11-12 ENCOUNTER — TELEPHONE (OUTPATIENT)
Dept: ANTICOAGULATION | Facility: CLINIC | Age: 76
End: 2021-11-12

## 2021-11-12 ENCOUNTER — OFFICE VISIT (OUTPATIENT)
Dept: FAMILY MEDICINE | Facility: OTHER | Age: 76
End: 2021-11-12
Payer: COMMERCIAL

## 2021-11-12 VITALS
WEIGHT: 291 LBS | RESPIRATION RATE: 18 BRPM | BODY MASS INDEX: 40.74 KG/M2 | OXYGEN SATURATION: 96 % | TEMPERATURE: 97.8 F | HEART RATE: 72 BPM | HEIGHT: 71 IN | DIASTOLIC BLOOD PRESSURE: 70 MMHG | SYSTOLIC BLOOD PRESSURE: 126 MMHG

## 2021-11-12 DIAGNOSIS — J20.9 ACUTE BRONCHITIS, UNSPECIFIED ORGANISM: Primary | ICD-10-CM

## 2021-11-12 DIAGNOSIS — Z23 NEED FOR VACCINATION: ICD-10-CM

## 2021-11-12 PROCEDURE — 90662 IIV NO PRSV INCREASED AG IM: CPT | Performed by: FAMILY MEDICINE

## 2021-11-12 PROCEDURE — G0008 ADMIN INFLUENZA VIRUS VAC: HCPCS | Performed by: FAMILY MEDICINE

## 2021-11-12 PROCEDURE — 99213 OFFICE O/P EST LOW 20 MIN: CPT | Mod: 25 | Performed by: FAMILY MEDICINE

## 2021-11-12 RX ORDER — DOXYCYCLINE 100 MG/1
100 CAPSULE ORAL 2 TIMES DAILY
Qty: 20 CAPSULE | Refills: 0 | Status: SHIPPED | OUTPATIENT
Start: 2021-11-12 | End: 2022-04-18

## 2021-11-12 RX ORDER — DOXYCYCLINE 100 MG/1
100 CAPSULE ORAL 2 TIMES DAILY
Qty: 20 CAPSULE | Refills: 0 | Status: SHIPPED | OUTPATIENT
Start: 2021-11-12 | End: 2021-11-12

## 2021-11-12 ASSESSMENT — ASTHMA QUESTIONNAIRES
QUESTION_1 LAST FOUR WEEKS HOW MUCH OF THE TIME DID YOUR ASTHMA KEEP YOU FROM GETTING AS MUCH DONE AT WORK, SCHOOL OR AT HOME: NONE OF THE TIME
QUESTION_4 LAST FOUR WEEKS HOW OFTEN HAVE YOU USED YOUR RESCUE INHALER OR NEBULIZER MEDICATION (SUCH AS ALBUTEROL): NOT AT ALL
QUESTION_3 LAST FOUR WEEKS HOW OFTEN DID YOUR ASTHMA SYMPTOMS (WHEEZING, COUGHING, SHORTNESS OF BREATH, CHEST TIGHTNESS OR PAIN) WAKE YOU UP AT NIGHT OR EARLIER THAN USUAL IN THE MORNING: NOT AT ALL
QUESTION_2 LAST FOUR WEEKS HOW OFTEN HAVE YOU HAD SHORTNESS OF BREATH: NOT AT ALL
ACT_TOTALSCORE: 25
QUESTION_5 LAST FOUR WEEKS HOW WOULD YOU RATE YOUR ASTHMA CONTROL: COMPLETELY CONTROLLED

## 2021-11-12 ASSESSMENT — MIFFLIN-ST. JEOR: SCORE: 2083.1

## 2021-11-12 ASSESSMENT — PAIN SCALES - GENERAL: PAINLEVEL: NO PAIN (0)

## 2021-11-12 NOTE — TELEPHONE ENCOUNTER
ANTICOAGULATION  MANAGEMENT     Interacting Medication Review    Interacting medication(s): Doxycyline  with warfarin.    Duration: 10 days  (11/12 to 11/22)    Indication: bronchitis    New medication?: Yes, interaction may increase INR and risk of bleeding       PLAN     Continue current warfarin dose. Recommend to check INR on 11/16    Spoke with Ky    No adjustment to Anticoagulation Calendar was required    Lucia Canales RN

## 2021-11-12 NOTE — PROGRESS NOTES
"  Assessment & Plan       Acute bronchitis, unspecified organism  - symptoms consistent with bronchitis not responding to conservative management. Discussed a trial of doxycycline  Discussed home care  Reportable signs and symptoms discussed  RTC if symptoms persist or fail to improve    - doxycycline hyclate (VIBRAMYCIN) 100 MG capsule; Take 1 capsule (100 mg) by mouth 2 times daily    BMI:   Estimated body mass index is 40.51 kg/m  as calculated from the following:    Height as of this encounter: 1.805 m (5' 11.06\").    Weight as of this encounter: 132 kg (291 lb).   Weight management plan: Discussed healthy diet and exercise guidelines    See Patient Instructions    Return if symptoms worsen or fail to improve.    Ana M To MD  [facility] is a 75 year old who presents for the following health issues     History of Present Illness       He eats 0-1 servings of fruits and vegetables daily.He consumes 0 sweetened beverage(s) daily.He exercises with enough effort to increase his heart rate 9 or less minutes per day.  He exercises with enough effort to increase his heart rate 4 days per week.   He is taking medications regularly.       Acute Illness  Acute illness concerns: Cough  Onset/Duration: 4 weeks  Symptoms:  Fever: no  Chills/Sweats: no  Headache (location?): no  Sinus Pressure: no  Conjunctivitis:  no  Ear Pain: no   Rhinorrhea: YES  Congestion: YES  Sore Throat: no  Cough: YES-productive of green sputum, worsening over time  Wheeze: YES-very little  Decreased Appetite: no  Nausea: no  Vomiting: no  Diarrhea: no  Dysuria/Freq.: no  Dysuria or Hematuria: no  Fatigue/Achiness: YES-fatigue  Sick/Strep Exposure: no  Therapies tried and outcome: Nyquil,Dayquil,Mucinex      Review of Systems   Constitutional, HEENT, cardiovascular, pulmonary, gi and gu systems are negative, except as otherwise noted.      Objective    /70   Pulse 72   Temp 97.8  F (36.6 " " C) (Temporal)   Resp 18   Ht 1.805 m (5' 11.06\")   Wt 132 kg (291 lb)   SpO2 96%   BMI 40.51 kg/m    Body mass index is 40.51 kg/m .  Physical Exam   GENERAL: healthy, alert and no distress  NECK: no adenopathy, no asymmetry, masses, or scars and thyroid normal to palpation  RESP:mild basilar crackles  CV: regular rate and rhythm, normal S1 S2, no S3 or S4, no murmur, click or rub, no peripheral edema and peripheral pulses strong  ABDOMEN: soft, nontender, no hepatosplenomegaly, no masses and bowel sounds normal  MS: no gross musculoskeletal defects noted, no edema      "

## 2021-11-13 ASSESSMENT — ASTHMA QUESTIONNAIRES: ACT_TOTALSCORE: 25

## 2021-11-16 ENCOUNTER — ANTICOAGULATION THERAPY VISIT (OUTPATIENT)
Dept: ANTICOAGULATION | Facility: CLINIC | Age: 76
End: 2021-11-16

## 2021-11-16 DIAGNOSIS — I26.99 ACUTE PULMONARY EMBOLISM, UNSPECIFIED PULMONARY EMBOLISM TYPE, UNSPECIFIED WHETHER ACUTE COR PULMONALE PRESENT (H): Primary | ICD-10-CM

## 2021-11-16 DIAGNOSIS — I48.0 PAROXYSMAL ATRIAL FIBRILLATION (H): ICD-10-CM

## 2021-11-16 DIAGNOSIS — I48.0 PAROXYSMAL ATRIAL FIBRILLATION WITH RAPID VENTRICULAR RESPONSE (H): ICD-10-CM

## 2021-11-16 NOTE — PROGRESS NOTES
ANTICOAGULATION MANAGEMENT     Ky Martinez 76 year old male is on warfarin with therapeutic INR result. (Goal INR 2.0-3.0)    Recent labs: (last 7 days)     11/16/21  1057   INR 2.1*       ASSESSMENT     Source(s): Chart Review and Patient/Caregiver Call       Warfarin doses taken: Warfarin taken as instructed    Diet: No new diet changes identified    New illness, injury, or hospitalization: Yes: bronchitis    Medication/supplement changes: doxycycline 10 day course (dates: 11/12-11/21) which has potential for interaction; increasing INR    Signs or symptoms of bleeding or clotting: No, however, patient states he will occasionally have a burst blood vessel in his right eye most frequently but now it is in the left eye. Advised patient to follow up with PCP if happening more frequently.    Previous INR: Therapeutic last 2(+) visits    Additional findings: patient is going to [place] in mid December and will need a refill while in [place]     PLAN     Recommended plan for temporary change(s) affecting INR     Dosing Instructions: Continue your current warfarin dose with next INR in 2 weeks       Summary  As of 11/16/2021    Full warfarin instructions:  2.5 mg every Mon, Fri; 5 mg all other days   Next INR check:  12/2/2021             Telephone call with Ky who verbalizes understanding and agrees to plan    Lab visit scheduled    Education provided: Please call back if any changes to your diet, medications or how you've been taking warfarin    Plan made per [facility] anticoagulation protocol    Destiney Muñoz, RN  Anticoagulation Clinic  11/16/2021    _______________________________________________________________________     Anticoagulation Episode Summary     Current INR goal:  2.0-3.0   TTR:  97.1 % (1 y)   Target end date:  9/30/2020   Send INR reminders to:  BRANDY MEHTA    Indications    Acute pulmonary embolism  unspecified pulmonary embolism type  unspecified whether acute cor pulmonale present (H)  [I26.99]  Paroxysmal atrial fibrillation with rapid ventricular response (H) [I48.0]  Paroxysmal atrial fibrillation (H) [I48.0]           Comments:  5 mg tabs, PM dose. Takes multivit         Anticoagulation Care Providers     Provider Role Specialty Phone number    Ky Major MD [facility] [phone]

## 2021-12-02 ENCOUNTER — LAB (OUTPATIENT)
Dept: LAB | Facility: CLINIC | Age: 76
End: 2021-12-02
Payer: COMMERCIAL

## 2021-12-02 ENCOUNTER — ANTICOAGULATION THERAPY VISIT (OUTPATIENT)
Dept: ANTICOAGULATION | Facility: CLINIC | Age: 76
End: 2021-12-02

## 2021-12-02 DIAGNOSIS — I26.99 ACUTE PULMONARY EMBOLISM, UNSPECIFIED PULMONARY EMBOLISM TYPE, UNSPECIFIED WHETHER ACUTE COR PULMONALE PRESENT (H): Primary | ICD-10-CM

## 2021-12-02 DIAGNOSIS — I48.0 PAROXYSMAL ATRIAL FIBRILLATION WITH RAPID VENTRICULAR RESPONSE (H): ICD-10-CM

## 2021-12-02 DIAGNOSIS — I48.0 PAROXYSMAL ATRIAL FIBRILLATION (H): ICD-10-CM

## 2021-12-02 DIAGNOSIS — I26.99 ACUTE PULMONARY EMBOLISM, UNSPECIFIED PULMONARY EMBOLISM TYPE, UNSPECIFIED WHETHER ACUTE COR PULMONALE PRESENT (H): ICD-10-CM

## 2021-12-02 LAB — INR BLD: 2.1 (ref 0.9–1.1)

## 2021-12-02 PROCEDURE — 85610 PROTHROMBIN TIME: CPT

## 2021-12-02 PROCEDURE — 36416 COLLJ CAPILLARY BLOOD SPEC: CPT

## 2021-12-02 NOTE — PROGRESS NOTES
ANTICOAGULATION MANAGEMENT     Ky Martinez 76 year old male is on warfarin with therapeutic INR result. (Goal INR 2.0-3.0)    Recent labs: (last 7 days)     12/02/21  1047   INR 2.1*       ASSESSMENT     Source(s): Chart Review and Patient/Caregiver Call       Warfarin doses taken: Warfarin taken as instructed    Diet: No new diet changes identified    New illness, injury, or hospitalization: No    Medication/supplement changes: None noted    Signs or symptoms of bleeding or clotting: No    Previous INR: Therapeutic last 2(+) visits    Additional findings: None     PLAN     Recommended plan for no diet, medication or health factor changes affecting INR     Dosing Instructions: Continue your current warfarin dose with next INR in 6 weeks       Summary  As of 12/2/2021    Full warfarin instructions:  2.5 mg every Mon, Fri; 5 mg all other days   Next INR check:  1/13/2022             Telephone call with Ky who verbalizes understanding and agrees to plan and who agrees to plan and repeated back plan correctly    Patient using outside facility for labs    Education provided: Please call back if any changes to your diet, medications or how you've been taking warfarin    Plan made per [facility] anticoagulation protocol    Jessica Chavez, RN  Anticoagulation Clinic  12/2/2021    _______________________________________________________________________     Anticoagulation Episode Summary     Current INR goal:  2.0-3.0   TTR:  97.1 % (1 y)   Target end date:  9/30/2020   [facility]    Indications    Acute pulmonary embolism  unspecified pulmonary embolism type  unspecified whether acute cor pulmonale present (H) [I26.99]  Paroxysmal atrial fibrillation with rapid ventricular response (H) [I48.0]  Paroxysmal atrial fibrillation (H) [I48.0]           Comments:  5 mg tabs, PM dose. Takes multivit         Anticoagulation Care Providers     Provider Role Specialty Phone number    Ky Major,  MD Referring [facility] [phone]

## 2022-01-20 ENCOUNTER — TELEPHONE (OUTPATIENT)
Dept: ANTICOAGULATION | Facility: CLINIC | Age: 77
End: 2022-01-20
Payer: COMMERCIAL

## 2022-01-20 NOTE — TELEPHONE ENCOUNTER
ANTICOAGULATION     Ky Martinez is overdue for INR check.      Left message for patient to call and schedule lab appointment as soon as possible. If returning call, please schedule.     Jessica Chavez RN

## 2022-01-24 ENCOUNTER — TELEPHONE (OUTPATIENT)
Dept: FAMILY MEDICINE | Facility: CLINIC | Age: 77
End: 2022-01-24
Payer: COMMERCIAL

## 2022-01-24 ENCOUNTER — TRANSFERRED RECORDS (OUTPATIENT)
Dept: HEALTH INFORMATION MANAGEMENT | Facility: CLINIC | Age: 77
End: 2022-01-24
Payer: COMMERCIAL

## 2022-01-24 LAB — INR (EXTERNAL): 2.3

## 2022-01-24 NOTE — TELEPHONE ENCOUNTER
JOVANI:   Please left a VM on the home care line at 2:27 pm stating he had INR done at Lab in [place] today and should be faxed to INR clinic. Any questions, please call Tarik [phone]   Thank you.  Briana Turk, RN  Anticoagulation Nurse - [facility] [place]

## 2022-01-24 NOTE — TELEPHONE ENCOUNTER
..Reason for Call:   INR    Detailed comments: Patient is in [place]; Had INR drawn today,  1-24; INR test results are being faxed in;   Thank you    Phone Number Patient can be reached at: Cell number on file:    Telephone Information:   Mobile [phone]       Best Time: anytime    Can we leave a detailed message on this number? YES    Call taken on 1/24/2022 at 2:47 PM by Jesenia Smith

## 2022-01-25 ENCOUNTER — ANTICOAGULATION THERAPY VISIT (OUTPATIENT)
Dept: ANTICOAGULATION | Facility: CLINIC | Age: 77
End: 2022-01-25
Payer: COMMERCIAL

## 2022-01-25 DIAGNOSIS — I26.99 ACUTE PULMONARY EMBOLISM, UNSPECIFIED PULMONARY EMBOLISM TYPE, UNSPECIFIED WHETHER ACUTE COR PULMONALE PRESENT (H): Primary | ICD-10-CM

## 2022-01-25 DIAGNOSIS — I48.0 PAROXYSMAL ATRIAL FIBRILLATION (H): ICD-10-CM

## 2022-01-25 DIAGNOSIS — I48.0 PAROXYSMAL ATRIAL FIBRILLATION WITH RAPID VENTRICULAR RESPONSE (H): ICD-10-CM

## 2022-01-25 NOTE — PROGRESS NOTES
Incoming fax from [facility] in [place], [place].    Collected and resulted 01/24/2022 at 2:11PM  INR: 2.3

## 2022-01-25 NOTE — PROGRESS NOTES
ANTICOAGULATION MANAGEMENT     Ky Martinez 76 year old male is on warfarin with therapeutic INR result. (Goal INR 2.0-3.0)    Recent labs: (last 7 days)     01/24/22  0730   INR 2.3*       ASSESSMENT     Source(s): Patient/Caregiver Call       Warfarin doses taken: Warfarin taken as instructed    Diet: No new diet changes identified    New illness, injury, or hospitalization: No    Medication/supplement changes: None noted    Signs or symptoms of bleeding or clotting: No    Previous INR: Therapeutic last 2(+) visits    Additional findings: None     PLAN     Recommended plan for no diet, medication or health factor changes affecting INR     Dosing Instructions: Continue your current warfarin dose with next INR in 6 weeks       Summary  As of 1/25/2022    Full warfarin instructions:  2.5 mg every Mon, Fri; 5 mg all other days   Next INR check:  3/8/2022             Telephone call with Ky who verbalizes understanding and agrees to plan    Lab visit scheduled    Education provided: Please call back if any changes to your diet, medications or how you've been taking warfarin and Contact [phone]  with any changes, questions or concerns.     Plan made per [facility] anticoagulation protocol    Julia Vaz RN  Anticoagulation Clinic  1/25/2022    _______________________________________________________________________     Anticoagulation Episode Summary     Current INR goal:  2.0-3.0   TTR:  100.0 % (1 y)   Target end date:  9/30/2020   Send INR reminders to:  BRANDY MEHTA    Indications    Acute pulmonary embolism  unspecified pulmonary embolism type  unspecified whether acute cor pulmonale present (H) [I26.99]  Paroxysmal atrial fibrillation with rapid ventricular response (H) [I48.0]  Paroxysmal atrial fibrillation (H) [I48.0]           Comments:  5 mg tabs, PM dose. Takes multivit         Anticoagulation Care Providers     Provider Role Specialty Phone number    Ky Major MD Referring Family  Psychiatric [phone]

## 2022-03-06 ENCOUNTER — HEALTH MAINTENANCE LETTER (OUTPATIENT)
Age: 77
End: 2022-03-06

## 2022-03-07 ENCOUNTER — TRANSFERRED RECORDS (OUTPATIENT)
Dept: HEALTH INFORMATION MANAGEMENT | Facility: CLINIC | Age: 77
End: 2022-03-07
Payer: COMMERCIAL

## 2022-03-07 ENCOUNTER — TELEPHONE (OUTPATIENT)
Dept: ANTICOAGULATION | Facility: CLINIC | Age: 77
End: 2022-03-07
Payer: COMMERCIAL

## 2022-03-07 DIAGNOSIS — I48.0 PAROXYSMAL ATRIAL FIBRILLATION WITH RAPID VENTRICULAR RESPONSE (H): Primary | ICD-10-CM

## 2022-03-07 NOTE — TELEPHONE ENCOUNTER
ANTICOAGULATION MANAGEMENT      Ky Martinez due for annual renewal of referral to anticoagulation monitoring. Order pended for your review and signature.      ANTICOAGULATION SUMMARY      Warfarin indication(s)     Atrial fibrillation  PE    Heart valve present?  NO       Current goal range   INR: 2.0-3.0     Goal appropriate for indication? Yes, INR 2-3 appropriate for hx of DVT, PE, hypercoagulable state, Afib, LVAD, or bileaflet AVR without risk factors     Current duration of therapy Indefinite/long term therapy   Time in Therapeutic Range (TTR)  (Goal > 60%) 100%       Office visit with referring provider's group within last year yes on 11/12/21       Ashleigh Savage RN

## 2022-03-08 ENCOUNTER — ANTICOAGULATION THERAPY VISIT (OUTPATIENT)
Dept: ANTICOAGULATION | Facility: CLINIC | Age: 77
End: 2022-03-08
Payer: COMMERCIAL

## 2022-03-08 DIAGNOSIS — I48.0 PAROXYSMAL ATRIAL FIBRILLATION WITH RAPID VENTRICULAR RESPONSE (H): ICD-10-CM

## 2022-03-08 DIAGNOSIS — I26.99 ACUTE PULMONARY EMBOLISM, UNSPECIFIED PULMONARY EMBOLISM TYPE, UNSPECIFIED WHETHER ACUTE COR PULMONALE PRESENT (H): Primary | ICD-10-CM

## 2022-03-08 DIAGNOSIS — I48.0 PAROXYSMAL ATRIAL FIBRILLATION (H): ICD-10-CM

## 2022-03-08 LAB — INR (EXTERNAL): 1.5 (ref 0.9–1.1)

## 2022-03-08 RX ORDER — WARFARIN SODIUM 5 MG/1
TABLET ORAL
Qty: 80 TABLET | Refills: 0 | Status: SHIPPED | OUTPATIENT
Start: 2022-03-08 | End: 2022-06-07

## 2022-03-08 NOTE — PROGRESS NOTES
Incoming fax from [facility]    Date of result 3/7    INR result 1.5

## 2022-03-08 NOTE — PROGRESS NOTES
ANTICOAGULATION MANAGEMENT     Ky Martinez 76 year old male is on warfarin with subtherapeutic INR result. (Goal INR 2.0-3.0)    Recent labs: (last 7 days)     03/07/22  0000   INR 1.5*       ASSESSMENT       Source(s): Chart Review and Patient/Caregiver Call       Warfarin doses taken: Less warfarin taken than planned which may be affecting INR    Diet: No new diet changes identified    New illness, injury, or hospitalization: No    Medication/supplement changes: None noted    Signs or symptoms of bleeding or clotting: No    Previous INR: Therapeutic last 2(+) visits    Additional findings: None       Pt in [place]. Plans to come back in 2-3 weeks        PLAN     Recommended plan for temporary change(s) affecting INR     Dosing Instructions: Booster dose then continue your current warfarin dose with next INR in 2 weeks       Summary  As of 3/8/2022    Full warfarin instructions:  3/8: 10 mg; Otherwise 2.5 mg every Mon, Fri; 5 mg all other days   Next INR check:  3/22/2022             Telephone call with Ky who verbalizes understanding and agrees to plan    Patient using outside facility for labs    Education provided: Goal range and significance of current result, Monitoring for clotting signs and symptoms and Contact [phone]  with any changes, questions or concerns.     Plan made per [facility] anticoagulation protocol    Ashleigh Savage, RN  Anticoagulation Clinic  3/8/2022    _______________________________________________________________________     Anticoagulation Episode Summary     Current INR goal:  2.0-3.0   TTR:  92.9 % (1 y)   Target end date:  9/30/2020   Send INR reminders to:  BRANDY MEHTA    Indications    Acute pulmonary embolism  unspecified pulmonary embolism type  unspecified whether acute cor pulmonale present (H) [I26.99]  Paroxysmal atrial fibrillation with rapid ventricular response (H) [I48.0]  Paroxysmal atrial fibrillation (H) [I48.0]           Comments:  5 mg tabs, PM dose.  Takes multivit         Anticoagulation Care Providers     Provider Role Specialty Phone number    Ky Major MD Referring Family Practice [phone]

## 2022-03-23 ENCOUNTER — TELEPHONE (OUTPATIENT)
Dept: ANTICOAGULATION | Facility: CLINIC | Age: 77
End: 2022-03-23
Payer: COMMERCIAL

## 2022-03-23 NOTE — TELEPHONE ENCOUNTER
ANTICOAGULATION     Ky Martinez is overdue for INR check.      Left message for patient to call and schedule lab appointment as soon as possible. If returning call, please schedule.     Lucia Canales RN

## 2022-04-07 ENCOUNTER — LAB (OUTPATIENT)
Dept: LAB | Facility: CLINIC | Age: 77
End: 2022-04-07
Payer: COMMERCIAL

## 2022-04-07 ENCOUNTER — ANTICOAGULATION THERAPY VISIT (OUTPATIENT)
Dept: ANTICOAGULATION | Facility: CLINIC | Age: 77
End: 2022-04-07

## 2022-04-07 DIAGNOSIS — I26.99 ACUTE PULMONARY EMBOLISM, UNSPECIFIED PULMONARY EMBOLISM TYPE, UNSPECIFIED WHETHER ACUTE COR PULMONALE PRESENT (H): ICD-10-CM

## 2022-04-07 DIAGNOSIS — I26.99 ACUTE PULMONARY EMBOLISM, UNSPECIFIED PULMONARY EMBOLISM TYPE, UNSPECIFIED WHETHER ACUTE COR PULMONALE PRESENT (H): Primary | ICD-10-CM

## 2022-04-07 DIAGNOSIS — I48.0 PAROXYSMAL ATRIAL FIBRILLATION (H): ICD-10-CM

## 2022-04-07 DIAGNOSIS — I48.0 PAROXYSMAL ATRIAL FIBRILLATION WITH RAPID VENTRICULAR RESPONSE (H): ICD-10-CM

## 2022-04-07 DIAGNOSIS — Z11.59 NEED FOR HEPATITIS C SCREENING TEST: Primary | ICD-10-CM

## 2022-04-07 LAB — INR BLD: 3 (ref 0.9–1.1)

## 2022-04-07 PROCEDURE — 85610 PROTHROMBIN TIME: CPT

## 2022-04-07 PROCEDURE — 36416 COLLJ CAPILLARY BLOOD SPEC: CPT

## 2022-04-07 NOTE — PROGRESS NOTES
ANTICOAGULATION MANAGEMENT     yK Martinez 76 year old male is on warfarin with therapeutic INR result. (Goal INR 2.0-3.0)    Recent labs: (last 7 days)     04/07/22  1009   INR 3.0*       ASSESSMENT       Source(s): Chart Review and Patient/Caregiver Call       Warfarin doses taken: Warfarin taken as instructed    Diet: No new diet changes identified    New illness, injury, or hospitalization: No    Medication/supplement changes: None noted    Signs or symptoms of bleeding or clotting: No    Previous INR: Subtherapeutic    Additional findings: None       PLAN     Recommended plan for no diet, medication or health factor changes affecting INR     Dosing Instructions: continue your current warfarin dose with next INR in 5 weeks       Summary  As of 4/7/2022    Full warfarin instructions:  2.5 mg every Mon, Fri; 5 mg all other days   Next INR check:  5/12/2022             Telephone call with Tarik who verbalizes understanding and agrees to plan    Lab visit scheduled    Education provided: None required    Plan made per [facility] anticoagulation protocol    Lucia Canales RN  Anticoagulation Clinic  4/7/2022    _______________________________________________________________________     Anticoagulation Episode Summary     Current INR goal:  2.0-3.0   TTR:  90.1 % (1 y)   Target end date:  9/30/2020   Send INR reminders to:  BRANDY MEHTA    Indications    Acute pulmonary embolism  unspecified pulmonary embolism type  unspecified whether acute cor pulmonale present (H) [I26.99]  Paroxysmal atrial fibrillation with rapid ventricular response (H) [I48.0]  Paroxysmal atrial fibrillation (H) [I48.0]           Comments:  5 mg tabs, PM dose. Takes multivit         Anticoagulation Care Providers     Provider Role Specialty Phone number    Ky Major MD Referring [facility] [phone]

## 2022-04-18 ENCOUNTER — OFFICE VISIT (OUTPATIENT)
Dept: FAMILY MEDICINE | Facility: CLINIC | Age: 77
End: 2022-04-18
Payer: COMMERCIAL

## 2022-04-18 VITALS
DIASTOLIC BLOOD PRESSURE: 76 MMHG | RESPIRATION RATE: 16 BRPM | HEART RATE: 84 BPM | BODY MASS INDEX: 40.6 KG/M2 | TEMPERATURE: 97.8 F | WEIGHT: 290 LBS | SYSTOLIC BLOOD PRESSURE: 120 MMHG | HEIGHT: 71 IN | OXYGEN SATURATION: 98 %

## 2022-04-18 DIAGNOSIS — Z12.5 SCREENING FOR PROSTATE CANCER: ICD-10-CM

## 2022-04-18 DIAGNOSIS — Z23 NEED FOR VACCINATION: Primary | ICD-10-CM

## 2022-04-18 DIAGNOSIS — Z00.00 ENCOUNTER FOR MEDICARE ANNUAL WELLNESS EXAM: ICD-10-CM

## 2022-04-18 DIAGNOSIS — E78.00 PURE HYPERCHOLESTEROLEMIA: ICD-10-CM

## 2022-04-18 DIAGNOSIS — I10 ESSENTIAL HYPERTENSION: ICD-10-CM

## 2022-04-18 LAB
ALBUMIN SERPL-MCNC: 3.6 G/DL (ref 3.4–5)
ALBUMIN UR-MCNC: NEGATIVE MG/DL
ALP SERPL-CCNC: 97 U/L (ref 40–150)
ALT SERPL W P-5'-P-CCNC: 45 U/L (ref 0–70)
ANION GAP SERPL CALCULATED.3IONS-SCNC: 3 MMOL/L (ref 3–14)
APPEARANCE UR: CLEAR
AST SERPL W P-5'-P-CCNC: 29 U/L (ref 0–45)
BILIRUB SERPL-MCNC: 0.7 MG/DL (ref 0.2–1.3)
BILIRUB UR QL STRIP: NEGATIVE
BUN SERPL-MCNC: 18 MG/DL (ref 7–30)
CALCIUM SERPL-MCNC: 8.1 MG/DL (ref 8.5–10.1)
CHLORIDE BLD-SCNC: 111 MMOL/L (ref 94–109)
CHOLEST SERPL-MCNC: 118 MG/DL
CO2 SERPL-SCNC: 27 MMOL/L (ref 20–32)
COLOR UR AUTO: YELLOW
CREAT SERPL-MCNC: 1.2 MG/DL (ref 0.66–1.25)
FASTING STATUS PATIENT QL REPORTED: YES
GFR SERPL CREATININE-BSD FRML MDRD: 63 ML/MIN/1.73M2
GLUCOSE BLD-MCNC: 94 MG/DL (ref 70–99)
GLUCOSE UR STRIP-MCNC: NEGATIVE MG/DL
HDLC SERPL-MCNC: 56 MG/DL
HGB UR QL STRIP: NEGATIVE
KETONES UR STRIP-MCNC: NEGATIVE MG/DL
LDLC SERPL CALC-MCNC: 45 MG/DL
LEUKOCYTE ESTERASE UR QL STRIP: NEGATIVE
NITRATE UR QL: NEGATIVE
NONHDLC SERPL-MCNC: 62 MG/DL
PH UR STRIP: 5 [PH] (ref 5–7)
POTASSIUM BLD-SCNC: 4.9 MMOL/L (ref 3.4–5.3)
PROT SERPL-MCNC: 7 G/DL (ref 6.8–8.8)
PSA SERPL-MCNC: 1 UG/L (ref 0–4)
SODIUM SERPL-SCNC: 141 MMOL/L (ref 133–144)
SP GR UR STRIP: 1.02 (ref 1–1.03)
TRIGL SERPL-MCNC: 86 MG/DL
UROBILINOGEN UR STRIP-MCNC: NORMAL MG/DL

## 2022-04-18 PROCEDURE — 36415 COLL VENOUS BLD VENIPUNCTURE: CPT | Performed by: FAMILY MEDICINE

## 2022-04-18 PROCEDURE — 91306 COVID-19,PF,MODERNA (18+ YRS BOOSTER .25ML): CPT | Performed by: FAMILY MEDICINE

## 2022-04-18 PROCEDURE — 99397 PER PM REEVAL EST PAT 65+ YR: CPT | Mod: 25 | Performed by: FAMILY MEDICINE

## 2022-04-18 PROCEDURE — 81003 URINALYSIS AUTO W/O SCOPE: CPT | Performed by: FAMILY MEDICINE

## 2022-04-18 PROCEDURE — 80053 COMPREHEN METABOLIC PANEL: CPT | Performed by: FAMILY MEDICINE

## 2022-04-18 PROCEDURE — G0103 PSA SCREENING: HCPCS | Performed by: FAMILY MEDICINE

## 2022-04-18 PROCEDURE — 80061 LIPID PANEL: CPT | Performed by: FAMILY MEDICINE

## 2022-04-18 PROCEDURE — 0064A COVID-19,PF,MODERNA (18+ YRS BOOSTER .25ML): CPT | Performed by: FAMILY MEDICINE

## 2022-04-18 ASSESSMENT — PAIN SCALES - GENERAL: PAINLEVEL: NO PAIN (0)

## 2022-04-18 ASSESSMENT — ENCOUNTER SYMPTOMS
PARESTHESIAS: 0
HEMATOCHEZIA: 0
FREQUENCY: 0
FEVER: 0
COUGH: 0
ARTHRALGIAS: 0
WEAKNESS: 0
ABDOMINAL PAIN: 0
NERVOUS/ANXIOUS: 0
NAUSEA: 0
DYSURIA: 0
DIARRHEA: 0
SORE THROAT: 0
MYALGIAS: 0
HEARTBURN: 0
HEMATURIA: 0
SHORTNESS OF BREATH: 0
JOINT SWELLING: 0
DIZZINESS: 0
CHILLS: 0
PALPITATIONS: 0
HEADACHES: 0
CONSTIPATION: 0
EYE PAIN: 0

## 2022-04-18 ASSESSMENT — ASTHMA QUESTIONNAIRES: ACT_TOTALSCORE: 24

## 2022-04-18 ASSESSMENT — ACTIVITIES OF DAILY LIVING (ADL): CURRENT_FUNCTION: NO ASSISTANCE NEEDED

## 2022-04-18 NOTE — PATIENT INSTRUCTIONS
Patient Education   Personalized Prevention Plan  You are due for the preventive services outlined below.  Your care team is available to assist you in scheduling these services.  If you have already completed any of these items, please share that information with your care team to update in your medical record.  Health Maintenance Due   Topic Date Due     Hepatitis C Screening  Never done     Zoster (Shingles) Vaccine (1 of 2) Never done     Pneumococcal Vaccine (1 of 1 - PPSV23) 10/29/2016     Asthma Action Plan - yearly  04/22/2020     FALL RISK ASSESSMENT  01/28/2022       Understanding USDA MyPlate  The USDA has guidelines to help you make healthy food choices. These are called MyPlate. MyPlate shows the food groups that make up healthy meals using the image of a place setting. Before you eat, think about the healthiest choices for what to put on your plate or in your cup or bowl. To learn more about building a healthy plate, visit www.Wantstermyplate.gov.    The food groups    Fruits. Any fruit or 100% fruit juice counts as part of the Fruit Group. Fruits may be fresh, canned, frozen, or dried, and may be whole, cut-up, or pureed. Make 1/2 of your plate fruits and vegetables.    Vegetables. Any vegetable or 100% vegetable juice counts as a member of the Vegetable Group. Vegetables may be fresh, frozen, canned, or dried. They can be served raw or cooked and may be whole, cut-up, or mashed. Make 1/2 of your plate fruits and vegetables.    Grains. All foods made from grains are part of the Grains Group. These include wheat, rice, oats, cornmeal, and barley. Grains are often used to make foods such as bread, pasta, oatmeal, cereal, tortillas, and grits. Grains should be no more than 1/4 of your plate. At least half of your grains should be whole grains.    Protein. This group includes meat, poultry, seafood, beans and peas, eggs, processed soy products (such as tofu), nuts (including nut butters), and seeds. Make  protein choices no more than 1/4 of your plate. Meat and poultry choices should be lean or low fat.    Dairy. The Dairy Group includes all fluid milk products and foods made from milk that contain calcium, such as yogurt and cheese. (Foods that have little calcium, such as cream, butter, and cream cheese, are not part of this group.) Most dairy choices should be low-fat or fat-free.    Oils. Oils aren't a food group, but they do contain essential nutrients. However it's important to watch your intake of oils. These are fats that are liquid at room temperature. They include canola, corn, olive, soybean, vegetable, and sunflower oil. Foods that are mainly oil include mayonnaise, certain salad dressings, and soft margarines. You likely already get your daily oil allowance from the foods you eat.  Things to limit  Eating healthy also means limiting these things in your diet:       Salt (sodium). Many processed foods have a lot of sodium. To keep sodium intake down, eat fresh vegetables, meats, poultry, and seafood when possible. Purchase low-sodium, reduced-sodium, or no-salt-added food products at the store. And don't add salt to your meals at home. Instead, season them with herbs and spices such as dill, oregano, cumin, and paprika. Or try adding flavor with lemon or lime zest and juice.    Saturated fat. Saturated fats are most often found in animal products such as beef, pork, and chicken. They are often solid at room temperature, such as butter. To reduce your saturated fat intake, choose leaner cuts of meat and poultry. And try healthier cooking methods such as grilling, broiling, roasting, or baking. For a simple lower-fat swap, use plain nonfat yogurt instead of mayonnaise when making potato salad or macaroni salad.    Added sugars. These are sugars added to foods. They are in foods such as ice cream, candy, soda, fruit drinks, sports drinks, energy drinks, cookies, pastries, jams, and syrups. Cut down on added  sugars by sharing sweet treats with a family member or friend. You can also choose fruit for dessert, and drink water or other unsweetened beverages.     StayWell last reviewed this educational content on 6/1/2020 2000-2021 [employer]. All rights reserved. This information is not intended as a substitute for professional medical care. Always follow your healthcare professional's instructions.          Signs of Hearing Loss      Hearing much better with one ear can be a sign of hearing loss.   Hearing loss is a problem shared by many people. In fact, it is one of the most common health problems, particularly as people age. Most people age 65 and older have some hearing loss. By age 80, almost everyone does. Hearing loss often occurs slowly over the years. So you may not realize your hearing has gotten worse.  Have your hearing checked  Call your healthcare provider if you:    Have to strain to hear normal conversation    Have to watch other people s faces very carefully to follow what they re saying    Need to ask people to repeat what they ve said    Often misunderstand what people are saying    Turn the volume of the television or radio up so high that others complain    Feel that people are mumbling when they re talking to you    Find that the effort to hear leaves you feeling tired and irritated    Notice, when using the phone, that you hear better with one ear than the other  [employer] last reviewed this educational content on 1/1/2020 2000-2021 [employer]. All rights reserved. This information is not intended as a substitute for professional medical care. Always follow your healthcare professional's instructions.

## 2022-04-18 NOTE — PROGRESS NOTES
"SUBJECTIVE:   Ky Martinez is a 76 year old male who presents for Preventive Visit.      Patient has been advised of split billing requirements and indicates understanding: Yes  Are you in the first 12 months of your Medicare coverage?  No    Healthy Habits:     In general, how would you rate your overall health?  Good    Frequency of exercise:  2-3 days/week    Duration of exercise:  15-30 minutes    Do you usually eat at least 4 servings of fruit and vegetables a day, include whole grains    & fiber and avoid regularly eating high fat or \"junk\" foods?  No    Taking medications regularly:  Yes    Ability to successfully perform activities of daily living:  No assistance needed    Home Safety:  No safety concerns identified    Hearing Impairment:  Need to ask people to speak up or repeat themselves    In the past 6 months, have you been bothered by leaking of urine?  No    In general, how would you rate your overall mental or emotional health?  Good      PHQ-2 Total Score: 0    Additional concerns today:  No    Do you feel safe in your environment? Yes    Have you ever done Advance Care Planning? (For example, a Health Directive, POLST, or a discussion with a medical provider or your loved ones about your wishes): No, advance care planning information given to patient to review.  Advanced care planning was discussed at today's visit.       Fall risk  Fallen 2 or more times in the past year?: No  Any fall with injury in the past year?: No    Cognitive Screening   1) Repeat 3 items (Leader, Season, Table)    2) Clock draw: NORMAL  3) 3 item recall: Recalls 3 objects  Results: 3 items recalled: COGNITIVE IMPAIRMENT LESS LIKELY    Mini-CogTM Copyright JASON Thomas. Licensed by the author for use in [facility]; reprinted with permission (linda@.[facility]). All rights reserved.      Do you have sleep apnea, excessive snoring or daytime drowsiness?: yes    Reviewed and updated as needed this visit by clinical " staff   Tobacco   Meds                Reviewed and updated as needed this visit by Provider                   Social History     Tobacco Use     Smoking status: Never Smoker     Smokeless tobacco: Never Used   Substance Use Topics     Alcohol use: Yes     Alcohol/week: 3.3 standard drinks     Types: 4 Glasses of wine per week     Comment: occasional          Alcohol Use 4/18/2022   Prescreen: >3 drinks/day or >7 drinks/week? No   Prescreen: >3 drinks/day or >7 drinks/week? -               Current providers sharing in care for this patient include:   Patient Care Team:  Ky Major MD as PCP - General  Ky Major MD as Assigned PCP  Nathan Hernandez DO as Assigned Musculoskeletal Provider    The following health maintenance items are reviewed in Epic and correct as of today:  Health Maintenance Due   Topic Date Due     HEPATITIS C SCREENING  Never done     ZOSTER IMMUNIZATION (1 of 2) Never done     Pneumococcal Vaccine: 65+ Years (1 of 1 - PPSV23) 10/29/2016     ASTHMA ACTION PLAN  04/22/2020     MEDICARE ANNUAL WELLNESS VISIT  01/28/2022     FALL RISK ASSESSMENT  01/28/2022     ASTHMA CONTROL TEST  05/12/2022     Lab work is in process          Review of Systems   Constitutional: Negative for chills and fever.   HENT: Negative for congestion, ear pain, hearing loss and sore throat.    Eyes: Negative for pain and visual disturbance.   Respiratory: Negative for cough and shortness of breath.    Cardiovascular: Negative for chest pain, palpitations and peripheral edema.   Gastrointestinal: Negative for abdominal pain, constipation, diarrhea, heartburn, hematochezia and nausea.   Genitourinary: Negative for dysuria, frequency, genital sores, hematuria, impotence, penile discharge and urgency.   Musculoskeletal: Negative for arthralgias, joint swelling and myalgias.   Skin: Negative for rash.   Neurological: Negative for dizziness, weakness, headaches and paresthesias.   Psychiatric/Behavioral:  "Negative for mood changes. The patient is not nervous/anxious.      Constitutional, HEENT, cardiovascular, pulmonary, GI, , musculoskeletal, neuro, skin, endocrine and psych systems are negative, except as otherwise noted.    OBJECTIVE:   /76   Pulse 84   Temp 97.8  F (36.6  C) (Temporal)   Resp 16   Ht 1.797 m (5' 10.75\")   Wt 131.5 kg (290 lb)   SpO2 98%   BMI 40.73 kg/m   Estimated body mass index is 40.73 kg/m  as calculated from the following:    Height as of this encounter: 1.797 m (5' 10.75\").    Weight as of this encounter: 131.5 kg (290 lb).  Physical Exam  GENERAL: healthy, alert and no distress  EYES: Eyes grossly normal to inspection, PERRL and conjunctivae and sclerae normal  HENT: ear canals and TM's normal, nose and mouth without ulcers or lesions  NECK: no adenopathy, no asymmetry, masses, or scars and thyroid normal to palpation  RESP: lungs clear to auscultation - no rales, rhonchi or wheezes  CV: regular rate and rhythm, normal S1 S2, no S3 or S4, no murmur, click or rub, no peripheral edema and peripheral pulses strong  ABDOMEN: soft, nontender, no hepatosplenomegaly, no masses and bowel sounds normal  MS: no gross musculoskeletal defects noted, no edema  SKIN: no suspicious lesions or rashes  NEURO: Normal strength and tone, mentation intact and speech normal  PSYCH: mentation appears normal, affect normal/bright    Diagnostic Test Results:  Labs reviewed in Epic  Results for orders placed or performed in visit on 04/18/22 (from the past 24 hour(s))   Lipid panel reflex to direct LDL Fasting   Result Value Ref Range    Cholesterol 118 <200 mg/dL    Triglycerides 86 <150 mg/dL    Direct Measure HDL 56 >=40 mg/dL    LDL Cholesterol Calculated 45 <=100 mg/dL    Non HDL Cholesterol 62 <130 mg/dL    Patient Fasting > 8hrs? Yes     Narrative    Cholesterol  Desirable:  <200 mg/dL    Triglycerides  Normal:  Less than 150 mg/dL  Borderline High:  150-199 mg/dL  High:  200-499 mg/dL  Very " High:  Greater than or equal to 500 mg/dL    Direct Measure HDL  Female:  Greater than or equal to 50 mg/dL   Male:  Greater than or equal to 40 mg/dL    LDL Cholesterol  Desirable:  <100mg/dL  Above Desirable:  100-129 mg/dL   Borderline High:  130-159 mg/dL   High:  160-189 mg/dL   Very High:  >= 190 mg/dL    Non HDL Cholesterol  Desirable:  130 mg/dL  Above Desirable:  130-159 mg/dL  Borderline High:  160-189 mg/dL  High:  190-219 mg/dL  Very High:  Greater than or equal to 220 mg/dL   Comprehensive metabolic panel (BMP + Alb, Alk Phos, ALT, AST, Total. Bili, TP)   Result Value Ref Range    Sodium 141 133 - 144 mmol/L    Potassium 4.9 3.4 - 5.3 mmol/L    Chloride 111 (H) 94 - 109 mmol/L    Carbon Dioxide (CO2) 27 20 - 32 mmol/L    Anion Gap 3 3 - 14 mmol/L    Urea Nitrogen 18 7 - 30 mg/dL    Creatinine 1.20 0.66 - 1.25 mg/dL    Calcium 8.1 (L) 8.5 - 10.1 mg/dL    Glucose 94 70 - 99 mg/dL    Alkaline Phosphatase 97 40 - 150 U/L    AST 29 0 - 45 U/L    ALT 45 0 - 70 U/L    Protein Total 7.0 6.8 - 8.8 g/dL    Albumin 3.6 3.4 - 5.0 g/dL    Bilirubin Total 0.7 0.2 - 1.3 mg/dL    GFR Estimate 63 >60 mL/min/1.73m2   PSA, screen   Result Value Ref Range    Prostate Specific Antigen Screen 1.00 0.00 - 4.00 ug/L    Narrative    Assay Method:  Chemiluminescence using Siemens   Vista analyzer.   UA Macro with Reflex to Micro and Culture - lab collect    Specimen: Urine, Midstream   Result Value Ref Range    Color Urine Yellow Colorless, Straw, Light Yellow, Yellow    Appearance Urine Clear Clear    Glucose Urine Negative Negative mg/dL    Bilirubin Urine Negative Negative    Ketones Urine Negative Negative mg/dL    Specific Gravity Urine 1.018 1.003 - 1.035    Blood Urine Negative Negative    pH Urine 5.0 5.0 - 7.0    Protein Albumin Urine Negative Negative mg/dL    Urobilinogen Urine Normal Normal, 2.0 mg/dL    Nitrite Urine Negative Negative    Leukocyte Esterase Urine Negative Negative    Narrative    Microscopic not  "indicated       ASSESSMENT / PLAN:   (Z00.00) Encounter for Medicare annual wellness exam  (primary encounter diagnosis)  Comment: Managed by cardiology for his ICD.  Plan:     (Z12.5) Screening for prostate cancer  Comment:   Plan: PSA, screen            (I10) Essential hypertension  Comment:   Plan: Comprehensive metabolic panel (BMP + Alb, Alk         Phos, ALT, AST, Total. Bili, TP), UA Macro with        Reflex to Micro and Culture - lab collect            (E78.00) Pure hypercholesterolemia  Comment:   Plan: Lipid panel reflex to direct LDL Fasting                  COUNSELING:  Reviewed preventive health counseling, as reflected in patient instructions       Regular exercise       Healthy diet/nutrition    Estimated body mass index is 40.73 kg/m  as calculated from the following:    Height as of this encounter: 1.797 m (5' 10.75\").    Weight as of this encounter: 131.5 kg (290 lb).    Weight management plan: Discussed healthy diet and exercise guidelines    He reports that he has never smoked. He has never used smokeless tobacco.      Appropriate preventive services were discussed with this patient, including applicable screening as appropriate for cardiovascular disease, diabetes, osteopenia/osteoporosis, and glaucoma.  As appropriate for age/gender, discussed screening for colorectal cancer, prostate cancer, breast cancer, and cervical cancer. Checklist reviewing preventive services available has been given to the patient.    Reviewed patients plan of care and provided an AVS. The Basic Care Plan (routine screening as documented in Health Maintenance) for Ky meets the Care Plan requirement. This Care Plan has been established and reviewed with the Patient.    Counseling Resources:  ATP IV Guidelines  Pooled Cohorts Equation Calculator  Breast Cancer Risk Calculator  Breast Cancer: Medication to Reduce Risk  FRAX Risk Assessment  ICSI Preventive Guidelines  Dietary Guidelines for Americans, 2010  USDA's " MyPlate  ASA Prophylaxis  Lung CA Screening    Ky Major MD  [facility]    Identified Health Risks:

## 2022-05-03 DIAGNOSIS — N40.0 BENIGN PROSTATIC HYPERPLASIA, UNSPECIFIED WHETHER LOWER URINARY TRACT SYMPTOMS PRESENT: ICD-10-CM

## 2022-05-03 DIAGNOSIS — I25.10 CORONARY ARTERY DISEASE INVOLVING NATIVE CORONARY ARTERY OF NATIVE HEART WITHOUT ANGINA PECTORIS: ICD-10-CM

## 2022-05-03 DIAGNOSIS — E78.5 HYPERLIPIDEMIA LDL GOAL <100: ICD-10-CM

## 2022-05-03 DIAGNOSIS — I10 BENIGN ESSENTIAL HYPERTENSION: ICD-10-CM

## 2022-05-03 NOTE — TELEPHONE ENCOUNTER
Reason for Call:  Medication or medication refill:    Do you use a [facility]?  Name of the pharmacy and phone number for the current request:  [facility] - [phone]    Name of the medication requested: Multiple medications    Other request:     Can we leave a detailed message on this number? YES    Phone number patient can be reached at: Home number on file [phone] (home)    Best Time: any    Call taken on 5/3/2022 at 10:25 AM by Sherrill Martino

## 2022-05-05 RX ORDER — POTASSIUM CHLORIDE 1500 MG/1
TABLET, EXTENDED RELEASE ORAL
Qty: 90 TABLET | Refills: 2 | Status: SHIPPED | OUTPATIENT
Start: 2022-05-05 | End: 2022-11-23

## 2022-05-05 RX ORDER — ISOSORBIDE MONONITRATE 30 MG/1
TABLET, EXTENDED RELEASE ORAL
Qty: 90 TABLET | Refills: 2 | Status: SHIPPED | OUTPATIENT
Start: 2022-05-05 | End: 2022-11-23

## 2022-05-05 RX ORDER — ATORVASTATIN CALCIUM 80 MG/1
TABLET, FILM COATED ORAL
Qty: 90 TABLET | Refills: 3 | Status: SHIPPED | OUTPATIENT
Start: 2022-05-05 | End: 2023-06-02

## 2022-05-05 RX ORDER — FUROSEMIDE 40 MG
TABLET ORAL
Qty: 90 TABLET | Refills: 2 | Status: SHIPPED | OUTPATIENT
Start: 2022-05-05 | End: 2022-11-23

## 2022-05-05 RX ORDER — FINASTERIDE 5 MG/1
TABLET, FILM COATED ORAL
Qty: 90 TABLET | Refills: 2 | Status: SHIPPED | OUTPATIENT
Start: 2022-05-05 | End: 2022-11-23

## 2022-05-05 RX ORDER — LOSARTAN POTASSIUM 50 MG/1
TABLET ORAL
Qty: 90 TABLET | Refills: 2 | Status: SHIPPED | OUTPATIENT
Start: 2022-05-05 | End: 2022-11-23

## 2022-05-05 RX ORDER — TAMSULOSIN HYDROCHLORIDE 0.4 MG/1
CAPSULE ORAL
Qty: 90 CAPSULE | Refills: 2 | Status: SHIPPED | OUTPATIENT
Start: 2022-05-05 | End: 2022-11-23

## 2022-05-05 RX ORDER — METOPROLOL SUCCINATE 50 MG/1
TABLET, EXTENDED RELEASE ORAL
Qty: 90 TABLET | Refills: 2 | Status: SHIPPED | OUTPATIENT
Start: 2022-05-05 | End: 2022-11-23

## 2022-05-12 ENCOUNTER — LAB (OUTPATIENT)
Dept: LAB | Facility: CLINIC | Age: 77
End: 2022-05-12
Payer: COMMERCIAL

## 2022-05-12 ENCOUNTER — ANTICOAGULATION THERAPY VISIT (OUTPATIENT)
Dept: ANTICOAGULATION | Facility: CLINIC | Age: 77
End: 2022-05-12

## 2022-05-12 DIAGNOSIS — I26.99 ACUTE PULMONARY EMBOLISM, UNSPECIFIED PULMONARY EMBOLISM TYPE, UNSPECIFIED WHETHER ACUTE COR PULMONALE PRESENT (H): ICD-10-CM

## 2022-05-12 DIAGNOSIS — I48.0 PAROXYSMAL ATRIAL FIBRILLATION (H): ICD-10-CM

## 2022-05-12 DIAGNOSIS — I48.0 PAROXYSMAL ATRIAL FIBRILLATION WITH RAPID VENTRICULAR RESPONSE (H): ICD-10-CM

## 2022-05-12 DIAGNOSIS — I26.99 ACUTE PULMONARY EMBOLISM, UNSPECIFIED PULMONARY EMBOLISM TYPE, UNSPECIFIED WHETHER ACUTE COR PULMONALE PRESENT (H): Primary | ICD-10-CM

## 2022-05-12 LAB — INR BLD: 2.1 (ref 0.9–1.1)

## 2022-05-12 PROCEDURE — 36416 COLLJ CAPILLARY BLOOD SPEC: CPT

## 2022-05-12 PROCEDURE — 85610 PROTHROMBIN TIME: CPT

## 2022-05-12 NOTE — PROGRESS NOTES
ANTICOAGULATION MANAGEMENT     Ky Martinez 76 year old male is on warfarin with therapeutic INR result. (Goal INR 2.0-3.0)    Recent labs: (last 7 days)     05/12/22  1027   INR 2.1*       ASSESSMENT       Source(s): Chart Review       Warfarin doses taken: Reviewed in chart    Diet: LM    New illness, injury, or hospitalization: No    Medication/supplement changes: None noted    Signs or symptoms of bleeding or clotting: No    Previous INR: Therapeutic last 2(+) visits    Additional findings: None       PLAN     Recommended plan for no diet, medication or health factor changes affecting INR     Dosing Instructions: continue your current warfarin dose with next INR in 5 weeks       Summary  As of 5/12/2022    Full warfarin instructions:  2.5 mg every Mon, Fri; 5 mg all other days   Next INR check:  6/16/2022             Detailed voice message left for Tarik with dosing instructions and follow up date.     Contact [phone]  to schedule and with any changes, questions or concerns.     Education provided: Please call back if any changes to your diet, medications or how you've been taking warfarin    Plan made per [facility] anticoagulation protocol    Lucia Canales RN  Anticoagulation Clinic  5/12/2022    _______________________________________________________________________     Anticoagulation Episode Summary     Current INR goal:  2.0-3.0   TTR:  90.1 % (1 y)   Target end date:  9/30/2020   Send INR reminders to:  BRANDY MEHTA    Indications    Acute pulmonary embolism  unspecified pulmonary embolism type  unspecified whether acute cor pulmonale present (H) [I26.99]  Paroxysmal atrial fibrillation with rapid ventricular response (H) [I48.0]  Paroxysmal atrial fibrillation (H) [I48.0]           Comments:           Anticoagulation Care Providers     Provider Role Specialty Phone number    Ky Major MD [facility] [phone]

## 2022-06-07 DIAGNOSIS — I26.99 ACUTE PULMONARY EMBOLISM, UNSPECIFIED PULMONARY EMBOLISM TYPE, UNSPECIFIED WHETHER ACUTE COR PULMONALE PRESENT (H): ICD-10-CM

## 2022-06-07 RX ORDER — WARFARIN SODIUM 5 MG/1
TABLET ORAL
Qty: 80 TABLET | Refills: 1 | Status: SHIPPED | OUTPATIENT
Start: 2022-06-07 | End: 2022-12-05

## 2022-06-07 NOTE — TELEPHONE ENCOUNTER
ANTICOAGULATION MANAGEMENT:  Medication Refill    Anticoagulation Summary  As of 5/12/2022    Warfarin maintenance plan:  2.5 mg (5 mg x 0.5) every Mon, Fri; 5 mg (5 mg x 1) all other days   Next INR check:  6/16/2022   Target end date:  9/30/2020    Indications    Acute pulmonary embolism  unspecified pulmonary embolism type  unspecified whether acute cor pulmonale present (H) [I26.99]  Paroxysmal atrial fibrillation with rapid ventricular response (H) [I48.0]  Paroxysmal atrial fibrillation (H) [I48.0]             Anticoagulation Care Providers     Provider Role Specialty Phone number    Ky Major MD Referring [facility] [phone]          Visit with referring provider/group within last year: Yes    [facility] referral signed within last year: Yes    Ky meets all criteria for refill (current [facility] referral, office visit with referring provider/group in last year, lab monitoring up to date or not exceeding 2 weeks overdue). Rx instructions and quantity supplied updated to match patient's current dosing plan. Warfarin 90 day supply with 1 refill granted per [facility] protocol     Ashleigh Savage RN  Anticoagulation Clinic

## 2022-06-07 NOTE — TELEPHONE ENCOUNTER
Voicemail from central scheduling state patient is calling requesting a refill of warfarin, and a call back from an INR nurse. Preferred pharmacy not stated.

## 2022-06-23 ENCOUNTER — TELEPHONE (OUTPATIENT)
Dept: ANTICOAGULATION | Facility: CLINIC | Age: 77
End: 2022-06-23

## 2022-07-06 ENCOUNTER — LAB (OUTPATIENT)
Dept: LAB | Facility: CLINIC | Age: 77
End: 2022-07-06
Payer: COMMERCIAL

## 2022-07-06 ENCOUNTER — ANTICOAGULATION THERAPY VISIT (OUTPATIENT)
Dept: ANTICOAGULATION | Facility: CLINIC | Age: 77
End: 2022-07-06

## 2022-07-06 DIAGNOSIS — I48.0 PAROXYSMAL ATRIAL FIBRILLATION WITH RAPID VENTRICULAR RESPONSE (H): ICD-10-CM

## 2022-07-06 DIAGNOSIS — I26.99 ACUTE PULMONARY EMBOLISM, UNSPECIFIED PULMONARY EMBOLISM TYPE, UNSPECIFIED WHETHER ACUTE COR PULMONALE PRESENT (H): Primary | ICD-10-CM

## 2022-07-06 DIAGNOSIS — I48.0 PAROXYSMAL ATRIAL FIBRILLATION (H): ICD-10-CM

## 2022-07-06 DIAGNOSIS — I26.99 ACUTE PULMONARY EMBOLISM, UNSPECIFIED PULMONARY EMBOLISM TYPE, UNSPECIFIED WHETHER ACUTE COR PULMONALE PRESENT (H): ICD-10-CM

## 2022-07-06 LAB — INR BLD: 3.9 (ref 0.9–1.1)

## 2022-07-06 PROCEDURE — 85610 PROTHROMBIN TIME: CPT

## 2022-07-06 PROCEDURE — 36416 COLLJ CAPILLARY BLOOD SPEC: CPT

## 2022-07-06 NOTE — PROGRESS NOTES
ANTICOAGULATION MANAGEMENT     Ky Martinez 76 year old male is on warfarin with supratherapeutic INR result. (Goal INR 2.0-3.0)    Recent labs: (last 7 days)     07/06/22  1329   INR 3.9*       ASSESSMENT       Source(s): Chart Review and Patient/Caregiver Call       Warfarin doses taken: Warfarin taken as instructed    Diet: Decreased greens/vitamin K in diet; plans to resume previous intake and Change in alcohol intake may be affecting INR. Increased alcohol use     New illness, injury, or hospitalization: No    Medication/supplement changes: None noted    Signs or symptoms of bleeding or clotting: No    Previous INR: Therapeutic last 2(+) visits    Additional findings: None       PLAN     Recommended plan for temporary change(s) affecting INR     Dosing Instructions: hold dose then continue your current warfarin dose with next INR in 2 weeks       Summary  As of 7/6/2022    Full warfarin instructions:  7/6: Hold; Otherwise 2.5 mg every Mon, Fri; 5 mg all other days   Next INR check:  7/20/2022             Telephone call with Tarik who verbalizes understanding and agrees to plan and who agrees to plan and repeated back plan correctly    Patient offered & declined to schedule next visit    Education provided: Please call back if any changes to your diet, medications or how you've been taking warfarin, Importance of consistent vitamin K intake, Impact of vitamin K foods on INR, Vitamin K content of foods, Potential interaction between warfarin and alcohol, Goal range and significance of current result, Importance of therapeutic range and Importance of following up at instructed interval    Plan made per [facility] anticoagulation protocol    Jessica Chavez, RN  Anticoagulation Clinic  7/6/2022    _______________________________________________________________________     Anticoagulation Episode Summary     Current INR goal:  2.0-3.0   TTR:  82.5 % (1 y)   Target end date:  9/30/2020   Send INR reminders to:  BRANDY  [place]    Indications    Acute pulmonary embolism  unspecified pulmonary embolism type  unspecified whether acute cor pulmonale present (H) [I26.99]  Paroxysmal atrial fibrillation with rapid ventricular response (H) [I48.0]  Paroxysmal atrial fibrillation (H) [I48.0]           Comments:           Anticoagulation Care Providers     Provider Role Specialty Phone number    Ky Major MD [facility] [phone]

## 2022-07-21 ENCOUNTER — ANTICOAGULATION THERAPY VISIT (OUTPATIENT)
Dept: ANTICOAGULATION | Facility: CLINIC | Age: 77
End: 2022-07-21

## 2022-07-21 ENCOUNTER — LAB (OUTPATIENT)
Dept: LAB | Facility: CLINIC | Age: 77
End: 2022-07-21
Payer: COMMERCIAL

## 2022-07-21 DIAGNOSIS — I26.99 ACUTE PULMONARY EMBOLISM, UNSPECIFIED PULMONARY EMBOLISM TYPE, UNSPECIFIED WHETHER ACUTE COR PULMONALE PRESENT (H): ICD-10-CM

## 2022-07-21 DIAGNOSIS — I48.0 PAROXYSMAL ATRIAL FIBRILLATION WITH RAPID VENTRICULAR RESPONSE (H): ICD-10-CM

## 2022-07-21 DIAGNOSIS — I48.0 PAROXYSMAL ATRIAL FIBRILLATION (H): ICD-10-CM

## 2022-07-21 DIAGNOSIS — I26.99 ACUTE PULMONARY EMBOLISM, UNSPECIFIED PULMONARY EMBOLISM TYPE, UNSPECIFIED WHETHER ACUTE COR PULMONALE PRESENT (H): Primary | ICD-10-CM

## 2022-07-21 LAB — INR BLD: 2.9 (ref 0.9–1.1)

## 2022-07-21 PROCEDURE — 85610 PROTHROMBIN TIME: CPT

## 2022-07-21 PROCEDURE — 36416 COLLJ CAPILLARY BLOOD SPEC: CPT

## 2022-07-21 NOTE — PROGRESS NOTES
ANTICOAGULATION MANAGEMENT     Ky Martinez 76 year old male is on warfarin with therapeutic INR result. (Goal INR 2.0-3.0)    Recent labs: (last 7 days)     07/21/22  1112   INR 2.9*       ASSESSMENT       Source(s): Chart Review       Warfarin doses taken: Reviewed in chart    Diet: LM    New illness, injury, or hospitalization: No    Medication/supplement changes: None noted    Signs or symptoms of bleeding or clotting: No    Previous INR: Supratherapeutic    Additional findings: None       PLAN     Recommended plan for no diet, medication or health factor changes affecting INR     Dosing Instructions: continue your current warfarin dose with next INR in 2 weeks       Summary  As of 7/21/2022    Full warfarin instructions:  2.5 mg every Mon, Fri; 5 mg all other days   Next INR check:  8/4/2022             Detailed voice message left for Tarik with dosing instructions and follow up date.     Contact [phone]  to schedule and with any changes, questions or concerns.     Education provided: Please call back if any changes to your diet, medications or how you've been taking warfarin    Plan made per [facility] anticoagulation protocol    Lucia Canales RN  Anticoagulation Clinic  7/21/2022    _______________________________________________________________________     Anticoagulation Episode Summary     Current INR goal:  2.0-3.0   TTR:  78.8 % (1 y)   Target end date:  9/30/2020   Send INR reminders to:  BRANDY MEHTA    Indications    Acute pulmonary embolism  unspecified pulmonary embolism type  unspecified whether acute cor pulmonale present (H) [I26.99]  Paroxysmal atrial fibrillation with rapid ventricular response (H) [I48.0]  Paroxysmal atrial fibrillation (H) [I48.0]           Comments:           Anticoagulation Care Providers     Provider Role Specialty Phone number    Ky Major MD [facility] [phone]

## 2022-08-04 ENCOUNTER — ANTICOAGULATION THERAPY VISIT (OUTPATIENT)
Dept: ANTICOAGULATION | Facility: CLINIC | Age: 77
End: 2022-08-04

## 2022-08-04 ENCOUNTER — LAB (OUTPATIENT)
Dept: LAB | Facility: CLINIC | Age: 77
End: 2022-08-04
Payer: COMMERCIAL

## 2022-08-04 DIAGNOSIS — I48.0 PAROXYSMAL ATRIAL FIBRILLATION WITH RAPID VENTRICULAR RESPONSE (H): ICD-10-CM

## 2022-08-04 DIAGNOSIS — I26.99 ACUTE PULMONARY EMBOLISM, UNSPECIFIED PULMONARY EMBOLISM TYPE, UNSPECIFIED WHETHER ACUTE COR PULMONALE PRESENT (H): Primary | ICD-10-CM

## 2022-08-04 DIAGNOSIS — I26.99 ACUTE PULMONARY EMBOLISM, UNSPECIFIED PULMONARY EMBOLISM TYPE, UNSPECIFIED WHETHER ACUTE COR PULMONALE PRESENT (H): ICD-10-CM

## 2022-08-04 DIAGNOSIS — I48.0 PAROXYSMAL ATRIAL FIBRILLATION (H): ICD-10-CM

## 2022-08-04 LAB — INR BLD: 2.3 (ref 0.9–1.1)

## 2022-08-04 PROCEDURE — 85610 PROTHROMBIN TIME: CPT

## 2022-08-04 PROCEDURE — 36416 COLLJ CAPILLARY BLOOD SPEC: CPT

## 2022-08-04 NOTE — PROGRESS NOTES
ANTICOAGULATION MANAGEMENT     Ky Martinez 76 year old male is on warfarin with therapeutic INR result. (Goal INR 2.0-3.0)    Recent labs: (last 7 days)     08/04/22  0904   INR 2.3*       ASSESSMENT       Source(s): Chart Review    Previous INR was Therapeutic last visit; previously outside of goal range    Medication, diet, health changes since last INR chart reviewed; none identified           PLAN     Recommended plan for no diet, medication or health factor changes affecting INR     Dosing Instructions: Continue your current warfarin dose with next INR in 3 weeks       Summary  As of 8/4/2022    Full warfarin instructions:  2.5 mg every Mon, Fri; 5 mg all other days   Next INR check:  8/25/2022             Detailed voice message left for Tarik with dosing instructions and follow up date.     Contact [phone]  to schedule and with any changes, questions or concerns.     Education provided: Please call back if any changes to your diet, medications or how you've been taking warfarin    Plan made per [facility] anticoagulation protocol    Jessica Chavez, RN  Anticoagulation Clinic  8/4/2022    _______________________________________________________________________     Anticoagulation Episode Summary     Current INR goal:  2.0-3.0   TTR:  78.8 % (1 y)   Target end date:  9/30/2020   Send INR reminders to:  BRANDY MEHTA    Indications    Acute pulmonary embolism  unspecified pulmonary embolism type  unspecified whether acute cor pulmonale present (H) [I26.99]  Paroxysmal atrial fibrillation with rapid ventricular response (H) [I48.0]  Paroxysmal atrial fibrillation (H) [I48.0]           Comments:           Anticoagulation Care Providers     Provider Role Specialty Phone number    Ky Major MD Referring [facility] [phone]

## 2022-09-01 ENCOUNTER — TELEPHONE (OUTPATIENT)
Dept: ANTICOAGULATION | Facility: CLINIC | Age: 77
End: 2022-09-01

## 2022-09-02 ENCOUNTER — LAB (OUTPATIENT)
Dept: LAB | Facility: CLINIC | Age: 77
End: 2022-09-02
Payer: COMMERCIAL

## 2022-09-02 ENCOUNTER — ANTICOAGULATION THERAPY VISIT (OUTPATIENT)
Dept: ANTICOAGULATION | Facility: CLINIC | Age: 77
End: 2022-09-02

## 2022-09-02 DIAGNOSIS — I48.0 PAROXYSMAL ATRIAL FIBRILLATION (H): ICD-10-CM

## 2022-09-02 DIAGNOSIS — I26.99 ACUTE PULMONARY EMBOLISM, UNSPECIFIED PULMONARY EMBOLISM TYPE, UNSPECIFIED WHETHER ACUTE COR PULMONALE PRESENT (H): ICD-10-CM

## 2022-09-02 DIAGNOSIS — I48.0 PAROXYSMAL ATRIAL FIBRILLATION WITH RAPID VENTRICULAR RESPONSE (H): ICD-10-CM

## 2022-09-02 DIAGNOSIS — I26.99 ACUTE PULMONARY EMBOLISM, UNSPECIFIED PULMONARY EMBOLISM TYPE, UNSPECIFIED WHETHER ACUTE COR PULMONALE PRESENT (H): Primary | ICD-10-CM

## 2022-09-02 LAB — INR BLD: 2.9 (ref 0.9–1.1)

## 2022-09-02 PROCEDURE — 85610 PROTHROMBIN TIME: CPT

## 2022-09-02 PROCEDURE — 36416 COLLJ CAPILLARY BLOOD SPEC: CPT

## 2022-09-02 NOTE — PROGRESS NOTES
ANTICOAGULATION MANAGEMENT     Ky Martinez 76 year old male is on warfarin with therapeutic INR result. (Goal INR 2.0-3.0)    Recent labs: (last 7 days)     09/02/22  1119   INR 2.9*       ASSESSMENT       Source(s): Chart Review    Previous INR was Therapeutic last 2(+) visits    Medication, diet, health changes since last INR chart reviewed; none identified           PLAN     Recommended plan for no diet, medication or health factor changes affecting INR     Dosing Instructions: Continue your current warfarin dose with next INR in 4 weeks       Summary  As of 9/2/2022    Full warfarin instructions:  2.5 mg every Mon, Fri; 5 mg all other days   Next INR check:  9/30/2022             Detailed voice message left for Tarik with dosing instructions and follow up date.   Sent TapBookAuthor message with dosing and follow up instructions    Contact [phone]  to schedule and with any changes, questions or concerns.     Education provided: Please call back if any changes to your diet, medications or how you've been taking warfarin, Goal range and significance of current result and Contact [phone]  with any changes, questions or concerns.     Plan made per [facility] anticoagulation protocol    Lily Gaines RN  Anticoagulation Clinic  9/2/2022    _______________________________________________________________________     Anticoagulation Episode Summary     Current INR goal:  2.0-3.0   TTR:  78.8 % (1 y)   Target end date:  9/30/2020   Send INR reminders to:  BRANDY MEHTA    Indications    Acute pulmonary embolism  unspecified pulmonary embolism type  unspecified whether acute cor pulmonale present (H) [I26.99]  Paroxysmal atrial fibrillation with rapid ventricular response (H) [I48.0]  Paroxysmal atrial fibrillation (H) [I48.0]           Comments:           Anticoagulation Care Providers     Provider Role Specialty Phone number    Ky Major MD Referring [facility] [phone]

## 2022-10-05 ENCOUNTER — ANTICOAGULATION THERAPY VISIT (OUTPATIENT)
Dept: ANTICOAGULATION | Facility: CLINIC | Age: 77
End: 2022-10-05

## 2022-10-05 ENCOUNTER — LAB (OUTPATIENT)
Dept: LAB | Facility: CLINIC | Age: 77
End: 2022-10-05
Payer: COMMERCIAL

## 2022-10-05 DIAGNOSIS — I48.0 PAROXYSMAL ATRIAL FIBRILLATION (H): ICD-10-CM

## 2022-10-05 DIAGNOSIS — I48.0 PAROXYSMAL ATRIAL FIBRILLATION WITH RAPID VENTRICULAR RESPONSE (H): ICD-10-CM

## 2022-10-05 DIAGNOSIS — I26.99 ACUTE PULMONARY EMBOLISM, UNSPECIFIED PULMONARY EMBOLISM TYPE, UNSPECIFIED WHETHER ACUTE COR PULMONALE PRESENT (H): Primary | ICD-10-CM

## 2022-10-05 DIAGNOSIS — I26.99 ACUTE PULMONARY EMBOLISM, UNSPECIFIED PULMONARY EMBOLISM TYPE, UNSPECIFIED WHETHER ACUTE COR PULMONALE PRESENT (H): ICD-10-CM

## 2022-10-05 LAB — INR BLD: 2.8 (ref 0.9–1.1)

## 2022-10-05 PROCEDURE — 36416 COLLJ CAPILLARY BLOOD SPEC: CPT

## 2022-10-05 PROCEDURE — 85610 PROTHROMBIN TIME: CPT

## 2022-10-05 NOTE — PROGRESS NOTES
ANTICOAGULATION MANAGEMENT     Ky Martinez 76 year old male is on warfarin with therapeutic INR result. (Goal INR 2.0-3.0)    Recent labs: (last 7 days)     10/05/22  0952   INR 2.8*       ASSESSMENT       Source(s): Chart Review and Patient/Caregiver Call       Warfarin doses taken: Warfarin taken as instructed    Diet: No new diet changes identified    New illness, injury, or hospitalization: No    Medication/supplement changes: None noted    Signs or symptoms of bleeding or clotting: No    Previous INR: Therapeutic last 2(+) visits    Additional findings: None       PLAN     Recommended plan for no diet, medication or health factor changes affecting INR     Dosing Instructions: Continue your current warfarin dose with next INR in 5 weeks       Summary  As of 10/5/2022    Full warfarin instructions:  2.5 mg every Mon, Fri; 5 mg all other days   Next INR check:  11/9/2022             Telephone call with Tarik who verbalizes understanding and agrees to plan and who agrees to plan and repeated back plan correctly    Patient offered & declined to schedule next visit    Education provided: None required    Plan made per [facility] anticoagulation protocol    Lucia Canales RN  Anticoagulation Clinic  10/5/2022    _______________________________________________________________________     Anticoagulation Episode Summary     Current INR goal:  2.0-3.0   TTR:  78.8 % (1 y)   Target end date:  9/30/2020   Send INR reminders to:  BRANDY MEHTA    Indications    Acute pulmonary embolism  unspecified pulmonary embolism type  unspecified whether acute cor pulmonale present (H) [I26.99]  Paroxysmal atrial fibrillation with rapid ventricular response (H) [I48.0]  Paroxysmal atrial fibrillation (H) [I48.0]           Comments:           Anticoagulation Care Providers     Provider Role Specialty Phone number    yK Major MD Referring [facility] [phone]

## 2022-10-06 NOTE — PROGRESS NOTES
[place] orders printed and sent to mail.     Lucia Canales, JACQUEN, RN- Coumadin Clinic RN

## 2022-11-17 ENCOUNTER — ANTICOAGULATION THERAPY VISIT (OUTPATIENT)
Dept: ANTICOAGULATION | Facility: CLINIC | Age: 77
End: 2022-11-17

## 2022-11-17 ENCOUNTER — LAB (OUTPATIENT)
Dept: LAB | Facility: CLINIC | Age: 77
End: 2022-11-17
Payer: COMMERCIAL

## 2022-11-17 DIAGNOSIS — I26.99 ACUTE PULMONARY EMBOLISM, UNSPECIFIED PULMONARY EMBOLISM TYPE, UNSPECIFIED WHETHER ACUTE COR PULMONALE PRESENT (H): ICD-10-CM

## 2022-11-17 DIAGNOSIS — I48.0 PAROXYSMAL ATRIAL FIBRILLATION WITH RAPID VENTRICULAR RESPONSE (H): ICD-10-CM

## 2022-11-17 DIAGNOSIS — I48.0 PAROXYSMAL ATRIAL FIBRILLATION (H): ICD-10-CM

## 2022-11-17 DIAGNOSIS — I26.99 ACUTE PULMONARY EMBOLISM, UNSPECIFIED PULMONARY EMBOLISM TYPE, UNSPECIFIED WHETHER ACUTE COR PULMONALE PRESENT (H): Primary | ICD-10-CM

## 2022-11-17 LAB — INR BLD: 2.3 (ref 0.9–1.1)

## 2022-11-17 PROCEDURE — 85610 PROTHROMBIN TIME: CPT

## 2022-11-17 PROCEDURE — 36416 COLLJ CAPILLARY BLOOD SPEC: CPT

## 2022-11-17 NOTE — PROGRESS NOTES
ANTICOAGULATION MANAGEMENT     Ky Martinez 77 year old male is on warfarin with therapeutic INR result. (Goal INR 2.0-3.0)    Recent labs: (last 7 days)     11/17/22  1104   INR 2.3*       ASSESSMENT       Source(s): Chart Review and Patient/Caregiver Call       Warfarin doses taken: Warfarin taken as instructed    Diet: No new diet changes identified    New illness, injury, or hospitalization: No    Medication/supplement changes: None noted    Signs or symptoms of bleeding or clotting: No    Previous INR: Therapeutic last 2(+) visits    Additional findings: None       PLAN     Recommended plan for no diet, medication or health factor changes affecting INR     Dosing Instructions: Continue your current warfarin dose with next INR in 6 weeks       Summary  As of 11/17/2022    Full warfarin instructions:  2.5 mg every Mon, Fri; 5 mg all other days; Starting 11/17/2022   Next INR check:  12/29/2022             Telephone call with Tarik who verbalizes understanding and agrees to plan and who agrees to plan and repeated back plan correctly    Patient using outside facility for labs    Education provided:     Please call back if any changes to your diet, medications or how you've been taking warfarin    Plan made per [facility] anticoagulation protocol    Jessica Chavez, RN  Anticoagulation Clinic  11/17/2022    _______________________________________________________________________     Anticoagulation Episode Summary     Current INR goal:  2.0-3.0   TTR:  78.8 % (1 y)   Target end date:  9/30/2020   Send INR reminders to:  BRANDY MEHTA    Indications    Acute pulmonary embolism  unspecified pulmonary embolism type  unspecified whether acute cor pulmonale present (H) [I26.99]  Paroxysmal atrial fibrillation with rapid ventricular response (H) [I48.0]  Paroxysmal atrial fibrillation (H) [I48.0]           Comments:           Anticoagulation Care Providers     Provider Role Specialty Phone number    Ky Major MD  Referring Family Practice [phone]

## 2022-11-18 ASSESSMENT — ASTHMA QUESTIONNAIRES
ACT_TOTALSCORE: 22
QUESTION_4 LAST FOUR WEEKS HOW OFTEN HAVE YOU USED YOUR RESCUE INHALER OR NEBULIZER MEDICATION (SUCH AS ALBUTEROL): NOT AT ALL
QUESTION_1 LAST FOUR WEEKS HOW MUCH OF THE TIME DID YOUR ASTHMA KEEP YOU FROM GETTING AS MUCH DONE AT WORK, SCHOOL OR AT HOME: NONE OF THE TIME
QUESTION_2 LAST FOUR WEEKS HOW OFTEN HAVE YOU HAD SHORTNESS OF BREATH: ONCE A DAY
QUESTION_3 LAST FOUR WEEKS HOW OFTEN DID YOUR ASTHMA SYMPTOMS (WHEEZING, COUGHING, SHORTNESS OF BREATH, CHEST TIGHTNESS OR PAIN) WAKE YOU UP AT NIGHT OR EARLIER THAN USUAL IN THE MORNING: NOT AT ALL
ACT_TOTALSCORE: 22
QUESTION_5 LAST FOUR WEEKS HOW WOULD YOU RATE YOUR ASTHMA CONTROL: COMPLETELY CONTROLLED

## 2022-11-23 ENCOUNTER — VIRTUAL VISIT (OUTPATIENT)
Dept: FAMILY MEDICINE | Facility: CLINIC | Age: 77
End: 2022-11-23
Payer: COMMERCIAL

## 2022-11-23 DIAGNOSIS — I26.99 ACUTE PULMONARY EMBOLISM, UNSPECIFIED PULMONARY EMBOLISM TYPE, UNSPECIFIED WHETHER ACUTE COR PULMONALE PRESENT (H): ICD-10-CM

## 2022-11-23 DIAGNOSIS — N40.0 BENIGN PROSTATIC HYPERPLASIA, UNSPECIFIED WHETHER LOWER URINARY TRACT SYMPTOMS PRESENT: ICD-10-CM

## 2022-11-23 DIAGNOSIS — I48.0 PAROXYSMAL ATRIAL FIBRILLATION (H): ICD-10-CM

## 2022-11-23 DIAGNOSIS — Z00.00 ENCOUNTER FOR MEDICAL EXAMINATION TO ESTABLISH CARE: Primary | ICD-10-CM

## 2022-11-23 DIAGNOSIS — E78.5 HYPERLIPIDEMIA LDL GOAL <100: ICD-10-CM

## 2022-11-23 DIAGNOSIS — I25.10 CORONARY ARTERY DISEASE INVOLVING NATIVE CORONARY ARTERY OF NATIVE HEART WITHOUT ANGINA PECTORIS: ICD-10-CM

## 2022-11-23 DIAGNOSIS — I10 BENIGN ESSENTIAL HYPERTENSION: ICD-10-CM

## 2022-11-23 DIAGNOSIS — I50.22 CHRONIC SYSTOLIC HEART FAILURE (H): ICD-10-CM

## 2022-11-23 DIAGNOSIS — E66.01 MORBID OBESITY (H): ICD-10-CM

## 2022-11-23 PROCEDURE — 99214 OFFICE O/P EST MOD 30 MIN: CPT | Mod: 95 | Performed by: FAMILY MEDICINE

## 2022-11-23 RX ORDER — TAMSULOSIN HYDROCHLORIDE 0.4 MG/1
CAPSULE ORAL
Qty: 90 CAPSULE | Refills: 2 | Status: SHIPPED | OUTPATIENT
Start: 2022-11-23 | End: 2023-07-10

## 2022-11-23 RX ORDER — METOPROLOL SUCCINATE 50 MG/1
TABLET, EXTENDED RELEASE ORAL
Qty: 90 TABLET | Refills: 2 | Status: SHIPPED | OUTPATIENT
Start: 2022-11-23 | End: 2023-07-10

## 2022-11-23 RX ORDER — LOSARTAN POTASSIUM 50 MG/1
TABLET ORAL
Qty: 90 TABLET | Refills: 2 | Status: SHIPPED | OUTPATIENT
Start: 2022-11-23 | End: 2023-07-10

## 2022-11-23 RX ORDER — FINASTERIDE 5 MG/1
TABLET, FILM COATED ORAL
Qty: 90 TABLET | Refills: 2 | Status: SHIPPED | OUTPATIENT
Start: 2022-11-23 | End: 2023-07-10

## 2022-11-23 RX ORDER — FUROSEMIDE 40 MG
TABLET ORAL
Qty: 90 TABLET | Refills: 2 | Status: SHIPPED | OUTPATIENT
Start: 2022-11-23 | End: 2023-07-10

## 2022-11-23 RX ORDER — POTASSIUM CHLORIDE 1500 MG/1
TABLET, EXTENDED RELEASE ORAL
Qty: 90 TABLET | Refills: 2 | Status: SHIPPED | OUTPATIENT
Start: 2022-11-23 | End: 2023-05-02

## 2022-11-23 RX ORDER — ISOSORBIDE MONONITRATE 30 MG/1
TABLET, EXTENDED RELEASE ORAL
Qty: 90 TABLET | Refills: 2 | Status: SHIPPED | OUTPATIENT
Start: 2022-11-23 | End: 2023-07-10

## 2022-11-23 NOTE — PROGRESS NOTES
Tarik is a 77 year old who is being evaluated via a billable telephone visit.      What phone number would you like to be contacted at? [phone]  [facility]    Assessment & Plan     Encounter for medical examination to establish care  Patient has been under the care of  who is retiring. Patient requests transfer care to me.   Patient medications reconciled, PMH and Problem list reviewed and  update      Acute pulmonary embolism, unspecified pulmonary embolism type, unspecified whether acute cor pulmonale present (H)  Chronic, stable. The current medical regimen is effective;  continue present plan and medications.     Chronic systolic heart failure (H)  Chronic stable, followed by Cardiology. The current medical regimen is effective;  continue present plan and medications.     Paroxysmal atrial fibrillation (H)  Chronic stable. Anticoagulated and rate control with Metoprolol. Followed by Cardiology. The current medical regimen is effective;  continue present plan and medications.     Morbid obesity (H)    Coronary artery disease involving native coronary artery of native heart without angina pectoris  Chronic stable. Anticoagulated and rate control with Metoprolol. Followed by Cardiology. The current medical regimen is effective;  continue present plan and medications.   Tolerating hd Statin, ARB and beta blocker.    Benign prostatic hyperplasia, unspecified whether lower urinary tract symptoms present  Chronic stable. The current medical regimen is effective;  continue present plan and medications.   - tamsulosin (FLOMAX) 0.4 MG capsule; TAKE 1 CAPSULE (0.4MG) BY MOUTH ONCE DAILY    Hyperlipidemia LDL goal <100  Chronic stable. The current medical regimen is effective;  continue present plan and medications.   - furosemide (LASIX) 40 MG tablet; TAKE 1 TABLET (40MG) BY MOUTH ONCE DAILY  - isosorbide mononitrate (IMDUR) 30 MG 24 hr tablet; TAKE 1 TABLET (30MG) BY  "MOUTH ONCE DAILY  - metoprolol succinate ER (TOPROL XL) 50 MG 24 hr tablet; TAKE 1 TABLET (50MG) BY MOUTH ONCE DAILY  - potassium chloride ER (KLOR-CON M) 20 MEQ CR tablet; TAKE 1 TABLET (20MEQ) BY MOUTH ONCE DAILY    Benign essential hypertension  Chronic stable. .conit   - losartan (COZAAR) 50 MG tablet; TAKE 1 TABLET (50MG) BY MOUTH ONCE DAILY    Prescription drug management         BMI:   Estimated body mass index is 40.73 kg/m  as calculated from the following:    Height as of 4/18/22: 1.797 m (5' 10.75\").    Weight as of 4/18/22: 131.5 kg (290 lb).       SELF MONITORING:       - Please check blood pressure readings daily  Work on weight loss  Regular exercise    Return in about 6 months (around 5/23/2023) for Follow up, Routine preventive, with me, in person.    Marcin Rios MD  [facility] JANINE Montanez is a 77 year old, presenting for the following health issues:  Establish Care      History of Present Illness       Reason for visit:  My family Dr. Ky Major is retiring    He eats 0-1 servings of fruits and vegetables daily.He consumes 0 sweetened beverage(s) daily.He exercises with enough effort to increase his heart rate 9 or less minutes per day.  He exercises with enough effort to increase his heart rate 3 or less days per week.   He is taking medications regularly.         Current Outpatient Medications   Medication Sig Dispense Refill     acetaminophen (TYLENOL) 325 MG tablet Take 325 mg by mouth 2 tablets daily       albuterol (PROAIR HFA) 108 (90 Base) MCG/ACT inhaler Inhale 1-2 puffs into the lungs every 4 hours as needed for shortness of breath / dyspnea 1 Inhaler 1     atorvastatin (LIPITOR) 80 MG tablet TAKE 1 TABLET (80MG) BY MOUTH ONCE DAILY 90 tablet 3     DAILY MULTI OR 1 TABLET DAILY       finasteride (PROSCAR) 5 MG tablet TAKE 1 TABLET (5MG) BY MOUTH ONCE DAILY 90 tablet 2     furosemide (LASIX) 40 MG tablet TAKE 1 TABLET (40MG) BY MOUTH ONCE DAILY 90 " tablet 2     isosorbide mononitrate (IMDUR) 30 MG 24 hr tablet TAKE 1 TABLET (30MG) BY MOUTH ONCE DAILY 90 tablet 2     losartan (COZAAR) 50 MG tablet TAKE 1 TABLET (50MG) BY MOUTH ONCE DAILY 90 tablet 2     metoprolol succinate ER (TOPROL XL) 50 MG 24 hr tablet TAKE 1 TABLET (50MG) BY MOUTH ONCE DAILY 90 tablet 2     nitroGLYcerin (NITROSTAT) 0.4 MG sublingual tablet 0.4 mg As needed       potassium chloride ER (KLOR-CON M) 20 MEQ CR tablet TAKE 1 TABLET (20MEQ) BY MOUTH ONCE DAILY 90 tablet 2     tamsulosin (FLOMAX) 0.4 MG capsule TAKE 1 CAPSULE (0.4MG) BY MOUTH ONCE DAILY 90 capsule 2     warfarin ANTICOAGULANT (COUMADIN) 5 MG tablet Take 2.5 mg Mon, Fri and 5 mg all other days, or as directed by the coumadin clinic 80 tablet 1       Patient Active Problem List   Diagnosis     Other acute and subacute form of ischemic heart disease     Intermittent asthma     Hyperlipidemia     Benign prostatic hyperplasia     Coronary artery disease involving native coronary artery of native heart without angina pectoris     Acute deep vein thrombosis (DVT) of other vein of left upper extremity (H)     S/P ICD (internal cardiac defibrillator) procedure     Chronic systolic heart failure (H)     Hypertension     Ischemic cardiomyopathy     Non-STEMI (non-ST elevated myocardial infarction) (H)     Coronary atherosclerosis     Morbid obesity (H)     Acute pulmonary embolism - bilateral with possible right sided heart strain on CT     Hypoxemia     Bilateral leg edema - right more than left     Shortness of breath     Pulmonary emboli (H)     Atrophy of left kidney     Acute pulmonary embolism, unspecified pulmonary embolism type, unspecified whether acute cor pulmonale present (H)     Primary osteoarthritis of right shoulder     Paroxysmal atrial fibrillation with rapid ventricular response (H)     Paroxysmal atrial fibrillation (H)     Immunization History   Administered Date(s) Administered     COVID-19 Vaccine 18+ (Moderna)  02/13/2021, 03/13/2021     COVID-19 Vaccine Bivalent Booster 18+ (Moderna) 11/02/2022     COVID-19,PF,Moderna Booster 11/19/2021, 04/18/2022     FLU 6-35 months 11/27/2009     Influenza (High Dose) 3 valent vaccine 12/06/2013, 10/22/2014, 10/29/2015, 10/28/2016, 12/24/2018, 12/09/2019, 10/25/2020     Influenza (IIV3) PF 12/10/2007, 10/29/2008, 11/27/2009     Influenza Vaccine 65+ (Fluzone HD) 11/12/2021, 11/02/2022     Pneumo Conj 13-V (2010&after) 10/29/2015     TDAP Vaccine (Adacel) 10/28/2016     TDAP Vaccine (Boostrix) 10/28/2016     Health Maintenance Due   Topic Date Due     HEPATITIS C SCREENING  Never done     ZOSTER IMMUNIZATION (1 of 2) Never done     Pneumococcal Vaccine: 65+ Years (2 - PPSV23 if available, else PCV20) 10/29/2016     ASTHMA ACTION PLAN  04/22/2020         Review of Systems   Constitutional, HEENT, cardiovascular, pulmonary, gi and gu systems are negative, except as otherwise noted.      Objective           Vitals:  No vitals were obtained today due to virtual visit.    Physical Exam   healthy, alert and no distress  PSYCH: Alert and oriented times 3; coherent speech, normal   rate and volume, able to articulate logical thoughts, able   to abstract reason, no tangential thoughts, no hallucinations   or delusions  His affect is normal  RESP: No cough, no audible wheezing, able to talk in full sentences  Remainder of exam unable to be completed due to telephone visits    Lab on 11/17/2022   Component Date Value Ref Range Status     INR 11/17/2022 2.3 (H)  0.9 - 1.1 Final               Phone call duration: 10 minutes

## 2022-12-02 DIAGNOSIS — I26.99 ACUTE PULMONARY EMBOLISM, UNSPECIFIED PULMONARY EMBOLISM TYPE, UNSPECIFIED WHETHER ACUTE COR PULMONALE PRESENT (H): ICD-10-CM

## 2022-12-05 DIAGNOSIS — I26.99 ACUTE PULMONARY EMBOLISM, UNSPECIFIED PULMONARY EMBOLISM TYPE, UNSPECIFIED WHETHER ACUTE COR PULMONALE PRESENT (H): ICD-10-CM

## 2022-12-05 RX ORDER — WARFARIN SODIUM 5 MG/1
TABLET ORAL
Qty: 80 TABLET | Refills: 0 | Status: SHIPPED | OUTPATIENT
Start: 2022-12-05 | End: 2023-06-05

## 2022-12-06 RX ORDER — WARFARIN SODIUM 5 MG/1
TABLET ORAL
Qty: 80 TABLET | Refills: 0 | Status: SHIPPED | OUTPATIENT
Start: 2022-12-06 | End: 2023-07-10

## 2022-12-06 NOTE — TELEPHONE ENCOUNTER
ANTICOAGULATION MANAGEMENT:  Medication Refill    Anticoagulation Summary  As of 11/17/2022    Warfarin maintenance plan:  2.5 mg (5 mg x 0.5) every Mon, Fri; 5 mg (5 mg x 1) all other days; Starting 11/17/2022   Next INR check:  12/29/2022   Target end date:  9/30/2020    Indications    Acute pulmonary embolism  unspecified pulmonary embolism type  unspecified whether acute cor pulmonale present (H) [I26.99]  Paroxysmal atrial fibrillation with rapid ventricular response (H) [I48.0]  Paroxysmal atrial fibrillation (H) [I48.0]             Anticoagulation Care Providers     Provider Role Specialty Phone number    Ky Major MD Referring [facility] [phone]          Visit with referring provider/group within last year: Yes    [facility] referral signed within last year: Yes    Ky meets all criteria for refill (current [facility] referral, office visit with referring provider/group in last year, lab monitoring up to date or not exceeding 2 weeks overdue). Rx instructions and quantity supplied updated to match patient's current dosing plan. Warfarin 90 day supply with 1 refill granted per [facility] protocol     Ashleigh Savage, RN  Anticoagulation Clinic

## 2022-12-16 ENCOUNTER — LAB (OUTPATIENT)
Dept: LAB | Facility: CLINIC | Age: 77
End: 2022-12-16
Payer: COMMERCIAL

## 2022-12-16 ENCOUNTER — ANTICOAGULATION THERAPY VISIT (OUTPATIENT)
Dept: ANTICOAGULATION | Facility: CLINIC | Age: 77
End: 2022-12-16

## 2022-12-16 DIAGNOSIS — I48.0 PAROXYSMAL ATRIAL FIBRILLATION WITH RAPID VENTRICULAR RESPONSE (H): ICD-10-CM

## 2022-12-16 DIAGNOSIS — I48.0 PAROXYSMAL ATRIAL FIBRILLATION (H): ICD-10-CM

## 2022-12-16 DIAGNOSIS — I26.99 ACUTE PULMONARY EMBOLISM, UNSPECIFIED PULMONARY EMBOLISM TYPE, UNSPECIFIED WHETHER ACUTE COR PULMONALE PRESENT (H): ICD-10-CM

## 2022-12-16 LAB — INR BLD: 2.5 (ref 0.9–1.1)

## 2022-12-16 PROCEDURE — 85610 PROTHROMBIN TIME: CPT

## 2022-12-16 PROCEDURE — 36416 COLLJ CAPILLARY BLOOD SPEC: CPT

## 2022-12-16 NOTE — PROGRESS NOTES
ANTICOAGULATION MANAGEMENT     Ky Matrinez 77 year old male is on warfarin with therapeutic INR result. (Goal INR 2.0-3.0)    Recent labs: (last 7 days)     12/16/22  1401   INR 2.5*       ASSESSMENT       Source(s): Chart Review and Patient/Caregiver Call       Warfarin doses taken: Warfarin taken as instructed    Diet: No new diet changes identified    New illness, injury, or hospitalization: No    Medication/supplement changes: None noted    Signs or symptoms of bleeding or clotting: No    Previous INR: Therapeutic last 2(+) visits    Additional findings: Pt headed to [place] until March. Pt will be using outside facility for labs.       PLAN     Recommended plan for no diet, medication or health factor changes affecting INR     Dosing Instructions: Continue your current warfarin dose with next INR in 6 weeks       Summary  As of 12/16/2022    Full warfarin instructions:  2.5 mg every Mon, Fri; 5 mg all other days; Starting 12/16/2022   Next INR check:  1/27/2023             Telephone call with Tarik who verbalizes understanding and agrees to plan    Patient using outside facility for labs    Education provided:     Contact [phone]  with any changes, questions or concerns.     Plan made per [facility] anticoagulation protocol    Jesusita Maloney RN  Anticoagulation Clinic  12/16/2022    _______________________________________________________________________     Anticoagulation Episode Summary     Current INR goal:  2.0-3.0   TTR:  78.8 % (1 y)   Target end date:  9/30/2020   Send INR reminders to:  BRANDY MEHTA    Indications    Acute pulmonary embolism  unspecified pulmonary embolism type  unspecified whether acute cor pulmonale present (H) [I26.99]  Paroxysmal atrial fibrillation with rapid ventricular response (H) [I48.0]  Paroxysmal atrial fibrillation (H) [I48.0]           Comments:           Anticoagulation Care Providers     Provider Role Specialty Phone number    Ky Major MD Referring  Family Practice [phone]

## 2023-02-01 ENCOUNTER — TRANSFERRED RECORDS (OUTPATIENT)
Dept: HEALTH INFORMATION MANAGEMENT | Facility: CLINIC | Age: 78
End: 2023-02-01
Payer: COMMERCIAL

## 2023-02-01 ENCOUNTER — ANTICOAGULATION THERAPY VISIT (OUTPATIENT)
Dept: ANTICOAGULATION | Facility: CLINIC | Age: 78
End: 2023-02-01
Payer: COMMERCIAL

## 2023-02-01 DIAGNOSIS — I26.99 ACUTE PULMONARY EMBOLISM, UNSPECIFIED PULMONARY EMBOLISM TYPE, UNSPECIFIED WHETHER ACUTE COR PULMONALE PRESENT (H): Primary | ICD-10-CM

## 2023-02-01 DIAGNOSIS — I48.0 PAROXYSMAL ATRIAL FIBRILLATION WITH RAPID VENTRICULAR RESPONSE (H): ICD-10-CM

## 2023-02-01 DIAGNOSIS — I48.0 PAROXYSMAL ATRIAL FIBRILLATION (H): ICD-10-CM

## 2023-02-01 LAB — INR (EXTERNAL): 2.2 (ref 0.9–1.1)

## 2023-02-01 NOTE — PROGRESS NOTES
Incoming fax from [facility] in [facility], [place]    INR 2.2 on 2/1/23 at 10:39 a.m.     Routing to managing [facility] pool     Jude Baron RN

## 2023-02-01 NOTE — PROGRESS NOTES
ANTICOAGULATION MANAGEMENT     Ky Martinez 77 year old male is on warfarin with therapeutic INR result. (Goal INR 2.0-3.0)    Recent labs: (last 7 days)     02/01/23  1039   INR 2.2*       ASSESSMENT       Source(s): Chart Review and Patient/Caregiver Call       Warfarin doses taken: Warfarin taken as instructed    Diet: No new diet changes identified    New illness, injury, or hospitalization: No    Medication/supplement changes: None noted    Signs or symptoms of bleeding or clotting: No    Previous INR: Therapeutic last 2(+) visits    Additional findings: None       PLAN     Recommended plan for no diet, medication or health factor changes affecting INR     Dosing Instructions: Continue your current warfarin dose with next INR in 6 weeks       Summary  As of 2/1/2023    Full warfarin instructions:  2.5 mg every Mon, Fri; 5 mg all other days   Next INR check:  3/15/2023             Telephone call with Tarik who verbalizes understanding and agrees to plan    Patient using outside facility for labs    Education provided:     Please call back if any changes to your diet, medications or how you've been taking warfarin    Plan made per [facility] anticoagulation protocol    Ashleigh Savage, RN  Anticoagulation Clinic  2/1/2023    _______________________________________________________________________     Anticoagulation Episode Summary     Current INR goal:  2.0-3.0   TTR:  78.8 % (1 y)   Target end date:  9/30/2020   Send INR reminders to:  BRNADY MEHTA    Indications    Acute pulmonary embolism  unspecified pulmonary embolism type  unspecified whether acute cor pulmonale present (H) [I26.99]  Paroxysmal atrial fibrillation with rapid ventricular response (H) [I48.0]  Paroxysmal atrial fibrillation (H) [I48.0]           Comments:           Anticoagulation Care Providers     Provider Role Specialty Phone number    Ky Major MD [facility] [phone]

## 2023-03-09 ENCOUNTER — TELEPHONE (OUTPATIENT)
Dept: ANTICOAGULATION | Facility: CLINIC | Age: 78
End: 2023-03-09
Payer: COMMERCIAL

## 2023-03-09 DIAGNOSIS — I48.0 PAROXYSMAL ATRIAL FIBRILLATION (H): ICD-10-CM

## 2023-03-09 DIAGNOSIS — I26.99 ACUTE PULMONARY EMBOLISM, UNSPECIFIED PULMONARY EMBOLISM TYPE, UNSPECIFIED WHETHER ACUTE COR PULMONALE PRESENT (H): Primary | ICD-10-CM

## 2023-03-09 DIAGNOSIS — I48.0 PAROXYSMAL ATRIAL FIBRILLATION WITH RAPID VENTRICULAR RESPONSE (H): ICD-10-CM

## 2023-03-09 NOTE — TELEPHONE ENCOUNTER
ANTICOAGULATION CLINIC REFERRAL RENEWAL REQUEST       An annual renewal order is required for all patients referred to [facility]tico[facility].?  Please review and sign the pended referral order for Ky Martinez.       ANTICOAGULATION SUMMARY      Warfarin indication(s)   Atrial Fibrillation and PE    Mechanical heart valve present?  NO       Current goal range   INR: 2.0-3.0     Goal appropriate for indication? Goal INR 2-3, standard for indication(s) above     Time in Therapeutic Range (TTR)  (Goal > 60%) 78.8%       Office visit with referring provider's group within last year yes on 11/23/22       Lucia Canales RN  [facility]tico[facility]

## 2023-03-10 DIAGNOSIS — I26.99 PULMONARY EMBOLISM (H): Primary | ICD-10-CM

## 2023-03-15 ENCOUNTER — TRANSFERRED RECORDS (OUTPATIENT)
Dept: HEALTH INFORMATION MANAGEMENT | Facility: CLINIC | Age: 78
End: 2023-03-15
Payer: COMMERCIAL

## 2023-03-16 ENCOUNTER — ANTICOAGULATION THERAPY VISIT (OUTPATIENT)
Dept: ANTICOAGULATION | Facility: CLINIC | Age: 78
End: 2023-03-16
Payer: COMMERCIAL

## 2023-03-16 DIAGNOSIS — I48.0 PAROXYSMAL ATRIAL FIBRILLATION (H): ICD-10-CM

## 2023-03-16 DIAGNOSIS — I48.0 PAROXYSMAL ATRIAL FIBRILLATION WITH RAPID VENTRICULAR RESPONSE (H): ICD-10-CM

## 2023-03-16 DIAGNOSIS — I26.99 ACUTE PULMONARY EMBOLISM, UNSPECIFIED PULMONARY EMBOLISM TYPE, UNSPECIFIED WHETHER ACUTE COR PULMONALE PRESENT (H): Primary | ICD-10-CM

## 2023-03-16 LAB — INR (EXTERNAL): 2.6 (ref 0.9–1.1)

## 2023-03-16 NOTE — PROGRESS NOTES
ANTICOAGULATION MANAGEMENT     Ky Martinez 77 year old male is on warfarin with therapeutic INR result. (Goal INR 2.0-3.0)    Recent labs: (last 7 days)     03/15/23  0800   INR 2.6*       ASSESSMENT       Source(s): Chart Review    Previous INR was Therapeutic last 2(+) visits    Medication, diet, health changes since last INR chart reviewed; none identified             PLAN     Recommended plan for no diet, medication or health factor changes affecting INR     Dosing Instructions: Continue your current warfarin dose with next INR in 6 weeks       Summary  As of 3/16/2023    Full warfarin instructions:  2.5 mg every Mon, Fri; 5 mg all other days   Next INR check:  4/27/2023             Detailed voice message left for Tarik with dosing instructions and follow up date.     Contact [phone]  to schedule and with any changes, questions or concerns.     Education provided:     Contact [phone]  with any changes, questions or concerns.     Plan made per [facility] anticoagulation protocol    Dennise Zuniga RN  Anticoagulation Clinic  3/16/2023    _______________________________________________________________________     Anticoagulation Episode Summary     Current INR goal:  2.0-3.0   TTR:  88.3 % (1 y)   Target end date:  Indefinite   Send INR reminders to:  BRANDY MEHTA    Indications    Acute pulmonary embolism  unspecified pulmonary embolism type  unspecified whether acute cor pulmonale present (H) [I26.99]  Paroxysmal atrial fibrillation with rapid ventricular response (H) [I48.0]  Paroxysmal atrial fibrillation (H) [I48.0]           Comments:           Anticoagulation Care Providers     Provider Role Specialty Phone number    Ky Major MD Referring Family Practice [phone]    Marcin Rios MD Referring Family Medicine [phone]

## 2023-03-16 NOTE — PROGRESS NOTES
Incoming fax from [facility] [place]    Date of result 3/15 at 1337    INR result 2.6

## 2023-04-05 NOTE — PROGRESS NOTES
Ky Martinez  :  1945  DOS: 2023  MRN: [id number]  PCP: Marcin Rios    Sports Medicine Clinic Visit      HPI  Ky Martinez is a 77 year old male who is seen as a self referral presenting with left knee pain.    - Mechanism of Injury:   Pain is overall chronic with recent exacerbation during snowblowing in 2022.  Had increase in swelling, hot to the touch and severe pain at that time.   - Prior evaluation: Injection of left knee performed in [place] on 2023, that provided temporary relief of his symptoms.      - Pain Character:  Pain has been present for for years with increase in pain 4 months ago.  Pain is well localized to the medial left knee without significant radiation.  - Endorses:  Swelling intermittently, pain as described above.  - Denies:  numbness, tingling, radicular pain, popping, grinding, weakness, instability, mechanical locking symptoms.  - Alleviating factors: Corticosteroid injection lasted 1 week of relief. Knee brace  - Aggravating factors: Ambulating, going down and up stairs, end of the day  - Treatments tried: corticosteroid injection, non-hinged knee brace, icing at night    - Patient Goals:  discuss treatment options  - Social History: retired    - Pertinent PMH:  Acute pulmonary embolism and paroxysmal atrial fibrillation with ICD and on warfarin.        Review of Systems  Musculoskeletal: as above  Remainder of review of systems is negative including constitutional, CV, pulmonary, GI, Skin and Neurologic except as noted in HPI or medical history.    Past Medical History:   Diagnosis Date     Hypertension      Myocardial infarction (H)      Past Surgical History:   Procedure Laterality Date     Cardiac stent       COLONOSCOPY N/A 2019    Procedure: Colonoscopy, Polypectomy by Snare;  Surgeon: Gian Horn DO;  [facility]     EXCISE GANGLION WRIST Left 2014    Procedure: EXCISE GANGLION WRIST;  Surgeon: Gian Haddad,  MD;  [facility]     ICD DEVICE INTERROGATE      2017     ORTHOPEDIC SURGERY       RELEASE CARPAL TUNNEL Left 11/26/2014    Procedure: RELEASE CARPAL TUNNEL;  Surgeon: Gian Haddad MD;  [facility]     RELEASE DEQUERVAINS WRIST Left 11/26/2014    Procedure: RELEASE DEQUERVAINS WRIST;  Surgeon: Gian Haddad MD;  [facility]     Family History   Problem Relation Age of Onset     Arthritis Mother      Diabetes Father      Allergies Father         sulfa     Eye Disorder Father         cataract     Heart Disease Father         by pass         Objective  /82   Wt 131.5 kg (290 lb)   BMI 40.73 kg/m        General: healthy, alert and in no acute distress.      HEENT: no scleral icterus or conjunctival erythema.     Skin: no suspicious lesions or rash. No jaundice.     CV: regular rhythm by palpation, 2+ distal pulses.    Resp: normal respiratory effort without conversational dyspnea.     Psych: normal mood and affect.      Gait: nonantalgic, appropriate coordination and balance.     Neuro:        - Sensation to light touch:    - Intact throughout the BLE including all peripheral nerve distributions.        - MSR:      RLE  LLE  - Patella 2+ 2+  - Achilles 2+ 2+       - Special tests:   - Slump/SLR:  Neg bilaterally    MSK - Knee:       - Inspection:    - No significant swelling, erythema, warmth, ecchymosis, lesion.        - ROM:    - Full AROM/PROM with anterior knee pain during knee flexion/extension.       - Palpation:    - TTP at the medial joint line, lateral joint line.  - NTTP elsewhere.        - Strength:  (*antalgic)  RLE LLE  - Hip Flexion  5 5   - Hip Extension 5 5   - Hip Abduction 5 5   - Hip Adduction 5 5  - Knee Flexion  5 5  - Knee Extension 5 5  - Dorsiflexion  5 5  - Plantarflexion 5 5  - Ext. Chan. Longus 5 5  - Inversion  5 5  - Eversion  5 5       - Special tests:        - Lachman: Positive for soft endpoint on the left as compared to the right.        - A/P drawer:  Positive for mild laxity in the left.       - Pivot shift:  Neg    - Donny: Positive for mild medial compartment pain     - Varus stress:  Neg for laxity or pain    - Valgus stress:  Neg for laxity or pain   - Patellar grind:  Neg   - Thessaly: Positive for medial compartment pain and feeling of buckling      Radiology  I independently reviewed the available relevant imaging, with the following interpretation:   - XR right knee 10/8/2020 has views of the left knee as well which shows mild tricompartmental OA bilaterally, worst medially.    I independently reviewed today's new relevant imaging, with the following interpretation:  - XR L knee shows normal anatomic alignment, mild degenerative changes of the medial and patellofemoral compartments, no acute fractures or dislocations, small knee joint effusion.      Assessment  1. Primary osteoarthritis of left knee    2. Rupture of anterior cruciate ligament of left knee, initial encounter    3. Tear of medial meniscus of left knee, current, unspecified tear type, initial encounter        Plan  Ky Martinez is a 77 year old male that presents with acute on chronic left knee pain that seems to be worsening over the past few months.  There has been some swelling, pain with activities like stairs, instability.  Received a corticosteroid injection in January at an outside facility that provided good short-term relief. History and physical exam appear most consistent with primary osteoarthritis of the knee, primarily affecting the medial compartment.  There is also at least some concern for a chronic ACL tear, medial meniscus tear based on the history and physical exam today. Discussed the nature of the condition and treatment options and mutually agreed upon the following plan:    - Imaging:          - Reviewed relevant imaging in the chart.  -XR L knee ordered today pre-clinic and independently interpreted by myself.    - Reviewed results and images with patient.  -It  will be important to evaluate the integrity of the soft tissues of the knee with advanced imaging (MRI).  MR L knee order placed today and instructions given for scheduling.  - Medications:          - Discussed pharmacologic options for pain relief.   - May use NSAIDs (Ibuprofen, Naproxen) or Acetaminophen (Tylenol) as needed for pain control.   - May also use topical medications such as lidocaine, IcyHot, BioFreeze, or Voltaren gel as needed for pain control.   - Injections:          - Discussed possible injection options and alternatives.    - Options include intra-articular knee joint injections with corticosteroid, viscosupplementation, or PRP.   - Deferred injections today and will consider them in the future as needed.   - Therapy:          - Discussed the benefits of physical therapy vs home exercise program for optimization of range of motion, flexibility, strength, stability and function.   - We will formalize a therapy plan after results of the MRI are taken in consideration.  - Modalities:          - May use ice, heat, massage or other modalities as needed.   - Bracing:          - Discussed bracing options and recommend using a knee stability brace, and we will prescribe the appropriate brace when results of the MRI are obtained.  - Activity:     - Encouraged to rest and protect the injured area from further injury.  Avoid exacerbating activities and activities that are high-risk for falls.   - Follow up:          - When results of the MRI are obtained to discuss the results, for re-evaluation and update to treatment plan, or sooner for new/worsening symptoms.  - Patient has clinic contact information for questions or concerns.       Eduarod Soriano DO, RADHAM  [facility] - Sports Medicine  [facility] - Department of Orthopedic Surgery       Disclaimer:  This note was prepared and written using Dragon Medical dictation software. As a result, there may be errors in the script  that have gone undetected. Please consider this when interpreting the information in this note.

## 2023-04-06 ENCOUNTER — OFFICE VISIT (OUTPATIENT)
Dept: ORTHOPEDICS | Facility: CLINIC | Age: 78
End: 2023-04-06
Payer: COMMERCIAL

## 2023-04-06 ENCOUNTER — ANCILLARY PROCEDURE (OUTPATIENT)
Dept: GENERAL RADIOLOGY | Facility: CLINIC | Age: 78
End: 2023-04-06
Attending: STUDENT IN AN ORGANIZED HEALTH CARE EDUCATION/TRAINING PROGRAM
Payer: COMMERCIAL

## 2023-04-06 VITALS — BODY MASS INDEX: 40.73 KG/M2 | WEIGHT: 290 LBS | DIASTOLIC BLOOD PRESSURE: 82 MMHG | SYSTOLIC BLOOD PRESSURE: 130 MMHG

## 2023-04-06 DIAGNOSIS — M17.12 PRIMARY OSTEOARTHRITIS OF LEFT KNEE: Primary | ICD-10-CM

## 2023-04-06 DIAGNOSIS — S83.512A RUPTURE OF ANTERIOR CRUCIATE LIGAMENT OF LEFT KNEE, INITIAL ENCOUNTER: ICD-10-CM

## 2023-04-06 DIAGNOSIS — M25.562 CHRONIC PAIN OF LEFT KNEE: ICD-10-CM

## 2023-04-06 DIAGNOSIS — S83.242A TEAR OF MEDIAL MENISCUS OF LEFT KNEE, CURRENT, UNSPECIFIED TEAR TYPE, INITIAL ENCOUNTER: ICD-10-CM

## 2023-04-06 DIAGNOSIS — G89.29 CHRONIC PAIN OF LEFT KNEE: ICD-10-CM

## 2023-04-06 PROCEDURE — 73564 X-RAY EXAM KNEE 4 OR MORE: CPT | Mod: TC | Performed by: RADIOLOGY

## 2023-04-06 PROCEDURE — 99204 OFFICE O/P NEW MOD 45 MIN: CPT | Performed by: STUDENT IN AN ORGANIZED HEALTH CARE EDUCATION/TRAINING PROGRAM

## 2023-04-06 ASSESSMENT — PAIN SCALES - GENERAL: PAINLEVEL: MILD PAIN (2)

## 2023-04-06 NOTE — PATIENT INSTRUCTIONS
Hema Ky Martinez ,     A copy of your assessment and our treatment plan that we discussed together is included below, as written in your medical chart.   If you have any questions, please feel free to call the clinic.     --------------------------------------------------  Ky Martinez is a 77 year old male that presents with acute on chronic left knee pain that seems to be worsening over the past few months.  There has been some swelling, pain with activities like stairs, instability.  Received a corticosteroid injection in January at an outside facility that provided good short-term relief. History and physical exam appear most consistent with primary osteoarthritis of the knee, primarily affecting the medial compartment.  There is also at least some concern for a chronic ACL tear, medial meniscus tear based on the history and physical exam today. Discussed the nature of the condition and treatment options and mutually agreed upon the following plan:    - Imaging:          - Reviewed relevant imaging in the chart.  -XR L knee ordered today pre-clinic and independently interpreted by myself.    - Reviewed results and images with patient.  -It will be important to evaluate the integrity of the soft tissues of the knee with advanced imaging (MRI).  MR L knee order placed today and instructions given for scheduling.  - Medications:          - Discussed pharmacologic options for pain relief.   - May use NSAIDs (Ibuprofen, Naproxen) or Acetaminophen (Tylenol) as needed for pain control.   - May also use topical medications such as lidocaine, IcyHot, BioFreeze, or Voltaren gel as needed for pain control.   - Injections:          - Discussed possible injection options and alternatives.    - Options include intra-articular knee joint injections with corticosteroid, viscosupplementation, or PRP.   - Deferred injections today and will consider them in the future as needed.   - Therapy:          - Discussed the benefits  of physical therapy vs home exercise program for optimization of range of motion, flexibility, strength, stability and function.   - We will formalize a therapy plan after results of the MRI are taken in consideration.  - Modalities:          - May use ice, heat, massage or other modalities as needed.   - Bracing:          - Discussed bracing options and recommend using a knee stability brace, and we will prescribe the appropriate brace when results of the MRI are obtained.  - Activity:     - Encouraged to rest and protect the injured area from further injury.  Avoid exacerbating activities and activities that are high-risk for falls.   - Follow up:          - When results of the MRI are obtained to discuss the results, for re-evaluation and update to treatment plan, or sooner for new/worsening symptoms.  - Patient has clinic contact information for questions or concerns.   --------------------------------------------------    It was a pleasure seeing you today. Thank you for choosing [facility] for your care.       Eduardo Soriano DO, CAQSM  [facility] - Sports Medicine  [facility] - Department of Orthopedic Surgery     Disclaimer:  This note was prepared and written using Dragon Medical dictation software. As a result, there may be errors in the script that have gone undetected. Please consider this when interpreting the information in this note.     ---------------------------------  Advanced imaging is done by appointment. Please call [facility] ([facility] [phone]  to schedule your MRI.     Some insurance companies may require a prior authorization to be completed which can delay the time until you are able to schedule your appointment.       If you are active on [employer], you may have access to your test results before your provider is able to review the study and advise on next steps.      The clinic will contact you with results.  If you have not heard from the clinic within 2-3 days following your MRI, please contact us at the number listed below.

## 2023-04-06 NOTE — LETTER
2023         RE: Ky Martinez  [id number]  [place] [id number]        Dear Colleague,    Thank you for referring your patient, Ky Martinez, to the [facility] [place]. Please see a copy of my visit note below.    yK Martinez  :  1945  DOS: 2023  MRN: [id number]  PCP: Marcin Rios    Sports Medicine Clinic Visit      HPI  Ky Martinez is a 77 year old male who is seen as a self referral presenting with left knee pain.    - Mechanism of Injury:   Pain is overall chronic with recent exacerbation during snowblowing in 2022.  Had increase in swelling, hot to the touch and severe pain at that time.   - Prior evaluation: Injection of left knee performed in [place] on 2023, that provided temporary relief of his symptoms.      - Pain Character:  Pain has been present for for years with increase in pain 4 months ago.  Pain is well localized to the medial left knee without significant radiation.  - Endorses:  Swelling intermittently, pain as described above.  - Denies:  numbness, tingling, radicular pain, popping, grinding, weakness, instability, mechanical locking symptoms.  - Alleviating factors: Corticosteroid injection lasted 1 week of relief. Knee brace  - Aggravating factors: Ambulating, going down and up stairs, end of the day  - Treatments tried: corticosteroid injection, non-hinged knee brace, icing at night    - Patient Goals:  discuss treatment options  - Social History: retired    - Pertinent PMH:  Acute pulmonary embolism and paroxysmal atrial fibrillation with ICD and on warfarin.        Review of Systems  Musculoskeletal: as above  Remainder of review of systems is negative including constitutional, CV, pulmonary, GI, Skin and Neurologic except as noted in HPI or medical history.    Past Medical History:   Diagnosis Date     Hypertension      Myocardial infarction (H)      Past Surgical History:   Procedure Laterality Date      Cardiac stent       COLONOSCOPY N/A 5/22/2019    Procedure: Colonoscopy, Polypectomy by Snare;  Surgeon: Gian Horn DO;  [facility]     EXCISE GANGLION WRIST Left 11/26/2014    Procedure: EXCISE GANGLION WRIST;  Surgeon: Gian Haddad MD;  [facility]     ICD DEVICE INTERROGATE      2017     ORTHOPEDIC SURGERY       RELEASE CARPAL TUNNEL Left 11/26/2014    Procedure: RELEASE CARPAL TUNNEL;  Surgeon: Gian Haddad MD;  [facility]     RELEASE DEQUERVAINS WRIST Left 11/26/2014    Procedure: RELEASE DEQUERVAINS WRIST;  Surgeon: Gian Haddad MD;  [facility]     Family History   Problem Relation Age of Onset     Arthritis Mother      Diabetes Father      Allergies Father         sulfa     Eye Disorder Father         cataract     Heart Disease Father         by pass         Objective  /82   Wt 131.5 kg (290 lb)   BMI 40.73 kg/m        General: healthy, alert and in no acute distress.      HEENT: no scleral icterus or conjunctival erythema.     Skin: no suspicious lesions or rash. No jaundice.     CV: regular rhythm by palpation, 2+ distal pulses.    Resp: normal respiratory effort without conversational dyspnea.     Psych: normal mood and affect.      Gait: nonantalgic, appropriate coordination and balance.     Neuro:        - Sensation to light touch:    - Intact throughout the BLE including all peripheral nerve distributions.        - MSR:      RLE  LLE  - Patella 2+ 2+  - Achilles 2+ 2+       - Special tests:   - Slump/SLR:  Neg bilaterally    MSK - Knee:       - Inspection:    - No significant swelling, erythema, warmth, ecchymosis, lesion.        - ROM:    - Full AROM/PROM with anterior knee pain during knee flexion/extension.       - Palpation:    - TTP at the medial joint line, lateral joint line.  - NTTP elsewhere.        - Strength:  (*antalgic)  RLE LLE  - Hip Flexion  5 5   - Hip Extension 5 5   - Hip Abduction 5 5   - Hip Adduction 5 5  - Knee  Flexion  5 5  - Knee Extension 5 5  - Dorsiflexion  5 5  - Plantarflexion 5 5  - Ext. Chan. Longus 5 5  - Inversion  5 5  - Eversion  5 5       - Special tests:        - Lachman: Positive for soft endpoint on the left as compared to the right.        - A/P drawer: Positive for mild laxity in the left.       - Pivot shift:  Neg    - Donny: Positive for mild medial compartment pain     - Varus stress:  Neg for laxity or pain    - Valgus stress:  Neg for laxity or pain   - Patellar grind:  Neg   - Thessaly: Positive for medial compartment pain and feeling of buckling      Radiology  I independently reviewed the available relevant imaging, with the following interpretation:   - XR right knee 10/8/2020 has views of the left knee as well which shows mild tricompartmental OA bilaterally, worst medially.    I independently reviewed today's new relevant imaging, with the following interpretation:  - XR L knee shows normal anatomic alignment, mild degenerative changes of the medial and patellofemoral compartments, no acute fractures or dislocations, small knee joint effusion.      Assessment  1. Primary osteoarthritis of left knee    2. Rupture of anterior cruciate ligament of left knee, initial encounter    3. Tear of medial meniscus of left knee, current, unspecified tear type, initial encounter        Plan  Ky Martinez is a 77 year old male that presents with acute on chronic left knee pain that seems to be worsening over the past few months.  There has been some swelling, pain with activities like stairs, instability.  Received a corticosteroid injection in January at an outside facility that provided good short-term relief. History and physical exam appear most consistent with primary osteoarthritis of the knee, primarily affecting the medial compartment.  There is also at least some concern for a chronic ACL tear, medial meniscus tear based on the history and physical exam today. Discussed the nature of the  condition and treatment options and mutually agreed upon the following plan:    - Imaging:          - Reviewed relevant imaging in the chart.  -XR L knee ordered today pre-clinic and independently interpreted by myself.    - Reviewed results and images with patient.  -It will be important to evaluate the integrity of the soft tissues of the knee with advanced imaging (MRI).  MR L knee order placed today and instructions given for scheduling.  - Medications:          - Discussed pharmacologic options for pain relief.   - May use NSAIDs (Ibuprofen, Naproxen) or Acetaminophen (Tylenol) as needed for pain control.   - May also use topical medications such as lidocaine, IcyHot, BioFreeze, or Voltaren gel as needed for pain control.   - Injections:          - Discussed possible injection options and alternatives.    - Options include intra-articular knee joint injections with corticosteroid, viscosupplementation, or PRP.   - Deferred injections today and will consider them in the future as needed.   - Therapy:          - Discussed the benefits of physical therapy vs home exercise program for optimization of range of motion, flexibility, strength, stability and function.   - We will formalize a therapy plan after results of the MRI are taken in consideration.  - Modalities:          - May use ice, heat, massage or other modalities as needed.   - Bracing:          - Discussed bracing options and recommend using a knee stability brace, and we will prescribe the appropriate brace when results of the MRI are obtained.  - Activity:     - Encouraged to rest and protect the injured area from further injury.  Avoid exacerbating activities and activities that are high-risk for falls.   - Follow up:          - When results of the MRI are obtained to discuss the results, for re-evaluation and update to treatment plan, or sooner for new/worsening symptoms.  - Patient has clinic contact information for questions or concerns.        Eduardo Soriano DO, CAQSM  [facility] - Department of Orthopedic Surgery       Disclaimer:  This note was prepared and written using Dragon Medical dictation software. As a result, there may be errors in the script that have gone undetected. Please consider this when interpreting the information in this note.         Again, thank you for allowing me to participate in the care of your patient.        Sincerely,        Eduardo Soriano DO

## 2023-04-07 ENCOUNTER — ANCILLARY ORDERS (OUTPATIENT)
Dept: ORTHOPEDICS | Facility: CLINIC | Age: 78
End: 2023-04-07

## 2023-04-07 DIAGNOSIS — Z45.018 PACEMAKER REPROGRAMMING/CHECK: ICD-10-CM

## 2023-04-20 ENCOUNTER — PATIENT OUTREACH (OUTPATIENT)
Dept: CARE COORDINATION | Facility: CLINIC | Age: 78
End: 2023-04-20
Payer: COMMERCIAL

## 2023-04-21 ENCOUNTER — TELEPHONE (OUTPATIENT)
Dept: ORTHOPEDICS | Facility: CLINIC | Age: 78
End: 2023-04-21

## 2023-04-21 ENCOUNTER — HOSPITAL ENCOUNTER (OUTPATIENT)
Dept: GENERAL RADIOLOGY | Facility: CLINIC | Age: 78
Discharge: HOME | End: 2023-04-21
Attending: STUDENT IN AN ORGANIZED HEALTH CARE EDUCATION/TRAINING PROGRAM
Payer: COMMERCIAL

## 2023-04-21 ENCOUNTER — ANCILLARY PROCEDURE (OUTPATIENT)
Dept: CARDIOLOGY | Facility: CLINIC | Age: 78
End: 2023-04-21
Attending: INTERNAL MEDICINE
Payer: COMMERCIAL

## 2023-04-21 ENCOUNTER — HOSPITAL ENCOUNTER (OUTPATIENT)
Dept: MRI IMAGING | Facility: CLINIC | Age: 78
Discharge: HOME | End: 2023-04-21
Attending: STUDENT IN AN ORGANIZED HEALTH CARE EDUCATION/TRAINING PROGRAM
Payer: COMMERCIAL

## 2023-04-21 VITALS — HEART RATE: 58 BPM | OXYGEN SATURATION: 98 %

## 2023-04-21 DIAGNOSIS — S83.242A TEAR OF MEDIAL MENISCUS OF LEFT KNEE, CURRENT, UNSPECIFIED TEAR TYPE, INITIAL ENCOUNTER: ICD-10-CM

## 2023-04-21 DIAGNOSIS — Z45.02 ENCOUNTER FOR FITTING OR ADJUSTMENT OF IMPLANTABLE CARDIOVERTER-DEFIBRILLATOR (ICD): ICD-10-CM

## 2023-04-21 DIAGNOSIS — S83.512A RUPTURE OF ANTERIOR CRUCIATE LIGAMENT OF LEFT KNEE, INITIAL ENCOUNTER: ICD-10-CM

## 2023-04-21 DIAGNOSIS — Z45.02 ENCOUNTER FOR FITTING OR ADJUSTMENT OF IMPLANTABLE CARDIOVERTER-DEFIBRILLATOR (ICD): Primary | ICD-10-CM

## 2023-04-21 DIAGNOSIS — Z45.018 PACEMAKER REPROGRAMMING/CHECK: ICD-10-CM

## 2023-04-21 LAB
MDC_IDC_LEAD_IMPLANT_DT: NORMAL
MDC_IDC_LEAD_IMPLANT_DT: NORMAL
MDC_IDC_LEAD_LOCATION: NORMAL
MDC_IDC_LEAD_LOCATION: NORMAL
MDC_IDC_LEAD_LOCATION_DETAIL_1: NORMAL
MDC_IDC_LEAD_LOCATION_DETAIL_1: NORMAL
MDC_IDC_LEAD_MFG: NORMAL
MDC_IDC_LEAD_MFG: NORMAL
MDC_IDC_LEAD_MODEL: NORMAL
MDC_IDC_LEAD_MODEL: NORMAL
MDC_IDC_LEAD_POLARITY_TYPE: NORMAL
MDC_IDC_LEAD_POLARITY_TYPE: NORMAL
MDC_IDC_LEAD_SERIAL: NORMAL
MDC_IDC_LEAD_SERIAL: NORMAL
MDC_IDC_LEAD_SPECIAL_FUNCTION: NORMAL
MDC_IDC_LEAD_SPECIAL_FUNCTION: NORMAL
MDC_IDC_MSMT_BATTERY_DTM: NORMAL
MDC_IDC_MSMT_BATTERY_REMAINING_LONGEVITY: 56 MO
MDC_IDC_MSMT_BATTERY_RRT_TRIGGER: 2.73
MDC_IDC_MSMT_BATTERY_STATUS: NORMAL
MDC_IDC_MSMT_BATTERY_VOLTAGE: 2.98 V
MDC_IDC_MSMT_CAP_CHARGE_DTM: NORMAL
MDC_IDC_MSMT_CAP_CHARGE_ENERGY: 18 J
MDC_IDC_MSMT_CAP_CHARGE_TIME: 3.94
MDC_IDC_MSMT_CAP_CHARGE_TYPE: NORMAL
MDC_IDC_MSMT_LEADCHNL_RA_IMPEDANCE_VALUE: 494 OHM
MDC_IDC_MSMT_LEADCHNL_RA_PACING_THRESHOLD_AMPLITUDE: 0.75 V
MDC_IDC_MSMT_LEADCHNL_RA_PACING_THRESHOLD_PULSEWIDTH: 0.4 MS
MDC_IDC_MSMT_LEADCHNL_RA_SENSING_INTR_AMPL: 4.4 MV
MDC_IDC_MSMT_LEADCHNL_RV_IMPEDANCE_VALUE: 285 OHM
MDC_IDC_MSMT_LEADCHNL_RV_IMPEDANCE_VALUE: 380 OHM
MDC_IDC_MSMT_LEADCHNL_RV_PACING_THRESHOLD_AMPLITUDE: 1 V
MDC_IDC_MSMT_LEADCHNL_RV_PACING_THRESHOLD_PULSEWIDTH: 0.4 MS
MDC_IDC_MSMT_LEADCHNL_RV_SENSING_INTR_AMPL: 9 MV
MDC_IDC_PG_IMPLANT_DTM: NORMAL
MDC_IDC_PG_MFG: NORMAL
MDC_IDC_PG_MODEL: NORMAL
MDC_IDC_PG_SERIAL: NORMAL
MDC_IDC_PG_TYPE: NORMAL
MDC_IDC_SESS_CLINIC_NAME: NORMAL
MDC_IDC_SESS_DTM: NORMAL
MDC_IDC_SESS_TYPE: NORMAL
MDC_IDC_SET_BRADY_AT_MODE_SWITCH_RATE: 171 {BEATS}/MIN
MDC_IDC_SET_BRADY_HYSTRATE: NORMAL
MDC_IDC_SET_BRADY_LOWRATE: 60 {BEATS}/MIN
MDC_IDC_SET_BRADY_MAX_SENSOR_RATE: 120 {BEATS}/MIN
MDC_IDC_SET_BRADY_MAX_TRACKING_RATE: 130 {BEATS}/MIN
MDC_IDC_SET_BRADY_MODE: NORMAL
MDC_IDC_SET_BRADY_PAV_DELAY_LOW: 180 MS
MDC_IDC_SET_BRADY_SAV_DELAY_LOW: 150 MS
MDC_IDC_SET_LEADCHNL_RA_PACING_AMPLITUDE: 1.5 V
MDC_IDC_SET_LEADCHNL_RA_PACING_ANODE_ELECTRODE_1: NORMAL
MDC_IDC_SET_LEADCHNL_RA_PACING_ANODE_LOCATION_1: NORMAL
MDC_IDC_SET_LEADCHNL_RA_PACING_CAPTURE_MODE: NORMAL
MDC_IDC_SET_LEADCHNL_RA_PACING_CATHODE_ELECTRODE_1: NORMAL
MDC_IDC_SET_LEADCHNL_RA_PACING_CATHODE_LOCATION_1: NORMAL
MDC_IDC_SET_LEADCHNL_RA_PACING_POLARITY: NORMAL
MDC_IDC_SET_LEADCHNL_RA_PACING_PULSEWIDTH: 0.4 MS
MDC_IDC_SET_LEADCHNL_RA_SENSING_ANODE_ELECTRODE_1: NORMAL
MDC_IDC_SET_LEADCHNL_RA_SENSING_ANODE_LOCATION_1: NORMAL
MDC_IDC_SET_LEADCHNL_RA_SENSING_CATHODE_ELECTRODE_1: NORMAL
MDC_IDC_SET_LEADCHNL_RA_SENSING_CATHODE_LOCATION_1: NORMAL
MDC_IDC_SET_LEADCHNL_RA_SENSING_POLARITY: NORMAL
MDC_IDC_SET_LEADCHNL_RA_SENSING_SENSITIVITY: 0.3 MV
MDC_IDC_SET_LEADCHNL_RV_PACING_AMPLITUDE: 2 V
MDC_IDC_SET_LEADCHNL_RV_PACING_ANODE_ELECTRODE_1: NORMAL
MDC_IDC_SET_LEADCHNL_RV_PACING_ANODE_LOCATION_1: NORMAL
MDC_IDC_SET_LEADCHNL_RV_PACING_CAPTURE_MODE: NORMAL
MDC_IDC_SET_LEADCHNL_RV_PACING_CATHODE_ELECTRODE_1: NORMAL
MDC_IDC_SET_LEADCHNL_RV_PACING_CATHODE_LOCATION_1: NORMAL
MDC_IDC_SET_LEADCHNL_RV_PACING_POLARITY: NORMAL
MDC_IDC_SET_LEADCHNL_RV_PACING_PULSEWIDTH: 0.4 MS
MDC_IDC_SET_LEADCHNL_RV_SENSING_ANODE_ELECTRODE_1: NORMAL
MDC_IDC_SET_LEADCHNL_RV_SENSING_ANODE_LOCATION_1: NORMAL
MDC_IDC_SET_LEADCHNL_RV_SENSING_CATHODE_ELECTRODE_1: NORMAL
MDC_IDC_SET_LEADCHNL_RV_SENSING_CATHODE_LOCATION_1: NORMAL
MDC_IDC_SET_LEADCHNL_RV_SENSING_POLARITY: NORMAL
MDC_IDC_SET_LEADCHNL_RV_SENSING_SENSITIVITY: 0.3 MV
MDC_IDC_SET_ZONE_DETECTION_BEATS_DENOMINATOR: 40 {BEATS}
MDC_IDC_SET_ZONE_DETECTION_BEATS_NUMERATOR: 30 {BEATS}
MDC_IDC_SET_ZONE_DETECTION_INTERVAL: 300 MS
MDC_IDC_SET_ZONE_DETECTION_INTERVAL: 350 MS
MDC_IDC_SET_ZONE_DETECTION_INTERVAL: 360 MS
MDC_IDC_SET_ZONE_DETECTION_INTERVAL: 400 MS
MDC_IDC_SET_ZONE_DETECTION_INTERVAL: NORMAL
MDC_IDC_SET_ZONE_TYPE: NORMAL
MDC_IDC_STAT_AT_BURDEN_PERCENT: 0 %
MDC_IDC_STAT_AT_DTM_END: NORMAL
MDC_IDC_STAT_AT_DTM_START: NORMAL
MDC_IDC_STAT_BRADY_AP_VP_PERCENT: 0.08 %
MDC_IDC_STAT_BRADY_AP_VS_PERCENT: 50.68 %
MDC_IDC_STAT_BRADY_AS_VP_PERCENT: 0.04 %
MDC_IDC_STAT_BRADY_AS_VS_PERCENT: 49.2 %
MDC_IDC_STAT_BRADY_DTM_END: NORMAL
MDC_IDC_STAT_BRADY_DTM_START: NORMAL
MDC_IDC_STAT_BRADY_RA_PERCENT_PACED: 49.21 %
MDC_IDC_STAT_BRADY_RV_PERCENT_PACED: 0.1 %
MDC_IDC_STAT_EPISODE_RECENT_COUNT: 0
MDC_IDC_STAT_EPISODE_RECENT_COUNT_DTM_END: NORMAL
MDC_IDC_STAT_EPISODE_RECENT_COUNT_DTM_START: NORMAL
MDC_IDC_STAT_EPISODE_TOTAL_COUNT: 0
MDC_IDC_STAT_EPISODE_TOTAL_COUNT: 10
MDC_IDC_STAT_EPISODE_TOTAL_COUNT: 4
MDC_IDC_STAT_EPISODE_TOTAL_COUNT_DTM_END: NORMAL
MDC_IDC_STAT_EPISODE_TOTAL_COUNT_DTM_START: NORMAL
MDC_IDC_STAT_EPISODE_TYPE: NORMAL
MDC_IDC_STAT_TACHYTHERAPY_ATP_DELIVERED_RECENT: 0
MDC_IDC_STAT_TACHYTHERAPY_ATP_DELIVERED_TOTAL: 0
MDC_IDC_STAT_TACHYTHERAPY_RECENT_DTM_END: NORMAL
MDC_IDC_STAT_TACHYTHERAPY_RECENT_DTM_START: NORMAL
MDC_IDC_STAT_TACHYTHERAPY_SHOCKS_ABORTED_RECENT: 0
MDC_IDC_STAT_TACHYTHERAPY_SHOCKS_ABORTED_TOTAL: 0
MDC_IDC_STAT_TACHYTHERAPY_SHOCKS_DELIVERED_RECENT: 0
MDC_IDC_STAT_TACHYTHERAPY_SHOCKS_DELIVERED_TOTAL: 0
MDC_IDC_STAT_TACHYTHERAPY_TOTAL_DTM_END: NORMAL
MDC_IDC_STAT_TACHYTHERAPY_TOTAL_DTM_START: NORMAL

## 2023-04-21 PROCEDURE — 71046 X-RAY EXAM CHEST 2 VIEWS: CPT

## 2023-04-21 PROCEDURE — 93287 PERI-PX DEVICE EVAL & PRGR: CPT | Mod: 26 | Performed by: INTERNAL MEDICINE

## 2023-04-21 PROCEDURE — 93287 PERI-PX DEVICE EVAL & PRGR: CPT

## 2023-04-21 PROCEDURE — 71046 X-RAY EXAM CHEST 2 VIEWS: CPT | Mod: 26 | Performed by: RADIOLOGY

## 2023-04-21 PROCEDURE — 73721 MRI JNT OF LWR EXTRE W/O DYE: CPT | Mod: LT

## 2023-04-21 PROCEDURE — 73721 MRI JNT OF LWR EXTRE W/O DYE: CPT | Mod: 26 | Performed by: RADIOLOGY

## 2023-04-21 NOTE — TELEPHONE ENCOUNTER
Called Mr. Martinez and discussed the results of the MRI and next steps in treatment plan.    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  The MRI also showed a peripheral partial radial tear in the posterior horn of the medial meniscus, small joint effusion, and moderate tricompartmental osteoarthritis.    We discussed operative and nonoperative treatment options for the knee and mutually decided that the most reasonable option is to continue with conservative care including corticosteroid injections, bracing, and home exercise program.  Currently has a well fitting brace.  Offered some more substantial braces that he will try at a follow-up appointment.  Interested in a corticosteroid injection, last injection was in January, so okay to schedule at my next available appointment that works for his schedule to get this injection.  We will provide home exercise program at that time as well.    All pertinent results were discussed and all questions were answered.      Eduardo Soriano DO, CAWANM  [facility] - Sports Medicine  [facility] - Department of Orthopedic Surgery

## 2023-04-21 NOTE — PROGRESS NOTES
Patient monitored while in MRI. No complications observed or reported throughout scan.     Jose Luis Roe RN     How Severe Are Your Spot(S)?: mild Have Your Spot(S) Been Treated In The Past?: has not been treated Hpi Title: Evaluation of a Skin Lesion Year Removed: 1900

## 2023-04-26 ENCOUNTER — ANTICOAGULATION THERAPY VISIT (OUTPATIENT)
Dept: ANTICOAGULATION | Facility: CLINIC | Age: 78
End: 2023-04-26

## 2023-04-26 ENCOUNTER — LAB (OUTPATIENT)
Dept: LAB | Facility: CLINIC | Age: 78
End: 2023-04-26
Payer: COMMERCIAL

## 2023-04-26 DIAGNOSIS — I26.99 ACUTE PULMONARY EMBOLISM, UNSPECIFIED PULMONARY EMBOLISM TYPE, UNSPECIFIED WHETHER ACUTE COR PULMONALE PRESENT (H): Primary | ICD-10-CM

## 2023-04-26 DIAGNOSIS — I48.0 PAROXYSMAL ATRIAL FIBRILLATION WITH RAPID VENTRICULAR RESPONSE (H): ICD-10-CM

## 2023-04-26 DIAGNOSIS — I26.99 PULMONARY EMBOLISM (H): ICD-10-CM

## 2023-04-26 DIAGNOSIS — I48.0 PAROXYSMAL ATRIAL FIBRILLATION (H): ICD-10-CM

## 2023-04-26 DIAGNOSIS — Z11.59 NEED FOR HEPATITIS C SCREENING TEST: ICD-10-CM

## 2023-04-26 DIAGNOSIS — I25.10 CORONARY ATHEROSCLEROSIS: ICD-10-CM

## 2023-04-26 LAB
CHOLEST SERPL-MCNC: 131 MG/DL
HDLC SERPL-MCNC: 46 MG/DL
INR BLD: 2.1 (ref 0.9–1.1)
LDLC SERPL CALC-MCNC: 61 MG/DL
NONHDLC SERPL-MCNC: 85 MG/DL
TRIGL SERPL-MCNC: 121 MG/DL

## 2023-04-26 PROCEDURE — 36415 COLL VENOUS BLD VENIPUNCTURE: CPT

## 2023-04-26 PROCEDURE — 85610 PROTHROMBIN TIME: CPT

## 2023-04-26 PROCEDURE — 36416 COLLJ CAPILLARY BLOOD SPEC: CPT

## 2023-04-26 PROCEDURE — 80061 LIPID PANEL: CPT

## 2023-04-26 PROCEDURE — 86803 HEPATITIS C AB TEST: CPT

## 2023-04-26 NOTE — PROGRESS NOTES
ANTICOAGULATION MANAGEMENT     Ky Martinez 77 year old male is on warfarin with therapeutic INR result. (Goal INR 2.0-3.0)    Recent labs: (last 7 days)     04/26/23  1013   INR 2.1*       ASSESSMENT     Warfarin Lab Questionnaire    Warfarin Doses Last 7 Days      4/25/2023     1:29 PM   Dose in Tablet or Mg   TAB or MG? milligram (mg)     Pt Rptd Dose SUNDAY MONDAY TUESDAY WED THURS FRIDAY SATURDAY 4/25/2023   1:29 PM 5 2.5 5 5 5 2.5 5         4/25/2023   Warfarin Lab Questionnaire   Missed doses within past 14 days? No   Changes in diet or alcohol within past 14 days? No   Medication changes since last result? No   Injuries or illness since last result? No   New shortness of breath, severe headaches or sudden changes in vision since last result? No   Abnormal bleeding since last result? No   Upcoming surgery, procedure? No        Previous result: Therapeutic last 2(+) visits  Additional findings: None       PLAN     Recommended plan for no diet, medication or health factor changes affecting INR     Dosing Instructions: Continue your current warfarin dose with next INR in 6 weeks       Summary  As of 4/26/2023    Full warfarin instructions:  2.5 mg every Mon, Fri; 5 mg all other days   Next INR check:  6/7/2023             Telephone call with Tarik who verbalizes understanding and agrees to plan and who agrees to plan and repeated back plan correctly    Patient offered & declined to schedule next visit    Education provided:     Please call back if any changes to your diet, medications or how you've been taking warfarin    Plan made per [facility] anticoagulation protocol    Jessica Chavez RN  Anticoagulation Clinic  4/26/2023    _______________________________________________________________________     Anticoagulation Episode Summary     Current INR goal:  2.0-3.0   TTR:  88.8 % (1 y)   Target end date:  Indefinite   Send INR reminders to:  BRANDY MEHTA    Indications    Acute pulmonary  embolism  unspecified pulmonary embolism type  unspecified whether acute cor pulmonale present (H) [I26.99]  Paroxysmal atrial fibrillation with rapid ventricular response (H) [I48.0]  Paroxysmal atrial fibrillation (H) [I48.0]           Comments:           Anticoagulation Care Providers     Provider Role Specialty Phone number    Ky Major MD Referring Family Practice [phone]    Marcin Rios MD Referring Family Medicine [phone]

## 2023-04-27 LAB — HCV AB SERPL QL IA: ABNORMAL

## 2023-04-28 LAB
MDC_IDC_LEAD_IMPLANT_DT: NORMAL
MDC_IDC_LEAD_IMPLANT_DT: NORMAL
MDC_IDC_LEAD_LOCATION: NORMAL
MDC_IDC_LEAD_LOCATION: NORMAL
MDC_IDC_LEAD_LOCATION_DETAIL_1: NORMAL
MDC_IDC_LEAD_LOCATION_DETAIL_1: NORMAL
MDC_IDC_LEAD_MFG: NORMAL
MDC_IDC_LEAD_MFG: NORMAL
MDC_IDC_LEAD_MODEL: NORMAL
MDC_IDC_LEAD_MODEL: NORMAL
MDC_IDC_LEAD_POLARITY_TYPE: NORMAL
MDC_IDC_LEAD_POLARITY_TYPE: NORMAL
MDC_IDC_LEAD_SERIAL: NORMAL
MDC_IDC_LEAD_SERIAL: NORMAL
MDC_IDC_LEAD_SPECIAL_FUNCTION: NORMAL
MDC_IDC_LEAD_SPECIAL_FUNCTION: NORMAL
MDC_IDC_MSMT_BATTERY_DTM: NORMAL
MDC_IDC_MSMT_BATTERY_REMAINING_LONGEVITY: 56 MO
MDC_IDC_MSMT_BATTERY_RRT_TRIGGER: 2.73
MDC_IDC_MSMT_BATTERY_STATUS: NORMAL
MDC_IDC_MSMT_BATTERY_VOLTAGE: 2.98 V
MDC_IDC_MSMT_CAP_CHARGE_DTM: NORMAL
MDC_IDC_MSMT_CAP_CHARGE_ENERGY: 18 J
MDC_IDC_MSMT_CAP_CHARGE_TIME: 3.94
MDC_IDC_MSMT_CAP_CHARGE_TYPE: NORMAL
MDC_IDC_MSMT_LEADCHNL_RA_IMPEDANCE_VALUE: 456 OHM
MDC_IDC_MSMT_LEADCHNL_RA_PACING_THRESHOLD_AMPLITUDE: 0.75 V
MDC_IDC_MSMT_LEADCHNL_RA_PACING_THRESHOLD_PULSEWIDTH: 0.4 MS
MDC_IDC_MSMT_LEADCHNL_RA_SENSING_INTR_AMPL: 4.1 MV
MDC_IDC_MSMT_LEADCHNL_RV_IMPEDANCE_VALUE: 285 OHM
MDC_IDC_MSMT_LEADCHNL_RV_IMPEDANCE_VALUE: 380 OHM
MDC_IDC_MSMT_LEADCHNL_RV_PACING_THRESHOLD_AMPLITUDE: 1 V
MDC_IDC_MSMT_LEADCHNL_RV_PACING_THRESHOLD_PULSEWIDTH: 0.4 MS
MDC_IDC_MSMT_LEADCHNL_RV_SENSING_INTR_AMPL: 8.3 MV
MDC_IDC_PG_IMPLANT_DTM: NORMAL
MDC_IDC_PG_MFG: NORMAL
MDC_IDC_PG_MODEL: NORMAL
MDC_IDC_PG_SERIAL: NORMAL
MDC_IDC_PG_TYPE: NORMAL
MDC_IDC_SESS_CLINIC_NAME: NORMAL
MDC_IDC_SESS_DTM: NORMAL
MDC_IDC_SESS_TYPE: NORMAL
MDC_IDC_SET_BRADY_AT_MODE_SWITCH_RATE: 171 {BEATS}/MIN
MDC_IDC_SET_BRADY_HYSTRATE: NORMAL
MDC_IDC_SET_BRADY_LOWRATE: 60 {BEATS}/MIN
MDC_IDC_SET_BRADY_MAX_SENSOR_RATE: 120 {BEATS}/MIN
MDC_IDC_SET_BRADY_MAX_TRACKING_RATE: 130 {BEATS}/MIN
MDC_IDC_SET_BRADY_MODE: NORMAL
MDC_IDC_SET_BRADY_PAV_DELAY_LOW: 180 MS
MDC_IDC_SET_BRADY_SAV_DELAY_LOW: 150 MS
MDC_IDC_SET_LEADCHNL_RA_PACING_AMPLITUDE: 1.5 V
MDC_IDC_SET_LEADCHNL_RA_PACING_ANODE_ELECTRODE_1: NORMAL
MDC_IDC_SET_LEADCHNL_RA_PACING_ANODE_LOCATION_1: NORMAL
MDC_IDC_SET_LEADCHNL_RA_PACING_CAPTURE_MODE: NORMAL
MDC_IDC_SET_LEADCHNL_RA_PACING_CATHODE_ELECTRODE_1: NORMAL
MDC_IDC_SET_LEADCHNL_RA_PACING_CATHODE_LOCATION_1: NORMAL
MDC_IDC_SET_LEADCHNL_RA_PACING_POLARITY: NORMAL
MDC_IDC_SET_LEADCHNL_RA_PACING_PULSEWIDTH: 0.4 MS
MDC_IDC_SET_LEADCHNL_RA_SENSING_ANODE_ELECTRODE_1: NORMAL
MDC_IDC_SET_LEADCHNL_RA_SENSING_ANODE_LOCATION_1: NORMAL
MDC_IDC_SET_LEADCHNL_RA_SENSING_CATHODE_ELECTRODE_1: NORMAL
MDC_IDC_SET_LEADCHNL_RA_SENSING_CATHODE_LOCATION_1: NORMAL
MDC_IDC_SET_LEADCHNL_RA_SENSING_POLARITY: NORMAL
MDC_IDC_SET_LEADCHNL_RA_SENSING_SENSITIVITY: 0.3 MV
MDC_IDC_SET_LEADCHNL_RV_PACING_AMPLITUDE: 2 V
MDC_IDC_SET_LEADCHNL_RV_PACING_ANODE_ELECTRODE_1: NORMAL
MDC_IDC_SET_LEADCHNL_RV_PACING_ANODE_LOCATION_1: NORMAL
MDC_IDC_SET_LEADCHNL_RV_PACING_CAPTURE_MODE: NORMAL
MDC_IDC_SET_LEADCHNL_RV_PACING_CATHODE_ELECTRODE_1: NORMAL
MDC_IDC_SET_LEADCHNL_RV_PACING_CATHODE_LOCATION_1: NORMAL
MDC_IDC_SET_LEADCHNL_RV_PACING_POLARITY: NORMAL
MDC_IDC_SET_LEADCHNL_RV_PACING_PULSEWIDTH: 0.4 MS
MDC_IDC_SET_LEADCHNL_RV_SENSING_ANODE_ELECTRODE_1: NORMAL
MDC_IDC_SET_LEADCHNL_RV_SENSING_ANODE_LOCATION_1: NORMAL
MDC_IDC_SET_LEADCHNL_RV_SENSING_CATHODE_ELECTRODE_1: NORMAL
MDC_IDC_SET_LEADCHNL_RV_SENSING_CATHODE_LOCATION_1: NORMAL
MDC_IDC_SET_LEADCHNL_RV_SENSING_POLARITY: NORMAL
MDC_IDC_SET_LEADCHNL_RV_SENSING_SENSITIVITY: 0.3 MV
MDC_IDC_SET_ZONE_DETECTION_BEATS_DENOMINATOR: 40 {BEATS}
MDC_IDC_SET_ZONE_DETECTION_BEATS_NUMERATOR: 30 {BEATS}
MDC_IDC_SET_ZONE_DETECTION_INTERVAL: 300 MS
MDC_IDC_SET_ZONE_DETECTION_INTERVAL: 350 MS
MDC_IDC_SET_ZONE_DETECTION_INTERVAL: 360 MS
MDC_IDC_SET_ZONE_DETECTION_INTERVAL: 400 MS
MDC_IDC_SET_ZONE_DETECTION_INTERVAL: NORMAL
MDC_IDC_SET_ZONE_TYPE: NORMAL
MDC_IDC_STAT_AT_BURDEN_PERCENT: 0 %
MDC_IDC_STAT_AT_DTM_END: NORMAL
MDC_IDC_STAT_AT_DTM_START: NORMAL
MDC_IDC_STAT_BRADY_AP_VP_PERCENT: 0.07 %
MDC_IDC_STAT_BRADY_AP_VS_PERCENT: 50.68 %
MDC_IDC_STAT_BRADY_AS_VP_PERCENT: 0.04 %
MDC_IDC_STAT_BRADY_AS_VS_PERCENT: 49.2 %
MDC_IDC_STAT_BRADY_DTM_END: NORMAL
MDC_IDC_STAT_BRADY_DTM_START: NORMAL
MDC_IDC_STAT_BRADY_RA_PERCENT_PACED: 49.21 %
MDC_IDC_STAT_BRADY_RV_PERCENT_PACED: 0.1 %
MDC_IDC_STAT_EPISODE_RECENT_COUNT: 0
MDC_IDC_STAT_EPISODE_RECENT_COUNT_DTM_END: NORMAL
MDC_IDC_STAT_EPISODE_RECENT_COUNT_DTM_START: NORMAL
MDC_IDC_STAT_EPISODE_TOTAL_COUNT: 0
MDC_IDC_STAT_EPISODE_TOTAL_COUNT: 10
MDC_IDC_STAT_EPISODE_TOTAL_COUNT: 4
MDC_IDC_STAT_EPISODE_TOTAL_COUNT_DTM_END: NORMAL
MDC_IDC_STAT_EPISODE_TOTAL_COUNT_DTM_START: NORMAL
MDC_IDC_STAT_EPISODE_TYPE: NORMAL
MDC_IDC_STAT_TACHYTHERAPY_ATP_DELIVERED_RECENT: 0
MDC_IDC_STAT_TACHYTHERAPY_ATP_DELIVERED_TOTAL: 0
MDC_IDC_STAT_TACHYTHERAPY_RECENT_DTM_END: NORMAL
MDC_IDC_STAT_TACHYTHERAPY_RECENT_DTM_START: NORMAL
MDC_IDC_STAT_TACHYTHERAPY_SHOCKS_ABORTED_RECENT: 0
MDC_IDC_STAT_TACHYTHERAPY_SHOCKS_ABORTED_TOTAL: 0
MDC_IDC_STAT_TACHYTHERAPY_SHOCKS_DELIVERED_RECENT: 0
MDC_IDC_STAT_TACHYTHERAPY_SHOCKS_DELIVERED_TOTAL: 0
MDC_IDC_STAT_TACHYTHERAPY_TOTAL_DTM_END: NORMAL
MDC_IDC_STAT_TACHYTHERAPY_TOTAL_DTM_START: NORMAL

## 2023-04-30 DIAGNOSIS — E78.5 HYPERLIPIDEMIA LDL GOAL <100: ICD-10-CM

## 2023-05-02 RX ORDER — POTASSIUM CHLORIDE 1500 MG/1
TABLET, EXTENDED RELEASE ORAL
Qty: 90 TABLET | Refills: 0 | Status: SHIPPED | OUTPATIENT
Start: 2023-05-02 | End: 2023-07-10

## 2023-05-02 NOTE — TELEPHONE ENCOUNTER
"Requested Prescriptions   Pending Prescriptions Disp Refills    potassium chloride ER (KLOR-CON M) 20 MEQ CR tablet [Pharmacy Med Name: Potassium Chloride Marci ER 20 MEQ Oral Tablet Extended Release] 90 tablet 0     Sig: Take 1 tablet by mouth once daily       Potassium Supplements Protocol Failed - 4/30/2023 11:30 AM        Failed - Normal serum potassium in past 12 months     Recent Labs   Lab Test 04/18/22  1013   POTASSIUM 4.9                    Passed - Recent (12 mo) or future (30 days) visit within the authorizing provider's department     Patient has had an office visit with the authorizing provider or a provider within the authorizing providers department within the previous 12 mos or has a future within next 30 days. See \"Patient Info\" tab in inbasket, or \"Choose Columns\" in Meds & Orders section of the refill encounter.              Passed - Medication is active on med list        Passed - Patient is age 18 or older             "

## 2023-05-07 DIAGNOSIS — E78.5 HYPERLIPIDEMIA LDL GOAL <100: ICD-10-CM

## 2023-05-08 NOTE — PROGRESS NOTES
Ky INMAN Juan  :  1945  DOS: 2023  MRN: [id number]    Sports Medicine Clinic Procedure    [place] Guided Left Intra-Articular Knee Injection    Clinical History:  Chronic ACL deficient knee, medial meniscus tear, primary osteoarthritis    Diagnosis:   1. Primary osteoarthritis of left knee    2. Tear of medial meniscus of left knee, current, unspecified tear type, initial encounter    3. Rupture of anterior cruciate ligament of left knee, initial encounter        Large Joint Injection/Arthocentesis: L knee joint    Date/Time: 2023 2:00 PM    Performed by: Eduardo Soriano DO  Authorized by: Eduardo Soriano DO    Indications:  Pain  Needle Size:  22 G  Guidance: landmark guided    Approach:  Anterolateral  Location:  Knee      Medications:  40 mg triamcinolone 40 MG/ML; 2 mL bupivacaine (PF) 0.5 %; 2 mL lidocaine 1 %  Outcome:  Tolerated well, no immediate complications  Procedure discussed: discussed risks, benefits, and alternatives    Consent Given by:  Patient  Prep: patient was prepped and draped in usual sterile fashion           PROCEDURE  Intraarticular Knee Joint Injection - Left  The patient was informed of the risks and benefits of the procedure and alternatives were discussed. A written consent was signed by the patient.   The injection site was prepped with chlorhexidine in sterile fashion.   An injectate solution containing 2 mL of 1% lidocaine, 2 mL of 0.5% bupivacaine, and 1 mL of Kenalog (40 mg/mL) was drawn up into a 5 mL syringe.  Injection was performed using sterile technique.  A 1.5-inch 22-gauge needle was used to enter the knee joint using a lateral infrapatellar approach and injectate was injected successfully. After the injection, the site was cleaned and a bandage applied.     The patient tolerated the procedure well without complications.       Impression:  Successful Left intra-articular knee injection.    Plan:  Follow up as needed in the future for re-evaluation and  update to treatment plan.   Expectations and goals of CSI reviewed  Often 2-3 days for steroid effect, and can take up to two weeks for maximum effect  We discussed modified progressive pain-free activity as tolerated  Do not overuse in first two weeks if feeling better due to concern for vulnerability while steroid is working  Supportive care reviewed  All questions were answered today  Contact us with additional questions or concerns  Signs and sx of concern reviewed      Eduardo Soriano DO, ABIGAIL  [facility] - Sports Medicine  [facility] - Department of Orthopedic Surgery

## 2023-05-09 RX ORDER — FUROSEMIDE 40 MG
TABLET ORAL
Qty: 90 TABLET | Refills: 0 | OUTPATIENT
Start: 2023-05-09

## 2023-05-11 ENCOUNTER — OFFICE VISIT (OUTPATIENT)
Dept: ORTHOPEDICS | Facility: CLINIC | Age: 78
End: 2023-05-11
Payer: COMMERCIAL

## 2023-05-11 DIAGNOSIS — S83.512A RUPTURE OF ANTERIOR CRUCIATE LIGAMENT OF LEFT KNEE, INITIAL ENCOUNTER: ICD-10-CM

## 2023-05-11 DIAGNOSIS — M17.12 PRIMARY OSTEOARTHRITIS OF LEFT KNEE: Primary | ICD-10-CM

## 2023-05-11 DIAGNOSIS — S83.242A TEAR OF MEDIAL MENISCUS OF LEFT KNEE, CURRENT, UNSPECIFIED TEAR TYPE, INITIAL ENCOUNTER: ICD-10-CM

## 2023-05-11 PROCEDURE — 20610 DRAIN/INJ JOINT/BURSA W/O US: CPT | Mod: LT | Performed by: STUDENT IN AN ORGANIZED HEALTH CARE EDUCATION/TRAINING PROGRAM

## 2023-05-11 PROCEDURE — 99207 PR DROP WITH A PROCEDURE: CPT | Mod: 25 | Performed by: STUDENT IN AN ORGANIZED HEALTH CARE EDUCATION/TRAINING PROGRAM

## 2023-05-11 RX ADMIN — BUPIVACAINE HYDROCHLORIDE 2 ML: 5 INJECTION, SOLUTION EPIDURAL; INTRACAUDAL at 14:00

## 2023-05-11 RX ADMIN — TRIAMCINOLONE ACETONIDE 40 MG: 40 INJECTION, SUSPENSION INTRA-ARTICULAR; INTRAMUSCULAR at 14:00

## 2023-05-11 RX ADMIN — LIDOCAINE HYDROCHLORIDE 2 ML: 10 INJECTION, SOLUTION INFILTRATION; PERINEURAL at 14:00

## 2023-05-11 NOTE — LETTER
2023         RE: Ky Martinez  [address] [place] [address]        Dear Colleague,    Thank you for referring your patient, Ky Martinez, to the [facility] [place]. Please see a copy of my visit note below.    Ky Martinez  :  1945  DOS: 2023  MRN: [id number]    Sports Medicine Clinic Procedure    [place] Guided Left Intra-Articular Knee Injection    Clinical History:  Chronic ACL deficient knee, medial meniscus tear, primary osteoarthritis    Diagnosis:   1. Primary osteoarthritis of left knee    2. Tear of medial meniscus of left knee, current, unspecified tear type, initial encounter    3. Rupture of anterior cruciate ligament of left knee, initial encounter        Large Joint Injection/Arthocentesis: L knee joint    Date/Time: 2023 2:00 PM    Performed by: Eduardo Soriano DO  Authorized by: Eduardo Soriano DO    Indications:  Pain  Needle Size:  22 G  Guidance: landmark guided    Approach:  Anterolateral  Location:  Knee      Medications:  40 mg triamcinolone 40 MG/ML; 2 mL bupivacaine (PF) 0.5 %; 2 mL lidocaine 1 %  Outcome:  Tolerated well, no immediate complications  Procedure discussed: discussed risks, benefits, and alternatives    Consent Given by:  Patient  Prep: patient was prepped and draped in usual sterile fashion           PROCEDURE  Intraarticular Knee Joint Injection - Left  The patient was informed of the risks and benefits of the procedure and alternatives were discussed. A written consent was signed by the patient.   The injection site was prepped with chlorhexidine in sterile fashion.   An injectate solution containing 2 mL of 1% lidocaine, 2 mL of 0.5% bupivacaine, and 1 mL of Kenalog (40 mg/mL) was drawn up into a 5 mL syringe.  Injection was performed using sterile technique.  A 1.5-inch 22-gauge needle was used to enter the knee joint using a lateral infrapatellar approach and injectate was injected successfully.  After the injection, the site was cleaned and a bandage applied.     The patient tolerated the procedure well without complications.       Impression:  Successful Left intra-articular knee injection.    Plan:  Follow up as needed in the future for re-evaluation and update to treatment plan.   Expectations and goals of CSI reviewed  Often 2-3 days for steroid effect, and can take up to two weeks for maximum effect  We discussed modified progressive pain-free activity as tolerated  Do not overuse in first two weeks if feeling better due to concern for vulnerability while steroid is working  Supportive care reviewed  All questions were answered today  Contact us with additional questions or concerns  Signs and sx of concern reviewed      Eduardo Soriano DO, CAQSM  [facility] Sports Medicine  [facility] - Department of Orthopedic Surgery                 Again, thank you for allowing me to participate in the care of your patient.        Sincerely,        Eduardo Soriano DO

## 2023-05-14 RX ORDER — TRIAMCINOLONE ACETONIDE 40 MG/ML
40 INJECTION, SUSPENSION INTRA-ARTICULAR; INTRAMUSCULAR
Status: DISCONTINUED | OUTPATIENT
Start: 2023-05-11 | End: 2024-02-29

## 2023-05-14 RX ORDER — LIDOCAINE HYDROCHLORIDE 10 MG/ML
2 INJECTION, SOLUTION INFILTRATION; PERINEURAL
Status: DISCONTINUED | OUTPATIENT
Start: 2023-05-11 | End: 2024-02-29

## 2023-05-14 RX ORDER — BUPIVACAINE HYDROCHLORIDE 5 MG/ML
2 INJECTION, SOLUTION EPIDURAL; INTRACAUDAL
Status: DISCONTINUED | OUTPATIENT
Start: 2023-05-11 | End: 2024-02-29

## 2023-06-02 ENCOUNTER — LAB (OUTPATIENT)
Dept: LAB | Facility: CLINIC | Age: 78
End: 2023-06-02
Payer: COMMERCIAL

## 2023-06-02 ENCOUNTER — ANTICOAGULATION THERAPY VISIT (OUTPATIENT)
Dept: ANTICOAGULATION | Facility: CLINIC | Age: 78
End: 2023-06-02

## 2023-06-02 ENCOUNTER — HEALTH MAINTENANCE LETTER (OUTPATIENT)
Age: 78
End: 2023-06-02

## 2023-06-02 DIAGNOSIS — I48.0 PAROXYSMAL ATRIAL FIBRILLATION (H): ICD-10-CM

## 2023-06-02 DIAGNOSIS — I26.99 ACUTE PULMONARY EMBOLISM, UNSPECIFIED PULMONARY EMBOLISM TYPE, UNSPECIFIED WHETHER ACUTE COR PULMONALE PRESENT (H): Primary | ICD-10-CM

## 2023-06-02 DIAGNOSIS — E78.5 HYPERLIPIDEMIA LDL GOAL <100: ICD-10-CM

## 2023-06-02 DIAGNOSIS — I26.99 PULMONARY EMBOLISM (H): ICD-10-CM

## 2023-06-02 DIAGNOSIS — I48.0 PAROXYSMAL ATRIAL FIBRILLATION WITH RAPID VENTRICULAR RESPONSE (H): ICD-10-CM

## 2023-06-02 LAB — INR BLD: 2.4 (ref 0.9–1.1)

## 2023-06-02 PROCEDURE — 85610 PROTHROMBIN TIME: CPT

## 2023-06-02 PROCEDURE — 36416 COLLJ CAPILLARY BLOOD SPEC: CPT

## 2023-06-02 RX ORDER — ATORVASTATIN CALCIUM 80 MG/1
TABLET, FILM COATED ORAL
Qty: 90 TABLET | Refills: 0 | Status: SHIPPED | OUTPATIENT
Start: 2023-06-02 | End: 2023-07-10

## 2023-06-02 NOTE — PROGRESS NOTES
ANTICOAGULATION MANAGEMENT     Ky Martinez 77 year old male is on warfarin with therapeutic INR result. (Goal INR 2.0-3.0)    Recent labs: (last 7 days)     06/02/23  1343   INR 2.4*       ASSESSMENT       Source(s): Chart Review    Previous INR was Therapeutic last 2(+) visits    Medication, diet, health changes since last INR chart reviewed; none identified             PLAN     Recommended plan for no diet, medication or health factor changes affecting INR     Dosing Instructions: Continue your current warfarin dose with next INR in 6 weeks       Summary  As of 6/2/2023    Full warfarin instructions:  2.5 mg every Mon, Fri; 5 mg all other days   Next INR check:  7/14/2023             Detailed voice message left for Tarik with dosing instructions and follow up date.     Contact [phone]  to schedule and with any changes, questions or concerns.     Education provided:     Please call back if any changes to your diet, medications or how you've been taking warfarin    Plan made per [facility] anticoagulation protocol    Jessica Chavez, RN  Anticoagulation Clinic  6/2/2023    _______________________________________________________________________     Anticoagulation Episode Summary     Current INR goal:  2.0-3.0   TTR:  88.8 % (1 y)   Target end date:  Indefinite   Send INR reminders to:  [facility]    Indications    Acute pulmonary embolism  unspecified pulmonary embolism type  unspecified whether acute cor pulmonale present (H) [I26.99]  Paroxysmal atrial fibrillation with rapid ventricular response (H) [I48.0]  Paroxysmal atrial fibrillation (H) [I48.0]           Comments:           Anticoagulation Care Providers     Provider Role Specialty Phone number    Ky Major MD Referring Family Practice [phone]    Marcin Rios MD Referring Family Medicine [phone]

## 2023-06-05 DIAGNOSIS — I26.99 ACUTE PULMONARY EMBOLISM, UNSPECIFIED PULMONARY EMBOLISM TYPE, UNSPECIFIED WHETHER ACUTE COR PULMONALE PRESENT (H): ICD-10-CM

## 2023-06-05 RX ORDER — WARFARIN SODIUM 5 MG/1
TABLET ORAL
Qty: 80 TABLET | Refills: 1 | Status: SHIPPED | OUTPATIENT
Start: 2023-06-05 | End: 2023-12-04

## 2023-06-05 NOTE — TELEPHONE ENCOUNTER
ANTICOAGULATION MANAGEMENT:  Medication Refill    Anticoagulation Summary  As of 6/2/2023    Warfarin maintenance plan:  2.5 mg (5 mg x 0.5) every Mon, Fri; 5 mg (5 mg x 1) all other days   Next INR check:  7/14/2023   Target end date:  Indefinite    Indications    Acute pulmonary embolism  unspecified pulmonary embolism type  unspecified whether acute cor pulmonale present (H) [I26.99]  Paroxysmal atrial fibrillation with rapid ventricular response (H) [I48.0]  Paroxysmal atrial fibrillation (H) [I48.0]             Anticoagulation Care Providers     Provider Role Specialty Phone number    Ky Major MD Referring Family Practice [phone]    Marcin Rios MD Referring Family Medicine [phone]          Visit with referring provider/group within last year: Yes    [facility] referral signed last signed: 03/09/2023; within last year: Yes    Ky meets all criteria for refill (current [facility] referral, office visit with referring provider/group in last year, lab monitoring up to date or not exceeding 2 weeks overdue). Rx instructions and quantity match patient's current dosing plan. Warfarin 90 day supply with 1 refill granted per [facility] protocol     Dennise Zuniga RN  Anticoagulation Clinic

## 2023-07-04 ASSESSMENT — ENCOUNTER SYMPTOMS
ARTHRALGIAS: 0
ABDOMINAL PAIN: 0
EYE PAIN: 0
COUGH: 0
DIARRHEA: 0
FEVER: 0
SORE THROAT: 0
NAUSEA: 0
PALPITATIONS: 0
HEADACHES: 0
WEAKNESS: 0
PARESTHESIAS: 0
CONSTIPATION: 0
CHILLS: 0
DIZZINESS: 0
HEARTBURN: 0
HEMATOCHEZIA: 0
JOINT SWELLING: 0
DYSURIA: 0
SHORTNESS OF BREATH: 0
MYALGIAS: 0
NERVOUS/ANXIOUS: 0
HEMATURIA: 0
FREQUENCY: 0

## 2023-07-04 ASSESSMENT — ACTIVITIES OF DAILY LIVING (ADL): CURRENT_FUNCTION: NO ASSISTANCE NEEDED

## 2023-07-04 ASSESSMENT — ASTHMA QUESTIONNAIRES: ACT_TOTALSCORE: 25

## 2023-07-10 ENCOUNTER — OFFICE VISIT (OUTPATIENT)
Dept: FAMILY MEDICINE | Facility: CLINIC | Age: 78
End: 2023-07-10
Payer: COMMERCIAL

## 2023-07-10 VITALS
TEMPERATURE: 98.6 F | RESPIRATION RATE: 14 BRPM | DIASTOLIC BLOOD PRESSURE: 80 MMHG | OXYGEN SATURATION: 95 % | HEIGHT: 70 IN | BODY MASS INDEX: 40.94 KG/M2 | HEART RATE: 60 BPM | SYSTOLIC BLOOD PRESSURE: 128 MMHG | WEIGHT: 286 LBS

## 2023-07-10 DIAGNOSIS — E66.01 MORBID OBESITY (H): ICD-10-CM

## 2023-07-10 DIAGNOSIS — J45.20 INTERMITTENT ASTHMA, UNCOMPLICATED: ICD-10-CM

## 2023-07-10 DIAGNOSIS — E78.5 HYPERLIPIDEMIA LDL GOAL <100: ICD-10-CM

## 2023-07-10 DIAGNOSIS — N40.0 BENIGN PROSTATIC HYPERPLASIA, UNSPECIFIED WHETHER LOWER URINARY TRACT SYMPTOMS PRESENT: ICD-10-CM

## 2023-07-10 DIAGNOSIS — I50.22 CHRONIC SYSTOLIC HEART FAILURE (H): ICD-10-CM

## 2023-07-10 DIAGNOSIS — I10 BENIGN ESSENTIAL HYPERTENSION: ICD-10-CM

## 2023-07-10 DIAGNOSIS — I25.118 CORONARY ARTERY DISEASE OF NATIVE ARTERY OF NATIVE HEART WITH STABLE ANGINA PECTORIS (H): ICD-10-CM

## 2023-07-10 DIAGNOSIS — I48.0 PAROXYSMAL ATRIAL FIBRILLATION (H): ICD-10-CM

## 2023-07-10 DIAGNOSIS — I26.99 ACUTE PULMONARY EMBOLISM, UNSPECIFIED PULMONARY EMBOLISM TYPE, UNSPECIFIED WHETHER ACUTE COR PULMONALE PRESENT (H): ICD-10-CM

## 2023-07-10 DIAGNOSIS — Z00.00 ENCOUNTER FOR MEDICARE ANNUAL WELLNESS EXAM: Primary | ICD-10-CM

## 2023-07-10 DIAGNOSIS — I25.10 CORONARY ARTERY DISEASE INVOLVING NATIVE CORONARY ARTERY OF NATIVE HEART WITHOUT ANGINA PECTORIS: ICD-10-CM

## 2023-07-10 LAB
ANION GAP SERPL CALCULATED.3IONS-SCNC: 9 MMOL/L (ref 7–15)
BUN SERPL-MCNC: 21.4 MG/DL (ref 8–23)
CALCIUM SERPL-MCNC: 8.9 MG/DL (ref 8.8–10.2)
CHLORIDE SERPL-SCNC: 106 MMOL/L (ref 98–107)
CREAT SERPL-MCNC: 1.28 MG/DL (ref 0.67–1.17)
DEPRECATED HCO3 PLAS-SCNC: 24 MMOL/L (ref 22–29)
GFR SERPL CREATININE-BSD FRML MDRD: 58 ML/MIN/1.73M2
GLUCOSE SERPL-MCNC: 93 MG/DL (ref 70–99)
POTASSIUM SERPL-SCNC: 4.8 MMOL/L (ref 3.4–5.3)
SODIUM SERPL-SCNC: 139 MMOL/L (ref 136–145)

## 2023-07-10 PROCEDURE — 80048 BASIC METABOLIC PNL TOTAL CA: CPT | Performed by: FAMILY MEDICINE

## 2023-07-10 PROCEDURE — G0439 PPPS, SUBSEQ VISIT: HCPCS | Performed by: FAMILY MEDICINE

## 2023-07-10 PROCEDURE — 99214 OFFICE O/P EST MOD 30 MIN: CPT | Mod: 25 | Performed by: FAMILY MEDICINE

## 2023-07-10 PROCEDURE — 36415 COLL VENOUS BLD VENIPUNCTURE: CPT | Performed by: FAMILY MEDICINE

## 2023-07-10 RX ORDER — FUROSEMIDE 40 MG
TABLET ORAL
Qty: 90 TABLET | Refills: 3 | Status: SHIPPED | OUTPATIENT
Start: 2023-07-10 | End: 2024-06-21

## 2023-07-10 RX ORDER — ATORVASTATIN CALCIUM 80 MG/1
TABLET, FILM COATED ORAL
Qty: 90 TABLET | Refills: 0 | Status: SHIPPED | OUTPATIENT
Start: 2023-07-10 | End: 2023-12-04

## 2023-07-10 RX ORDER — POTASSIUM CHLORIDE 1500 MG/1
TABLET, EXTENDED RELEASE ORAL
Qty: 90 TABLET | Refills: 3 | Status: SHIPPED | OUTPATIENT
Start: 2023-07-10 | End: 2024-07-12

## 2023-07-10 RX ORDER — ALBUTEROL SULFATE 90 UG/1
1-2 AEROSOL, METERED RESPIRATORY (INHALATION) EVERY 4 HOURS PRN
Qty: 8 G | Refills: 1 | Status: SHIPPED | OUTPATIENT
Start: 2023-07-10 | End: 2024-07-24

## 2023-07-10 RX ORDER — LOSARTAN POTASSIUM 50 MG/1
TABLET ORAL
Qty: 90 TABLET | Refills: 3 | Status: ON HOLD | OUTPATIENT
Start: 2023-07-10 | End: 2024-06-19

## 2023-07-10 RX ORDER — FINASTERIDE 5 MG/1
TABLET, FILM COATED ORAL
Qty: 90 TABLET | Refills: 3 | Status: SHIPPED | OUTPATIENT
Start: 2023-07-10 | End: 2024-07-12

## 2023-07-10 RX ORDER — METOPROLOL SUCCINATE 50 MG/1
TABLET, EXTENDED RELEASE ORAL
Qty: 90 TABLET | Refills: 3 | Status: SHIPPED | OUTPATIENT
Start: 2023-07-10 | End: 2024-06-05

## 2023-07-10 RX ORDER — TAMSULOSIN HYDROCHLORIDE 0.4 MG/1
CAPSULE ORAL
Qty: 90 CAPSULE | Refills: 3 | Status: SHIPPED | OUTPATIENT
Start: 2023-07-10 | End: 2024-06-21

## 2023-07-10 RX ORDER — ISOSORBIDE MONONITRATE 30 MG/1
TABLET, EXTENDED RELEASE ORAL
Qty: 90 TABLET | Refills: 3 | Status: SHIPPED | OUTPATIENT
Start: 2023-07-10 | End: 2024-06-21

## 2023-07-10 ASSESSMENT — ENCOUNTER SYMPTOMS
PARESTHESIAS: 0
HEMATOCHEZIA: 0
ARTHRALGIAS: 0
CONSTIPATION: 0
SHORTNESS OF BREATH: 0
HEADACHES: 0
DIZZINESS: 0
COUGH: 0
PALPITATIONS: 0
DYSURIA: 0
MYALGIAS: 0
FEVER: 0
HEARTBURN: 0
FREQUENCY: 0
SORE THROAT: 0
NERVOUS/ANXIOUS: 0
HEMATURIA: 0
EYE PAIN: 0
NAUSEA: 0
CHILLS: 0
DIARRHEA: 0
JOINT SWELLING: 0
ABDOMINAL PAIN: 0
WEAKNESS: 0

## 2023-07-10 ASSESSMENT — ACTIVITIES OF DAILY LIVING (ADL): CURRENT_FUNCTION: NO ASSISTANCE NEEDED

## 2023-07-10 NOTE — PATIENT INSTRUCTIONS
Patient Education   Personalized Prevention Plan  You are due for the preventive services outlined below.  Your care team is available to assist you in scheduling these services.  If you have already completed any of these items, please share that information with your care team to update in your medical record.  Health Maintenance Due   Topic Date Due     Zoster (Shingles) Vaccine (1 of 2) Never done     Pneumococcal Vaccine (2 - PPSV23 if available, else PCV20) 12/24/2015     Asthma Action Plan - yearly  04/22/2020     COVID-19 Vaccine (6 - Moderna series) 03/02/2023       Understanding USDA MyPlate  The USDA has guidelines to help you make healthy food choices. These are called MyPlate. MyPlate shows the food groups that make up healthy meals using the image of a place setting. Before you eat, think about the healthiest choices for what to put on your plate or in your cup or bowl. To learn more about building a healthy plate, visit www.[employer].gov.     The food groups    Fruits. Any fruit or 100% fruit juice counts as part of the Fruit Group. Fruits may be fresh, canned, frozen, or dried, and may be whole, cut-up, or pureed. Make 1/2 of your plate fruits and vegetables.    Vegetables. Any vegetable or 100% vegetable juice counts as a member of the Vegetable Group. Vegetables may be fresh, frozen, canned, or dried. They can be served raw or cooked and may be whole, cut-up, or mashed. Make 1/2 of your plate fruits and vegetables.    Grains. All foods made from grains are part of the Grains Group. These include wheat, rice, oats, cornmeal, and barley. Grains are often used to make foods such as bread, pasta, oatmeal, cereal, tortillas, and grits. Grains should be no more than 1/4 of your plate. At least half of your grains should be whole grains.    Protein. This group includes meat, poultry, seafood, beans and peas, eggs, processed soy products (such as tofu), nuts (including nut butters), and seeds. Make protein  choices no more than 1/4 of your plate. Meat and poultry choices should be lean or low fat.    Dairy. The Dairy Group includes all fluid milk products and foods made from milk that contain calcium, such as yogurt and cheese. (Foods that have little calcium, such as cream, butter, and cream cheese, are not part of this group.) Most dairy choices should be low-fat or fat-free.  Things to limit  Eating healthy also means limiting these things in your diet:    Oils. Oils aren't a food group, but they do contain essential nutrients. These are fats that are liquid at room temperature. Including small amounts of oils in your diet is fine and can even be good for you. But it's important to watch how much oil you include in your diet. Oils include avocado, canola, corn, olive, soybean, vegetable, and sunflower. Foods that are mainly oil include mayonnaise, certain salad dressings, and soft margarines. You likely already get your daily oil allowance from the foods you eat.    Salt (sodium).  Many processed foods have a lot of sodium. To keep sodium intake down, eat fresh vegetables, meats, poultry, and seafood when possible. Buy low-sodium, reduced-sodium, or no-salt-added food products at the store. And don't add salt to your meals at home. Instead, season them with herbs and spices such as dill, oregano, cumin, and paprika. Or try adding flavor with vinegar, onion, garlic, or lemon or lime zest and juice.    Saturated fat . Saturated fats are most often found in animal products such as beef, pork, and chicken. They are often solid at room temperature, such as butter. To reduce your saturated fat intake, choose leaner cuts of meat and poultry. And try healthier cooking methods such as grilling, broiling, roasting, or baking. For a simple lower-fat swap, use plain nonfat yogurt instead of mayonnaise when making potato salad or macaroni salad.    Added sugars.  These are sugars added to foods. They are in foods such as ice  cream, candy, soda, fruit drinks, sports drinks, energy drinks, cookies, pastries, jams, and syrups. Cut down on added sugars by sharing sweet treats with a family member or friend. You can also choose fruit for dessert, and drink water or other unsweetened beverages.    Alcohol. Alcohol contains calories but few nutrients. If you don't drink alcohol, don't start drinking for any reason. But if you do choose to drink alcohol, do so only in moderation. This means no more than 2 drinks in a day for men and no more than 1 drink per day for women, on days when you have alcohol.  [employer] last reviewed this educational content on 1/1/2023 2000-2023 [employer]. All rights reserved. This information is not intended as a substitute for professional medical care. Always follow your healthcare professional's instructions.          Signs of Hearing Loss  Hearing loss is a problem shared by many people. In fact, it's one of the most common health problems, particularly as people age. Most people aged 65 and older have some hearing loss. By age 80, almost everyone does. Hearing loss often occurs slowly over the years. So, you may not realize your hearing has gotten worse.   When sudden hearing loss occurs, it's important to contact your healthcare provider right away. Your provider will do a medical exam and a hearing exam as soon as possible. This is to help find the cause and type of your sudden hearing loss. Based on your diagnosis, your healthcare provider will discuss possible treatments.      Hearing much better with one ear can be a sign of hearing loss.     Have your hearing checked  Call your healthcare provider if you:     Have to strain to hear normal conversation    Have to watch other people s faces very carefully to follow what they re saying    Need to ask people to repeat what they ve said    Often misunderstand what people are saying    Turn the volume of the television or radio up so high that  others complain    Feel that people are mumbling when they re talking to you    Find that the effort to hear leaves you feeling tired and irritated    Notice, when using the phone, that you hear better with one ear than the other  [employer] last reviewed this educational content on 6/1/2022 2000-2023 [employer]. All rights reserved. This information is not intended as a substitute for professional medical care. Always follow your healthcare professional's instructions.

## 2023-07-10 NOTE — PROGRESS NOTES
"SUBJECTIVE:   Tarik is a 77 year old who presents for Preventive Visit.      7/10/2023     4:53 PM   Additional Questions   Roomed by Mechelle smith     Are you in the first 12 months of your Medicare coverage?  No    Healthy Habits:     In general, how would you rate your overall health?  Good    Frequency of exercise:  2-3 days/week    Duration of exercise:  Less than 15 minutes    Do you usually eat at least 4 servings of fruit and vegetables a day, include whole grains    & fiber and avoid regularly eating high fat or \"junk\" foods?  No    Taking medications regularly:  Yes    Medication side effects:  None    Ability to successfully perform activities of daily living:  No assistance needed    Home Safety:  No safety concerns identified    Hearing Impairment:  Difficulty following a conversation in a noisy restaurant or crowded room    In the past 6 months, have you been bothered by leaking of urine?  No    In general, how would you rate your overall mental or emotional health?  Excellent    Additional concerns today:  No        Have you ever done Advance Care Planning? (For example, a Health Directive, POLST, or a discussion with a medical provider or your loved ones about your wishes): Yes, advance care planning is on file.       Fall risk  Fallen 2 or more times in the past year?: No  Any fall with injury in the past year?: No    Cognitive Screening   1) Repeat 3 items (Leader, Season, Table)    2) Clock draw: ABNORMAL wrong time   3) 3 item recall: Recalls 2 objects   Results: ABNORMAL clock, 1-2 items recalled: PROBABLE COGNITIVE IMPAIRMENT, **INFORM PROVIDER**    Mini-CogTM Copyright JASON Thomas. Licensed by the author for use in [facility]; reprinted with permission (linda@.[email or web address]). All rights reserved.      Do you have sleep apnea, excessive snoring or daytime drowsiness?: no    Reviewed and updated as needed this visit by clinical staff   Tobacco  Allergies  Meds              Reviewed and updated as " needed this visit by Provider                 Social History     Tobacco Use     Smoking status: Never     Smokeless tobacco: Never   Substance Use Topics     Alcohol use: Yes     Alcohol/week: 3.3 standard drinks of alcohol     Types: 4 Glasses of wine per week     Comment: occasional              7/4/2023    12:44 PM   Alcohol Use   Prescreen: >3 drinks/day or >7 drinks/week? No     Do you have a current opioid prescription? No  Do you use any other controlled substances or medications that are not prescribed by a provider? None          Atrial Fibrillation Follow-up      Symptoms: no recent chest pain, significant palpitations, dizziness/lightheadedness, dyspnea, or increased peripheral edema.    Stroke prevention: Coumadin (Warfarin)        4/21/2023    10:25 AM 4/21/2023    10:30 AM 4/21/2023    10:35 AM 4/21/2023    10:40 AM 7/10/2023     5:03 PM   Date   Pulse 55 60 59 58 60     Current [id number] Score: 6 points - A score of 5 or greater represents a 7.2 - 12.2% annual risk of major embolic event, without anti-coagulation or an LAAO device.     Vascular Disease Follow-up      How often do you take nitroglycerin? Never    Do you take an aspirin every day? No    Heart Failure Follow-up    Are you experiencing any shortness of breath? No    Are you experiencing any swelling in your legs or feet?  No    Are you using more pillows than usual? No    Do you cough at night?  No    Do you check your weight daily?  Yes    Have you had a weight change recently?  Weight decrease    Are you having any of the following side effects from your medications? (Select all that apply)  The patient does not report symptoms of dizziness, fatigue, cough, swelling, or slow heart beat.    Since your last visit, how many times have you gone to the cardiologist, urgent care, emergency room, or hospital because of your heart failure?   None    Asthma Follow-Up    Was ACT completed today?  Yes        7/4/2023    12:46 PM   ACT Total  Scores   ACT TOTAL SCORE (Goal Greater than or Equal to 20) 25   In the past 12 months, how many times did you visit the emergency room for your asthma without being admitted to the hospital? 0   In the past 12 months, how many times were you hospitalized overnight because of your asthma? 0          How many days per week do you miss taking your asthma controller medication?  I do not have an asthma controller medication    Please describe any recent triggers for your asthma: None    Have you had any Emergency Room Visits, Urgent Care Visits, or Hospital Admissions since your last office visit?  No      Current providers sharing in care for this patient include:   Patient Care Team:  Marcin Rios MD as PCP - General (Family Medicine)  Marcin Rios MD as Assigned PCP  Eduardo Sorinao DO as Assigned Neuroscience Provider    The following health maintenance items are reviewed in Epic and correct as of today:  Health Maintenance   Topic Date Due     ZOSTER IMMUNIZATION (1 of 2) Never done     Pneumococcal Vaccine: 65+ Years (2 - PPSV23 if available, else PCV20) 12/24/2015     ASTHMA ACTION PLAN  04/22/2020     COVID-19 Vaccine (6 - Moderna series) 03/02/2023     MEDICARE ANNUAL WELLNESS VISIT  04/18/2023     INFLUENZA VACCINE (1) 09/01/2023     ANNUAL REVIEW OF HM ORDERS  11/23/2023     ASTHMA CONTROL TEST  01/10/2024     LIPID  04/26/2024     COLORECTAL CANCER SCREENING  05/22/2024     FALL RISK ASSESSMENT  07/10/2024     DTAP/TDAP/TD IMMUNIZATION (3 - Td or Tdap) 10/28/2026     ADVANCE CARE PLANNING  04/18/2027     HEPATITIS C SCREENING  Completed     PHQ-2 (once per calendar year)  Completed     IPV IMMUNIZATION  Aged Out     MENINGITIS IMMUNIZATION  Aged Out     Lab work is in process  Labs reviewed in EPIC  BP Readings from Last 3 Encounters:   07/10/23 128/80   04/06/23 130/82   04/18/22 120/76    Wt Readings from Last 3 Encounters:   07/10/23 129.7 kg (286 lb)   04/06/23 131.5 kg (290 lb)   04/18/22 131.5  kg (290 lb)                  Patient Active Problem List   Diagnosis     Other acute and subacute form of ischemic heart disease     Intermittent asthma     Hyperlipidemia     Benign prostatic hyperplasia     Coronary artery disease of native artery of native heart with stable angina pectoris (H)     Acute deep vein thrombosis (DVT) of other vein of left upper extremity (H)     S/P ICD (internal cardiac defibrillator) procedure     Chronic systolic heart failure (H)     Hypertension     Ischemic cardiomyopathy     Non-STEMI (non-ST elevated myocardial infarction) (H)     Coronary atherosclerosis     Morbid obesity (H)     Acute pulmonary embolism - bilateral with possible right sided heart strain on CT     Hypoxemia     Bilateral leg edema - right more than left     Shortness of breath     Pulmonary emboli (H)     Atrophy of left kidney     Acute pulmonary embolism, unspecified pulmonary embolism type, unspecified whether acute cor pulmonale present (H)     Primary osteoarthritis of right shoulder     Paroxysmal atrial fibrillation with rapid ventricular response (H)     Paroxysmal atrial fibrillation (H)     Past Surgical History:   Procedure Laterality Date     Cardiac stent       COLONOSCOPY N/A 5/22/2019    Procedure: Colonoscopy, Polypectomy by Snare;  Surgeon: Gian Horn DO;  [facility]     EXCISE GANGLION WRIST Left 11/26/2014    Procedure: EXCISE GANGLION WRIST;  Surgeon: Gian Haddad MD;  [facility]     ICD DEVICE INTERROGATE      2017     ORTHOPEDIC SURGERY       RELEASE CARPAL TUNNEL Left 11/26/2014    Procedure: RELEASE CARPAL TUNNEL;  Surgeon: Gian Haddad MD;  [facility]     RELEASE DEQUERVAINS WRIST Left 11/26/2014    Procedure: RELEASE DEQUERVAINS WRIST;  Surgeon: Gian Haddad MD;  Location: [facility]       Social History     Tobacco Use     Smoking status: Never     Smokeless tobacco: Never   Substance Use Topics     Alcohol use: Yes      Alcohol/week: 3.3 standard drinks of alcohol     Types: 4 Glasses of wine per week     Comment: occasional      Family History   Problem Relation Age of Onset     Arthritis Mother      Diabetes Father      Allergies Father         sulfa     Eye Disorder Father         cataract     Heart Disease Father         by pass         Current Outpatient Medications   Medication Sig Dispense Refill     acetaminophen (TYLENOL) 325 MG tablet Take 325 mg by mouth 2 tablets daily       albuterol (PROAIR HFA) 108 (90 Base) MCG/ACT inhaler Inhale 1-2 puffs into the lungs every 4 hours as needed for shortness of breath / dyspnea 1 Inhaler 1     atorvastatin (LIPITOR) 80 MG tablet TAKE 1 TABLET (80MG) BY MOUTH ONCE DAILY 90 tablet 0     DAILY MULTI OR 1 TABLET DAILY       finasteride (PROSCAR) 5 MG tablet TAKE 1 TABLET (5MG) BY MOUTH ONCE DAILY 90 tablet 2     furosemide (LASIX) 40 MG tablet TAKE 1 TABLET (40MG) BY MOUTH ONCE DAILY 90 tablet 2     isosorbide mononitrate (IMDUR) 30 MG 24 hr tablet TAKE 1 TABLET (30MG) BY MOUTH ONCE DAILY 90 tablet 2     losartan (COZAAR) 50 MG tablet TAKE 1 TABLET (50MG) BY MOUTH ONCE DAILY 90 tablet 2     metoprolol succinate ER (TOPROL XL) 50 MG 24 hr tablet TAKE 1 TABLET (50MG) BY MOUTH ONCE DAILY 90 tablet 2     potassium chloride ER (KLOR-CON M) 20 MEQ CR tablet Take 1 tablet by mouth once daily 90 tablet 0     tamsulosin (FLOMAX) 0.4 MG capsule TAKE 1 CAPSULE (0.4MG) BY MOUTH ONCE DAILY 90 capsule 2     warfarin ANTICOAGULANT (COUMADIN) 5 MG tablet Take 2.5 mg Mon, Fri and 5 mg all other days, or as directed by the coumadin clinic 80 tablet 1     nitroGLYcerin (NITROSTAT) 0.4 MG sublingual tablet 0.4 mg As needed (Patient not taking: Reported on 7/10/2023)       Allergies   Allergen Reactions     Pine Shortness Of Breath     Pine Pollen  Pine Pollen  Pine Pollen     No Clinical Screening - See Comments Rash     Other reaction(s): Rash     No Known Allergies Other (See Comments)     Recent Labs  "  Lab Test 04/26/23  1013 04/18/22  1013 01/28/21  1223 03/30/20  0622 03/28/20  0601 03/27/20  1435   LDL 61 45 48  --   --   --    HDL 46 56 52  --   --   --    TRIG 121 86 111  --   --   --    ALT  --  45 36  --   --  39   CR  --  1.20 1.25 1.26*   < > 1.19   GFRESTIMATED  --  63 56* 56*   < > 60*   GFRESTBLACK  --   --  65 65   < > 70   POTASSIUM  --  4.9 4.7 4.0   < > 4.0    < > = values in this interval not displayed.                Review of Systems   Constitutional: Negative for chills and fever.   HENT: Negative for congestion, ear pain, hearing loss and sore throat.    Eyes: Negative for pain and visual disturbance.   Respiratory: Negative for cough and shortness of breath.    Cardiovascular: Negative for chest pain, palpitations and peripheral edema.   Gastrointestinal: Negative for abdominal pain, constipation, diarrhea, heartburn, hematochezia and nausea.   Genitourinary: Positive for impotence and urgency. Negative for dysuria, frequency, genital sores, hematuria and penile discharge.   Musculoskeletal: Negative for arthralgias, joint swelling and myalgias.   Skin: Negative for rash.   Neurological: Negative for dizziness, weakness, headaches and paresthesias.   Psychiatric/Behavioral: Negative for mood changes. The patient is not nervous/anxious.          OBJECTIVE:   /80   Pulse 60   Temp 98.6  F (37  C)   Resp 14   Ht 1.79 m (5' 10.47\")   Wt 129.7 kg (286 lb)   SpO2 95%   BMI 40.49 kg/m   Estimated body mass index is 40.73 kg/m  as calculated from the following:    Height as of 4/18/22: 1.797 m (5' 10.75\").    Weight as of 4/6/23: 131.5 kg (290 lb).  Physical Exam  GENERAL: healthy, alert, no distress and obese  NECK: no adenopathy, no asymmetry, masses, or scars and thyroid normal to palpation  RESP: lungs clear to auscultation - no rales, rhonchi or wheezes  CV: regular rate and rhythm, normal S1 S2, no S3 or S4, no murmur, click or rub, no peripheral edema and peripheral pulses " strong  ABDOMEN: soft, nontender, no hepatosplenomegaly, no masses and bowel sounds normal  MS: no gross musculoskeletal defects noted, no edema  NEURO: Normal strength and tone, mentation intact and speech normal  PSYCH: mentation appears normal, affect normal/bright  LYMPH: no cervical, supraclavicular, axillary, or inguinal adenopathy    Diagnostic Test Results:  Labs reviewed in Epic    ASSESSMENT / PLAN:       ICD-10-CM    1. Encounter for Medicare annual wellness exam  Z00.00 PRIMARY CARE FOLLOW-UP SCHEDULING     REVIEW OF HEALTH MAINTENANCE PROTOCOL ORDERS      2. Acute pulmonary embolism, unspecified pulmonary embolism type, unspecified whether acute cor pulmonale present (H)  I26.99       3. Chronic systolic heart failure (H)  I50.22 Basic metabolic panel  (Ca, Cl, CO2, Creat, Gluc, K, Na, BUN)     OFFICE/OUTPT VISIT,EST,LEVL IV      4. Paroxysmal atrial fibrillation (H)  I48.0 OFFICE/OUTPT VISIT,EST,LEVL IV      5. Morbid obesity (H)  E66.01       6. Coronary artery disease of native artery of native heart with stable angina pectoris (H)  I25.118 OFFICE/OUTPT VISIT,EST,LEVL IV      7. Intermittent asthma, uncomplicated  J45.20 albuterol (PROAIR HFA) 108 (90 Base) MCG/ACT inhaler     OFFICE/OUTPT VISIT,EST,LEVL IV      8. Hyperlipidemia LDL goal <100  E78.5 atorvastatin (LIPITOR) 80 MG tablet     furosemide (LASIX) 40 MG tablet     isosorbide mononitrate (IMDUR) 30 MG 24 hr tablet     metoprolol succinate ER (TOPROL XL) 50 MG 24 hr tablet     potassium chloride ER (KLOR-CON M) 20 MEQ CR tablet      9. Coronary artery disease involving native coronary artery of native heart without angina pectoris  I25.10 finasteride (PROSCAR) 5 MG tablet      10. Benign essential hypertension  I10 losartan (COZAAR) 50 MG tablet     OFFICE/OUTPT VISIT,EST,LEVL IV      11. Benign prostatic hyperplasia, unspecified whether lower urinary tract symptoms present  N40.0 tamsulosin (FLOMAX) 0.4 MG capsule     OFFICE/OUTPT  VISIT,RUBENS MARSH IV          Patient has been advised of split billing requirements and indicates understanding: Yes   1. Encounter for Medicare annual wellness exam  Patient has been under the care of Dr. Major who retired. Patient requests transfer care to me.   Patient medications reconciled, PMH and Problem list reviewed and HM updated.  Past Medical History:   Diagnosis Date     Hypertension      Myocardial infarction (H)      Patient Active Problem List   Diagnosis     Other acute and subacute form of ischemic heart disease     Intermittent asthma     Hyperlipidemia     Benign prostatic hyperplasia     Coronary artery disease of native artery of native heart with stable angina pectoris (H)     Acute deep vein thrombosis (DVT) of other vein of left upper extremity (H)     S/P ICD (internal cardiac defibrillator) procedure     Chronic systolic heart failure (H)     Hypertension     Ischemic cardiomyopathy     Non-STEMI (non-ST elevated myocardial infarction) (H)     Coronary atherosclerosis     Morbid obesity (H)     Acute pulmonary embolism - bilateral with possible right sided heart strain on CT     Hypoxemia     Bilateral leg edema - right more than left     Shortness of breath     Pulmonary emboli (H)     Atrophy of left kidney     Acute pulmonary embolism, unspecified pulmonary embolism type, unspecified whether acute cor pulmonale present (H)     Primary osteoarthritis of right shoulder     Paroxysmal atrial fibrillation with rapid ventricular response (H)     Paroxysmal atrial fibrillation (H)     Past Surgical History:   Procedure Laterality Date     Cardiac stent       COLONOSCOPY N/A 5/22/2019    Procedure: Colonoscopy, Polypectomy by Snare;  Surgeon: Gian Horn DO;  [facility]     EXCISE GANGLION WRIST Left 11/26/2014    Procedure: EXCISE GANGLION WRIST;  Surgeon: Gian Haddad MD;  [facility]     ICD DEVICE INTERROGATE      2017     ORTHOPEDIC SURGERY        RELEASE CARPAL TUNNEL Left 11/26/2014    Procedure: RELEASE CARPAL TUNNEL;  Surgeon: Gian Haddad MD;  [facility]     RELEASE DEQUERVAINS WRIST Left 11/26/2014    Procedure: RELEASE DEQUERVAINS WRIST;  Surgeon: Gian Haddad MD;  Location[facility]         - PRIMARY CARE FOLLOW-UP SCHEDULING; Future  - REVIEW OF HEALTH MAINTENANCE PROTOCOL ORDERS    2. Acute pulmonary embolism, unspecified pulmonary embolism type, unspecified whether acute cor pulmonale present (H)  Acute episode, remains therapeutic on Coumadin life long.  INR   Date Value Ref Range Status   06/02/2023 2.4 (H) 0.9 - 1.1 Final   04/03/2020 2.10 (H) 0.86 - 1.14 Final     Comment:     This test is intended for monitoring Coumadin therapy.  Results are not   accurate in patients with prolonged INR due to factor deficiency.       INR Point of Care   Date Value Ref Range Status   06/03/2021 2.6 (H) 0.86 - 1.14 Final     Comment:     This test is intended for monitoring Coumadin therapy.  Results are not   accurate in patients with prolonged INR due to factor deficiency.       INR (External)   Date Value Ref Range Status   03/15/2023 2.6 (A) 0.9 - 1.1 Final          3. Chronic systolic heart failure (H)  Chronic, stable. Weight down, no exercise intolerance, no edema. Renal function remains stable. Tolerates hd statin, ARB, nitrate, lasix and BB. The current medical regimen is effective;  continue present plan and medications.   - Basic metabolic panel  (Ca, Cl, CO2, Creat, Gluc, K, Na, BUN); Future  - OFFICE/OUTPT VISIT,EST,LEVL IV    4. Paroxysmal atrial fibrillation (H)  Chronic, rate controlled with BB and anticoagulated on coumadin.  The current medical regimen is effective;  continue present plan and medications.     - OFFICE/OUTPT VISIT,EST,LEVL IV    5. Morbid obesity (H)  Chronic, encourage weight loss with diet and exercise.    6. Coronary artery disease of native artery of native heart with stable angina pectoris (H)  Chronic  stable. Prior cardiac stent with ICD, Denies recurrent angina. Tolerates hd statin, ARB, BB, Nitrate and coumadin. The current medical regimen is effective;  continue present plan and medications.   - OFFICE/OUTPT VISIT,EST,LEVL IV    7. Intermittent asthma, uncomplicated  Very mild with rare cough. The current medical regimen is effective;  continue present plan and medications.   - albuterol (PROAIR HFA) 108 (90 Base) MCG/ACT inhaler; Inhale 1-2 puffs into the lungs every 4 hours as needed for shortness of breath  Dispense: 8 g; Refill: 1  - OFFICE/OUTPT VISIT,EST,LEVL IV    8. Hyperlipidemia LDL goal <100  Chronic with ASCVD. Tolerate hd statin. The current medical regimen is effective;  continue present plan and medications.   - atorvastatin (LIPITOR) 80 MG tablet; TAKE 1 TABLET (80MG) BY MOUTH ONCE DAILY  Dispense: 90 tablet; Refill: 0  - furosemide (LASIX) 40 MG tablet; TAKE 1 TABLET (40MG) BY MOUTH ONCE DAILY  Dispense: 90 tablet; Refill: 3  - isosorbide mononitrate (IMDUR) 30 MG 24 hr tablet; TAKE 1 TABLET (30MG) BY MOUTH ONCE DAILY  Dispense: 90 tablet; Refill: 3  - metoprolol succinate ER (TOPROL XL) 50 MG 24 hr tablet; TAKE 1 TABLET (50MG) BY MOUTH ONCE DAILY  Dispense: 90 tablet; Refill: 3  - potassium chloride ER (KLOR-CON M) 20 MEQ CR tablet; Take 1 tablet by mouth once daily  Dispense: 90 tablet; Refill: 3        10. Benign essential hypertension  Chronic controlled. Renal function stable. The current medical regimen is effective;  continue present plan and medications.    - losartan (COZAAR) 50 MG tablet; TAKE 1 TABLET (50MG) BY MOUTH ONCE DAILY  Dispense: 90 tablet; Refill: 3  - OFFICE/OUTPT VISIT,EST,LEVL IV    11. Benign prostatic hyperplasia, unspecified whether lower urinary tract symptoms present  - finasteride (PROSCAR) 5 MG tablet; TAKE 1 TABLET (5MG) BY MOUTH ONCE DAILY  Dispense: 90 tablet; Refill: 3  - tamsulosin (FLOMAX) 0.4 MG capsule; TAKE 1 CAPSULE (0.4MG) BY MOUTH ONCE DAILY   Dispense: 90 capsule; Refill: 3  - OFFICE/OUTPT VISIT,EST,LEVL IV        COUNSELING:  Reviewed preventive health counseling, as reflected in patient instructions       Regular exercise       Healthy diet/nutrition       Vision screening       Hearing screening       Bladder control       Fall risk prevention       Immunizations    Declined: Covid-19, Influenza, Pneumococcal and Zoster due to Other will obtain at local pharmacy                He reports that he has never smoked. He has never used smokeless tobacco.      Appropriate preventive services were discussed with this patient, including applicable screening as appropriate for cardiovascular disease, diabetes, osteopenia/osteoporosis, and glaucoma.  As appropriate for age/gender, discussed screening for colorectal cancer, prostate cancer, breast cancer, and cervical cancer. Checklist reviewing preventive services available has been given to the patient.    Reviewed patients plan of care and provided an AVS. The Basic Care Plan (routine screening as documented in Health Maintenance) for Ky meets the Care Plan requirement. This Care Plan has been established and reviewed with the Patient.          Marcin Rios MD  [facility]    Identified Health Risks:    I have reviewed Opioid Use Disorder and Substance Use Disorder risk factors and made any needed referrals.

## 2023-07-13 ENCOUNTER — ANTICOAGULATION THERAPY VISIT (OUTPATIENT)
Dept: ANTICOAGULATION | Facility: CLINIC | Age: 78
End: 2023-07-13

## 2023-07-13 ENCOUNTER — LAB (OUTPATIENT)
Dept: LAB | Facility: CLINIC | Age: 78
End: 2023-07-13
Payer: COMMERCIAL

## 2023-07-13 DIAGNOSIS — I26.99 ACUTE PULMONARY EMBOLISM, UNSPECIFIED PULMONARY EMBOLISM TYPE, UNSPECIFIED WHETHER ACUTE COR PULMONALE PRESENT (H): Primary | ICD-10-CM

## 2023-07-13 DIAGNOSIS — I48.0 PAROXYSMAL ATRIAL FIBRILLATION WITH RAPID VENTRICULAR RESPONSE (H): ICD-10-CM

## 2023-07-13 DIAGNOSIS — I26.99 PULMONARY EMBOLISM (H): ICD-10-CM

## 2023-07-13 DIAGNOSIS — I48.0 PAROXYSMAL ATRIAL FIBRILLATION (H): ICD-10-CM

## 2023-07-13 LAB — INR BLD: 2.2 (ref 0.9–1.1)

## 2023-07-13 PROCEDURE — 36416 COLLJ CAPILLARY BLOOD SPEC: CPT

## 2023-07-13 PROCEDURE — 85610 PROTHROMBIN TIME: CPT

## 2023-07-13 NOTE — PROGRESS NOTES
ANTICOAGULATION MANAGEMENT     Ky Martinez 77 year old male is on warfarin with therapeutic INR result. (Goal INR 2.0-3.0)    Recent labs: (last 7 days)     07/13/23  1102   INR 2.2*       ASSESSMENT       Source(s): Chart Review and Patient/Caregiver Call       Warfarin doses taken: Warfarin taken as instructed    Diet: No new diet changes identified    Medication/supplement changes: None noted    New illness, injury, or hospitalization: No    Signs or symptoms of bleeding or clotting: No    Previous result: Therapeutic last 2(+) visits    Additional findings: None         PLAN     Recommended plan for no diet, medication or health factor changes affecting INR     Dosing Instructions: Continue your current warfarin dose with next INR in 6 weeks       Summary  As of 7/13/2023    Full warfarin instructions:  2.5 mg every Mon, Fri; 5 mg all other days   Next INR check:  8/24/2023             Telephone call with Tarik who verbalizes understanding and agrees to plan and who agrees to plan and repeated back plan correctly    Patient offered & declined to schedule next visit    Education provided:     Please call back if any changes to your diet, medications or how you've been taking warfarin    Plan made per [facility] anticoagulation protocol    Jessica Chavez, RN  Anticoagulation Clinic  7/13/2023    _______________________________________________________________________     Anticoagulation Episode Summary     Current INR goal:  2.0-3.0   TTR:  98.1 % (1 y)   Target end date:  Indefinite   [facility]    Indications    Acute pulmonary embolism  unspecified pulmonary embolism type  unspecified whether acute cor pulmonale present (H) [I26.99]  Paroxysmal atrial fibrillation with rapid ventricular response (H) [I48.0]  Paroxysmal atrial fibrillation (H) [I48.0]           Comments:           Anticoagulation Care Providers     Provider Role Specialty Phone number    Ky Major MD  Referring Family Practice [phone]    Marcin Rios MD Referring Family Medicine [phone]

## 2023-08-24 ENCOUNTER — LAB (OUTPATIENT)
Dept: LAB | Facility: CLINIC | Age: 78
End: 2023-08-24
Payer: COMMERCIAL

## 2023-08-24 ENCOUNTER — ANTICOAGULATION THERAPY VISIT (OUTPATIENT)
Dept: ANTICOAGULATION | Facility: CLINIC | Age: 78
End: 2023-08-24

## 2023-08-24 DIAGNOSIS — I48.0 PAROXYSMAL ATRIAL FIBRILLATION (H): ICD-10-CM

## 2023-08-24 DIAGNOSIS — I48.0 PAROXYSMAL ATRIAL FIBRILLATION WITH RAPID VENTRICULAR RESPONSE (H): ICD-10-CM

## 2023-08-24 DIAGNOSIS — I26.99 ACUTE PULMONARY EMBOLISM, UNSPECIFIED PULMONARY EMBOLISM TYPE, UNSPECIFIED WHETHER ACUTE COR PULMONALE PRESENT (H): Primary | ICD-10-CM

## 2023-08-24 DIAGNOSIS — I26.99 PULMONARY EMBOLISM (H): ICD-10-CM

## 2023-08-24 LAB — INR BLD: 2.4 (ref 0.9–1.1)

## 2023-08-24 PROCEDURE — 85610 PROTHROMBIN TIME: CPT

## 2023-08-24 PROCEDURE — 36416 COLLJ CAPILLARY BLOOD SPEC: CPT

## 2023-08-24 NOTE — PROGRESS NOTES
ANTICOAGULATION MANAGEMENT     Ky Martinez 77 year old male is on warfarin with therapeutic INR result. (Goal INR 2.0-3.0)    Recent labs: (last 7 days)     08/24/23  1038   INR 2.4*       ASSESSMENT     Source(s): Chart Review     Warfarin doses taken: Reviewed in chart  Diet:  LM  Medication/supplement changes: None noted  New illness, injury, or hospitalization: No  Signs or symptoms of bleeding or clotting: No  Previous result: Therapeutic last 2(+) visits  Additional findings: None       PLAN     Recommended plan for no diet, medication or health factor changes affecting INR     Dosing Instructions: Continue your current warfarin dose with next INR in 6 weeks       Summary  As of 8/24/2023      Full warfarin instructions:  2.5 mg every Mon, Fri; 5 mg all other days   Next INR check:  10/5/2023               Detailed voice message left for Tarik with dosing instructions and follow up date.     Contact [phone]  to schedule and with any changes, questions or concerns.     Education provided:   Please call back if any changes to your diet, medications or how you've been taking warfarin    Plan made per [facility] anticoagulation protocol    Lucia Canales RN  Anticoagulation Clinic  8/24/2023    _______________________________________________________________________     Anticoagulation Episode Summary       Current INR goal:  2.0-3.0   TTR:  100.0 % (1 y)   Target end date:  Indefinite   Send INR reminders to:  BRANDY MEHTA    Indications    Acute pulmonary embolism  unspecified pulmonary embolism type  unspecified whether acute cor pulmonale present (H) [I26.99]  Paroxysmal atrial fibrillation with rapid ventricular response (H) [I48.0]  Paroxysmal atrial fibrillation (H) [I48.0]             Comments:               Anticoagulation Care Providers       Provider Role Specialty Phone number    Ky Major MD Referring Family Practice     Marcin Rios MD Referring Family Medicine [phone]

## 2023-09-27 NOTE — PROGRESS NOTES
Ky Martinez  :  1945  DOS: 2023  MRN: [id number]  PCP: Marcin Rios    Sports Medicine Clinic Visit    Interim History - 2023  - Last seen in clinic on 2023 for chronic ACL deficient knee, medial meniscus tear, primary osteoarthritis .  - Left knee corticosteroid injection completed on 2023 provided 4 months of great relief.  - Since the last visit, he notes that he was able to go through the summer without wearing his knee brace. Is hopeful for a repeat CSI today. Pain has returned over the past week and is located over the anteromedial left knee.   - No interim injury.       Initial Visit: 2023  HPI  Ky Martinez is a 77 year old male who is seen as a self referral presenting with left knee pain.    - Mechanism of Injury:   Pain is overall chronic with recent exacerbation during snowblowing in 2022.  Had increase in swelling, hot to the touch and severe pain at that time.   - Prior evaluation: Injection of left knee performed in [place] on 2023, that provided temporary relief of his symptoms.      - Pain Character:  Pain has been present for  for years with increase in pain 4 months ago.   Pain is well localized to the medial left knee without significant radiation.  - Endorses:  Swelling intermittently, pain as described above.  - Denies:  numbness, tingling, radicular pain, popping, grinding, weakness, instability, mechanical locking symptoms.  - Alleviating factors: Corticosteroid injection lasted 1 week of relief. Knee brace  - Aggravating factors: Ambulating, going down and up stairs, end of the day  - Treatments tried: corticosteroid injection, non-hinged knee brace, icing at night    - Patient Goals:  discuss treatment options  - Social History: retired    - Pertinent PMH:  Acute pulmonary embolism and paroxysmal atrial fibrillation with ICD and on warfarin.        Review of Systems  Musculoskeletal: as above  Remainder of review of systems is  negative including constitutional, CV, pulmonary, GI, Skin and Neurologic except as noted in HPI or medical history.    Past Medical History:   Diagnosis Date    Hypertension     Myocardial infarction (H)      Past Surgical History:   Procedure Laterality Date    Cardiac stent      COLONOSCOPY N/A 5/22/2019    Procedure: Colonoscopy, Polypectomy by Snare;  Surgeon: Gian Horn DO;  [facility]    EXCISE GANGLION WRIST Left 11/26/2014    Procedure: EXCISE GANGLION WRIST;  Surgeon: Gian Haddad MD;  [facility]    ICD DEVICE INTERROGATE      2017    ORTHOPEDIC SURGERY      RELEASE CARPAL TUNNEL Left 11/26/2014    Procedure: RELEASE CARPAL TUNNEL;  Surgeon: Gian Haddad MD;  [facility]    RELEASE DEQUERVAINS WRIST Left 11/26/2014    Procedure: RELEASE DEQUERVAINS WRIST;  Surgeon: Gian Haddad MD;  [facility]     Family History   Problem Relation Age of Onset    Arthritis Mother     Diabetes Father     Allergies Father         sulfa    Eye Disorder Father         cataract    Heart Disease Father         by pass         Objective  /82   Wt 131.1 kg (289 lb)   BMI 40.91 kg/m      General: healthy, alert and in no acute distress.    HEENT: no scleral icterus or conjunctival erythema.   Skin: no suspicious lesions or rash. No jaundice.   CV: regular rhythm by palpation, 2+ distal pulses.  Resp: normal respiratory effort without conversational dyspnea.   Psych: normal mood and affect.    Gait: nonantalgic, appropriate coordination and balance.     Neuro:        - Sensation to light touch:    - Intact throughout the BLE including all peripheral nerve distributions.        - MSR:      RLE  LLE  - Patella 2+ 2+  - Achilles 2+ 2+       - Special tests:   - Slump/SLR:  Neg bilaterally    MSK - Knee:       - Inspection:    - No significant swelling, erythema, warmth, ecchymosis, lesion.        - ROM:    - Full AROM/PROM with anterior knee pain during knee  flexion/extension.       - Palpation:    - TTP at the medial joint line, lateral joint line.  - NTTP elsewhere.        - Strength:  (*antalgic)  RLE LLE  - Hip Flexion  5 5   - Hip Extension 5 5   - Hip Abduction 5 5   - Hip Adduction 5 5  - Knee Flexion  5 5  - Knee Extension 5 5  - Dorsiflexion  5 5  - Plantarflexion 5 5  - Ext. Chan. Longus 5 5  - Inversion  5 5  - Eversion  5 5       - Special tests:        - Lachman: Positive for soft endpoint on the left as compared to the right.        - A/P drawer: Positive for mild laxity in the left.       - Pivot shift:  Neg    - Donny: Positive for mild medial compartment pain     - Varus stress:  Neg for laxity or pain    - Valgus stress:  Neg for laxity or pain   - Patellar grind:  Neg   - Thessaly: Positive for medial compartment pain and feeling of buckling      Radiology  I independently reviewed the available relevant imaging, with the following interpretation:   - XR right knee 10/8/2020 has views of the left knee as well which shows mild tricompartmental OA bilaterally, worst medially.    I independently reviewed today's new relevant imaging, with the following interpretation:  - XR L knee shows normal anatomic alignment, mild degenerative changes of the medial and patellofemoral compartments, no acute fractures or dislocations, small knee joint effusion.    Procedure  Large Joint Injection/Arthocentesis: L knee joint    Date/Time: 9/28/2023 3:12 PM    Performed by: Eduardo Sroiano DO  Authorized by: Eduardo Soriano DO    Indications:  Pain  Needle Size:  22 G  Guidance: landmark guided    Approach:  Anterolateral  Location:  Knee      Medications:  40 mg triamcinolone 40 MG/ML; 2 mL lidocaine 1 %; 2 mL BUPivacaine 0.5 %  Outcome:  Tolerated well, no immediate complications  Procedure discussed: discussed risks, benefits, and alternatives    Consent Given by:  Patient  Prep: patient was prepped and draped in usual sterile fashion          PROCEDURE  Intraarticular Knee Joint Injection - Left  The patient was informed of the risks and benefits of the procedure and alternatives were discussed. A written consent was signed by the patient.   The injection site was prepped with chlorhexidine in sterile fashion.   An injectate solution containing 2 mL of 1% lidocaine, 2 mL of 0.5% bupivacaine, and 1 mL of Kenalog (40 mg/mL) was drawn up into a 5 mL syringe.  Injection was performed using sterile technique.  A 1.5-inch 22-gauge needle was used to enter the knee joint using a lateral infrapatellar approach and injectate was injected successfully. After the injection, the site was cleaned and a bandage applied. The patient tolerated the procedure well without complications.       Assessment  1. Primary osteoarthritis of left knee    2. Tear of medial meniscus of left knee, current, unspecified tear type, initial encounter          Plan  Ky Martinez is a 77 year old male that presents for follow-up of acute on chronic left knee pain.  Imaging confirmed an ACL deficient knee with a medial meniscus tear and primary  tricompartmental osteoarthritis.  He has had good response with corticosteroid injections and presents today for another injection.  Discussed the nature of the condition and treatment options and mutually agreed upon the following plan:    - Medications:          - Discussed pharmacologic options for pain relief.   - May use NSAIDs (Ibuprofen, Naproxen) or Acetaminophen (Tylenol) as needed for pain control.   - May also use topical medications such as lidocaine, IcyHot, BioFreeze, or Voltaren gel as needed for pain control.   - Injections:          - Discussed possible injection options and alternatives.    - Options include intra-articular knee joint injections with corticosteroid, viscosupplementation, or PRP.   - Performed a corticosteroid injection of the left intra-articular knee joint today in clinic. Patient tolerated the procedure  well without complications.   - Post-procedure instructions:    - Keep the injection site clean and dry.   - Do not submerge the injection site for 24 hours (no baths, pools). Showers are ok.   - Rest the area for 24-48 hours before resuming normal activities. Avoid overexerting the area for the first few weeks.   - It may take 2-3 days to start noticing the effects of the injection and up to 3-4 weeks to feel significant benefits.   - In general, an injection in the same anatomical region can be repeated every 3 months as needed.  However, for the health of your tissues you will want to space out the injections as much as possible and request them only when symptoms are significantly bothersome and disrupting daily function and/or sleep.   - Therapy:          - Continue home exercise program as instructed by PT.  - Modalities:          - May use ice, heat, massage or other modalities as needed.   - Bracing:          -Continue to use your knee stability brace when ambulating and performing exacerbating activities.  - Activity:     - Encouraged to remain active and participate in regular activities as tolerated.  - Follow up:          -In at least 3 months if needed for re-evaluation and update to treatment plan, or sooner for new/worsening symptoms.  - Patient has clinic contact information for questions or concerns.       Eduardo Soriano DO, CAQSM  [facility] - Sports Medicine  [facility] - Department of Orthopedic Surgery       Disclaimer:  This note was prepared and written using Dragon Medical dictation software. As a result, there may be errors in the script that have gone undetected. Please consider this when interpreting the information in this note.

## 2023-09-28 ENCOUNTER — OFFICE VISIT (OUTPATIENT)
Dept: ORTHOPEDICS | Facility: CLINIC | Age: 78
End: 2023-09-28
Payer: COMMERCIAL

## 2023-09-28 VITALS — SYSTOLIC BLOOD PRESSURE: 136 MMHG | DIASTOLIC BLOOD PRESSURE: 82 MMHG | WEIGHT: 289 LBS | BODY MASS INDEX: 40.91 KG/M2

## 2023-09-28 DIAGNOSIS — S83.242A TEAR OF MEDIAL MENISCUS OF LEFT KNEE, CURRENT, UNSPECIFIED TEAR TYPE, INITIAL ENCOUNTER: ICD-10-CM

## 2023-09-28 DIAGNOSIS — M17.12 PRIMARY OSTEOARTHRITIS OF LEFT KNEE: Primary | ICD-10-CM

## 2023-09-28 PROCEDURE — 99207 PR DROP WITH A PROCEDURE: CPT | Mod: 25 | Performed by: STUDENT IN AN ORGANIZED HEALTH CARE EDUCATION/TRAINING PROGRAM

## 2023-09-28 PROCEDURE — 20610 DRAIN/INJ JOINT/BURSA W/O US: CPT | Mod: LT | Performed by: STUDENT IN AN ORGANIZED HEALTH CARE EDUCATION/TRAINING PROGRAM

## 2023-09-28 RX ORDER — BUPIVACAINE HYDROCHLORIDE 5 MG/ML
2 INJECTION, SOLUTION PERINEURAL
Status: DISCONTINUED | OUTPATIENT
Start: 2023-09-28 | End: 2024-02-29

## 2023-09-28 RX ORDER — TRIAMCINOLONE ACETONIDE 40 MG/ML
40 INJECTION, SUSPENSION INTRA-ARTICULAR; INTRAMUSCULAR
Status: DISCONTINUED | OUTPATIENT
Start: 2023-09-28 | End: 2024-02-29

## 2023-09-28 RX ORDER — LIDOCAINE HYDROCHLORIDE 10 MG/ML
2 INJECTION, SOLUTION INFILTRATION; PERINEURAL
Status: DISCONTINUED | OUTPATIENT
Start: 2023-09-28 | End: 2024-02-29

## 2023-09-28 RX ADMIN — LIDOCAINE HYDROCHLORIDE 2 ML: 10 INJECTION, SOLUTION INFILTRATION; PERINEURAL at 15:12

## 2023-09-28 RX ADMIN — TRIAMCINOLONE ACETONIDE 40 MG: 40 INJECTION, SUSPENSION INTRA-ARTICULAR; INTRAMUSCULAR at 15:12

## 2023-09-28 RX ADMIN — BUPIVACAINE HYDROCHLORIDE 2 ML: 5 INJECTION, SOLUTION PERINEURAL at 15:12

## 2023-09-28 ASSESSMENT — PAIN SCALES - GENERAL: PAINLEVEL: MILD PAIN (3)

## 2023-09-28 NOTE — LETTER
2023         RE: Ky Martinez  [address]  [facility] [address]        Dear Colleague,    Thank you for referring your patient, Ky Martinez, to the [facility] [place]. Please see a copy of my visit note below.    Ky Martinez  :  1945  DOS: 2023  MRN: [id number]  PCP: Marcin Rios    Sports Medicine Clinic Visit    Interim History - 2023  - Last seen in clinic on 2023 for chronic ACL deficient knee, medial meniscus tear, primary osteoarthritis .  - Left knee corticosteroid injection completed on 2023 provided 4 months of great relief.  - Since the last visit, he notes that he was able to go through the summer without wearing his knee brace. Is hopeful for a repeat CSI today. Pain has returned over the past week and is located over the anteromedial left knee.   - No interim injury.       Initial Visit: 2023  HPI  Ky Martinez is a 77 year old male who is seen as a self referral presenting with left knee pain.    - Mechanism of Injury:   Pain is overall chronic with recent exacerbation during snowblowing in 2022.  Had increase in swelling, hot to the touch and severe pain at that time.   - Prior evaluation: Injection of left knee performed in [place] on 2023, that provided temporary relief of his symptoms.      - Pain Character:  Pain has been present for  for years with increase in pain 4 months ago.   Pain is well localized to the medial left knee without significant radiation.  - Endorses:  Swelling intermittently, pain as described above.  - Denies:  numbness, tingling, radicular pain, popping, grinding, weakness, instability, mechanical locking symptoms.  - Alleviating factors: Corticosteroid injection lasted 1 week of relief. Knee brace  - Aggravating factors: Ambulating, going down and up stairs, end of the day  - Treatments tried: corticosteroid injection, non-hinged knee brace, icing at  night    - Patient Goals:  discuss treatment options  - Social History: retired    - Pertinent PMH:  Acute pulmonary embolism and paroxysmal atrial fibrillation with ICD and on warfarin.        Review of Systems  Musculoskeletal: as above  Remainder of review of systems is negative including constitutional, CV, pulmonary, GI, Skin and Neurologic except as noted in HPI or medical history.    Past Medical History:   Diagnosis Date     Hypertension      Myocardial infarction (H)      Past Surgical History:   Procedure Laterality Date     Cardiac stent       COLONOSCOPY N/A 5/22/2019    Procedure: Colonoscopy, Polypectomy by Snare;  Surgeon: Gian Horn DO;  [facility]     EXCISE GANGLION WRIST Left 11/26/2014    Procedure: EXCISE GANGLION WRIST;  Surgeon: Gian Haddad MD;  [facility]     ICD DEVICE INTERROGATE      2017     ORTHOPEDIC SURGERY       RELEASE CARPAL TUNNEL Left 11/26/2014    Procedure: RELEASE CARPAL TUNNEL;  Surgeon: Gian Haddad MD;  [facility]     RELEASE DEQUERVAINS WRIST Left 11/26/2014    Procedure: RELEASE DEQUERVAINS WRIST;  Surgeon: Gian Haddad MD;  [facility]     Family History   Problem Relation Age of Onset     Arthritis Mother      Diabetes Father      Allergies Father         sulfa     Eye Disorder Father         cataract     Heart Disease Father         by pass         Objective  /82   Wt 131.1 kg (289 lb)   BMI 40.91 kg/m      General: healthy, alert and in no acute distress.    HEENT: no scleral icterus or conjunctival erythema.   Skin: no suspicious lesions or rash. No jaundice.   CV: regular rhythm by palpation, 2+ distal pulses.  Resp: normal respiratory effort without conversational dyspnea.   Psych: normal mood and affect.    Gait: nonantalgic, appropriate coordination and balance.     Neuro:        - Sensation to light touch:    - Intact throughout the BLE including all peripheral nerve distributions.        -  MSR:      RLE  LLE  - Patella 2+ 2+  - Achilles 2+ 2+       - Special tests:   - Slump/SLR:  Neg bilaterally    MSK - Knee:       - Inspection:    - No significant swelling, erythema, warmth, ecchymosis, lesion.        - ROM:    - Full AROM/PROM with anterior knee pain during knee flexion/extension.       - Palpation:    - TTP at the medial joint line, lateral joint line.  - NTTP elsewhere.        - Strength:  (*antalgic)  RLE LLE  - Hip Flexion  5 5   - Hip Extension 5 5   - Hip Abduction 5 5   - Hip Adduction 5 5  - Knee Flexion  5 5  - Knee Extension 5 5  - Dorsiflexion  5 5  - Plantarflexion 5 5  - Ext. Chan. Longus 5 5  - Inversion  5 5  - Eversion  5 5       - Special tests:        - Lachman: Positive for soft endpoint on the left as compared to the right.        - A/P drawer: Positive for mild laxity in the left.       - Pivot shift:  Neg    - Donny: Positive for mild medial compartment pain     - Varus stress:  Neg for laxity or pain    - Valgus stress:  Neg for laxity or pain   - Patellar grind:  Neg   - Thessaly: Positive for medial compartment pain and feeling of buckling      Radiology  I independently reviewed the available relevant imaging, with the following interpretation:   - XR right knee 10/8/2020 has views of the left knee as well which shows mild tricompartmental OA bilaterally, worst medially.    I independently reviewed today's new relevant imaging, with the following interpretation:  - XR L knee shows normal anatomic alignment, mild degenerative changes of the medial and patellofemoral compartments, no acute fractures or dislocations, small knee joint effusion.    Procedure  Large Joint Injection/Arthocentesis: L knee joint    Date/Time: 9/28/2023 3:12 PM    Performed by: Eduardo Soriano DO  Authorized by: Eduardo Soriano DO    Indications:  Pain  Needle Size:  22 G  Guidance: landmark guided    Approach:  Anterolateral  Location:  Knee      Medications:  40 mg triamcinolone 40 MG/ML; 2 mL  lidocaine 1 %; 2 mL BUPivacaine 0.5 %  Outcome:  Tolerated well, no immediate complications  Procedure discussed: discussed risks, benefits, and alternatives    Consent Given by:  Patient  Prep: patient was prepped and draped in usual sterile fashion         PROCEDURE  Intraarticular Knee Joint Injection - Left  The patient was informed of the risks and benefits of the procedure and alternatives were discussed. A written consent was signed by the patient.   The injection site was prepped with chlorhexidine in sterile fashion.   An injectate solution containing 2 mL of 1% lidocaine, 2 mL of 0.5% bupivacaine, and 1 mL of Kenalog (40 mg/mL) was drawn up into a 5 mL syringe.  Injection was performed using sterile technique.  A 1.5-inch 22-gauge needle was used to enter the knee joint using a lateral infrapatellar approach and injectate was injected successfully. After the injection, the site was cleaned and a bandage applied. The patient tolerated the procedure well without complications.       Assessment  1. Primary osteoarthritis of left knee    2. Tear of medial meniscus of left knee, current, unspecified tear type, initial encounter          Plan  Ky Martinez is a 77 year old male that presents for follow-up of acute on chronic left knee pain.  Imaging confirmed an ACL deficient knee with a medial meniscus tear and primary  tricompartmental osteoarthritis.  He has had good response with corticosteroid injections and presents today for another injection.  Discussed the nature of the condition and treatment options and mutually agreed upon the following plan:    - Medications:          - Discussed pharmacologic options for pain relief.   - May use NSAIDs (Ibuprofen, Naproxen) or Acetaminophen (Tylenol) as needed for pain control.   - May also use topical medications such as lidocaine, IcyHot, BioFreeze, or Voltaren gel as needed for pain control.   - Injections:          - Discussed possible injection options and  alternatives.    - Options include intra-articular knee joint injections with corticosteroid, viscosupplementation, or PRP.   - Performed a corticosteroid injection of the left intra-articular knee joint today in clinic. Patient tolerated the procedure well without complications.   - Post-procedure instructions:    - Keep the injection site clean and dry.   - Do not submerge the injection site for 24 hours (no baths, pools). Showers are ok.   - Rest the area for 24-48 hours before resuming normal activities. Avoid overexerting the area for the first few weeks.   - It may take 2-3 days to start noticing the effects of the injection and up to 3-4 weeks to feel significant benefits.   - In general, an injection in the same anatomical region can be repeated every 3 months as needed.  However, for the health of your tissues you will want to space out the injections as much as possible and request them only when symptoms are significantly bothersome and disrupting daily function and/or sleep.   - Therapy:          - Continue home exercise program as instructed by PT.  - Modalities:          - May use ice, heat, massage or other modalities as needed.   - Bracing:          -Continue to use your knee stability brace when ambulating and performing exacerbating activities.  - Activity:     - Encouraged to remain active and participate in regular activities as tolerated.  - Follow up:          -In at least 3 months if needed for re-evaluation and update to treatment plan, or sooner for new/worsening symptoms.  - Patient has clinic contact information for questions or concerns.       Eduardo Soriano DO, ABIGAIL  [facility] - Sports Medicine  [facility] - Department of Orthopedic Surgery       Disclaimer:  This note was prepared and written using Dragon Medical dictation software. As a result, there may be errors in the script that have gone undetected. Please consider this when interpreting the  information in this note.       Again, thank you for allowing me to participate in the care of your patient.        Sincerely,        Eduardo Soriano, DO

## 2023-10-05 ENCOUNTER — LAB (OUTPATIENT)
Dept: LAB | Facility: CLINIC | Age: 78
End: 2023-10-05
Payer: COMMERCIAL

## 2023-10-05 ENCOUNTER — ANTICOAGULATION THERAPY VISIT (OUTPATIENT)
Dept: ANTICOAGULATION | Facility: CLINIC | Age: 78
End: 2023-10-05

## 2023-10-05 DIAGNOSIS — I26.99 PULMONARY EMBOLISM (H): ICD-10-CM

## 2023-10-05 DIAGNOSIS — I48.0 PAROXYSMAL ATRIAL FIBRILLATION (H): ICD-10-CM

## 2023-10-05 DIAGNOSIS — I26.99 ACUTE PULMONARY EMBOLISM, UNSPECIFIED PULMONARY EMBOLISM TYPE, UNSPECIFIED WHETHER ACUTE COR PULMONALE PRESENT (H): Primary | ICD-10-CM

## 2023-10-05 DIAGNOSIS — I48.0 PAROXYSMAL ATRIAL FIBRILLATION WITH RAPID VENTRICULAR RESPONSE (H): ICD-10-CM

## 2023-10-05 LAB — INR BLD: 2.4 (ref 0.9–1.1)

## 2023-10-05 PROCEDURE — 85610 PROTHROMBIN TIME: CPT

## 2023-10-05 PROCEDURE — 36416 COLLJ CAPILLARY BLOOD SPEC: CPT

## 2023-10-05 NOTE — PROGRESS NOTES
ANTICOAGULATION MANAGEMENT     Ky Martinez 77 year old male is on warfarin with therapeutic INR result. (Goal INR 2.0-3.0)    Recent labs: (last 7 days)     10/05/23  1013   INR 2.4*       ASSESSMENT     Source(s): Chart Review and Patient/Caregiver Call     Warfarin doses taken: Warfarin taken as instructed  Diet: No new diet changes identified  Medication/supplement changes: None noted  New illness, injury, or hospitalization: No  Signs or symptoms of bleeding or clotting: No  Previous result: Therapeutic last 2(+) visits  Additional findings: None       PLAN     Recommended plan for no diet, medication or health factor changes affecting INR     Dosing Instructions: Continue your current warfarin dose with next INR in 6 weeks       Summary  As of 10/5/2023      Full warfarin instructions:  2.5 mg every Mon, Fri; 5 mg all other days   Next INR check:  11/16/2023               Telephone call with Tarik who verbalizes understanding and agrees to plan and who agrees to plan and repeated back plan correctly    Patient offered & declined to schedule next visit    Education provided:   Contact [phone]  with any changes, questions or concerns.     Plan made per [facility] anticoagulation protocol    Jessica Chavez, RN  Anticoagulation Clinic  10/5/2023    _______________________________________________________________________     Anticoagulation Episode Summary       Current INR goal:  2.0-3.0   TTR:  100.0 % (1 y)   Target end date:  Indefinite   Send INR reminders to:  BRANDY MEHTA    Indications    Acute pulmonary embolism  unspecified pulmonary embolism type  unspecified whether acute cor pulmonale present (H) [I26.99]  Paroxysmal atrial fibrillation with rapid ventricular response (H) [I48.0]  Paroxysmal atrial fibrillation (H) [I48.0]             Comments:               Anticoagulation Care Providers       Provider Role Specialty Phone number    Ky Major MD Referring [facility]     Gabriel  MD Marcin [facility] [phone]

## 2023-11-15 ENCOUNTER — LAB (OUTPATIENT)
Dept: LAB | Facility: CLINIC | Age: 78
End: 2023-11-15
Payer: COMMERCIAL

## 2023-11-15 ENCOUNTER — ANTICOAGULATION THERAPY VISIT (OUTPATIENT)
Dept: ANTICOAGULATION | Facility: CLINIC | Age: 78
End: 2023-11-15

## 2023-11-15 DIAGNOSIS — I26.99 ACUTE PULMONARY EMBOLISM, UNSPECIFIED PULMONARY EMBOLISM TYPE, UNSPECIFIED WHETHER ACUTE COR PULMONALE PRESENT (H): Primary | ICD-10-CM

## 2023-11-15 DIAGNOSIS — I48.0 PAROXYSMAL ATRIAL FIBRILLATION WITH RAPID VENTRICULAR RESPONSE (H): ICD-10-CM

## 2023-11-15 DIAGNOSIS — I26.99 PULMONARY EMBOLISM (H): ICD-10-CM

## 2023-11-15 DIAGNOSIS — I48.0 PAROXYSMAL ATRIAL FIBRILLATION (H): ICD-10-CM

## 2023-11-15 LAB — INR BLD: 2.4 (ref 0.9–1.1)

## 2023-11-15 PROCEDURE — 36416 COLLJ CAPILLARY BLOOD SPEC: CPT

## 2023-11-15 PROCEDURE — 85610 PROTHROMBIN TIME: CPT

## 2023-11-15 NOTE — PROGRESS NOTES
ANTICOAGULATION MANAGEMENT     Ky Martinez 78 year old male is on warfarin with therapeutic INR result. (Goal INR 2.0-3.0)    Recent labs: (last 7 days)     11/15/23  1043   INR 2.4*       ASSESSMENT     Source(s): Chart Review and Patient/Caregiver Call     Warfarin doses taken: Warfarin taken as instructed  Diet: No new diet changes identified  Medication/supplement changes: None noted  New illness, injury, or hospitalization: No  Signs or symptoms of bleeding or clotting: No  Previous result: Therapeutic last 2(+) visits  Additional findings: None       PLAN     Recommended plan for no diet, medication or health factor changes affecting INR     Dosing Instructions: Continue your current warfarin dose with next INR in 6 weeks       Summary  As of 11/15/2023      Full warfarin instructions:  2.5 mg every Mon, Fri; 5 mg all other days   Next INR check:  12/27/2023               Telephone call with Tarik who verbalizes understanding and agrees to plan and who agrees to plan and repeated back plan correctly    Patient offered & declined to schedule next visit    Education provided:   None required    Plan made per [facility] anticoagulation protocol    Lucia Canales RN  Anticoagulation Clinic  11/15/2023    _______________________________________________________________________     Anticoagulation Episode Summary       Current INR goal:  2.0-3.0   TTR:  100.0% (1 y)   Target end date:  Indefinite   Send INR reminders to:  BRANDY MEHTA    Indications    Acute pulmonary embolism  unspecified pulmonary embolism type  unspecified whether acute cor pulmonale present (H) [I26.99]  Paroxysmal atrial fibrillation with rapid ventricular response (H) [I48.0]  Paroxysmal atrial fibrillation (H) [I48.0]             Comments:               Anticoagulation Care Providers       Provider Role Specialty Phone number    Ky Major MD Referring Family Practice     Marcin Rios MD Referring Family Medicine [phone]

## 2023-12-04 DIAGNOSIS — E78.5 HYPERLIPIDEMIA LDL GOAL <100: ICD-10-CM

## 2023-12-04 DIAGNOSIS — I26.99 ACUTE PULMONARY EMBOLISM, UNSPECIFIED PULMONARY EMBOLISM TYPE, UNSPECIFIED WHETHER ACUTE COR PULMONALE PRESENT (H): ICD-10-CM

## 2023-12-04 RX ORDER — WARFARIN SODIUM 5 MG/1
TABLET ORAL
Qty: 80 TABLET | Refills: 1 | Status: SHIPPED | OUTPATIENT
Start: 2023-12-04 | End: 2024-05-31

## 2023-12-04 RX ORDER — ATORVASTATIN CALCIUM 80 MG/1
TABLET, FILM COATED ORAL
Qty: 90 TABLET | Refills: 0 | Status: SHIPPED | OUTPATIENT
Start: 2023-12-04 | End: 2024-03-11

## 2023-12-04 NOTE — TELEPHONE ENCOUNTER
ANTICOAGULATION MANAGEMENT:  Medication Refill    Anticoagulation Summary  As of 11/15/2023      Warfarin maintenance plan:  2.5 mg (5 mg x 0.5) every Mon, Fri; 5 mg (5 mg x 1) all other days   Next INR check:  12/27/2023   Target end date:  Indefinite    Indications    Acute pulmonary embolism  unspecified pulmonary embolism type  unspecified whether acute cor pulmonale present (H) [I26.99]  Paroxysmal atrial fibrillation with rapid ventricular response (H) [I48.0]  Paroxysmal atrial fibrillation (H) [I48.0]                 Anticoagulation Care Providers       Provider Role Specialty Phone number    Ky Major MD Referring Family Practice     Marcin Rios MD Referring Family Medicine [phone]            Refill Criteria    Visit with referring provider/group: Meets criteria: office visit within referring provider group in the last 1 year on 7/10/23    [facility] referral signed last signed: 03/09/2023; within last year: Yes    Lab monitoring not exceeding 2 weeks overdue: Yes    Ky meets all criteria for refill. Rx instructions and quantity supplied updated to match patient's current dosing plan. Warfarin 90 day supply with 1 refill granted per [facility] protocol     Ashleigh Savage, RN  Anticoagulation Clinic

## 2023-12-27 ENCOUNTER — LAB (OUTPATIENT)
Dept: LAB | Facility: CLINIC | Age: 78
End: 2023-12-27
Payer: COMMERCIAL

## 2023-12-27 ENCOUNTER — ANTICOAGULATION THERAPY VISIT (OUTPATIENT)
Dept: ANTICOAGULATION | Facility: CLINIC | Age: 78
End: 2023-12-27

## 2023-12-27 DIAGNOSIS — I48.0 PAROXYSMAL ATRIAL FIBRILLATION (H): ICD-10-CM

## 2023-12-27 DIAGNOSIS — I48.0 PAROXYSMAL ATRIAL FIBRILLATION WITH RAPID VENTRICULAR RESPONSE (H): ICD-10-CM

## 2023-12-27 DIAGNOSIS — I26.99 ACUTE PULMONARY EMBOLISM, UNSPECIFIED PULMONARY EMBOLISM TYPE, UNSPECIFIED WHETHER ACUTE COR PULMONALE PRESENT (H): Primary | ICD-10-CM

## 2023-12-27 DIAGNOSIS — I26.99 PULMONARY EMBOLISM (H): ICD-10-CM

## 2023-12-27 LAB — INR BLD: 2.8 (ref 0.9–1.1)

## 2023-12-27 PROCEDURE — 36416 COLLJ CAPILLARY BLOOD SPEC: CPT

## 2023-12-27 PROCEDURE — 85610 PROTHROMBIN TIME: CPT

## 2023-12-27 NOTE — PROGRESS NOTES
ANTICOAGULATION MANAGEMENT     Ky Martinez 78 year old male is on warfarin with therapeutic INR result. (Goal INR 2.0-3.0)    Recent labs: (last 7 days)     12/27/23  1045   INR 2.8*       ASSESSMENT     Source(s): Chart Review  Previous INR was Therapeutic last 2(+) visits  Medication, diet, health changes since last INR chart reviewed; none identified         PLAN     Recommended plan for no diet, medication or health factor changes affecting INR     Dosing Instructions: Continue your current warfarin dose with next INR in 6 weeks       Summary  As of 12/27/2023      Full warfarin instructions:  2.5 mg every Mon, Fri; 5 mg all other days   Next INR check:  2/7/2024               Detailed voice message left for Tarik with dosing instructions and follow up date.     Contact [phone]  to schedule and with any changes, questions or concerns.     Education provided:   Please call back if any changes to your diet, medications or how you've been taking warfarin    Plan made per [facility] anticoagulation protocol    Lucia Canales RN  Anticoagulation Clinic  12/27/2023    _______________________________________________________________________     Anticoagulation Episode Summary       Current INR goal:  2.0-3.0   TTR:  100.0% (1 y)   Target end date:  Indefinite   Send INR reminders to:  BRANDY MEHTA    Indications    Acute pulmonary embolism  unspecified pulmonary embolism type  unspecified whether acute cor pulmonale present (H) [I26.99]  Paroxysmal atrial fibrillation with rapid ventricular response (H) [I48.0]  Paroxysmal atrial fibrillation (H) [I48.0]             Comments:               Anticoagulation Care Providers       Provider Role Specialty Phone number    Ky Major MD Referring Family Practice     Marcin Rios MD Referring Family Medicine [phone]

## 2024-01-30 ENCOUNTER — LAB (OUTPATIENT)
Dept: LAB | Facility: CLINIC | Age: 79
End: 2024-01-30
Payer: COMMERCIAL

## 2024-01-30 ENCOUNTER — DOCUMENTATION ONLY (OUTPATIENT)
Dept: ANTICOAGULATION | Facility: CLINIC | Age: 79
End: 2024-01-30

## 2024-01-30 ENCOUNTER — ANTICOAGULATION THERAPY VISIT (OUTPATIENT)
Dept: ANTICOAGULATION | Facility: CLINIC | Age: 79
End: 2024-01-30

## 2024-01-30 DIAGNOSIS — I48.0 PAROXYSMAL ATRIAL FIBRILLATION (H): ICD-10-CM

## 2024-01-30 DIAGNOSIS — I48.0 PAROXYSMAL ATRIAL FIBRILLATION WITH RAPID VENTRICULAR RESPONSE (H): ICD-10-CM

## 2024-01-30 DIAGNOSIS — I26.99 ACUTE PULMONARY EMBOLISM, UNSPECIFIED PULMONARY EMBOLISM TYPE, UNSPECIFIED WHETHER ACUTE COR PULMONALE PRESENT (H): Primary | ICD-10-CM

## 2024-01-30 DIAGNOSIS — I26.99 PULMONARY EMBOLISM (H): ICD-10-CM

## 2024-01-30 LAB — INR BLD: 1.9 (ref 0.9–1.1)

## 2024-01-30 PROCEDURE — 85610 PROTHROMBIN TIME: CPT

## 2024-01-30 PROCEDURE — 36416 COLLJ CAPILLARY BLOOD SPEC: CPT

## 2024-01-30 NOTE — PROGRESS NOTES
ANTICOAGULATION MANAGEMENT     Ky Martinez 78 year old male is on warfarin with subtherapeutic INR result. (Goal INR 2.0-3.0)    Recent labs: (last 7 days)     01/30/24  1003   INR 1.9*       ASSESSMENT     Source(s): Chart Review  Previous INR was Therapeutic last 2(+) visits  Medication, diet, health changes since last INR chart reviewed; none identified         PLAN     Recommended plan for no diet, medication or health factor changes affecting INR     Dosing Instructions: Continue your current warfarin dose with next INR in 2 weeks       Summary  As of 1/30/2024      Full warfarin instructions:  2.5 mg every Mon, Fri; 5 mg all other days   Next INR check:  2/13/2024               Detailed voice message left for Tarik with dosing instructions and follow up date.     Contact [phone]  to schedule and with any changes, questions or concerns.     Education provided:   Please call back if any changes to your diet, medications or how you've been taking warfarin  Contact [phone]  with any changes, questions or concerns.     Plan made per [facility] anticoagulation protocol    Julia CHARLES RN  Anticoagulation Clinic  1/30/2024    _______________________________________________________________________     Anticoagulation Episode Summary       Current INR goal:  2.0-3.0   TTR:  99.0% (1 y)   Target end date:  Indefinite   Send INR reminders to:  BRANDY MEHTA    Indications    Acute pulmonary embolism  unspecified pulmonary embolism type  unspecified whether acute cor pulmonale present (H) [I26.99]  Paroxysmal atrial fibrillation with rapid ventricular response (H) [I48.0]  Paroxysmal atrial fibrillation (H) [I48.0]             Comments:               Anticoagulation Care Providers       Provider Role Specialty Phone number    Ky Major MD Referring Family Practice     Marcin Rios MD Referring Family Medicine [phone]

## 2024-01-30 NOTE — PROGRESS NOTES
ANTICOAGULATION MANAGEMENT     Ky Martinez 78 year old male is on warfarin with subtherapeutic INR result. (Goal INR 2.0-3.0)    Recent labs: (last 7 days)     01/30/24  1003   INR 1.9*       ASSESSMENT     Source(s): Chart Review and Patient/Caregiver Call     Warfarin doses taken: Missed dose(s) may be affecting INR  Diet: No new diet changes identified  Medication/supplement changes: None noted  New illness, injury, or hospitalization: No  Signs or symptoms of bleeding or clotting: No  Previous result: Therapeutic last 2(+) visits  Additional findings: None       PLAN     Recommended plan for temporary change(s) affecting INR     Dosing Instructions: Continue your current warfarin dose with next INR in 2 weeks       Summary  As of 1/30/2024      Full warfarin instructions:  2.5 mg every Mon, Fri; 5 mg all other days   Next INR check:  2/13/2024               Telephone call with Tarik who verbalizes understanding and agrees to plan    Patient offered & declined to schedule next visit. Patient will be gone on a long fishing trip so he will schedule the next INR.    Education provided:   Contact [phone]  with any changes, questions or concerns.     Plan made per [facility] anticoagulation protocol    Julia CHARLES RN  Anticoagulation Clinic  1/30/2024    _______________________________________________________________________     Anticoagulation Episode Summary       Current INR goal:  2.0-3.0   TTR:  99.0% (1 y)   Target end date:  Indefinite   Send INR reminders to:  BRANDY MEHTA    Indications    Acute pulmonary embolism  unspecified pulmonary embolism type  unspecified whether acute cor pulmonale present (H) [I26.99]  Paroxysmal atrial fibrillation with rapid ventricular response (H) [I48.0]  Paroxysmal atrial fibrillation (H) [I48.0]             Comments:               Anticoagulation Care Providers       Provider Role Specialty Phone number    Ky Major MD Referring [facility]  MD Marcin [facility] [phone]

## 2024-01-30 NOTE — PROGRESS NOTES
ANTICOAGULATION CLINIC REFERRAL RENEWAL REQUEST       An annual renewal order is required for all patients referred to [facility].?  Please review and sign the pended referral order for Ky Martinez.       ANTICOAGULATION SUMMARY      Warfarin indication(s)   Atrial Fibrillation and PE    Mechanical heart valve present?  NO       Current goal range   INR: 2.0-3.0     Goal appropriate for indication? Goal INR 2-3, standard for indication(s) above     Time in Therapeutic Range (TTR)  (Goal > 60%) 100%       Office visit with referring provider's group within last year yes on 7/10/23       [facility]

## 2024-02-13 ENCOUNTER — ANTICOAGULATION THERAPY VISIT (OUTPATIENT)
Dept: ANTICOAGULATION | Facility: CLINIC | Age: 79
End: 2024-02-13

## 2024-02-13 ENCOUNTER — LAB (OUTPATIENT)
Dept: LAB | Facility: CLINIC | Age: 79
End: 2024-02-13
Payer: COMMERCIAL

## 2024-02-13 DIAGNOSIS — I26.99 ACUTE PULMONARY EMBOLISM, UNSPECIFIED PULMONARY EMBOLISM TYPE, UNSPECIFIED WHETHER ACUTE COR PULMONALE PRESENT (H): ICD-10-CM

## 2024-02-13 DIAGNOSIS — I48.0 PAROXYSMAL ATRIAL FIBRILLATION (H): ICD-10-CM

## 2024-02-13 DIAGNOSIS — I48.0 PAROXYSMAL ATRIAL FIBRILLATION WITH RAPID VENTRICULAR RESPONSE (H): ICD-10-CM

## 2024-02-13 DIAGNOSIS — I26.99 ACUTE PULMONARY EMBOLISM, UNSPECIFIED PULMONARY EMBOLISM TYPE, UNSPECIFIED WHETHER ACUTE COR PULMONALE PRESENT (H): Primary | ICD-10-CM

## 2024-02-13 LAB — INR BLD: 3.1 (ref 0.9–1.1)

## 2024-02-13 PROCEDURE — 36416 COLLJ CAPILLARY BLOOD SPEC: CPT

## 2024-02-13 PROCEDURE — 85610 PROTHROMBIN TIME: CPT

## 2024-02-13 NOTE — PROGRESS NOTES
ANTICOAGULATION MANAGEMENT     Ky Martinez 78 year old male is on warfarin with supratherapeutic INR result. (Goal INR 2.0-3.0)    Recent labs: (last 7 days)     02/13/24  1102   INR 3.1*       ASSESSMENT     Source(s): Chart Review and Patient/Caregiver Call     Warfarin doses taken: Warfarin taken as instructed  Diet: Change in alcohol intake may be affecting INR. Fishing trip and had a few more then usual cocktails.   Medication/supplement changes: None noted  New illness, injury, or hospitalization: No  Signs or symptoms of bleeding or clotting: No  Previous result: Therapeutic last 2(+) visits  Additional findings: None       PLAN     Recommended plan for temporary change(s) affecting INR     Dosing Instructions: Continue your current warfarin dose with next INR in 2 weeks       Summary  As of 2/13/2024      Full warfarin instructions:  2.5 mg every Mon, Fri; 5 mg all other days   Next INR check:  2/27/2024               Telephone call with Tarik who verbalizes understanding and agrees to plan    Patient offered & declined to schedule next visit    Education provided:   Please call back if any changes to your diet, medications or how you've been taking warfarin    Plan made per [facility] anticoagulation protocol    Jailene Whitfield, RN  Anticoagulation Clinic  2/13/2024    _______________________________________________________________________     Anticoagulation Episode Summary       Current INR goal:  2.0-3.0   TTR:  98.3% (1 y)   Target end date:  Indefinite   Send INR reminders to:  BRANDY MEHTA    Indications    Acute pulmonary embolism  unspecified pulmonary embolism type  unspecified whether acute cor pulmonale present (H) [I26.99]  Paroxysmal atrial fibrillation with rapid ventricular response (H) [I48.0]  Paroxysmal atrial fibrillation (H) [I48.0]             Comments:               Anticoagulation Care Providers       Provider Role Specialty Phone number    Ky Major MD Referring Family  Practice     Marcin Rios MD [facility] [phone]

## 2024-02-27 ENCOUNTER — ANTICOAGULATION THERAPY VISIT (OUTPATIENT)
Dept: ANTICOAGULATION | Facility: CLINIC | Age: 79
End: 2024-02-27

## 2024-02-27 ENCOUNTER — LAB (OUTPATIENT)
Dept: LAB | Facility: CLINIC | Age: 79
End: 2024-02-27
Payer: COMMERCIAL

## 2024-02-27 DIAGNOSIS — I48.0 PAROXYSMAL ATRIAL FIBRILLATION (H): ICD-10-CM

## 2024-02-27 DIAGNOSIS — I48.0 PAROXYSMAL ATRIAL FIBRILLATION WITH RAPID VENTRICULAR RESPONSE (H): ICD-10-CM

## 2024-02-27 DIAGNOSIS — I26.99 ACUTE PULMONARY EMBOLISM, UNSPECIFIED PULMONARY EMBOLISM TYPE, UNSPECIFIED WHETHER ACUTE COR PULMONALE PRESENT (H): ICD-10-CM

## 2024-02-27 DIAGNOSIS — I26.99 ACUTE PULMONARY EMBOLISM, UNSPECIFIED PULMONARY EMBOLISM TYPE, UNSPECIFIED WHETHER ACUTE COR PULMONALE PRESENT (H): Primary | ICD-10-CM

## 2024-02-27 LAB — INR BLD: 2 (ref 0.9–1.1)

## 2024-02-27 PROCEDURE — 36416 COLLJ CAPILLARY BLOOD SPEC: CPT

## 2024-02-27 PROCEDURE — 85610 PROTHROMBIN TIME: CPT

## 2024-02-27 NOTE — PROGRESS NOTES
Ky Martinez  :  1945  DOS: 2024  MRN: [id number]  PCP: Marcin Rios    Sports Medicine Clinic Visit    Interim History - 2024  - Last seen in clinic on 2023 for a left chronic ACL deficient knee, medial meniscus tear, primary osteoarthritis .  - Left knee corticosteroid injection completed on 2023 provided 3+ months of great relief.  - Since the last visit, he notes a return in anterior left knee pain over the past few weeks. Taking ibuprofen for pain management. Is hopeful for a repeat corticosteroid injection today.   - No interim injury.     Interim History - 2023  - Last seen in clinic on 2023 for chronic ACL deficient knee, medial meniscus tear, primary osteoarthritis .  - Left knee corticosteroid injection completed on 2023 provided 4 months of great relief.  - Since the last visit, he notes that he was able to go through the summer without wearing his knee brace. Is hopeful for a repeat CSI today. Pain has returned over the past week and is located over the anteromedial left knee.   - No interim injury.       Initial Visit: 2023  HPI  Ky Martinez is a 77 year old male who is seen as a self referral presenting with left knee pain.    - Mechanism of Injury:   Pain is overall chronic with recent exacerbation during snowblowing in 2022.  Had increase in swelling, hot to the touch and severe pain at that time.   - Prior evaluation: Injection of left knee performed in [place] on 2023, that provided temporary relief of his symptoms.      - Pain Character:  Pain has been present for  for years with increase in pain 4 months ago.   Pain is well localized to the medial left knee without significant radiation.  - Endorses:  Swelling intermittently, pain as described above.  - Denies:  numbness, tingling, radicular pain, popping, grinding, weakness, instability, mechanical locking symptoms.  - Alleviating factors: Corticosteroid  "injection lasted 1 week of relief. Knee brace  - Aggravating factors: Ambulating, going down and up stairs, end of the day  - Treatments tried: corticosteroid injection, non-hinged knee brace, icing at night    - Patient Goals:  discuss treatment options  - Social History: retired    - Pertinent PMH:  Acute pulmonary embolism and paroxysmal atrial fibrillation with ICD and on warfarin.        Review of Systems  Musculoskeletal: as above  Remainder of review of systems is negative including constitutional, CV, pulmonary, GI, Skin and Neurologic except as noted in HPI or medical history.    Past Medical History:   Diagnosis Date    Hypertension     Myocardial infarction (H)      Past Surgical History:   Procedure Laterality Date    Cardiac stent      COLONOSCOPY N/A 5/22/2019    Procedure: Colonoscopy, Polypectomy by Snare;  Surgeon: Gian Horn DO;  Location: [facility]    EXCISE GANGLION WRIST Left 11/26/2014    Procedure: EXCISE GANGLION WRIST;  Surgeon: Gian Haddad MD;  Location: [facility]    ICD DEVICE INTERROGATE      2017    ORTHOPEDIC SURGERY      RELEASE CARPAL TUNNEL Left 11/26/2014    Procedure: RELEASE CARPAL TUNNEL;  Surgeon: Gian Haddad MD;  Location: [facility]    RELEASE DEQUERVAINS WRIST Left 11/26/2014    Procedure: RELEASE DEQUERVAINS WRIST;  Surgeon: Gian Haddad MD;  Location: [facility] OR     Family History   Problem Relation Age of Onset    Arthritis Mother     Diabetes Father     Allergies Father         sulfa    Eye Disorder Father         cataract    Heart Disease Father         by pass         Objective  Ht 1.778 m (5' 10\")   Wt 131.1 kg (289 lb)   BMI 41.47 kg/m      General: healthy, alert and in no acute distress.    HEENT: no scleral icterus or conjunctival erythema.   Skin: no suspicious lesions or rash. No jaundice.   CV: regular rhythm by palpation, 2+ distal pulses.  Resp: normal respiratory effort without conversational dyspnea.   Psych: normal mood and " affect.    Gait: nonantalgic, appropriate coordination and balance.     Neuro:        - Sensation to light touch:    - Intact throughout the BLE including all peripheral nerve distributions.        - MSR:      RLE  LLE  - Patella 2+ 2+  - Achilles 2+ 2+       - Special tests:   - Slump/SLR:  Neg bilaterally    MSK - Knee:       - Inspection:    - No significant swelling, erythema, warmth, ecchymosis, lesion.        - ROM:    - Full AROM/PROM with anterior knee pain during knee flexion/extension.       - Palpation:    - TTP at the medial joint line, lateral joint line.  - NTTP elsewhere.        - Strength:  (*antalgic)  RLE LLE  - Hip Flexion  5 5   - Hip Extension 5 5   - Hip Abduction 5 5   - Hip Adduction 5 5  - Knee Flexion  5 5  - Knee Extension 5 5  - Dorsiflexion  5 5  - Plantarflexion 5 5  - Ext. Chan. Longus 5 5  - Inversion  5 5  - Eversion  5 5       - Special tests:        - Lachman: Positive for soft endpoint on the left as compared to the right.        - A/P drawer: Positive for mild laxity in the left.       - Pivot shift:  Neg    - Donny: Positive for mild medial compartment pain     - Varus stress:  Neg for laxity or pain    - Valgus stress:  Neg for laxity or pain   - Patellar grind:  Neg   - Thessaly: Positive for medial compartment pain and feeling of buckling      Radiology  I independently reviewed the available relevant imaging, with the following interpretation:   - XR right knee 10/8/2020 has views of the left knee as well which shows mild tricompartmental OA bilaterally, worst medially.  - XR L knee 4/6/2023 shows normal anatomic alignment, mild degenerative changes of the medial and patellofemoral compartments, no acute fractures or dislocations, small knee joint effusion.  - MR L knee 4/21/2023 shows a chronic ACL deficient knee with a peripheral partial radial tear of the posterior horn of the medial meniscus, moderate tricompartmental osteoarthritis, and a small joint  effusion.    Procedure  Large Joint Injection/Arthocentesis: L knee joint    Date/Time: 2/29/2024 11:30 AM    Performed by: Eduardo Soriano DO  Authorized by: Eduardo Soriano DO    Indications:  Pain  Needle Size:  22 G  Guidance: landmark guided    Approach:  Anterolateral  Location:  Knee      Medications:  40 mg triamcinolone 40 MG/ML; 2 mL lidocaine 1 %; 2 mL BUPivacaine (PF) 0.5 %  Outcome:  Tolerated well, no immediate complications  Procedure discussed: discussed risks, benefits, and alternatives    Consent Given by:  Patient  Prep: patient was prepped and draped in usual sterile fashion      PROCEDURE  Intraarticular Knee Joint Injection - Left  The patient was informed of the risks and benefits of the procedure and alternatives were discussed. A written consent was signed by the patient.   The injection site was prepped with chlorhexidine in sterile fashion.   An injectate solution containing 2 mL of 1% lidocaine, 2 mL of 0.5% bupivacaine, and 1 mL of Kenalog (40 mg/mL) was drawn up into a 5 mL syringe.  Injection was performed using sterile technique.  A 1.5-inch 22-gauge needle was used to enter the knee joint using a lateral infrapatellar approach and injectate was injected successfully. After the injection, the site was cleaned and a bandage applied. The patient tolerated the procedure well without complications.       Assessment  1. Primary osteoarthritis of left knee    2. Tear of medial meniscus of left knee, current, unspecified tear type, initial encounter        Plan  Ky Martinez is a 77 year old male that presents for follow-up of acute on chronic left knee pain.  Imaging confirmed an ACL deficient knee with a medial meniscus tear and primary tricompartmental osteoarthritis.  He has had good responses with corticosteroid injections and presents today for another injection in hopes of similar relief.  Discussed the nature of the condition and treatment options and mutually agreed upon the  following plan:    - Medications:          - Discussed pharmacologic options for pain relief.   - May use NSAIDs (Ibuprofen, Naproxen) or Acetaminophen (Tylenol) as needed for pain control.   - May also use topical medications such as lidocaine, IcyHot, BioFreeze, or Voltaren gel as needed for pain control.   - Injections:          - Discussed possible injection options and alternatives.    - Options include intra-articular knee joint injections with corticosteroid, viscosupplementation, or PRP.   - Performed a corticosteroid injection of the left intra-articular knee joint today in clinic. Patient tolerated the procedure well without complications.   - Post-procedure instructions:    - Keep the injection site clean and dry.   - Do not submerge the injection site for 24 hours (no baths, pools). Showers are ok.   - Rest the area for 24-48 hours before resuming normal activities. Avoid overexerting the area for the first few weeks.   - It may take 2-3 days to start noticing the effects of the injection and up to 3-4 weeks to feel significant benefits.   - In general, an injection in the same anatomical region can be repeated every 3 months as needed.  However, for the health of your tissues you will want to space out the injections as much as possible and request them only when symptoms are significantly bothersome and disrupting daily function and/or sleep.   - Therapy:          - Continue home exercise program as instructed by PT.  - Modalities:          - May use ice, heat, massage or other modalities as needed.   - Bracing:          -Continue to use your knee stability brace when ambulating and performing exacerbating activities.  - Activity:     - Encouraged to remain active and participate in regular activities as tolerated.  - Follow up:          - In at least 3 months if needed for re-evaluation and update to treatment plan, or sooner for new/worsening symptoms.  - Patient has clinic contact information for  questions or concerns.       Eduardo Soriano DO, CAQSM  [facility] - Department of Orthopedic Surgery       Disclaimer:  This note was prepared and written using Dragon Medical dictation software. As a result, there may be errors in the script that have gone undetected. Please consider this when interpreting the information in this note.

## 2024-02-27 NOTE — PROGRESS NOTES
ANTICOAGULATION MANAGEMENT     Ky Martinez 78 year old male is on warfarin with therapeutic INR result. (Goal INR 2.0-3.0)    Recent labs: (last 7 days)     02/27/24  1111   INR 2.0*       ASSESSMENT     Source(s): Chart Review and Patient/Caregiver Call     Warfarin doses taken: Warfarin taken as instructed  Diet: Increased greens/vitamin K in diet; plans to resume previous intake  Medication/supplement changes: None noted  New illness, injury, or hospitalization: No  Signs or symptoms of bleeding or clotting: No  Previous result: Supratherapeutic  Additional findings: None       PLAN     Recommended plan for no diet, medication or health factor changes affecting INR     Dosing Instructions: Continue your current warfarin dose with next INR in 3 weeks       Summary  As of 2/27/2024      Full warfarin instructions:  2.5 mg every Mon, Fri; 5 mg all other days   Next INR check:  3/19/2024               Telephone call with Tarik who verbalizes understanding and agrees to plan    Patient offered & declined to schedule next visit    Education provided:   Contact [phone]  with any changes, questions or concerns.     Plan made per [facility] anticoagulation protocol    Dennise Zuniga, RN  Anticoagulation Clinic  2/27/2024    _______________________________________________________________________     Anticoagulation Episode Summary       Current INR goal:  2.0-3.0   TTR:  98.0% (1 y)   Target end date:  Indefinite   Send INR reminders to:  NIK JANINE    Indications    Acute pulmonary embolism  unspecified pulmonary embolism type  unspecified whether acute cor pulmonale present (H) [I26.99]  Paroxysmal atrial fibrillation with rapid ventricular response (H) [I48.0]  Paroxysmal atrial fibrillation (H) [I48.0]             Comments:               Anticoagulation Care Providers       Provider Role Specialty Phone number    Ky Major MD Referring Family Practice     Marcin Rios MD Referring Family  Medicine [phone]

## 2024-02-29 ENCOUNTER — OFFICE VISIT (OUTPATIENT)
Dept: ORTHOPEDICS | Facility: CLINIC | Age: 79
End: 2024-02-29
Payer: COMMERCIAL

## 2024-02-29 VITALS — WEIGHT: 289 LBS | HEIGHT: 70 IN | BODY MASS INDEX: 41.37 KG/M2

## 2024-02-29 DIAGNOSIS — S83.242A TEAR OF MEDIAL MENISCUS OF LEFT KNEE, CURRENT, UNSPECIFIED TEAR TYPE, INITIAL ENCOUNTER: ICD-10-CM

## 2024-02-29 DIAGNOSIS — M17.12 PRIMARY OSTEOARTHRITIS OF LEFT KNEE: Primary | ICD-10-CM

## 2024-02-29 PROCEDURE — 99213 OFFICE O/P EST LOW 20 MIN: CPT | Mod: 25 | Performed by: STUDENT IN AN ORGANIZED HEALTH CARE EDUCATION/TRAINING PROGRAM

## 2024-02-29 PROCEDURE — 20610 DRAIN/INJ JOINT/BURSA W/O US: CPT | Mod: LT | Performed by: STUDENT IN AN ORGANIZED HEALTH CARE EDUCATION/TRAINING PROGRAM

## 2024-02-29 RX ORDER — LIDOCAINE HYDROCHLORIDE 10 MG/ML
2 INJECTION, SOLUTION INFILTRATION; PERINEURAL
Status: DISCONTINUED | OUTPATIENT
Start: 2024-02-29 | End: 2024-06-19

## 2024-02-29 RX ORDER — BUPIVACAINE HYDROCHLORIDE 5 MG/ML
2 INJECTION, SOLUTION EPIDURAL; INTRACAUDAL
Status: DISCONTINUED | OUTPATIENT
Start: 2024-02-29 | End: 2024-06-19

## 2024-02-29 RX ORDER — TRIAMCINOLONE ACETONIDE 40 MG/ML
40 INJECTION, SUSPENSION INTRA-ARTICULAR; INTRAMUSCULAR
Status: DISCONTINUED | OUTPATIENT
Start: 2024-02-29 | End: 2024-06-19

## 2024-02-29 RX ADMIN — LIDOCAINE HYDROCHLORIDE 2 ML: 10 INJECTION, SOLUTION INFILTRATION; PERINEURAL at 11:30

## 2024-02-29 RX ADMIN — BUPIVACAINE HYDROCHLORIDE 2 ML: 5 INJECTION, SOLUTION EPIDURAL; INTRACAUDAL at 11:30

## 2024-02-29 RX ADMIN — TRIAMCINOLONE ACETONIDE 40 MG: 40 INJECTION, SUSPENSION INTRA-ARTICULAR; INTRAMUSCULAR at 11:30

## 2024-02-29 NOTE — LETTER
2024         RE: Ky Martinez  [id number]  [facility] [address]        Dear Colleague,    Thank you for referring your patient, Ky Martinez, to the [facility] [place]. Please see a copy of my visit note below.    Ky Martinez  :  1945  DOS: 2024  MRN: [id number]  PCP: Marcin Rios    Sports Medicine Clinic Visit    Interim History - 2024  - Last seen in clinic on 2023 for a left chronic ACL deficient knee, medial meniscus tear, primary osteoarthritis .  - Left knee corticosteroid injection completed on 2023 provided 3+ months of great relief.  - Since the last visit, he notes a return in anterior left knee pain over the past few weeks. Taking ibuprofen for pain management. Is hopeful for a repeat corticosteroid injection today.   - No interim injury.     Interim History - 2023  - Last seen in clinic on 2023 for chronic ACL deficient knee, medial meniscus tear, primary osteoarthritis .  - Left knee corticosteroid injection completed on 2023 provided 4 months of great relief.  - Since the last visit, he notes that he was able to go through the summer without wearing his knee brace. Is hopeful for a repeat CSI today. Pain has returned over the past week and is located over the anteromedial left knee.   - No interim injury.       Initial Visit: 2023  HPI  Ky Martinez is a 77 year old male who is seen as a self referral presenting with left knee pain.    - Mechanism of Injury:   Pain is overall chronic with recent exacerbation during snowblowing in 2022.  Had increase in swelling, hot to the touch and severe pain at that time.   - Prior evaluation: Injection of left knee performed in [place] on 2023, that provided temporary relief of his symptoms.      - Pain Character:  Pain has been present for  for years with increase in pain 4 months ago.   Pain is well localized to the medial  "left knee without significant radiation.  - Endorses:  Swelling intermittently, pain as described above.  - Denies:  numbness, tingling, radicular pain, popping, grinding, weakness, instability, mechanical locking symptoms.  - Alleviating factors: Corticosteroid injection lasted 1 week of relief. Knee brace  - Aggravating factors: Ambulating, going down and up stairs, end of the day  - Treatments tried: corticosteroid injection, non-hinged knee brace, icing at night    - Patient Goals:  discuss treatment options  - Social History: retired    - Pertinent PMH:  Acute pulmonary embolism and paroxysmal atrial fibrillation with ICD and on warfarin.        Review of Systems  Musculoskeletal: as above  Remainder of review of systems is negative including constitutional, CV, pulmonary, GI, Skin and Neurologic except as noted in HPI or medical history.    Past Medical History:   Diagnosis Date     Hypertension      Myocardial infarction (H)      Past Surgical History:   Procedure Laterality Date     Cardiac stent       COLONOSCOPY N/A 5/22/2019    Procedure: Colonoscopy, Polypectomy by Snare;  Surgeon: Gian Horn DO;  [facility]     EXCISE GANGLION WRIST Left 11/26/2014    Procedure: EXCISE GANGLION WRIST;  Surgeon: Gian Haddad MD;  Location: [facility]     ICD DEVICE INTERROGATE      2017     ORTHOPEDIC SURGERY       RELEASE CARPAL TUNNEL Left 11/26/2014    Procedure: RELEASE CARPAL TUNNEL;  Surgeon: Gian Haddad MD;  [facility]     RELEASE DEQUERVAINS WRIST Left 11/26/2014    Procedure: RELEASE DEQUERVAINS WRIST;  Surgeon: Gian Haddad MD;  Location: [facility]     Family History   Problem Relation Age of Onset     Arthritis Mother      Diabetes Father      Allergies Father         sulfa     Eye Disorder Father         cataract     Heart Disease Father         by pass         Objective  Ht 1.778 m (5' 10\")   Wt 131.1 kg (289 lb)   BMI 41.47 kg/m      General: healthy, alert " and in no acute distress.    HEENT: no scleral icterus or conjunctival erythema.   Skin: no suspicious lesions or rash. No jaundice.   CV: regular rhythm by palpation, 2+ distal pulses.  Resp: normal respiratory effort without conversational dyspnea.   Psych: normal mood and affect.    Gait: nonantalgic, appropriate coordination and balance.     Neuro:        - Sensation to light touch:    - Intact throughout the BLE including all peripheral nerve distributions.        - MSR:      RLE  LLE  - Patella 2+ 2+  - Achilles 2+ 2+       - Special tests:   - Slump/SLR:  Neg bilaterally    MSK - Knee:       - Inspection:    - No significant swelling, erythema, warmth, ecchymosis, lesion.        - ROM:    - Full AROM/PROM with anterior knee pain during knee flexion/extension.       - Palpation:    - TTP at the medial joint line, lateral joint line.  - NTTP elsewhere.        - Strength:  (*antalgic)  RLE LLE  - Hip Flexion  5 5   - Hip Extension 5 5   - Hip Abduction 5 5   - Hip Adduction 5 5  - Knee Flexion  5 5  - Knee Extension 5 5  - Dorsiflexion  5 5  - Plantarflexion 5 5  - Ext. Chan. Longus 5 5  - Inversion  5 5  - Eversion  5 5       - Special tests:        - Lachman: Positive for soft endpoint on the left as compared to the right.        - A/P drawer: Positive for mild laxity in the left.       - Pivot shift:  Neg    - Donny: Positive for mild medial compartment pain     - Varus stress:  Neg for laxity or pain    - Valgus stress:  Neg for laxity or pain   - Patellar grind:  Neg   - Thessaly: Positive for medial compartment pain and feeling of buckling      Radiology  I independently reviewed the available relevant imaging, with the following interpretation:   - XR right knee 10/8/2020 has views of the left knee as well which shows mild tricompartmental OA bilaterally, worst medially.  - XR L knee 4/6/2023 shows normal anatomic alignment, mild degenerative changes of the medial and patellofemoral compartments, no  acute fractures or dislocations, small knee joint effusion.  - MR L knee 4/21/2023 shows a chronic ACL deficient knee with a peripheral partial radial tear of the posterior horn of the medial meniscus, moderate tricompartmental osteoarthritis, and a small joint effusion.    Procedure  Large Joint Injection/Arthocentesis: L knee joint    Date/Time: 2/29/2024 11:30 AM    Performed by: Eduardo Soriano DO  Authorized by: Eduardo Soriano DO    Indications:  Pain  Needle Size:  22 G  Guidance: landmark guided    Approach:  Anterolateral  Location:  Knee      Medications:  40 mg triamcinolone 40 MG/ML; 2 mL lidocaine 1 %; 2 mL BUPivacaine (PF) 0.5 %  Outcome:  Tolerated well, no immediate complications  Procedure discussed: discussed risks, benefits, and alternatives    Consent Given by:  Patient  Prep: patient was prepped and draped in usual sterile fashion      PROCEDURE  Intraarticular Knee Joint Injection - Left  The patient was informed of the risks and benefits of the procedure and alternatives were discussed. A written consent was signed by the patient.   The injection site was prepped with chlorhexidine in sterile fashion.   An injectate solution containing 2 mL of 1% lidocaine, 2 mL of 0.5% bupivacaine, and 1 mL of Kenalog (40 mg/mL) was drawn up into a 5 mL syringe.  Injection was performed using sterile technique.  A 1.5-inch 22-gauge needle was used to enter the knee joint using a lateral infrapatellar approach and injectate was injected successfully. After the injection, the site was cleaned and a bandage applied. The patient tolerated the procedure well without complications.       Assessment  1. Primary osteoarthritis of left knee    2. Tear of medial meniscus of left knee, current, unspecified tear type, initial encounter        Plan  Ky Martinez is a 77 year old male that presents for follow-up of acute on chronic left knee pain.  Imaging confirmed an ACL deficient knee with a medial meniscus tear and  primary tricompartmental osteoarthritis.  He has had good responses with corticosteroid injections and presents today for another injection in hopes of similar relief.  Discussed the nature of the condition and treatment options and mutually agreed upon the following plan:    - Medications:          - Discussed pharmacologic options for pain relief.   - May use NSAIDs (Ibuprofen, Naproxen) or Acetaminophen (Tylenol) as needed for pain control.   - May also use topical medications such as lidocaine, IcyHot, BioFreeze, or Voltaren gel as needed for pain control.   - Injections:          - Discussed possible injection options and alternatives.    - Options include intra-articular knee joint injections with corticosteroid, viscosupplementation, or PRP.   - Performed a corticosteroid injection of the left intra-articular knee joint today in clinic. Patient tolerated the procedure well without complications.   - Post-procedure instructions:    - Keep the injection site clean and dry.   - Do not submerge the injection site for 24 hours (no baths, pools). Showers are ok.   - Rest the area for 24-48 hours before resuming normal activities. Avoid overexerting the area for the first few weeks.   - It may take 2-3 days to start noticing the effects of the injection and up to 3-4 weeks to feel significant benefits.   - In general, an injection in the same anatomical region can be repeated every 3 months as needed.  However, for the health of your tissues you will want to space out the injections as much as possible and request them only when symptoms are significantly bothersome and disrupting daily function and/or sleep.   - Therapy:          - Continue home exercise program as instructed by PT.  - Modalities:          - May use ice, heat, massage or other modalities as needed.   - Bracing:          -Continue to use your knee stability brace when ambulating and performing exacerbating activities.  - Activity:     - Encouraged  to remain active and participate in regular activities as tolerated.  - Follow up:          - In at least 3 months if needed for re-evaluation and update to treatment plan, or sooner for new/worsening symptoms.  - Patient has clinic contact information for questions or concerns.       Eduardo Soriano DO, CAQSM  [facility] - Sports Medicine  [facility] - Department of Orthopedic Surgery       Disclaimer:  This note was prepared and written using Dragon Medical dictation software. As a result, there may be errors in the script that have gone undetected. Please consider this when interpreting the information in this note.       Again, thank you for allowing me to participate in the care of your patient.        Sincerely,        Eduardo Soriano DO

## 2024-03-08 ENCOUNTER — TELEPHONE (OUTPATIENT)
Dept: ORTHOPEDICS | Facility: CLINIC | Age: 79
End: 2024-03-08
Payer: COMMERCIAL

## 2024-03-08 DIAGNOSIS — S83.242A TEAR OF MEDIAL MENISCUS OF LEFT KNEE, CURRENT, UNSPECIFIED TEAR TYPE, INITIAL ENCOUNTER: ICD-10-CM

## 2024-03-08 DIAGNOSIS — M17.12 PRIMARY OSTEOARTHRITIS OF LEFT KNEE: Primary | ICD-10-CM

## 2024-03-08 NOTE — TELEPHONE ENCOUNTER
Patient scheduled for appointment on 3/15/2024 @ [facility] [place] for discussion of viscosupplementation injection vs steroid injection of left knee.        Steroid  injection last completed 2/29/2024.  Patient reports no relief to date.       Patient has failed trial of OTC NSAIDs/Pain Medication (ibuprofen, tylenol, naproxen):  Yes       Patient has completed trial of physical therapy: No        Prior authorization referral for SynviscOne injection pended.    Please advise

## 2024-03-11 DIAGNOSIS — E78.5 HYPERLIPIDEMIA LDL GOAL <100: ICD-10-CM

## 2024-03-11 RX ORDER — ATORVASTATIN CALCIUM 80 MG/1
TABLET, FILM COATED ORAL
Qty: 90 TABLET | Refills: 0 | Status: SHIPPED | OUTPATIENT
Start: 2024-03-11 | End: 2024-07-08

## 2024-03-19 ENCOUNTER — LAB (OUTPATIENT)
Dept: LAB | Facility: CLINIC | Age: 79
End: 2024-03-19
Payer: COMMERCIAL

## 2024-03-19 ENCOUNTER — ANTICOAGULATION THERAPY VISIT (OUTPATIENT)
Dept: ANTICOAGULATION | Facility: CLINIC | Age: 79
End: 2024-03-19

## 2024-03-19 DIAGNOSIS — I48.0 PAROXYSMAL ATRIAL FIBRILLATION WITH RAPID VENTRICULAR RESPONSE (H): ICD-10-CM

## 2024-03-19 DIAGNOSIS — I26.99 ACUTE PULMONARY EMBOLISM, UNSPECIFIED PULMONARY EMBOLISM TYPE, UNSPECIFIED WHETHER ACUTE COR PULMONALE PRESENT (H): Primary | ICD-10-CM

## 2024-03-19 DIAGNOSIS — I48.0 PAROXYSMAL ATRIAL FIBRILLATION (H): ICD-10-CM

## 2024-03-19 DIAGNOSIS — I26.99 ACUTE PULMONARY EMBOLISM, UNSPECIFIED PULMONARY EMBOLISM TYPE, UNSPECIFIED WHETHER ACUTE COR PULMONALE PRESENT (H): ICD-10-CM

## 2024-03-19 LAB — INR BLD: 2.4 (ref 0.9–1.1)

## 2024-03-19 PROCEDURE — 36416 COLLJ CAPILLARY BLOOD SPEC: CPT

## 2024-03-19 PROCEDURE — 85610 PROTHROMBIN TIME: CPT

## 2024-03-19 NOTE — PROGRESS NOTES
ANTICOAGULATION MANAGEMENT     Ky Martinez 78 year old male is on warfarin with therapeutic INR result. (Goal INR 2.0-3.0)    Recent labs: (last 7 days)     03/19/24  1049   INR 2.4*       ASSESSMENT     Source(s): Chart Review  Previous INR was Therapeutic last visit; previously outside of goal range  Medication, diet, health changes since last INR chart reviewed; none identified         PLAN     Recommended plan for no diet, medication or health factor changes affecting INR     Dosing Instructions: Continue your current warfarin dose with next INR in 4 weeks       Summary  As of 3/19/2024      Full warfarin instructions:  2.5 mg every Mon, Fri; 5 mg all other days   Next INR check:  4/16/2024               Detailed voice message left for Tarik with dosing instructions and follow up date.   Sent [employer] message with dosing and follow up instructions    Contact [phone]  to schedule and with any changes, questions or concerns.     Education provided:   Contact [phone]  with any changes, questions or concerns.     Plan made per [facility] anticoagulation protocol    Dennise Zuniga RN  Anticoagulation Clinic  3/19/2024    _______________________________________________________________________     Anticoagulation Episode Summary       Current INR goal:  2.0-3.0   TTR:  98.0% (1 y)   Target end date:  Indefinite   Send INR reminders to:  BRANDY MEHTA    Indications    Acute pulmonary embolism  unspecified pulmonary embolism type  unspecified whether acute cor pulmonale present (H) [I26.99]  Paroxysmal atrial fibrillation with rapid ventricular response (H) [I48.0]  Paroxysmal atrial fibrillation (H) [I48.0]             Comments:               Anticoagulation Care Providers       Provider Role Specialty Phone number    Ky Major MD Referring Family Practice     Marcin Rios MD Referring Family Medicine [phone]

## 2024-03-28 NOTE — PROGRESS NOTES
Ky Martinez  :  1945  DOS: 4/3/2024  MRN: [id number]  PCP: Marcin Rios    Sports Medicine Clinic Visit    Interim History - April 3, 2024  - Last seen in clinic on 2024 for left chronic ACL deficient knee, medial meniscus tear and primary osteoarthritis.  - Left knee corticosteroid injection completed on 2024 provided moderate relief initially. Since last visit, was carrying some boxes upstairs after the injection and then preceded to use the snowblower last week. Now is using a walker due to severe pain with weightbearing. Pain is located over the medial left patella. Has been using a brace from [employer], icing all day, ibuprofen and a walker. [employer] PA requested but pending.  - No interim injury.       Interim History - 2024  - Last seen in clinic on 2023 for a left chronic ACL deficient knee, medial meniscus tear, primary osteoarthritis .  - Left knee corticosteroid injection completed on 2023 provided 3+ months of great relief.  - Since the last visit, he notes a return in anterior left knee pain over the past few weeks. Taking ibuprofen for pain management. Is hopeful for a repeat corticosteroid injection today.   - No interim injury.     Interim History - 2023  - Last seen in clinic on 2023 for chronic ACL deficient knee, medial meniscus tear, primary osteoarthritis .  - Left knee corticosteroid injection completed on 2023 provided 4 months of great relief.  - Since the last visit, he notes that he was able to go through the summer without wearing his knee brace. Is hopeful for a repeat CSI today. Pain has returned over the past week and is located over the anteromedial left knee.   - No interim injury.       Initial Visit: 2023  HPI  Ky Martinez is a 77 year old male who is seen as a self referral presenting with left knee pain.    - Mechanism of Injury:   Pain is overall chronic with recent exacerbation during snowblowing  in December 2022.  Had increase in swelling, hot to the touch and severe pain at that time.   - Prior evaluation: Injection of left knee performed in [place] on 1/1/2023, that provided temporary relief of his symptoms.      - Pain Character:  Pain has been present for  for years with increase in pain 4 months ago.   Pain is well localized to the medial left knee without significant radiation.  - Endorses:  Swelling intermittently, pain as described above.  - Denies:  numbness, tingling, radicular pain, popping, grinding, weakness, instability, mechanical locking symptoms.  - Alleviating factors: Corticosteroid injection lasted 1 week of relief. Knee brace  - Aggravating factors: Ambulating, going down and up stairs, end of the day  - Treatments tried: corticosteroid injection, non-hinged knee brace, icing at night    - Patient Goals:  discuss treatment options  - Social History: retired    - Pertinent PMH:  Acute pulmonary embolism and paroxysmal atrial fibrillation with ICD and on warfarin.        Review of Systems  Musculoskeletal: as above  Remainder of review of systems is negative including constitutional, CV, pulmonary, GI, Skin and Neurologic except as noted in HPI or medical history.    Past Medical History:   Diagnosis Date    Hypertension     Myocardial infarction (H)      Past Surgical History:   Procedure Laterality Date    Cardiac stent      COLONOSCOPY N/A 5/22/2019    Procedure: Colonoscopy, Polypectomy by Snare;  Surgeon: Gian Horn DO;  [facility]    EXCISE GANGLION WRIST Left 11/26/2014    Procedure: EXCISE GANGLION WRIST;  Surgeon: Gian Haddad MD;  [facility]    ICD DEVICE INTERROGATE      2017    ORTHOPEDIC SURGERY      RELEASE CARPAL TUNNEL Left 11/26/2014    Procedure: RELEASE CARPAL TUNNEL;  Surgeon: Gian Haddad MD;  [facility]    RELEASE DEQUERVAINS WRIST Left 11/26/2014    Procedure: RELEASE DEQUERVAINS WRIST;  Surgeon: Gian Haddad  MD;  [facility]     Family History   Problem Relation Age of Onset    Arthritis Mother     Diabetes Father     Allergies Father         sulfa    Eye Disorder Father         cataract    Heart Disease Father         by pass         Objective  There were no vitals taken for this visit.    General: healthy, alert and in no acute distress.    HEENT: no scleral icterus or conjunctival erythema.   Skin: no suspicious lesions or rash. No jaundice.   CV: regular rhythm by palpation, 2+ distal pulses.  Resp: normal respiratory effort without conversational dyspnea.   Psych: normal mood and affect.    Gait: nonantalgic, appropriate coordination and balance.     Neuro:        - Sensation to light touch:    - Intact throughout the BLE including all peripheral nerve distributions.        - MSR:      RLE  LLE  - Patella 2+ 2+  - Achilles 2+ 2+       - Special tests:   - Slump/SLR:  Neg bilaterally    MSK - Knee:       - Inspection:    - No significant swelling, erythema, warmth, ecchymosis, lesion.        - ROM:    - Full AROM/PROM with anterior knee pain during knee flexion/extension.       - Palpation:    - TTP at the medial joint line, lateral joint line.  - NTTP elsewhere.        - Strength:  (*antalgic)  RLE LLE  - Hip Flexion  5 5   - Hip Extension 5 5   - Hip Abduction 5 5   - Hip Adduction 5 5  - Knee Flexion  5 5  - Knee Extension 5 5  - Dorsiflexion  5 5  - Plantarflexion 5 5  - Ext. Chan. Longus 5 5  - Inversion  5 5  - Eversion  5 5       - Special tests:        - Lachman: Positive for soft endpoint on the left as compared to the right.        - A/P drawer: Positive for mild laxity in the left.       - Pivot shift:  Neg    - Donny: Positive for mild medial compartment pain     - Varus stress:  Neg for laxity or pain    - Valgus stress:  Neg for laxity or pain   - Patellar grind:  Neg   - Thessaly: Positive for medial compartment pain and feeling of buckling      Radiology  I independently reviewed the available  relevant imaging, with the following interpretation:   - XR right knee 10/8/2020 has views of the left knee as well which shows mild tricompartmental OA bilaterally, worst medially.  - XR L knee 4/6/2023 shows normal anatomic alignment, mild degenerative changes of the medial and patellofemoral compartments, no acute fractures or dislocations, small knee joint effusion.  - MR L knee 4/21/2023 shows a chronic ACL deficient knee with a peripheral partial radial tear of the posterior horn of the medial meniscus, moderate tricompartmental osteoarthritis, and a small joint effusion.      Assessment  1. Primary osteoarthritis of left knee    2. Rupture of anterior cruciate ligament of left knee, initial encounter    3. Tear of medial meniscus of left knee, current, unspecified tear type, initial encounter        Plan  Ky Martinez is a 77 year old male that presents for follow-up of acute on chronic left knee pain.  Imaging confirmed an ACL deficient knee with a medial meniscus tear and primary tricompartmental osteoarthritis.  He has had good responses with corticosteroid injections and presents today for another injection in hopes of similar relief.  Discussed the nature of the condition and treatment options and mutually agreed upon the following plan:    - Medications:          - Discussed pharmacologic options for pain relief.   - May use NSAIDs (Ibuprofen, Naproxen) or Acetaminophen (Tylenol) as needed for pain control.   - May also use topical medications such as lidocaine, IcyHot, BioFreeze, or Voltaren gel as needed for pain control.   - Injections:          - Discussed possible injection options and alternatives.    - Options include intra-articular knee joint injections with corticosteroid, viscosupplementation, or PRP.   - Performed a corticosteroid injection of the left intra-articular knee joint today in clinic. Patient tolerated the procedure well without complications.   - Post-procedure instructions:     - Keep the injection site clean and dry.   - Do not submerge the injection site for 24 hours (no baths, pools). Showers are ok.   - Rest the area for 24-48 hours before resuming normal activities. Avoid overexerting the area for the first few weeks.   - It may take 2-3 days to start noticing the effects of the injection and up to 3-4 weeks to feel significant benefits.   - In general, an injection in the same anatomical region can be repeated every 3 months as needed.  However, for the health of your tissues you will want to space out the injections as much as possible and request them only when symptoms are significantly bothersome and disrupting daily function and/or sleep.   - Therapy:          - Continue home exercise program as instructed by PT.  - Modalities:          - May use ice, heat, massage or other modalities as needed.   - Bracing:          -Continue to use your knee stability brace when ambulating and performing exacerbating activities.  - Activity:     - Encouraged to remain active and participate in regular activities as tolerated.  - Follow up:          - In at least 3 months if needed for re-evaluation and update to treatment plan, or sooner for new/worsening symptoms.  - Patient has clinic contact information for questions or concerns.       Updated Plan - 4/3/24:  Tarik presents for continuing knee pain and decreasing efficacy of corticosteroid injections.  He is authorized for viscosupplementation injections with Synvisc, but he wanted to talk today about his declining function and inability to perform his daily activities due to the severe pain in the knee.  He has been resorting to assistive aids for ambulation and would like to discuss a more definitive treatment plan for his knee then continued therapy and injections.  We discussed in detail the nature of his knee pathology and available treatment options.  At this time, he is more interested in discussing more long-term surgical repair  of his knee as opposed to continue conservative care.  It is possible that he could benefit from arthroscopic interventions for the knee or consider total knee arthroplasty.  I will message my arthroscopic colleagues for their opinion and will place the appropriate Ortho  referral based on advice.  In the meantime, Tarik will continue with his treatment plan above and we will be happy to discuss his options with the appropriate surgical colleague.  If nonoperative treatment plan is decided, I will be happy to see him again for continued treatment, alternative injections, etc.      Eduardo Soriano DO, CAM  [facility]  [facility] - Department of Orthopedic Surgery       Disclaimer:  This note was prepared and written using Dragon Medical dictation software. As a result, there may be errors in the script that have gone undetected. Please consider this when interpreting the information in this note.

## 2024-04-03 ENCOUNTER — OFFICE VISIT (OUTPATIENT)
Dept: ORTHOPEDICS | Facility: CLINIC | Age: 79
End: 2024-04-03
Payer: COMMERCIAL

## 2024-04-03 DIAGNOSIS — S83.242A TEAR OF MEDIAL MENISCUS OF LEFT KNEE, CURRENT, UNSPECIFIED TEAR TYPE, INITIAL ENCOUNTER: ICD-10-CM

## 2024-04-03 DIAGNOSIS — S83.512A RUPTURE OF ANTERIOR CRUCIATE LIGAMENT OF LEFT KNEE, INITIAL ENCOUNTER: ICD-10-CM

## 2024-04-03 DIAGNOSIS — M17.12 PRIMARY OSTEOARTHRITIS OF LEFT KNEE: Primary | ICD-10-CM

## 2024-04-03 PROCEDURE — 99213 OFFICE O/P EST LOW 20 MIN: CPT | Performed by: STUDENT IN AN ORGANIZED HEALTH CARE EDUCATION/TRAINING PROGRAM

## 2024-04-03 ASSESSMENT — PAIN SCALES - GENERAL: PAINLEVEL: NO PAIN (1)

## 2024-04-03 NOTE — LETTER
4/3/2024         RE: Ky Martinez  [address] [place] [address]        Dear Colleague,    Thank you for referring your patient, Ky Martinez, to the [facility] [place]. Please see a copy of my visit note below.    Ky Martinez  :  1945  DOS: 4/3/2024  MRN: [id number]  PCP: Marcin Rios    Sports Medicine Clinic Visit    Interim History - April 3, 2024  - Last seen in clinic on 2024 for left chronic ACL deficient knee, medial meniscus tear and primary osteoarthritis.  - Left knee corticosteroid injection completed on 2024 provided moderate relief initially. Since last visit, was carrying some boxes upstairs after the injection and then preceded to use the snowblower last week. Now is using a walker due to severe pain with weightbearing. Pain is located over the medial left patella. Has been using a brace from [employer], icing all day, ibuprofen and a walker. Amarilys DESAI requested but pending.  - No interim injury.       Interim History - 2024  - Last seen in clinic on 2023 for a left chronic ACL deficient knee, medial meniscus tear, primary osteoarthritis .  - Left knee corticosteroid injection completed on 2023 provided 3+ months of great relief.  - Since the last visit, he notes a return in anterior left knee pain over the past few weeks. Taking ibuprofen for pain management. Is hopeful for a repeat corticosteroid injection today.   - No interim injury.     Interim History - 2023  - Last seen in clinic on 2023 for chronic ACL deficient knee, medial meniscus tear, primary osteoarthritis .  - Left knee corticosteroid injection completed on 2023 provided 4 months of great relief.  - Since the last visit, he notes that he was able to go through the summer without wearing his knee brace. Is hopeful for a repeat CSI today. Pain has returned over the past week and is located over the anteromedial left  knee.   - No interim injury.       Initial Visit: April 6, 2023  HPI  Ky Martinez is a 77 year old male who is seen as a self referral presenting with left knee pain.    - Mechanism of Injury:   Pain is overall chronic with recent exacerbation during snowblowing in December 2022.  Had increase in swelling, hot to the touch and severe pain at that time.   - Prior evaluation: Injection of left knee performed in [place] on 1/1/2023, that provided temporary relief of his symptoms.      - Pain Character:  Pain has been present for  for years with increase in pain 4 months ago.   Pain is well localized to the medial left knee without significant radiation.  - Endorses:  Swelling intermittently, pain as described above.  - Denies:  numbness, tingling, radicular pain, popping, grinding, weakness, instability, mechanical locking symptoms.  - Alleviating factors: Corticosteroid injection lasted 1 week of relief. Knee brace  - Aggravating factors: Ambulating, going down and up stairs, end of the day  - Treatments tried: corticosteroid injection, non-hinged knee brace, icing at night    - Patient Goals:  discuss treatment options  - Social History: retired    - Pertinent PMH:  Acute pulmonary embolism and paroxysmal atrial fibrillation with ICD and on warfarin.        Review of Systems  Musculoskeletal: as above  Remainder of review of systems is negative including constitutional, CV, pulmonary, GI, Skin and Neurologic except as noted in HPI or medical history.    Past Medical History:   Diagnosis Date     Hypertension      Myocardial infarction (H)      Past Surgical History:   Procedure Laterality Date     Cardiac stent       COLONOSCOPY N/A 5/22/2019    Procedure: Colonoscopy, Polypectomy by Snare;  Surgeon: Gian Horn DO;  [facility]     EXCISE GANGLION WRIST Left 11/26/2014    Procedure: EXCISE GANGLION WRIST;  Surgeon: Gian Haddad MD;  Location: [facility]     ICD DEVICE INTERROGATE      2017      ORTHOPEDIC SURGERY       RELEASE CARPAL TUNNEL Left 11/26/2014    Procedure: RELEASE CARPAL TUNNEL;  Surgeon: Gian Haddad MD;  [facility]     RELEASE DEQUERVAINS WRIST Left 11/26/2014    Procedure: RELEASE DEQUERVAINS WRIST;  Surgeon: Gian Haddad MD;  Location: [facility]     Family History   Problem Relation Age of Onset     Arthritis Mother      Diabetes Father      Allergies Father         sulfa     Eye Disorder Father         cataract     Heart Disease Father         by pass         Objective  There were no vitals taken for this visit.    General: healthy, alert and in no acute distress.    HEENT: no scleral icterus or conjunctival erythema.   Skin: no suspicious lesions or rash. No jaundice.   CV: regular rhythm by palpation, 2+ distal pulses.  Resp: normal respiratory effort without conversational dyspnea.   Psych: normal mood and affect.    Gait: nonantalgic, appropriate coordination and balance.     Neuro:        - Sensation to light touch:    - Intact throughout the BLE including all peripheral nerve distributions.        - MSR:      RLE  LLE  - Patella 2+ 2+  - Achilles 2+ 2+       - Special tests:   - Slump/SLR:  Neg bilaterally    MSK - Knee:       - Inspection:    - No significant swelling, erythema, warmth, ecchymosis, lesion.        - ROM:    - Full AROM/PROM with anterior knee pain during knee flexion/extension.       - Palpation:    - TTP at the medial joint line, lateral joint line.  - NTTP elsewhere.        - Strength:  (*antalgic)  RLE LLE  - Hip Flexion  5 5   - Hip Extension 5 5   - Hip Abduction 5 5   - Hip Adduction 5 5  - Knee Flexion  5 5  - Knee Extension 5 5  - Dorsiflexion  5 5  - Plantarflexion 5 5  - Ext. Chan. Longus 5 5  - Inversion  5 5  - Eversion  5 5       - Special tests:        - Lachman: Positive for soft endpoint on the left as compared to the right.        - A/P drawer: Positive for mild laxity in the left.       - Pivot shift:  Neg    - Donny:  Positive for mild medial compartment pain     - Varus stress:  Neg for laxity or pain    - Valgus stress:  Neg for laxity or pain   - Patellar grind:  Neg   - Thessaly: Positive for medial compartment pain and feeling of buckling      Radiology  I independently reviewed the available relevant imaging, with the following interpretation:   - XR right knee 10/8/2020 has views of the left knee as well which shows mild tricompartmental OA bilaterally, worst medially.  - XR L knee 4/6/2023 shows normal anatomic alignment, mild degenerative changes of the medial and patellofemoral compartments, no acute fractures or dislocations, small knee joint effusion.  - MR L knee 4/21/2023 shows a chronic ACL deficient knee with a peripheral partial radial tear of the posterior horn of the medial meniscus, moderate tricompartmental osteoarthritis, and a small joint effusion.      Assessment  1. Primary osteoarthritis of left knee    2. Rupture of anterior cruciate ligament of left knee, initial encounter    3. Tear of medial meniscus of left knee, current, unspecified tear type, initial encounter        Plan  Ky Martinez is a 77 year old male that presents for follow-up of acute on chronic left knee pain.  Imaging confirmed an ACL deficient knee with a medial meniscus tear and primary tricompartmental osteoarthritis.  He has had good responses with corticosteroid injections and presents today for another injection in hopes of similar relief.  Discussed the nature of the condition and treatment options and mutually agreed upon the following plan:    - Medications:          - Discussed pharmacologic options for pain relief.   - May use NSAIDs (Ibuprofen, Naproxen) or Acetaminophen (Tylenol) as needed for pain control.   - May also use topical medications such as lidocaine, IcyHot, BioFreeze, or Voltaren gel as needed for pain control.   - Injections:          - Discussed possible injection options and alternatives.    - Options  include intra-articular knee joint injections with corticosteroid, viscosupplementation, or PRP.   - Performed a corticosteroid injection of the left intra-articular knee joint today in clinic. Patient tolerated the procedure well without complications.   - Post-procedure instructions:    - Keep the injection site clean and dry.   - Do not submerge the injection site for 24 hours (no baths, pools). Showers are ok.   - Rest the area for 24-48 hours before resuming normal activities. Avoid overexerting the area for the first few weeks.   - It may take 2-3 days to start noticing the effects of the injection and up to 3-4 weeks to feel significant benefits.   - In general, an injection in the same anatomical region can be repeated every 3 months as needed.  However, for the health of your tissues you will want to space out the injections as much as possible and request them only when symptoms are significantly bothersome and disrupting daily function and/or sleep.   - Therapy:          - Continue home exercise program as instructed by PT.  - Modalities:          - May use ice, heat, massage or other modalities as needed.   - Bracing:          -Continue to use your knee stability brace when ambulating and performing exacerbating activities.  - Activity:     - Encouraged to remain active and participate in regular activities as tolerated.  - Follow up:          - In at least 3 months if needed for re-evaluation and update to treatment plan, or sooner for new/worsening symptoms.  - Patient has clinic contact information for questions or concerns.       Updated Plan - 4/3/24:  Tarik presents for continuing knee pain and decreasing efficacy of corticosteroid injections.  He is authorized for viscosupplementation injections with Synvisc, but he wanted to talk today about his declining function and inability to perform his daily activities due to the severe pain in the knee.  He has been resorting to assistive aids for  ambulation and would like to discuss a more definitive treatment plan for his knee then continued therapy and injections.  We discussed in detail the nature of his knee pathology and available treatment options.  At this time, he is more interested in discussing more long-term surgical repair of his knee as opposed to continue conservative care.  It is possible that he could benefit from arthroscopic interventions for the knee or consider total knee arthroplasty.  I will message my arthroscopic colleagues for their opinion and will place the appropriate Ortho  referral based on advice.  In the meantime, Tarik will continue with his treatment plan above and we will be happy to discuss his options with the appropriate surgical colleague.  If nonoperative treatment plan is decided, I will be happy to see him again for continued treatment, alternative injections, etc.      Eduardo Soriano DO, CAQSM  [facility]  [facility] - Department of Orthopedic Surgery       Disclaimer:  This note was prepared and written using Dragon Medical dictation software. As a result, there may be errors in the script that have gone undetected. Please consider this when interpreting the information in this note.       Again, thank you for allowing me to participate in the care of your patient.        Sincerely,        Eduardo Soriano DO

## 2024-04-06 ENCOUNTER — TELEPHONE (OUTPATIENT)
Dept: ORTHOPEDICS | Facility: CLINIC | Age: 79
End: 2024-04-06
Payer: COMMERCIAL

## 2024-04-06 NOTE — TELEPHONE ENCOUNTER
Staff message sent to Dr. Ma to get an opinion on arthroscopy vs arthroplasty. Will place appropriate Ortho  referral based on response.

## 2024-04-09 ENCOUNTER — MYC MEDICAL ADVICE (OUTPATIENT)
Dept: ORTHOPEDICS | Facility: CLINIC | Age: 79
End: 2024-04-09
Payer: COMMERCIAL

## 2024-04-09 DIAGNOSIS — M17.12 PRIMARY OSTEOARTHRITIS OF LEFT KNEE: Primary | ICD-10-CM

## 2024-04-09 NOTE — TELEPHONE ENCOUNTER
Received message and placed appropriate referral, which was completed and documented in a separate encounter.       Eduardo Soriano DO, CAQSM  [facility] - Department of Orthopedic Surgery

## 2024-04-10 ENCOUNTER — OFFICE VISIT (OUTPATIENT)
Dept: ORTHOPEDICS | Facility: CLINIC | Age: 79
End: 2024-04-10
Attending: STUDENT IN AN ORGANIZED HEALTH CARE EDUCATION/TRAINING PROGRAM
Payer: COMMERCIAL

## 2024-04-10 VITALS
DIASTOLIC BLOOD PRESSURE: 78 MMHG | BODY MASS INDEX: 42.4 KG/M2 | WEIGHT: 295.5 LBS | SYSTOLIC BLOOD PRESSURE: 118 MMHG | TEMPERATURE: 97.3 F

## 2024-04-10 DIAGNOSIS — M17.12 PRIMARY OSTEOARTHRITIS OF LEFT KNEE: ICD-10-CM

## 2024-04-10 PROCEDURE — 99203 OFFICE O/P NEW LOW 30 MIN: CPT | Performed by: ORTHOPAEDIC SURGERY

## 2024-04-10 RX ORDER — METOPROLOL TARTRATE 75 MG/1
75 TABLET, FILM COATED ORAL 2 TIMES DAILY
COMMUNITY
End: 2024-06-04

## 2024-04-10 ASSESSMENT — KOOS JR
HOW SEVERE IS YOUR KNEE STIFFNESS AFTER FIRST WAKING IN MORNING: MILD
STANDING UPRIGHT: MILD
BENDING TO THE FLOOR TO PICK UP OBJECT: MILD
STRAIGHTENING KNEE FULLY: MILD
GOING UP OR DOWN STAIRS: SEVERE
TWISING OR PIVOTING ON KNEE: SEVERE
KOOS JR SCORING: 57.14
RISING FROM SITTING: MODERATE

## 2024-04-10 ASSESSMENT — PAIN SCALES - GENERAL: PAINLEVEL: MODERATE PAIN (5)

## 2024-04-10 NOTE — LETTER
"    4/10/2024         RE: Ky Martinez  [address]  [facility] [address]        Dear Colleague,    Thank you for referring your patient, Ky Martinez, to the [facility]. Please see a copy of my visit note below.    ORTHOPEDIC CONSULT      Chief Complaint: Ky Martinez is a 78 year old male who is being seen for   Chief Complaint   Patient presents with     Left Knee - Pain       History of Present Illness:   Presents with left knee pain.  He reports has been dealing with this for toxically 2 years.  No specific injuries or traumas.  No previous surgeries.  Subsequently was seen and started being treated.  Underwent steroid injections the knee which initially provided relief however becoming less helpful.  Is been taking ibuprofen.  Resting modifying activities icing and utilizing a brace.  Pain is medial but generally feels that in general location with weightbearing.  Pain at rest.        January 28, 2021 office visit:  Seen previously for other issues. First time for the right shoulder. States has had many years of chronic pain and lack of internal rotation to his shoulder. Mostly though it is a mild dull ache, and still has good use and function. However 4 days ago while going down stairs, he fell backwards, landing on his buttock slid down 4 stairs. Thinks when attempting to brace himself with his right arm, \"max it\" and had immediate pain. Over the last 4 days has had much more than usual pain, decreased motion, and difficulty with any motion and activity with the shoulder.  Described as a lateral shoulder severe pain. Non-radiating. No previous surgeries to the shoulder, no previous significant injuries.  Treatments tried: icing, rest, activity modification, tylenol and his PCP sent him Norco to use.       On coumadin due to blood clots discovered April of 2020.      October 8, 2020 visit:  Complains approximately 1 week worth of anterior lateral knee discomfort.  " Initially was a very sharp pain.  Got to the point where he is having difficulty walking.  No specific injuries.  It occurred when he is walking.  He was using a brace cane and ice.  Since then the pain has improved.  He continues to have some swelling.  Pain was nonradiating.  Rates it as mild to moderate at times.  He has not been taking any medications for the pain.  He is on Coumadin.  He denies any previous issues with his knees.  No previous surgeries or injections.     Patient's past medical, surgical, social and family histories reviewed.     Past Medical History:   Diagnosis Date     Hypertension      Myocardial infarction (H)        Past Surgical History:   Procedure Laterality Date     Cardiac stent       COLONOSCOPY N/A 5/22/2019    Procedure: Colonoscopy, Polypectomy by Snare;  Surgeon: Gian Horn DO;  [facility]     EXCISE GANGLION WRIST Left 11/26/2014    Procedure: EXCISE GANGLION WRIST;  Surgeon: Gian Haddad MD;  Location: [facility]     ICD DEVICE INTERROGATE      2017     ORTHOPEDIC SURGERY       RELEASE CARPAL TUNNEL Left 11/26/2014    Procedure: RELEASE CARPAL TUNNEL;  Surgeon: Gian Haddad MD;  [facility]     RELEASE DEQUERVAINS WRIST Left 11/26/2014    Procedure: RELEASE DEQUERVAINS WRIST;  Surgeon: Gian Haddad MD;  Location: [facility] OR       Medications:  Current Outpatient Medications   Medication Sig Dispense Refill     acetaminophen (TYLENOL) 325 MG tablet Take 325 mg by mouth 2 tablets daily       albuterol (PROAIR HFA) 108 (90 Base) MCG/ACT inhaler Inhale 1-2 puffs into the lungs every 4 hours as needed for shortness of breath 8 g 1     atorvastatin (LIPITOR) 80 MG tablet Take 1 tablet by mouth once daily 90 tablet 0     DAILY MULTI OR 1 TABLET DAILY       finasteride (PROSCAR) 5 MG tablet TAKE 1 TABLET (5MG) BY MOUTH ONCE DAILY 90 tablet 3     furosemide (LASIX) 40 MG tablet TAKE 1 TABLET (40MG) BY MOUTH ONCE DAILY 90 tablet 3     isosorbide  mononitrate (IMDUR) 30 MG 24 hr tablet TAKE 1 TABLET (30MG) BY MOUTH ONCE DAILY 90 tablet 3     losartan (COZAAR) 50 MG tablet TAKE 1 TABLET (50MG) BY MOUTH ONCE DAILY 90 tablet 3     Metoprolol Tartrate 75 MG TABS Take 75 mg by mouth 2 times daily       nitroGLYcerin (NITROSTAT) 0.4 MG sublingual tablet 0.4 mg As needed       potassium chloride ER (KLOR-CON M) 20 MEQ CR tablet Take 1 tablet by mouth once daily 90 tablet 3     tamsulosin (FLOMAX) 0.4 MG capsule TAKE 1 CAPSULE (0.4MG) BY MOUTH ONCE DAILY 90 capsule 3     warfarin ANTICOAGULANT (COUMADIN) 5 MG tablet Take 2.5 mg on Mon, Fri and 5 mg all other days, or as directed by the Coumadin Clinic. 80 tablet 1     metoprolol succinate ER (TOPROL XL) 50 MG 24 hr tablet TAKE 1 TABLET (50MG) BY MOUTH ONCE DAILY (Patient not taking: Reported on 4/10/2024) 90 tablet 3     Current Facility-Administered Medications   Medication Dose Route Frequency Provider Last Rate Last Admin     2 mL bupivacaine (MARCAINE) preservative free injection 0.5% (20 mL vial)  2 mL   Eduardo Soriano, DO   2 mL at 02/29/24 1130     lidocaine 1 % injection 2 mL  2 mL   Eduardo Soriano, DO   2 mL at 02/29/24 1130     triamcinolone (KENALOG-40) injection 40 mg  40 mg   Eduardo Soriano, DO   40 mg at 02/29/24 1130       Allergies   Allergen Reactions     Pine Shortness Of Breath     Pine Pollen  Pine Pollen  Pine Pollen     No Clinical Screening - See Comments Rash     Other reaction(s): Rash     No Known Allergies Other (See Comments)       Social History     Occupational History     Not on file   Tobacco Use     Smoking status: Never     Smokeless tobacco: Never   Vaping Use     Vaping status: Never Used   Substance and Sexual Activity     Alcohol use: Yes     Alcohol/week: 3.3 standard drinks of alcohol     Types: 4 Glasses of wine per week     Comment: occasional      Drug use: No     Sexual activity: Yes     Partners: Female       Family History   Problem Relation Age of Onset     Arthritis  Mother      Diabetes Father      Allergies Father         sulfa     Eye Disorder Father         cataract     Heart Disease Father         by pass       REVIEW OF SYSTEMS  10 point review systems performed otherwise negative as noted as per history of present illness.    Physical Exam:  Vitals: /78   Temp 97.3  F (36.3  C)   Wt 134 kg (295 lb 8 oz)   BMI 42.40 kg/m    BMI= Body mass index is 42.4 kg/m .  Constitutional: healthy, alert and no acute distress   Psychiatric: mentation appears normal and affect normal/bright  NEURO: no focal deficits  RESP: Normal with easy respirations and no use of accessory muscles to breathe, no audible wheezing or retractions  CV: LLE:  midcalf and down-  non-pitting edema         Regular rate and rhythm by palpation  SKIN: No erythema, rashes, excoriation, or breakdown. No evidence of infection.   JOINT/EXTREMITIES:left knee: No gross deformity.  Tenderness along the medial joint line.  Pseudolaxity medial valgus stressing.  Collateral ligaments are intact.  Active motion 0-120 degrees.  Patella tracks midline.  No soft tissue or bursal swelling.  No gross instability on exam     GAIT: not tested     Diagnostic Modalities:  Left knee x-ray: No acute fractures or dislocations.  Moderate medial compartment narrowing with some mild to moderate patellofemoral changes well.      left knee without contrast 4/21/2023 10:57 AM     Techniques: Multiplanar multisequence imaging of the left knee was  obtained without administration of intra-articular or intravenous  contrast using routing protocol.     History: Rupture of anterior cruciate ligament of left knee, initial  encounter; Tear of medial meniscus of left knee, current, unspecified  tear type, initial encounter     Comparison:   4/6/2023      Findings:     MENISCI:  Medial meniscus: Partial radial tear of the free edge of the posterior  horn of the medial meniscus. Mild medial meniscal extrusion.  Lateral meniscus: Intact.      LIGAMENTS  Cruciate ligaments: Chronic appearing ACL deficiency. Posterior  cruciate ligament is intact  Medial supporting structures: Deep and superficial tibial collateral  ligament is preserved.  Lateral supporting structures: Iliotibial band, lateral collateral  ligament and popliteus tendon, biceps femoris tendons are intact and  unremarkable     EXTENSOR MECHANISM  Intact.     FLUID  Small joint effusion. No substantial Baker's cyst.     OSSEOUS and ARTICULAR STRUCTURES  Bones: No fracture, contusion, or osseous lesion is seen.     Patellofemoral compartment: Focal full-thickness articular cartilage  loss of the lateral trochlea proximally. This is consistent with grade  IV chondromalacia according to the modified Outerbridge  classification.     Medial compartment: Focal full-thickness articular cartilage  fissuring, consistent with grade IV chondromalacia.     Lateral compartment: Mild superficial articular cartilage fissuring,  consistent with grade II chondromalacia.                                                                         Impression:  1. Chronic appearing ACL deficient knee.  2. Partial radial tear at the free edge of the posterior horn of the  medial meniscus with associated mild medial meniscal extrusion.  3. 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  4. Small joint effusion.  Independent visualization of the images was performed.      Impression: left knee primary osteoarthritis     Plan:  All of the above pertinent physical exam and imaging modalities findings was reviewed with Ky and his wife.    We reviewed his treatment options.  We discussed how his radiographs were clinically correlated to his issues.  We reviewed based on his MRI I would recommend a left knee replacement for surgical options.  At this point his last injection  was March 15.  There is a 90-day window from injection to consider arthroplasty due to the infection risk.  We also discussed weight loss.  Currently his BMI is around 42-1/2.  BMI needs to be less than 40 to consider replacement.  He has not done any physical therapy to this point.  We made a referral for [facility].  We agreed on a plan to see me back in 6 weeks.  At that point we will reevaluate how his knee is doing.  He will work on his weight loss.  If the knee continues to bother him we will proceed with scheduling left total knee arthroplasty as long as his BMI is under 40.        Return to clinic 6, week(s), or sooner as needed for changes.  Re-x-ray on return: No    Chavo Hernandez D.O.      Again, thank you for allowing me to participate in the care of your patient.        Sincerely,        Nathan Hernandez, DO

## 2024-04-10 NOTE — PROGRESS NOTES
"ORTHOPEDIC CONSULT      Chief Complaint: Ky Martinez is a 78 year old male who is being seen for   Chief Complaint   Patient presents with    Left Knee - Pain       History of Present Illness:   Presents with left knee pain.  He reports has been dealing with this for toxically 2 years.  No specific injuries or traumas.  No previous surgeries.  Subsequently was seen and started being treated.  Underwent steroid injections the knee which initially provided relief however becoming less helpful.  Is been taking ibuprofen.  Resting modifying activities icing and utilizing a brace.  Pain is medial but generally feels that in general location with weightbearing.  Pain at rest.        January 28, 2021 office visit:  Seen previously for other issues. First time for the right shoulder. States has had many years of chronic pain and lack of internal rotation to his shoulder. Mostly though it is a mild dull ache, and still has good use and function. However 4 days ago while going down stairs, he fell backwards, landing on his buttock slid down 4 stairs. Thinks when attempting to brace himself with his right arm, \"max it\" and had immediate pain. Over the last 4 days has had much more than usual pain, decreased motion, and difficulty with any motion and activity with the shoulder.  Described as a lateral shoulder severe pain. Non-radiating. No previous surgeries to the shoulder, no previous significant injuries.  Treatments tried: icing, rest, activity modification, tylenol and his PCP sent him Norco to use.       On coumadin due to blood clots discovered April of 2020.      October 8, 2020 visit:  Complains approximately 1 week worth of anterior lateral knee discomfort.  Initially was a very sharp pain.  Got to the point where he is having difficulty walking.  No specific injuries.  It occurred when he is walking.  He was using a brace cane and ice.  Since then the pain has improved.  He continues to have some swelling.  " Pain was nonradiating.  Rates it as mild to moderate at times.  He has not been taking any medications for the pain.  He is on Coumadin.  He denies any previous issues with his knees.  No previous surgeries or injections.     Patient's past medical, surgical, social and family histories reviewed.     Past Medical History:   Diagnosis Date    Hypertension     Myocardial infarction (H)        Past Surgical History:   Procedure Laterality Date    Cardiac stent      COLONOSCOPY N/A 5/22/2019    Procedure: Colonoscopy, Polypectomy by Snare;  Surgeon: Gian Horn DO;  Location: [facility]    EXCISE GANGLION WRIST Left 11/26/2014    Procedure: EXCISE GANGLION WRIST;  Surgeon: Gian Haddad MD;  Location: [facility]    ICD DEVICE INTERROGATE      2017    ORTHOPEDIC SURGERY      RELEASE CARPAL TUNNEL Left 11/26/2014    Procedure: RELEASE CARPAL TUNNEL;  Surgeon: Gian Haddad MD;  Location: [facility]    RELEASE DEQUERVAINS WRIST Left 11/26/2014    Procedure: RELEASE DEQUERVAINS WRIST;  Surgeon: Gian Haddad MD;  Location: [facility]       Medications:  Current Outpatient Medications   Medication Sig Dispense Refill    acetaminophen (TYLENOL) 325 MG tablet Take 325 mg by mouth 2 tablets daily      albuterol (PROAIR HFA) 108 (90 Base) MCG/ACT inhaler Inhale 1-2 puffs into the lungs every 4 hours as needed for shortness of breath 8 g 1    atorvastatin (LIPITOR) 80 MG tablet Take 1 tablet by mouth once daily 90 tablet 0    DAILY MULTI OR 1 TABLET DAILY      finasteride (PROSCAR) 5 MG tablet TAKE 1 TABLET (5MG) BY MOUTH ONCE DAILY 90 tablet 3    furosemide (LASIX) 40 MG tablet TAKE 1 TABLET (40MG) BY MOUTH ONCE DAILY 90 tablet 3    isosorbide mononitrate (IMDUR) 30 MG 24 hr tablet TAKE 1 TABLET (30MG) BY MOUTH ONCE DAILY 90 tablet 3    losartan (COZAAR) 50 MG tablet TAKE 1 TABLET (50MG) BY MOUTH ONCE DAILY 90 tablet 3    Metoprolol Tartrate 75 MG TABS Take 75 mg by mouth 2 times daily      nitroGLYcerin  (NITROSTAT) 0.4 MG sublingual tablet 0.4 mg As needed      potassium chloride ER (KLOR-CON M) 20 MEQ CR tablet Take 1 tablet by mouth once daily 90 tablet 3    tamsulosin (FLOMAX) 0.4 MG capsule TAKE 1 CAPSULE (0.4MG) BY MOUTH ONCE DAILY 90 capsule 3    warfarin ANTICOAGULANT (COUMADIN) 5 MG tablet Take 2.5 mg on Mon, Fri and 5 mg all other days, or as directed by the [facility]. 80 tablet 1    metoprolol succinate ER (TOPROL XL) 50 MG 24 hr tablet TAKE 1 TABLET (50MG) BY MOUTH ONCE DAILY (Patient not taking: Reported on 4/10/2024) 90 tablet 3     Current Facility-Administered Medications   Medication Dose Route Frequency Provider Last Rate Last Admin    2 mL bupivacaine (MARCAINE) preservative free injection 0.5% (20 mL vial)  2 mL   Eduardo Soriano, DO   2 mL at 02/29/24 1130    lidocaine 1 % injection 2 mL  2 mL   Eduardo Soriano, DO   2 mL at 02/29/24 1130    triamcinolone (KENALOG-40) injection 40 mg  40 mg   Eduardo Soriano, DO   40 mg at 02/29/24 1130       Allergies   Allergen Reactions    Pine Shortness Of Breath     Pine Pollen  Pine Pollen  Pine Pollen    No Clinical Screening - See Comments Rash     Other reaction(s): Rash    No Known Allergies Other (See Comments)       Social History     Occupational History    Not on file   Tobacco Use    Smoking status: Never    Smokeless tobacco: Never   Vaping Use    Vaping status: Never Used   Substance and Sexual Activity    Alcohol use: Yes     Alcohol/week: 3.3 standard drinks of alcohol     Types: 4 Glasses of wine per week     Comment: occasional     Drug use: No    Sexual activity: Yes     Partners: Female       Family History   Problem Relation Age of Onset    Arthritis Mother     Diabetes Father     Allergies Father         sulfa    Eye Disorder Father         cataract    Heart Disease Father         by pass       REVIEW OF SYSTEMS  10 point review systems performed otherwise negative as noted as per history of present illness.    Physical Exam:  Vitals:  /78   Temp 97.3  F (36.3  C)   Wt 134 kg (295 lb 8 oz)   BMI 42.40 kg/m    BMI= Body mass index is 42.4 kg/m .  Constitutional: healthy, alert and no acute distress   Psychiatric: mentation appears normal and affect normal/bright  NEURO: no focal deficits  RESP: Normal with easy respirations and no use of accessory muscles to breathe, no audible wheezing or retractions  CV: LLE:  midcalf and down-  non-pitting edema         Regular rate and rhythm by palpation  SKIN: No erythema, rashes, excoriation, or breakdown. No evidence of infection.   JOINT/EXTREMITIES:left knee: No gross deformity.  Tenderness along the medial joint line.  Pseudolaxity medial valgus stressing.  Collateral ligaments are intact.  Active motion 0-120 degrees.  Patella tracks midline.  No soft tissue or bursal swelling.  No gross instability on exam     GAIT: not tested     Diagnostic Modalities:  Left knee x-ray: No acute fractures or dislocations.  Moderate medial compartment narrowing with some mild to moderate patellofemoral changes well.      left knee without contrast 4/21/2023 10:57 AM     Techniques: Multiplanar multisequence imaging of the left knee was  obtained without administration of intra-articular or intravenous  contrast using routing protocol.     History: Rupture of anterior cruciate ligament of left knee, initial  encounter; Tear of medial meniscus of left knee, current, unspecified  tear type, initial encounter     Comparison:   4/6/2023      Findings:     MENISCI:  Medial meniscus: Partial radial tear of the free edge of the posterior  horn of the medial meniscus. Mild medial meniscal extrusion.  Lateral meniscus: Intact.     LIGAMENTS  Cruciate ligaments: Chronic appearing ACL deficiency. Posterior  cruciate ligament is intact  Medial supporting structures: Deep and superficial tibial collateral  ligament is preserved.  Lateral supporting structures: Iliotibial band, lateral collateral  ligament and popliteus  tendon, biceps femoris tendons are intact and  unremarkable     EXTENSOR MECHANISM  Intact.     FLUID  Small joint effusion. No substantial Baker's cyst.     OSSEOUS and ARTICULAR STRUCTURES  Bones: No fracture, contusion, or osseous lesion is seen.     Patellofemoral compartment: Focal full-thickness articular cartilage  loss of the lateral trochlea proximally. This is consistent with grade  IV chondromalacia according to the modified [place]  classification.     Medial compartment: Focal full-thickness articular cartilage  fissuring, consistent with grade IV chondromalacia.     Lateral compartment: Mild superficial articular cartilage fissuring,  consistent with grade II chondromalacia.                                                                         Impression:  1. Chronic appearing ACL deficient knee.  2. Partial radial tear at the free edge of the posterior horn of the  medial meniscus with associated mild medial meniscal extrusion.  3. 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  4. Small joint effusion.  Independent visualization of the images was performed.      Impression: left knee primary osteoarthritis     Plan:  All of the above pertinent physical exam and imaging modalities findings was reviewed with Ky and his wife.    We reviewed his treatment options.  We discussed how his radiographs were clinically correlated to his issues.  We reviewed based on his MRI I would recommend a left knee replacement for surgical options.  At this point his last injection was March 15.  There is a 90-day window from injection to consider arthroplasty due to the infection risk.  We also discussed weight loss.  Currently his BMI is around 42-1/2.  BMI needs to be less than 40 to consider replacement.  He has not done any physical therapy to this point.  We  made a referral for [facility].  We agreed on a plan to see me back in 6 weeks.  At that point we will reevaluate how his knee is doing.  He will work on his weight loss.  If the knee continues to bother him we will proceed with scheduling left total knee arthroplasty as long as his BMI is under 40.        Return to clinic 6, week(s), or sooner as needed for changes.  Re-x-ray on return: No    Chavo Hernandez D.O.

## 2024-04-19 ENCOUNTER — ANTICOAGULATION THERAPY VISIT (OUTPATIENT)
Dept: ANTICOAGULATION | Facility: CLINIC | Age: 79
End: 2024-04-19

## 2024-04-19 ENCOUNTER — LAB (OUTPATIENT)
Dept: LAB | Facility: CLINIC | Age: 79
End: 2024-04-19
Payer: COMMERCIAL

## 2024-04-19 ENCOUNTER — TRANSFERRED RECORDS (OUTPATIENT)
Dept: HEALTH INFORMATION MANAGEMENT | Facility: CLINIC | Age: 79
End: 2024-04-19

## 2024-04-19 DIAGNOSIS — I48.0 PAROXYSMAL ATRIAL FIBRILLATION WITH RAPID VENTRICULAR RESPONSE (H): ICD-10-CM

## 2024-04-19 DIAGNOSIS — I48.0 PAROXYSMAL ATRIAL FIBRILLATION (H): ICD-10-CM

## 2024-04-19 DIAGNOSIS — I26.99 ACUTE PULMONARY EMBOLISM, UNSPECIFIED PULMONARY EMBOLISM TYPE, UNSPECIFIED WHETHER ACUTE COR PULMONALE PRESENT (H): ICD-10-CM

## 2024-04-19 DIAGNOSIS — I26.99 ACUTE PULMONARY EMBOLISM, UNSPECIFIED PULMONARY EMBOLISM TYPE, UNSPECIFIED WHETHER ACUTE COR PULMONALE PRESENT (H): Primary | ICD-10-CM

## 2024-04-19 LAB — INR BLD: 3.7 (ref 0.9–1.1)

## 2024-04-19 PROCEDURE — 85610 PROTHROMBIN TIME: CPT

## 2024-04-19 PROCEDURE — 36416 COLLJ CAPILLARY BLOOD SPEC: CPT

## 2024-04-19 NOTE — PROGRESS NOTES
ANTICOAGULATION MANAGEMENT     Ky Martinez 78 year old male is on warfarin with supratherapeutic INR result. (Goal INR 2.0-3.0)    Recent labs: (last 7 days)     04/19/24  1313   INR 3.7*       ASSESSMENT     Source(s): Chart Review and Patient/Caregiver Call     Warfarin doses taken: Warfarin taken as instructed  Diet: No new diet changes identified  Medication/supplement changes: taking mortin/ ibuprofen  for 3 weeks   New illness, injury, or hospitalization: Yes: having knee pain using walker now doing PT   Signs or symptoms of bleeding or clotting: No  Previous result: Therapeutic last 2(+) visits  Additional findings: None encouraged to use tylenol not NSAIDS for pain       PLAN     Recommended plan for temporary change(s) affecting INR     Dosing Instructions: hold dose then continue your current warfarin dose with next INR in 2 weeks    hold tomorrow dose already took todays dose.    Summary  As of 4/19/2024      Full warfarin instructions:  4/19: 2.5 mg; 4/20: Hold; Otherwise 2.5 mg every Mon, Fri; 5 mg all other days   Next INR check:  5/3/2024               Telephone call with Tarik who verbalizes understanding and agrees to plan    Patient offered & declined to schedule next visit    Education provided:   Symptom monitoring: monitoring for bleeding signs and symptoms    Plan made per [facility] anticoagulation protocol    Jailene Whitfield, RN  Anticoagulation Clinic  4/19/2024    _______________________________________________________________________     Anticoagulation Episode Summary       Current INR goal:  2.0-3.0   TTR:  93.4% (1 y)   Target end date:  Indefinite   Send INR reminders to:  BRANDY MEHTA    Indications    Acute pulmonary embolism  unspecified pulmonary embolism type  unspecified whether acute cor pulmonale present (H) [I26.99]  Paroxysmal atrial fibrillation with rapid ventricular response (H) [I48.0]  Paroxysmal atrial fibrillation (H) [I48.0]             Comments:                Anticoagulation Care Providers       Provider Role Specialty Phone number    Ky Major MD Referring Family Practice     Marcin Rios MD Referring Family Medicine [phone]

## 2024-04-19 NOTE — TELEPHONE ENCOUNTER
ANTICOAGULATION MANAGEMENT:  Medication Refill    Anticoagulation Summary  As of 11/17/2022    Warfarin maintenance plan:  2.5 mg (5 mg x 0.5) every Mon, Fri; 5 mg (5 mg x 1) all other days; Starting 11/17/2022   Next INR check:  12/29/2022   Target end date:  9/30/2020    Indications    Acute pulmonary embolism  unspecified pulmonary embolism type  unspecified whether acute cor pulmonale present (H) [I26.99]  Paroxysmal atrial fibrillation with rapid ventricular response (H) [I48.0]  Paroxysmal atrial fibrillation (H) [I48.0]             Anticoagulation Care Providers     Provider Role Specialty Phone number    Ky Major MD Referring [facility] [phone]          Visit with referring provider/group within last year: No, last visit date: 11/12/21    [facility] referral signed within last year: Yes    Ky does NOT meet all criteria for refill: Visit with referring provider's group was >= 1 year ago. 90 day sally fill approved; patient notified to schedule with referring provider per [facility] protocol      Tatyana Gilbert RN  Anticoagulation Clinic    
Patient did have a virtual visit to establish care with Dr. Rios on 11/23/22 with note that states to be seen in 6 months, around 5/23/23 for his annual exam/office visit. Damaris Rubalcava LPN    
Routing refill request to provider for review/approval because:  Drug not on the [facility] refill protocol      Naveen Mosley RN   
(4) rarely moist

## 2024-05-02 ENCOUNTER — LAB (OUTPATIENT)
Dept: LAB | Facility: CLINIC | Age: 79
End: 2024-05-02
Payer: COMMERCIAL

## 2024-05-02 ENCOUNTER — ANTICOAGULATION THERAPY VISIT (OUTPATIENT)
Dept: ANTICOAGULATION | Facility: CLINIC | Age: 79
End: 2024-05-02

## 2024-05-02 ENCOUNTER — TELEPHONE (OUTPATIENT)
Dept: FAMILY MEDICINE | Facility: CLINIC | Age: 79
End: 2024-05-02

## 2024-05-02 DIAGNOSIS — I48.0 PAROXYSMAL ATRIAL FIBRILLATION (H): ICD-10-CM

## 2024-05-02 DIAGNOSIS — I26.99 ACUTE PULMONARY EMBOLISM, UNSPECIFIED PULMONARY EMBOLISM TYPE, UNSPECIFIED WHETHER ACUTE COR PULMONALE PRESENT (H): Primary | ICD-10-CM

## 2024-05-02 DIAGNOSIS — I48.0 PAROXYSMAL ATRIAL FIBRILLATION WITH RAPID VENTRICULAR RESPONSE (H): ICD-10-CM

## 2024-05-02 DIAGNOSIS — I26.99 ACUTE PULMONARY EMBOLISM, UNSPECIFIED PULMONARY EMBOLISM TYPE, UNSPECIFIED WHETHER ACUTE COR PULMONALE PRESENT (H): ICD-10-CM

## 2024-05-02 LAB — INR BLD: 3.4 (ref 0.9–1.1)

## 2024-05-02 PROCEDURE — 36416 COLLJ CAPILLARY BLOOD SPEC: CPT

## 2024-05-02 PROCEDURE — 85610 PROTHROMBIN TIME: CPT

## 2024-05-02 NOTE — TELEPHONE ENCOUNTER
Please see [facility] encounter from today.    Dennise Zuniga RN  [facility]

## 2024-05-02 NOTE — TELEPHONE ENCOUNTER
Patient returning call to INR nurse but no answer at number listed.  Please call patient back at   [phone]

## 2024-05-02 NOTE — PROGRESS NOTES
ANTICOAGULATION MANAGEMENT     Ky Martinez 78 year old male is on warfarin with supratherapeutic INR result. (Goal INR 2.0-3.0)    Recent labs: (last 7 days)     05/02/24  1154   INR 3.4*       ASSESSMENT     Source(s): Chart Review and Patient/Caregiver Call     Warfarin doses taken: Warfarin taken as instructed  Diet: No new diet changes identified  Medication/supplement changes: None noted  New illness, injury, or hospitalization: Yes: ongoing knee pain   Signs or symptoms of bleeding or clotting: No  Previous result: Supratherapeutic  Additional findings:  will need a knee replacement but needs to lose weight first.  He is less active now and walking with a walker due to the pain and limited ROM        PLAN     Recommended plan for ongoing change(s) affecting INR     Dosing Instructions: hold dose then decrease your warfarin dose (8% change) with next INR in 2 weeks   took dose today so will hold tomorrow     Summary  As of 5/2/2024      Full warfarin instructions:  5/3: Hold; Otherwise 2.5 mg every Mon, Wed, Fri; 5 mg all other days   Next INR check:  5/20/2024               Telephone call with Tarik who verbalizes understanding and agrees to plan    Patient elected to schedule next visit on 5/20/24 due to will be out of town for a fishing trip.     Education provided:   Goal range and lab monitoring: goal range and significance of current result  Contact [phone]  with any changes, questions or concerns.     Plan made per [facility] anticoagulation protocol    Dennise Zuniga RN  Anticoagulation Clinic  5/2/2024    _______________________________________________________________________     Anticoagulation Episode Summary       Current INR goal:  2.0-3.0   TTR:  89.9% (1 y)   Target end date:  Indefinite   Send INR reminders to:  BRANDY MEHTA    Indications    Acute pulmonary embolism  unspecified pulmonary embolism type  unspecified whether acute cor pulmonale present (H) [I26.99]  Paroxysmal atrial  fibrillation with rapid ventricular response (H) [I48.0]  Paroxysmal atrial fibrillation (H) [I48.0]             Comments:               Anticoagulation Care Providers       Provider Role Specialty Phone number    Ky Major MD Referring Family Practice     Marcin Rios MD Referring Family Medicine [phone]

## 2024-05-20 ENCOUNTER — ANTICOAGULATION THERAPY VISIT (OUTPATIENT)
Dept: ANTICOAGULATION | Facility: CLINIC | Age: 79
End: 2024-05-20

## 2024-05-20 ENCOUNTER — LAB (OUTPATIENT)
Dept: LAB | Facility: CLINIC | Age: 79
End: 2024-05-20
Payer: COMMERCIAL

## 2024-05-20 DIAGNOSIS — Z13.220 SCREENING FOR HYPERLIPIDEMIA: ICD-10-CM

## 2024-05-20 DIAGNOSIS — I48.0 PAROXYSMAL ATRIAL FIBRILLATION (H): ICD-10-CM

## 2024-05-20 DIAGNOSIS — I48.0 PAROXYSMAL ATRIAL FIBRILLATION WITH RAPID VENTRICULAR RESPONSE (H): ICD-10-CM

## 2024-05-20 DIAGNOSIS — E78.5 HYPERLIPIDEMIA: Primary | ICD-10-CM

## 2024-05-20 DIAGNOSIS — I25.10 CORONARY ATHEROSCLEROSIS: ICD-10-CM

## 2024-05-20 DIAGNOSIS — I10 HYPERTENSION: ICD-10-CM

## 2024-05-20 DIAGNOSIS — I26.99 ACUTE PULMONARY EMBOLISM, UNSPECIFIED PULMONARY EMBOLISM TYPE, UNSPECIFIED WHETHER ACUTE COR PULMONALE PRESENT (H): ICD-10-CM

## 2024-05-20 DIAGNOSIS — I26.99 ACUTE PULMONARY EMBOLISM, UNSPECIFIED PULMONARY EMBOLISM TYPE, UNSPECIFIED WHETHER ACUTE COR PULMONALE PRESENT (H): Primary | ICD-10-CM

## 2024-05-20 LAB
ALT SERPL W P-5'-P-CCNC: 37 U/L (ref 0–70)
ANION GAP SERPL CALCULATED.3IONS-SCNC: 12 MMOL/L (ref 7–15)
BUN SERPL-MCNC: 19.6 MG/DL (ref 8–23)
CALCIUM SERPL-MCNC: 8.8 MG/DL (ref 8.8–10.2)
CHLORIDE SERPL-SCNC: 109 MMOL/L (ref 98–107)
CHOLEST SERPL-MCNC: 113 MG/DL
CREAT SERPL-MCNC: 1.27 MG/DL (ref 0.67–1.17)
DEPRECATED HCO3 PLAS-SCNC: 18 MMOL/L (ref 22–29)
EGFRCR SERPLBLD CKD-EPI 2021: 58 ML/MIN/1.73M2
ERYTHROCYTE [DISTWIDTH] IN BLOOD BY AUTOMATED COUNT: 14.6 % (ref 10–15)
FASTING STATUS PATIENT QL REPORTED: YES
FASTING STATUS PATIENT QL REPORTED: YES
GLUCOSE SERPL-MCNC: 118 MG/DL (ref 70–99)
HCT VFR BLD AUTO: 38.8 % (ref 40–53)
HDLC SERPL-MCNC: 52 MG/DL
HGB BLD-MCNC: 13.1 G/DL (ref 13.3–17.7)
INR BLD: 4.7 (ref 0.9–1.1)
LDLC SERPL CALC-MCNC: 43 MG/DL
MCH RBC QN AUTO: 33.8 PG (ref 26.5–33)
MCHC RBC AUTO-ENTMCNC: 33.8 G/DL (ref 31.5–36.5)
MCV RBC AUTO: 100 FL (ref 78–100)
NONHDLC SERPL-MCNC: 61 MG/DL
PLATELET # BLD AUTO: 147 10E3/UL (ref 150–450)
POTASSIUM SERPL-SCNC: 4.6 MMOL/L (ref 3.4–5.3)
RBC # BLD AUTO: 3.88 10E6/UL (ref 4.4–5.9)
SODIUM SERPL-SCNC: 139 MMOL/L (ref 135–145)
TRIGL SERPL-MCNC: 91 MG/DL
TSH SERPL DL<=0.005 MIU/L-ACNC: 1.69 UIU/ML (ref 0.3–4.2)
WBC # BLD AUTO: 6.8 10E3/UL (ref 4–11)

## 2024-05-20 PROCEDURE — 36416 COLLJ CAPILLARY BLOOD SPEC: CPT

## 2024-05-20 PROCEDURE — 84443 ASSAY THYROID STIM HORMONE: CPT

## 2024-05-20 PROCEDURE — 36415 COLL VENOUS BLD VENIPUNCTURE: CPT

## 2024-05-20 PROCEDURE — 85610 PROTHROMBIN TIME: CPT

## 2024-05-20 PROCEDURE — 85027 COMPLETE CBC AUTOMATED: CPT

## 2024-05-20 PROCEDURE — 80061 LIPID PANEL: CPT

## 2024-05-20 PROCEDURE — 80048 BASIC METABOLIC PNL TOTAL CA: CPT

## 2024-05-20 PROCEDURE — 84460 ALANINE AMINO (ALT) (SGPT): CPT

## 2024-05-20 NOTE — PROGRESS NOTES
ANTICOAGULATION MANAGEMENT     Ky Martinez 78 year old male is on warfarin with supratherapeutic INR result. (Goal INR 2.0-3.0)    Recent labs: (last 7 days)     05/20/24  1104   INR 4.7*       ASSESSMENT     Source(s): Chart Review and Patient/Caregiver Call     Warfarin doses taken: Warfarin taken as instructed  Diet: Decreased greens/vitamin K in diet; plans to resume previous intake, Change in alcohol intake may be affecting INR. Pt was on his annual fishing trip so increased alcohol intake , and not eating very much or very well may be affecting diet and INR  Medication/supplement changes: None noted  New illness, injury, or hospitalization: No  Signs or symptoms of bleeding or clotting: No  Previous result: Supratherapeutic  Additional findings:  Pt states he has been seen at [facility] and they have been monitoring his heart rhythm and he has been in a-fib for some time. Possible cardioversion is in the near future. He will keep [facility] posted on this       PLAN     Recommended plan for temporary change(s) affecting INR     Dosing Instructions: hold one full and one partial doses then continue your current warfarin dose with next INR in 1 week       Summary  As of 5/20/2024      Full warfarin instructions:  5/20: Hold; 5/21: 2.5 mg; Otherwise 2.5 mg every Mon, Wed, Fri; 5 mg all other days   Next INR check:  5/29/2024               Telephone call with Tarik who verbalizes understanding and agrees to plan and who agrees to plan and repeated back plan correctly    Lab visit scheduled    Education provided:   Goal range and lab monitoring: goal range and significance of current result, Importance of therapeutic range, and Importance of following up at instructed interval  Dietary considerations: importance of consistent vitamin K intake  Healthy lifestyle considerations: potential interaction between warfarin and alcohol    Plan made per [facility] anticoagulation protocol    Jessica Chavez, RN  Anticoagulation  Clinic  5/20/2024    _______________________________________________________________________     Anticoagulation Episode Summary       Current INR goal:  2.0-3.0   TTR:  84.9% (1 y)   Target end date:  Indefinite   Send INR reminders to:  BRANDY MEHTA    Indications    Acute pulmonary embolism  unspecified pulmonary embolism type  unspecified whether acute cor pulmonale present (H) [I26.99]  Paroxysmal atrial fibrillation with rapid ventricular response (H) [I48.0]  Paroxysmal atrial fibrillation (H) [I48.0]             Comments:               Anticoagulation Care Providers       Provider Role Specialty Phone number    Ky Major MD Referring Family Practice     Marcin Rios MD Referring Family Medicine [phone]

## 2024-05-29 ENCOUNTER — TELEPHONE (OUTPATIENT)
Dept: ORTHOPEDICS | Facility: OTHER | Age: 79
End: 2024-05-29

## 2024-05-29 ENCOUNTER — LAB (OUTPATIENT)
Dept: LAB | Facility: CLINIC | Age: 79
End: 2024-05-29
Payer: COMMERCIAL

## 2024-05-29 ENCOUNTER — ANTICOAGULATION THERAPY VISIT (OUTPATIENT)
Dept: ANTICOAGULATION | Facility: CLINIC | Age: 79
End: 2024-05-29

## 2024-05-29 ENCOUNTER — OFFICE VISIT (OUTPATIENT)
Dept: ORTHOPEDICS | Facility: CLINIC | Age: 79
End: 2024-05-29
Payer: COMMERCIAL

## 2024-05-29 ENCOUNTER — TELEPHONE (OUTPATIENT)
Dept: ANTICOAGULATION | Facility: CLINIC | Age: 79
End: 2024-05-29

## 2024-05-29 VITALS
HEIGHT: 71 IN | DIASTOLIC BLOOD PRESSURE: 88 MMHG | SYSTOLIC BLOOD PRESSURE: 142 MMHG | WEIGHT: 288.5 LBS | BODY MASS INDEX: 40.39 KG/M2 | TEMPERATURE: 98.2 F

## 2024-05-29 DIAGNOSIS — I48.0 PAROXYSMAL ATRIAL FIBRILLATION (H): ICD-10-CM

## 2024-05-29 DIAGNOSIS — I26.99 ACUTE PULMONARY EMBOLISM, UNSPECIFIED PULMONARY EMBOLISM TYPE, UNSPECIFIED WHETHER ACUTE COR PULMONALE PRESENT (H): ICD-10-CM

## 2024-05-29 DIAGNOSIS — I48.0 PAROXYSMAL ATRIAL FIBRILLATION WITH RAPID VENTRICULAR RESPONSE (H): ICD-10-CM

## 2024-05-29 DIAGNOSIS — I26.99 ACUTE PULMONARY EMBOLISM, UNSPECIFIED PULMONARY EMBOLISM TYPE, UNSPECIFIED WHETHER ACUTE COR PULMONALE PRESENT (H): Primary | ICD-10-CM

## 2024-05-29 DIAGNOSIS — M17.11 PRIMARY OSTEOARTHRITIS OF RIGHT KNEE: ICD-10-CM

## 2024-05-29 DIAGNOSIS — M17.12 PRIMARY OSTEOARTHRITIS OF LEFT KNEE: Primary | ICD-10-CM

## 2024-05-29 LAB — INR BLD: 2.3 (ref 0.9–1.1)

## 2024-05-29 PROCEDURE — 85610 PROTHROMBIN TIME: CPT

## 2024-05-29 PROCEDURE — 36416 COLLJ CAPILLARY BLOOD SPEC: CPT

## 2024-05-29 PROCEDURE — 99214 OFFICE O/P EST MOD 30 MIN: CPT | Performed by: ORTHOPAEDIC SURGERY

## 2024-05-29 NOTE — LETTER
"    5/29/2024         RE: Ky Martinez  [address] [place] [address]        Dear Colleague,    Thank you for referring your patient, Ky Martinez, to the [facility]. Please see a copy of my visit note below.    Office Visit-Follow up    Chief Complaint: Ky Martinez is a 78 year old male who is being seen for   Chief Complaint   Patient presents with     Left Knee - Follow Up       History of Present Illness:   Today's visit:  At his last visit we discussed starting with some physical therapy and working on weight loss.  Returns with his wife.  Continues having fairly significant left knee pain.  He has been working on weight loss and physical therapy.  He does feel like that is been helpful but not to the degree he was hoping for.  Today he is attempted rest, activity modification, physical therapy, weight loss, steroid injections, bracing with no improvement.  Pain wakes him at night.  Pain causes him to limp.      April 10, 2024 visit:  Presents with left knee pain.  He reports has been dealing with this for toxically 2 years.  No specific injuries or traumas.  No previous surgeries.  Subsequently was seen and started being treated.  Underwent steroid injections the knee which initially provided relief however becoming less helpful.  Is been taking ibuprofen.  Resting modifying activities icing and utilizing a brace.  Pain is medial but generally feels that in general location with weightbearing.  Pain at rest.           January 28, 2021 office visit:  Seen previously for other issues. First time for the right shoulder. States has had many years of chronic pain and lack of internal rotation to his shoulder. Mostly though it is a mild dull ache, and still has good use and function. However 4 days ago while going down stairs, he fell backwards, landing on his buttock slid down 4 stairs. Thinks when attempting to brace himself with his right arm, \"max it\" and had " "immediate pain. Over the last 4 days has had much more than usual pain, decreased motion, and difficulty with any motion and activity with the shoulder.  Described as a lateral shoulder severe pain. Non-radiating. No previous surgeries to the shoulder, no previous significant injuries.  Treatments tried: icing, rest, activity modification, tylenol and his PCP sent him Norco to use.       On coumadin due to blood clots discovered April of 2020.      October 8, 2020 visit:  Complains approximately 1 week worth of anterior lateral knee discomfort.  Initially was a very sharp pain.  Got to the point where he is having difficulty walking.  No specific injuries.  It occurred when he is walking.  He was using a brace cane and ice.  Since then the pain has improved.  He continues to have some swelling.  Pain was nonradiating.  Rates it as mild to moderate at times.  He has not been taking any medications for the pain.  He is on Coumadin.  He denies any previous issues with his knees.  No previous surgeries or injections.         Social History     Occupational History     Not on file   Tobacco Use     Smoking status: Never     Smokeless tobacco: Never   Vaping Use     Vaping status: Never Used   Substance and Sexual Activity     Alcohol use: Yes     Alcohol/week: 3.3 standard drinks of alcohol     Types: 4 Glasses of wine per week     Comment: occasional      Drug use: No     Sexual activity: Yes     Partners: Female       REVIEW OF SYSTEMS  General: negative for, night sweats, dizziness, fatigue  Resp: No shortness of breath and no cough  CV: negative for chest pain, syncope or near-syncope  GI: negative for nausea, vomiting and diarrhea  : negative for dysuria and hematuria  Musculoskeletal: as above  Neurologic: negative for syncope   Hematologic: negative for bleeding disorder    Physical Exam:  Vitals: BP (!) 142/88   Temp 98.2  F (36.8  C)   Ht 1.803 m (5' 11\")   Wt 130.9 kg (288 lb 8 oz)   BMI 40.24 kg/m  "   BMI= Body mass index is 40.24 kg/m .  Constitutional: healthy, alert and no acute distress   Psychiatric: mentation appears normal and affect normal/bright  NEURO: no focal deficits  RESP: Normal with easy respirations and no use of accessory muscles to breathe, no audible wheezing or retractions  CV: LLE:  ankle and down-  non-pitting edema           SKIN: No erythema, rashes, excoriation, or breakdown. No evidence of infection.   JOINT/EXTREMITIES:left knee: 3-115 degrees active motion.  Patella tracks midline.  Exquisite medial joint line tenderness.  No gross instability varus and valgus testing.  Small effusion.  No soft tissue or bursal swelling.  No focal atrophy.  GAIT: antalgic            Diagnostic Modalities:  Left knee MRI April 21, 2023:  Impression:  1. Chronic appearing ACL deficient knee.  2. Partial radial tear at the free edge of the posterior horn of the  medial meniscus with associated mild medial meniscal extrusion.  3. 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  4. Small joint effusion  Independent visualization of the images was performed.      Impression: left knee primary osteoarthritis     Plan:  All of the above pertinent physical exam and imaging modalities findings was reviewed with Ky and his wife.    The patient has attempted conservative treatments that include NSAIDs, focused self directed physical therapy, formal physical therapy, steroid injections, activity modifications, weight loss, bracing , rest yet still continues to have significant issues. These issues include pain at rest, increased pain with activity, pain that wakes at night, gait disturbances, needing the use of assistive devices for ambulation, loss of motion of the joint. Secondary to these reasons I have offered surgery in the form of left total knee  arthroplasty.      Risks, benefits, complications, alternatives, limitations, and post operative course were discussed. Risks including infection (requiring long-term antibiotics and repeat surgeries), implant loosening (requiring revision surgery), heart attacks, strokes, bleeding (possibly requiring blood transfusion), scars, instability, numbness around the knee, stiffness (requiring manipulations or repeat surgeries), instability and dislocations, fractures, blood clots the legs and lungs, nerve injury (possibly leading to foot drop).  Postoperative course was discussed as far as limitations and expected recovery times. It was also discussed that after surgery there is a need for blood thinners to prevent blood clots, but even when being treated it is possible to develop a blood clot. Anticipate being discharged the day of surgery or hospitalized 1 day and subsequently discharged home. Did discuss that discharge home is preferred and many insurances do not cover rehabilitation facilities due to outpatient nature of surgery.  Determinations of this will be based on progress with physical therapy while in the hospital. The importance of post-operative physical therapy was discussed.  Discussed recommendations to start outpatient physical therapy within a week of surgery and home based therapy would be a backup if not able to get to outpatient therapy. If home physical therapy for about 2 weeks and then transition to going to a physical therapy location for more aggressive therapy. Without physical therapy, outcomes may not be optimal. All questions were answered. The patient agrees to proceed with surgery.  Past medical and surgical history was reviewed.  Mr. Martinez will need a pre-operative medical evaluation and clearance by a primary care provider. We will obtain a CBC and the appropriate radiographs prior to surgery. I will leave it to the discretion of the primary care provider for additional pre-operative  testing.      He is on Coumadin-we need a preoperative plan.  He is can to continue working on weight loss and physical therapy.    Return to clinic 14 days post-operatively. , or sooner as needed for changes.  Re-x-ray on return: Yes.    Chavo Hernandez D.O.          Again, thank you for allowing me to participate in the care of your patient.        Sincerely,        Nathan Hernandez, DO

## 2024-05-29 NOTE — PROGRESS NOTES
ANTICOAGULATION MANAGEMENT     Ky Martinez 78 year old male is on warfarin with therapeutic INR result. (Goal INR 2.0-3.0)    Recent labs: (last 7 days)     05/29/24  0819   INR 2.3*       ASSESSMENT     Source(s): Chart Review and Patient/Caregiver Call     Warfarin doses taken: Warfarin taken as instructed  Diet: No new diet changes identified  Medication/supplement changes: None noted  New illness, injury, or hospitalization: No  Signs or symptoms of bleeding or clotting: No  Previous result: Supratherapeutic  Additional findings: Upcoming surgery/procedure Total knee 6/18/24, TE sent to [facility], [facility] cardioversion possibly next week with [facility].       PLAN     Recommended plan for no diet, medication or health factor changes affecting INR     Dosing Instructions: Continue your current warfarin dose with next INR in 3 weeks       Summary  As of 5/29/2024      Full warfarin instructions:  2.5 mg every Mon, Wed, Fri; 5 mg all other days   Next INR check:  6/18/2024               Telephone call with Tarik who verbalizes understanding and agrees to plan and who agrees to plan and repeated back plan correctly    Patient offered & declined to schedule next visit    Education provided:   Importance of notifying anticoagulation clinic for: upcoming surgeries and procedures 2 weeks in advance    Plan made per [facility] anticoagulation protocol    Lucia Canales, RN  Anticoagulation Clinic  5/29/2024    _______________________________________________________________________     Anticoagulation Episode Summary       Current INR goal:  2.0-3.0   TTR:  83.2% (1 y)   Target end date:  Indefinite   Send INR reminders to:  BRANDY MEHTA    Indications    Acute pulmonary embolism  unspecified pulmonary embolism type  unspecified whether acute cor pulmonale present (H) [I26.99]  Paroxysmal atrial fibrillation with rapid ventricular response (H) [I48.0]  Paroxysmal atrial fibrillation (H) [I48.0]             Comments:                Anticoagulation Care Providers       Provider Role Specialty Phone number    Ky Major MD Referring Family Practice     Marcin Rios MD Referring Family Medicine [phone]

## 2024-05-29 NOTE — TELEPHONE ENCOUNTER
Type of surgery: ALON ASSISTED Left TOTAL KNEE ARTHROPLASTY   Location of surgery: [facility] [place]  Date and time of surgery: 6/18  Surgeon: Mary  Pre-Op Appt Date: 6/5  Post-Op Appt Date: 7/3   Packet sent out: Yes  Pre-cert/Authorization completed:  Not Applicable  Date: na  CT # given to patient.

## 2024-05-29 NOTE — PROGRESS NOTES
"Office Visit-Follow up    Chief Complaint: Ky Martinez is a 78 year old male who is being seen for   Chief Complaint   Patient presents with    Left Knee - Follow Up       History of Present Illness:   Today's visit:  At his last visit we discussed starting with some physical therapy and working on weight loss.  Returns with his wife.  Continues having fairly significant left knee pain.  He has been working on weight loss and physical therapy.  He does feel like that is been helpful but not to the degree he was hoping for.  Today he is attempted rest, activity modification, physical therapy, weight loss, steroid injections, bracing with no improvement.  Pain wakes him at night.  Pain causes him to limp.      April 10, 2024 visit:  Presents with left knee pain.  He reports has been dealing with this for toxically 2 years.  No specific injuries or traumas.  No previous surgeries.  Subsequently was seen and started being treated.  Underwent steroid injections the knee which initially provided relief however becoming less helpful.  Is been taking ibuprofen.  Resting modifying activities icing and utilizing a brace.  Pain is medial but generally feels that in general location with weightbearing.  Pain at rest.           January 28, 2021 office visit:  Seen previously for other issues. First time for the right shoulder. States has had many years of chronic pain and lack of internal rotation to his shoulder. Mostly though it is a mild dull ache, and still has good use and function. However 4 days ago while going down stairs, he fell backwards, landing on his buttock slid down 4 stairs. Thinks when attempting to brace himself with his right arm, \"max it\" and had immediate pain. Over the last 4 days has had much more than usual pain, decreased motion, and difficulty with any motion and activity with the shoulder.  Described as a lateral shoulder severe pain. Non-radiating. No previous surgeries to the shoulder, no " "previous significant injuries.  Treatments tried: icing, rest, activity modification, tylenol and his PCP sent him Norco to use.       On coumadin due to blood clots discovered April of 2020.      October 8, 2020 visit:  Complains approximately 1 week worth of anterior lateral knee discomfort.  Initially was a very sharp pain.  Got to the point where he is having difficulty walking.  No specific injuries.  It occurred when he is walking.  He was using a brace cane and ice.  Since then the pain has improved.  He continues to have some swelling.  Pain was nonradiating.  Rates it as mild to moderate at times.  He has not been taking any medications for the pain.  He is on Coumadin.  He denies any previous issues with his knees.  No previous surgeries or injections.         Social History     Occupational History    Not on file   Tobacco Use    Smoking status: Never    Smokeless tobacco: Never   Vaping Use    Vaping status: Never Used   Substance and Sexual Activity    Alcohol use: Yes     Alcohol/week: 3.3 standard drinks of alcohol     Types: 4 Glasses of wine per week     Comment: occasional     Drug use: No    Sexual activity: Yes     Partners: Female       REVIEW OF SYSTEMS  General: negative for, night sweats, dizziness, fatigue  Resp: No shortness of breath and no cough  CV: negative for chest pain, syncope or near-syncope  GI: negative for nausea, vomiting and diarrhea  : negative for dysuria and hematuria  Musculoskeletal: as above  Neurologic: negative for syncope   Hematologic: negative for bleeding disorder    Physical Exam:  Vitals: BP (!) 142/88   Temp 98.2  F (36.8  C)   Ht 1.803 m (5' 11\")   Wt 130.9 kg (288 lb 8 oz)   BMI 40.24 kg/m    BMI= Body mass index is 40.24 kg/m .  Constitutional: healthy, alert and no acute distress   Psychiatric: mentation appears normal and affect normal/bright  NEURO: no focal deficits  RESP: Normal with easy respirations and no use of accessory muscles to breathe, no " audible wheezing or retractions  CV: LLE:  ankle and down-  non-pitting edema           SKIN: No erythema, rashes, excoriation, or breakdown. No evidence of infection.   JOINT/EXTREMITIES:left knee: 3-115 degrees active motion.  Patella tracks midline.  Exquisite medial joint line tenderness.  No gross instability varus and valgus testing.  Small effusion.  No soft tissue or bursal swelling.  No focal atrophy.  GAIT: antalgic            Diagnostic Modalities:  Left knee MRI April 21, 2023:  Impression:  1. Chronic appearing ACL deficient knee.  2. Partial radial tear at the free edge of the posterior horn of the  medial meniscus with associated mild medial meniscal extrusion.  3. 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  4. Small joint effusion  Independent visualization of the images was performed.      Impression: left knee primary osteoarthritis     Plan:  All of the above pertinent physical exam and imaging modalities findings was reviewed with Ky and his wife.    The patient has attempted conservative treatments that include NSAIDs, focused self directed physical therapy, formal physical therapy, steroid injections, activity modifications, weight loss, bracing , rest yet still continues to have significant issues. These issues include pain at rest, increased pain with activity, pain that wakes at night, gait disturbances, needing the use of assistive devices for ambulation, loss of motion of the joint. Secondary to these reasons I have offered surgery in the form of left total knee arthroplasty.      Risks, benefits, complications, alternatives, limitations, and post operative course were discussed. Risks including infection (requiring long-term antibiotics and repeat surgeries), implant loosening (requiring revision surgery), heart attacks, strokes,  bleeding (possibly requiring blood transfusion), scars, instability, numbness around the knee, stiffness (requiring manipulations or repeat surgeries), instability and dislocations, fractures, blood clots the legs and lungs, nerve injury (possibly leading to foot drop).  Postoperative course was discussed as far as limitations and expected recovery times. It was also discussed that after surgery there is a need for blood thinners to prevent blood clots, but even when being treated it is possible to develop a blood clot. Anticipate being discharged the day of surgery or hospitalized 1 day and subsequently discharged home. Did discuss that discharge home is preferred and many insurances do not cover rehabilitation facilities due to outpatient nature of surgery.  Determinations of this will be based on progress with physical therapy while in the hospital. The importance of post-operative physical therapy was discussed.  Discussed recommendations to start outpatient physical therapy within a week of surgery and home based therapy would be a backup if not able to get to outpatient therapy. If home physical therapy for about 2 weeks and then transition to going to a physical therapy location for more aggressive therapy. Without physical therapy, outcomes may not be optimal. All questions were answered. The patient agrees to proceed with surgery.  Past medical and surgical history was reviewed.  Mr. Martinez will need a pre-operative medical evaluation and clearance by a primary care provider. We will obtain a CBC and the appropriate radiographs prior to surgery. I will leave it to the discretion of the primary care provider for additional pre-operative testing.      He is on Coumadin-we need a preoperative plan.  He is can to continue working on weight loss and physical therapy.    Return to clinic 14 days post-operatively. , or sooner as needed for changes.  Re-x-ray on return: Yes.    Chavo Hernandez D.O.

## 2024-05-29 NOTE — TELEPHONE ENCOUNTER
Patient calling back to schedule surgery. Informed patient someone will call him back when available.

## 2024-05-29 NOTE — TELEPHONE ENCOUNTER
[facility]-PROCED[facility] ANTICOAGULATION  MANAGEMENT    [facility]  requesting pre-procedure hold orders for warfarin and review for bridging      Procedure date: 6/18/24       Procedure: Total Knee Replacement      Procedure location and phone number (if external): [place]     Number of warfarin hold days requested and/or target INR: unknown    Pre-op date:  6/5/24      Routing to Anticoagulation Pharmacist for review.      Lucia Canales RN

## 2024-05-31 DIAGNOSIS — I26.99 ACUTE PULMONARY EMBOLISM, UNSPECIFIED PULMONARY EMBOLISM TYPE, UNSPECIFIED WHETHER ACUTE COR PULMONALE PRESENT (H): ICD-10-CM

## 2024-05-31 RX ORDER — WARFARIN SODIUM 5 MG/1
TABLET ORAL
Qty: 66 TABLET | Refills: 1 | Status: SHIPPED | OUTPATIENT
Start: 2024-05-31

## 2024-05-31 NOTE — TELEPHONE ENCOUNTER
ANTICOAGULATION MANAGEMENT:  Medication Refill    Anticoagulation Summary  As of 5/29/2024      Warfarin maintenance plan:  2.5 mg (5 mg x 0.5) every Mon, Wed, Fri; 5 mg (5 mg x 1) all other days   Next INR check:  6/18/2024   Target end date:  Indefinite    Indications    Acute pulmonary embolism  unspecified pulmonary embolism type  unspecified whether acute cor pulmonale present (H) [I26.99]  Paroxysmal atrial fibrillation with rapid ventricular response (H) [I48.0]  Paroxysmal atrial fibrillation (H) [I48.0]                 Anticoagulation Care Providers       Provider Role Specialty Phone number    Ky Major MD Referring Family Practice     Marcin Rios MD Referring [facility] [phone]            Refill Criteria    Visit with referring provider/group: Meets criteria: office visit within referring provider group in the last 1 year on 7/10/23    [facility] referral last signed: 01/30/2024; within last year: Yes    Lab monitoring not exceeding 2 weeks overdue: No    Ky meets all criteria for refill. Rx instructions and quantity supplied updated to match patient's current dosing plan. Warfarin 90 day supply with 1 refill granted per [facility] protocol     Julia CHARLES RN  Anticoagulation Clinic

## 2024-06-04 ASSESSMENT — ASTHMA QUESTIONNAIRES
QUESTION_3 LAST FOUR WEEKS HOW OFTEN DID YOUR ASTHMA SYMPTOMS (WHEEZING, COUGHING, SHORTNESS OF BREATH, CHEST TIGHTNESS OR PAIN) WAKE YOU UP AT NIGHT OR EARLIER THAN USUAL IN THE MORNING: NOT AT ALL
QUESTION_4 LAST FOUR WEEKS HOW OFTEN HAVE YOU USED YOUR RESCUE INHALER OR NEBULIZER MEDICATION (SUCH AS ALBUTEROL): NOT AT ALL
QUESTION_2 LAST FOUR WEEKS HOW OFTEN HAVE YOU HAD SHORTNESS OF BREATH: THREE TO SIX TIMES A WEEK
ACT_TOTALSCORE: 22
QUESTION_1 LAST FOUR WEEKS HOW MUCH OF THE TIME DID YOUR ASTHMA KEEP YOU FROM GETTING AS MUCH DONE AT WORK, SCHOOL OR AT HOME: NONE OF THE TIME
ACT_TOTALSCORE: 22
QUESTION_5 LAST FOUR WEEKS HOW WOULD YOU RATE YOUR ASTHMA CONTROL: WELL CONTROLLED

## 2024-06-04 NOTE — PROGRESS NOTES
06/04/24 [id number]   Discharge Planning   Patient/Family Anticipates Transition to home with family   Concerns to be Addressed all concerns addressed in this encounter   Living Arrangements   People in Home spouse   Type of Residence Private Residence   Number of Stairs, Within Home, Primary greater than 10 stairs  (To go downstairs.  Doesn't need to go down there)   Stair Railings, Within Home, Primary railings safe and in good condition   Once home, are you able to live on one level? Yes   Bathroom Shower/Tub Walk-in shower   Equipment Currently Used at Home walker, rolling;walker, standard;grab bar, tub/shower;shower chair   Support System   Support Systems Spouse/Significant Other   Do you have someone available to stay with you one or two nights once you are home? Yes   Blood   Known Bleeding Disorder or Coagulopathy No   Education   Patient attended total joint pre-op class/received pre-op teaching  online

## 2024-06-05 ENCOUNTER — OFFICE VISIT (OUTPATIENT)
Dept: FAMILY MEDICINE | Facility: CLINIC | Age: 79
End: 2024-06-05
Payer: COMMERCIAL

## 2024-06-05 VITALS
SYSTOLIC BLOOD PRESSURE: 134 MMHG | WEIGHT: 286.6 LBS | TEMPERATURE: 97.7 F | OXYGEN SATURATION: 97 % | HEART RATE: 80 BPM | HEIGHT: 70 IN | DIASTOLIC BLOOD PRESSURE: 80 MMHG | RESPIRATION RATE: 18 BRPM | BODY MASS INDEX: 41.03 KG/M2

## 2024-06-05 DIAGNOSIS — M17.12 PRIMARY OSTEOARTHRITIS OF LEFT KNEE: ICD-10-CM

## 2024-06-05 DIAGNOSIS — I50.22 CHRONIC SYSTOLIC HEART FAILURE (H): ICD-10-CM

## 2024-06-05 DIAGNOSIS — E78.5 HYPERLIPIDEMIA LDL GOAL <100: ICD-10-CM

## 2024-06-05 DIAGNOSIS — Z01.818 PREOP GENERAL PHYSICAL EXAM: Primary | ICD-10-CM

## 2024-06-05 LAB — NT-PROBNP SERPL-MCNC: 4233 PG/ML (ref 0–1800)

## 2024-06-05 PROCEDURE — 99214 OFFICE O/P EST MOD 30 MIN: CPT | Performed by: FAMILY MEDICINE

## 2024-06-05 PROCEDURE — 83880 ASSAY OF NATRIURETIC PEPTIDE: CPT | Performed by: FAMILY MEDICINE

## 2024-06-05 PROCEDURE — 36415 COLL VENOUS BLD VENIPUNCTURE: CPT | Performed by: FAMILY MEDICINE

## 2024-06-05 PROCEDURE — 93000 ELECTROCARDIOGRAM COMPLETE: CPT | Performed by: FAMILY MEDICINE

## 2024-06-05 RX ORDER — RESPIRATORY SYNCYTIAL VIRUS VACCINE 120MCG/0.5
0.5 KIT INTRAMUSCULAR ONCE
Qty: 1 EACH | Refills: 0 | Status: CANCELLED | OUTPATIENT
Start: 2024-06-05 | End: 2024-06-05

## 2024-06-05 RX ORDER — METOPROLOL SUCCINATE 50 MG/1
150 TABLET, EXTENDED RELEASE ORAL 2 TIMES DAILY
COMMUNITY
Start: 2024-06-05 | End: 2024-07-24

## 2024-06-05 ASSESSMENT — PAIN SCALES - GENERAL: PAINLEVEL: MILD PAIN (2)

## 2024-06-05 NOTE — TELEPHONE ENCOUNTER
[facility]-PROCEDURAL ANTICOAGULATION  MANAGEMENT    ASSESSMENT     Warfarin interruption plan for TKA on 2024.    Indication for Anticoagulation: Atrial Fibrillation, DVT, and PE    CSM2RR1-CEEx = 5 (CHF, Hypertension, Age >= 75, and Vascular- MI)  DVT 2017 in arm provoked with ICD placement  PE 2020 provoked after extended road trip  Caprini score = 13 (HX MI, BMI>25, HX VTE)    [facility]-Procedure Risk stratification for thromboembolism: moderate ( Chest guidelines and  ACC periprocedure pathway for NVAF Expert Consensus)    VTE:  CHEST Perioperative Management guidelines note, suggesting against bridging with a therapeutic-dose regimen does not preclude the use of a low-dose heparin regimen, typically started within 24 hours after surgery and continued for up to 5 days while VKA therapy is resumed, to decrease the risk for postoperative VTE.  NVAF: 2017 ACC periprocedure pathway for NVAF advises likely NO bridge for moderate risk stratification ([id number]-CHTt score 5-6)  without a hx of stroke, TIA or systemic embolism  VTE Prophylaxis, Post-Surgical: 2012 Chest guidelines recommend use of Caprini Score to guide post-surgical VTE prophylaxis. For Caprini Score >= 9: enoxaparin or heparin prophylaxis recommended for 30 days (or until therapeutic on warfarin).    RECOMMENDATION     Pre-Procedure:  Hold warfarin for 5 days, until after procedure startin2024   No pre-procedure bridge    Post-Procedure:  Resume warfarin dose if okay with provider doing procedure on night of procedure, 2024 PM: 5mg  Start enoxaparin (Lovenox) 40 mg subq Q 24 hrs (prophylaxis dose) ~ 24 hours post procedure when okay with provider doing procedure. Continue until INR >= 2.0  Recheck INR ~5-6 days after resuming warfarin     Plan routed to referring provider for approval  ?   Mitali Major [facility]    SUBJECTIVE/OBJECTIVE     Ky Martinez, a 78 year old male    Goal INR Range: 2.0-3.0     Patient bridged in  "past: Yes: most recently with acute PE      Wt Readings from Last 3 Encounters:   05/29/24 130.9 kg (288 lb 8 oz)   04/10/24 134 kg (295 lb 8 oz)   02/29/24 131.1 kg (289 lb)      Ideal body weight: 75.3 kg (166 lb 0.1 oz)  Adjusted ideal body weight: 97.5 kg (215 lb 0.1 oz)     Estimated body mass index is 40.24 kg/m  as calculated from the following:    Height as of an earlier encounter on 5/29/24: 1.803 m (5' 11\").    Weight as of an earlier encounter on 5/29/24: 130.9 kg (288 lb 8 oz).    Lab Results   Component Value Date    INR 2.3 (H) 05/29/2024    INR 4.7 (H) 05/20/2024    INR 3.4 (H) 05/02/2024     Lab Results   Component Value Date    HGB 13.1 (L) 05/20/2024    HCT 38.8 (L) 05/20/2024     (L) 05/20/2024     Lab Results   Component Value Date    CR 1.27 (H) 05/20/2024    CR 1.28 (H) 07/10/2023    CR 1.20 04/18/2022     Estimated Creatinine Clearance: 67.2 mL/min (A) (based on SCr of 1.27 mg/dL (H)).    "

## 2024-06-05 NOTE — PATIENT INSTRUCTIONS

## 2024-06-05 NOTE — H&P (VIEW-ONLY)
Preoperative Evaluation  [address] [address]  Phone: [phone]  Fax: [phone]  Primary Provider: Marcin Rios MD  Pre-op Performing Provider: Marcin Rios MD  Jun 5, 2024 6/2/2024   Surgical Information   What procedure is being done? PreOp   Facility or Hospital where procedure/surgery will be performed: [facility]   Who is doing the procedure / surgery? Dr. Nathan Hernandez   Date of surgery / procedure: 6/18/2024   Time of surgery / procedure: 7:30   Where do you plan to recover after surgery? at home with family     Fax number for surgical facility: Note does not need to be faxed, will be available electronically in Epic.    Assessment & Plan     The proposed surgical procedure is considered INTERMEDIATE risk.    Preop general physical exam  Tarik is a 78 Y year old male who presents for preop evaluation. History of atrial fibrillation. He also is a history of hypertension, hyperlipidemia, ischemic cardiomyopathy status post ICD July 2017, CAD status post PCI to RCA in 2008. A-fib history dates back to 2008. He had recurrence in 2020 in which he spontaneously converted. Reported did not have any A-fib since January 2023 which is around the time he was treated for respiratory illness.. Metoprolol increased that time to 50 mg twice daily. He again did not have any recurrence of A-fib until 4/4/2024. Dr. Condon recommended BEBO/cardioversion.  He appears to be doing well overall. Pacemaker interrogation shows continued elevated rates.  He is currently on metoprolol 150 mg twice daily.    On exam his blood pressure is well-controlled today at 134/80.  His pulse is 80 and irregularly irregular.  His weight is down.  He denies any shortness of breath or chest pain today.  Identifies episodes of brief shortness of breath associated with the rise in pulse rate.    Lungs are clear without wheezing rales or rhonchi.  He has trace  bilateral pedal edema.    BNP is elevated today at 4001 his baseline usually is under 300.  This is most likely due to to his recent persistent atrial fibrillation and chronic systolic heart failure.  He is currently on Lasix 40 mg daily.  Will increase his Lasix slightly to offload some of the fluid volume.  - EKG 12-lead complete w/read - Clinics    Primary osteoarthritis of left knee  Chronic, persistent.  Failed conservative outpatient management.    Chronic systolic heart failure (H)  Tarik is a 78 Y year old male who presents for preop evaluation. History of atrial fibrillation. He also is a history of hypertension, hyperlipidemia, ischemic cardiomyopathy status post ICD July 2017, CAD status post PCI to RCA in 2008. A-fib history dates back to 2008. He had recurrence in 2020 in which he spontaneously converted. Reported did not have any A-fib since January 2023 which is around the time he was treated for respiratory illness.. Metoprolol increased that time to 50 mg twice daily. He again did not have any recurrence of A-fib until 4/4/2024. Dr. Condon recommended BEBO/cardioversion.  He appears to be doing well overall. Pacemaker interrogation shows continued elevated rates.  He is currently on metoprolol 150 mg twice daily.    On exam his blood pressure is well-controlled today at 134/80.  His pulse is 80 and irregularly irregular.  His weight is down.  He denies any shortness of breath or chest pain today.  Identifies episodes of brief shortness of breath associated with the rise in pulse rate.    Lungs are clear without wheezing rales or rhonchi.  He has trace bilateral pedal edema.    BNP is elevated today at 4001 his baseline usually is under 300.  This is most likely due to to his recent persistent atrial fibrillation and chronic systolic heart failure.  He is currently on Lasix 40 mg daily.  Will increase his Lasix slightly to offload some of the fluid volume.   .- BNP-N terminal pro; Future  - BNP-N  terminal pro    Hyperlipidemia LDL goal <100  Chronic, stable.  Continue current medication and plan.  - metoprolol succinate ER (TOPROL XL) 50 MG 24 hr tablet; Take 3 tablets (150 mg) by mouth 2 times daily      Possible Sleep Apnea:       Implanted Device   - Type of device: ICD. Patient advised to bring device information on day of surgery.      Risks and Recommendations  The patient has the following additional risks and recommendations for perioperative complications:   - Morbid obesity (BMI >40)  Cardiovascular:   - Cardiology consulted will have patient connect with Dr. Condon at [facility] cardiology    Preoperative Medication Instructions  Antiplatelet or Anticoagulation Medication Instructions   - Patient is on no antiplatelet or anticoagulation medications.   - warfarin: Thromboembolic risk is low (<4%/year). DO NOT TAKE warfarin for 5 days without bridging before procedure.     Additional Medication Instructions  Hold all medications the morning of the procedure.    Recommendation  Approval given to proceed with proposed procedure, without further diagnostic evaluation.        Adrien Montanez is a 78 year old, presenting for the following:  Pre-Op Exam          6/5/2024     4:22 PM   Additional Questions   Roomed by Lizzy HURST         6/5/2024     4:22 PM   Patient Reported Additional Medications   Patient reports taking the following new medications Metoprolol  50 mg 3 tablets twice daily     HPI related to upcoming procedure:   ALON ASSISTED Left TOTAL KNEE ARTHROPLASTY Left Spinal with Block           6/2/2024   Pre-Op Questionnaire   Have you ever had a heart attack or stroke? (!) YES heart attack   Have you ever had surgery on your heart or blood vessels, such as a stent placement, a coronary artery bypass, or surgery on an artery in your head, neck, heart, or legs? (!) YES stent   Do you have chest pain with activity? No   Do you have a history of heart failure? (!) YES Atrial fibrillation     Do you currently have a cold, bronchitis or symptoms of other infection? No   Do you have a cough, shortness of breath, or wheezing? No   Do you or anyone in your family have previous history of blood clots? No   Do you or does anyone in your family have a serious bleeding problem such as prolonged bleeding following surgeries or cuts? No   Have you ever had problems with anemia or been told to take iron pills? No   Have you had any abnormal blood loss such as black, tarry or bloody stools? No   Have you ever had a blood transfusion? No   Are you willing to have a blood transfusion if it is medically needed before, during, or after your surgery? Yes   Have you or any of your relatives ever had problems with anesthesia? No   Do you have sleep apnea, excessive snoring or daytime drowsiness? (!) YES, sleep apnea   Do you have a CPAP machine? (!) NO    Do you have any artifical heart valves or other implanted medical devices like a pacemaker, defibrillator, or continuous glucose monitor? (!) YES   What type of device do you have? ICD   Name of the clinic that manages your device [facility]   Do you have artificial joints? No   Are you allergic to latex? No     Health Care Directive  Patient does not have a Health Care Directive or Living Will: Patient states has Advance Directive and will bring in a copy to clinic.    Preoperative Review of    reviewed - no record of controlled substances prescribed.      Status of Chronic Conditions:  See problem list for active medical problems.  Problems all longstanding and stable, except as noted/documented.  See ROS for pertinent symptoms related to these conditions.    A-FIB - Patient has a longstanding history of chronic A-fib currently on rate control. Current treatment regimen includes Warfarin for stroke prevention and denies significant symptoms of lightheadedness, palpitations or dyspnea.     CAD - Patient has a longstanding history of  moderate-severe CAD. Patient denies recent chest pain or NTG use, denies exercise induced dyspnea or PND. Last Stress test 03/22/2016, EKG June 5 , 2024 atrial fibrillation with controlled rate.  .     CHF - Patient has a longstanding history of moderate-severe CHF. Exacerbating conditions include ischemic heart disease. Currently the patient's condition is same. Current treatment regimen includes Angiotensin 2 receptor blocker, long acting nitrate, beta blocker, and diuretic. The patient denies chest pain, edema, orthopnea, SOB or recent weight gain. Last Echocardiogram 3/27/2020 EF 40%, EKG 6/5/2024.     Patient Active Problem List    Diagnosis Date Noted    Paroxysmal atrial fibrillation with rapid ventricular response (H) 03/05/2021     Priority: Medium    Paroxysmal atrial fibrillation (H) 03/05/2021     Priority: Medium    Primary osteoarthritis of right shoulder 01/28/2021     Priority: Medium    Acute pulmonary embolism, unspecified pulmonary embolism type, unspecified whether acute cor pulmonale present (H) 03/30/2020     Priority: Medium    Atrophy of left kidney 03/28/2020     Priority: Medium    Acute pulmonary embolism - bilateral with possible right sided heart strain on CT 03/27/2020     Priority: Medium    Hypoxemia 03/27/2020     Priority: Medium    Bilateral leg edema - right more than left 03/27/2020     Priority: Medium    Shortness of breath 03/27/2020     Priority: Medium    Pulmonary emboli (H) 03/27/2020     Priority: Medium    Morbid obesity (H) 04/22/2019     Priority: Medium    Acute deep vein thrombosis (DVT) of other vein of left upper extremity (H) 08/19/2017     Priority: Medium    S/P ICD (internal cardiac defibrillator) procedure 08/19/2017     Priority: Medium    Ischemic cardiomyopathy 07/31/2017     Priority: Medium    Chronic systolic heart failure (H) 04/10/2017     Priority: Medium     Overview:   03/2017 in [place]. LVEF 30-40% by TTE      Non-STEMI (non-ST elevated myocardial  infarction) (H) 03/01/2017     Priority: Medium    Coronary artery disease of native artery of native heart with stable angina pectoris (H24)      Priority: Medium    Benign prostatic hyperplasia 10/29/2015     Priority: Medium    Intermittent asthma 06/17/2011     Priority: Medium    Hyperlipidemia 08/14/2009     Priority: Medium    Hypertension 08/14/2009     Priority: Medium    Coronary atherosclerosis 08/14/2009     Priority: Medium     Overview:   03/2008 Inferior STEMI, RCA  3.0 x 24 mm Walters JEFF  03/2017 Angiogram in [place], Non-STEMI (patent RCA stent, mild non-obstructive CAD to pLAD and LCx)       Other acute and subacute form of ischemic heart disease 04/07/2008     Priority: Medium      Past Medical History:   Diagnosis Date    Hypertension     Myocardial infarction (H)      Past Surgical History:   Procedure Laterality Date    Cardiac stent      COLONOSCOPY N/A 5/22/2019    Procedure: Colonoscopy, Polypectomy by Snare;  Surgeon: Gian Horn DO;  Location: [facility]    EXCISE GANGLION WRIST Left 11/26/2014    Procedure: EXCISE GANGLION WRIST;  Surgeon: Gian Haddad MD;  Location: [facility] OR    ICD DEVICE INTERROGATE      2017    ORTHOPEDIC SURGERY      RELEASE CARPAL TUNNEL Left 11/26/2014    Procedure: RELEASE CARPAL TUNNEL;  Surgeon: Gian Haddad MD;  Location: [facility]    RELEASE DEQUERVAINS WRIST Left 11/26/2014    Procedure: RELEASE DEQUERVAINS WRIST;  Surgeon: Gian Haddad MD;  Location: [facility] OR     Current Outpatient Medications   Medication Sig Dispense Refill    acetaminophen (TYLENOL) 325 MG tablet Take 325 mg by mouth 2 tablets daily      albuterol (PROAIR HFA) 108 (90 Base) MCG/ACT inhaler Inhale 1-2 puffs into the lungs every 4 hours as needed for shortness of breath 8 g 1    atorvastatin (LIPITOR) 80 MG tablet Take 1 tablet by mouth once daily 90 tablet 0    DAILY MULTI OR 1 TABLET DAILY      finasteride (PROSCAR) 5 MG tablet TAKE 1 TABLET (5MG) BY MOUTH ONCE  DAILY 90 tablet 3    furosemide (LASIX) 40 MG tablet TAKE 1 TABLET (40MG) BY MOUTH ONCE DAILY 90 tablet 3    isosorbide mononitrate (IMDUR) 30 MG 24 hr tablet TAKE 1 TABLET (30MG) BY MOUTH ONCE DAILY 90 tablet 3    losartan (COZAAR) 50 MG tablet TAKE 1 TABLET (50MG) BY MOUTH ONCE DAILY 90 tablet 3    metoprolol succinate ER (TOPROL XL) 50 MG 24 hr tablet TAKE 1 TABLET (50MG) BY MOUTH ONCE DAILY 90 tablet 3    tamsulosin (FLOMAX) 0.4 MG capsule TAKE 1 CAPSULE (0.4MG) BY MOUTH ONCE DAILY 90 capsule 3    warfarin ANTICOAGULANT (COUMADIN) 5 MG tablet Take 2.5 mg every Mon, Wed, Fri; 5 mg all other days or as directed by the INR clinic. 66 tablet 1    nitroGLYcerin (NITROSTAT) 0.4 MG sublingual tablet 0.4 mg As needed      potassium chloride ER (KLOR-CON M) 20 MEQ CR tablet Take 1 tablet by mouth once daily (Patient not taking: Reported on 6/5/2024) 90 tablet 3       Allergies   Allergen Reactions    Pine Shortness Of Breath     Pine Pollen  Pine Pollen  Pine Pollen    No Clinical Screening - See Comments Rash     Other reaction(s): Rash    No Known Allergies Other (See Comments)        Social History     Tobacco Use    Smoking status: Never    Smokeless tobacco: Never   Substance Use Topics    Alcohol use: Yes     Alcohol/week: 3.3 standard drinks of alcohol     Types: 4 Glasses of wine per week     Comment: occasional      Family History   Problem Relation Age of Onset    Arthritis Mother     Diabetes Father     Allergies Father         sulfa    Eye Disorder Father         cataract    Heart Disease Father         by pass     History   Drug Use No             Review of Systems  CONSTITUTIONAL: NEGATIVE for fever, chills, change in weight  ENT/MOUTH: NEGATIVE for ear, mouth and throat problems  RESP: NEGATIVE for significant cough or SOB  CV: POSITIVE for irregular heart beat and palpitations and NEGATIVE for chest pain/chest pressure, claudication, cyanosis, diaphoresis, dyspnea on exertion, lower extremity edema,  "orthopnea, paroxysmal nocturnal dyspnea, and syncope or near-syncope  MUSCULOSKELETAL: POSITIVE  for joint pain bilateral knee pain  ROS otherwise negative    Objective    /80 (BP Location: Left arm, Patient Position: Chair)   Pulse 80   Temp 97.7  F (36.5  C) (Temporal)   Resp 18   Ht 1.778 m (5' 10\")   Wt 130 kg (286 lb 9.6 oz)   SpO2 97%   BMI 41.12 kg/m     Estimated body mass index is 41.12 kg/m  as calculated from the following:    Height as of this encounter: 1.778 m (5' 10\").    Weight as of this encounter: 130 kg (286 lb 9.6 oz).  Physical Exam  GENERAL: alert, no distress, and obese  EYES: Eyes grossly normal to inspection, PERRL and conjunctivae and sclerae normal  HENT: ear canals and TM's normal, nose and mouth without ulcers or lesions  NECK: no adenopathy, no asymmetry, masses, or scars  RESP: lungs clear to auscultation - no rales, rhonchi or wheezes  CV: irregularly irregular rhythm, normal S1 S2, no S3 or S4, no murmur, click or rub, peripheral pulses strong, and trace + bilateral lower extremity pitting edema to ankles  ABDOMEN: soft, nontender, no hepatosplenomegaly, no masses and bowel sounds normal  MS: no gross musculoskeletal defects noted, no edema  NEURO: Normal strength and tone, mentation intact and speech normal  PSYCH: mentation appears normal, affect normal/bright  LYMPH: no cervical, supraclavicular, axillary, or inguinal adenopathy    Recent Labs   Lab Test 05/29/24  0819 05/20/24  1104 07/13/23  1102 07/10/23  1740   HGB  --  13.1*  --   --    PLT  --  147*  --   --    INR 2.3* 4.7*   < >  --    NA  --  139  --  139   POTASSIUM  --  4.6  --  4.8   CR  --  1.27*  --  1.28*    < > = values in this interval not displayed.        Diagnostics  Recent Results (from the past 720 hour(s))   INR point of care    Collection Time: 05/20/24 11:04 AM   Result Value Ref Range    INR 4.7 (H) 0.9 - 1.1   TSH WITH FREE T4 REFLEX    Collection Time: 05/20/24 11:04 AM   Result Value Ref " Range    TSH 1.69 0.30 - 4.20 uIU/mL   CBC with Platelets    Collection Time: 05/20/24 11:04 AM   Result Value Ref Range    WBC Count 6.8 4.0 - 11.0 10e3/uL    RBC Count 3.88 (L) 4.40 - 5.90 10e6/uL    Hemoglobin 13.1 (L) 13.3 - 17.7 g/dL    Hematocrit 38.8 (L) 40.0 - 53.0 %     78 - 100 fL    MCH 33.8 (H) 26.5 - 33.0 pg    MCHC 33.8 31.5 - 36.5 g/dL    RDW 14.6 10.0 - 15.0 %    Platelet Count 147 (L) 150 - 450 10e3/uL   ALT    Collection Time: 05/20/24 11:04 AM   Result Value Ref Range    ALT 37 0 - 70 U/L   BASIC METABOLIC PANEL    Collection Time: 05/20/24 11:04 AM   Result Value Ref Range    Sodium 139 135 - 145 mmol/L    Potassium 4.6 3.4 - 5.3 mmol/L    Chloride 109 (H) 98 - 107 mmol/L    Carbon Dioxide (CO2) 18 (L) 22 - 29 mmol/L    Anion Gap 12 7 - 15 mmol/L    Urea Nitrogen 19.6 8.0 - 23.0 mg/dL    Creatinine 1.27 (H) 0.67 - 1.17 mg/dL    GFR Estimate 58 (L) >60 mL/min/1.73m2    Calcium 8.8 8.8 - 10.2 mg/dL    Glucose 118 (H) 70 - 99 mg/dL    Patient Fasting > 8hrs? Yes    Lipid panel reflex to direct LDL Non-fasting    Collection Time: 05/20/24 11:04 AM   Result Value Ref Range    Cholesterol 113 <200 mg/dL    Triglycerides 91 <150 mg/dL    Direct Measure HDL 52 >=40 mg/dL    LDL Cholesterol Calculated 43 <=100 mg/dL    Non HDL Cholesterol 61 <130 mg/dL    Patient Fasting > 8hrs? Yes    INR point of care    Collection Time: 05/29/24  8:19 AM   Result Value Ref Range    INR 2.3 (H) 0.9 - 1.1   BNP-N terminal pro    Collection Time: 06/05/24  5:38 PM   Result Value Ref Range    N Terminal Pro BNP Outpatient 4,233 (H) 0 - 1,800 pg/mL      EKG: atrial fibrillation, rate 90, normal axis, normal intervals, no acute ST/T changes c/w ischemia, no LVH by voltage criteria, unchanged from previous tracings    Revised Cardiac Risk Index (RCRI)  The patient has the following serious cardiovascular risks for perioperative complications:   - No serious cardiac risks = 0 points     RCRI Interpretation: 0 points:  Class I (very low risk - 0.4% complication rate)         Signed Electronically by: Marcin Rios MD  Copy of this evaluation report is provided to requesting physician.

## 2024-06-05 NOTE — PROGRESS NOTES
Preoperative Evaluation  [address] [address]  Phone: [phone]  Fax: [phone]  Primary Provider: Marcin Rios MD  Pre-op Performing Provider: Marcin Rios MD  Jun 5, 2024 6/2/2024   Surgical Information   What procedure is being done? PreOp   Facility or Hospital where procedure/surgery will be performed: [facility]   Who is doing the procedure / surgery? Dr. Nathan Hernandez   Date of surgery / procedure: 6/18/2024   Time of surgery / procedure: 7:30   Where do you plan to recover after surgery? at home with family     Fax number for surgical facility: Note does not need to be faxed, will be available electronically in Epic.    Assessment & Plan     The proposed surgical procedure is considered INTERMEDIATE risk.    Preop general physical exam  Tarik is a 78 Y year old male who presents for preop evaluation. History of atrial fibrillation. He also is a history of hypertension, hyperlipidemia, ischemic cardiomyopathy status post ICD July 2017, CAD status post PCI to RCA in 2008. A-fib history dates back to 2008. He had recurrence in 2020 in which he spontaneously converted. Reported did not have any A-fib since January 2023 which is around the time he was treated for respiratory illness.. Metoprolol increased that time to 50 mg twice daily. He again did not have any recurrence of A-fib until 4/4/2024. Dr. Condon recommended BEBO/cardioversion.  He appears to be doing well overall. Pacemaker interrogation shows continued elevated rates.  He is currently on metoprolol 150 mg twice daily.    On exam his blood pressure is well-controlled today at 134/80.  His pulse is 80 and irregularly irregular.  His weight is down.  He denies any shortness of breath or chest pain today.  Identifies episodes of brief shortness of breath associated with the rise in pulse rate.    Lungs are clear without wheezing rales or rhonchi.  He has trace  bilateral pedal edema.    BNP is elevated today at 4001 his baseline usually is under 300.  This is most likely due to to his recent persistent atrial fibrillation and chronic systolic heart failure.  He is currently on Lasix 40 mg daily.  Will increase his Lasix slightly to offload some of the fluid volume.  - EKG 12-lead complete w/read - Clinics    Primary osteoarthritis of left knee  Chronic, persistent.  Failed conservative outpatient management.    Chronic systolic heart failure (H)  Tarik is a 78 Y year old male who presents for preop evaluation. History of atrial fibrillation. He also is a history of hypertension, hyperlipidemia, ischemic cardiomyopathy status post ICD July 2017, CAD status post PCI to RCA in 2008. A-fib history dates back to 2008. He had recurrence in 2020 in which he spontaneously converted. Reported did not have any A-fib since January 2023 which is around the time he was treated for respiratory illness.. Metoprolol increased that time to 50 mg twice daily. He again did not have any recurrence of A-fib until 4/4/2024. Dr. Condon recommended BEBO/cardioversion.  He appears to be doing well overall. Pacemaker interrogation shows continued elevated rates.  He is currently on metoprolol 150 mg twice daily.    On exam his blood pressure is well-controlled today at 134/80.  His pulse is 80 and irregularly irregular.  His weight is down.  He denies any shortness of breath or chest pain today.  Identifies episodes of brief shortness of breath associated with the rise in pulse rate.    Lungs are clear without wheezing rales or rhonchi.  He has trace bilateral pedal edema.    BNP is elevated today at 4001 his baseline usually is under 300.  This is most likely due to to his recent persistent atrial fibrillation and chronic systolic heart failure.  He is currently on Lasix 40 mg daily.  Will increase his Lasix slightly to offload some of the fluid volume.   .- BNP-N terminal pro; Future  - BNP-N  terminal pro    Hyperlipidemia LDL goal <100  Chronic, stable.  Continue current medication and plan.  - metoprolol succinate ER (TOPROL XL) 50 MG 24 hr tablet; Take 3 tablets (150 mg) by mouth 2 times daily      Possible Sleep Apnea:       Implanted Device   - Type of device: ICD. Patient advised to bring device information on day of surgery.      Risks and Recommendations  The patient has the following additional risks and recommendations for perioperative complications:   - Morbid obesity (BMI >40)  Cardiovascular:   - Cardiology consulted will have patient connect with Dr. Condon at [facility] cardiology    Preoperative Medication Instructions  Antiplatelet or Anticoagulation Medication Instructions   - Patient is on no antiplatelet or anticoagulation medications.   - warfarin: Thromboembolic risk is low (<4%/year). DO NOT TAKE warfarin for 5 days without bridging before procedure.     Additional Medication Instructions  Hold all medications the morning of the procedure.    Recommendation  Approval given to proceed with proposed procedure, without further diagnostic evaluation.        Adrien Montanez is a 78 year old, presenting for the following:  Pre-Op Exam          6/5/2024     4:22 PM   Additional Questions   Roomed by Lizzy HURST         6/5/2024     4:22 PM   Patient Reported Additional Medications   Patient reports taking the following new medications Metoprolol  50 mg 3 tablets twice daily     HPI related to upcoming procedure:   ALON ASSISTED Left TOTAL KNEE ARTHROPLASTY Left Spinal with Block           6/2/2024   Pre-Op Questionnaire   Have you ever had a heart attack or stroke? (!) YES heart attack   Have you ever had surgery on your heart or blood vessels, such as a stent placement, a coronary artery bypass, or surgery on an artery in your head, neck, heart, or legs? (!) YES stent   Do you have chest pain with activity? No   Do you have a history of heart failure? (!) YES Atrial fibrillation     Do you currently have a cold, bronchitis or symptoms of other infection? No   Do you have a cough, shortness of breath, or wheezing? No   Do you or anyone in your family have previous history of blood clots? No   Do you or does anyone in your family have a serious bleeding problem such as prolonged bleeding following surgeries or cuts? No   Have you ever had problems with anemia or been told to take iron pills? No   Have you had any abnormal blood loss such as black, tarry or bloody stools? No   Have you ever had a blood transfusion? No   Are you willing to have a blood transfusion if it is medically needed before, during, or after your surgery? Yes   Have you or any of your relatives ever had problems with anesthesia? No   Do you have sleep apnea, excessive snoring or daytime drowsiness? (!) YES, sleep apnea   Do you have a CPAP machine? (!) NO    Do you have any artifical heart valves or other implanted medical devices like a pacemaker, defibrillator, or continuous glucose monitor? (!) YES   What type of device do you have? ICD   Name of the clinic that manages your device [facility]   Do you have artificial joints? No   Are you allergic to latex? No     Health Care Directive  Patient does not have a Health Care Directive or Living Will: Patient states has Advance Directive and will bring in a copy to clinic.    Preoperative Review of    reviewed - no record of controlled substances prescribed.      Status of Chronic Conditions:  See problem list for active medical problems.  Problems all longstanding and stable, except as noted/documented.  See ROS for pertinent symptoms related to these conditions.    A-FIB - Patient has a longstanding history of chronic A-fib currently on rate control. Current treatment regimen includes Warfarin for stroke prevention and denies significant symptoms of lightheadedness, palpitations or dyspnea.     CAD - Patient has a longstanding history of  moderate-severe CAD. Patient denies recent chest pain or NTG use, denies exercise induced dyspnea or PND. Last Stress test 03/22/2016, EKG June 5 , 2024 atrial fibrillation with controlled rate.  .     CHF - Patient has a longstanding history of moderate-severe CHF. Exacerbating conditions include ischemic heart disease. Currently the patient's condition is same. Current treatment regimen includes Angiotensin 2 receptor blocker, long acting nitrate, beta blocker, and diuretic. The patient denies chest pain, edema, orthopnea, SOB or recent weight gain. Last Echocardiogram 3/27/2020 EF 40%, EKG 6/5/2024.     Patient Active Problem List    Diagnosis Date Noted    Paroxysmal atrial fibrillation with rapid ventricular response (H) 03/05/2021     Priority: Medium    Paroxysmal atrial fibrillation (H) 03/05/2021     Priority: Medium    Primary osteoarthritis of right shoulder 01/28/2021     Priority: Medium    Acute pulmonary embolism, unspecified pulmonary embolism type, unspecified whether acute cor pulmonale present (H) 03/30/2020     Priority: Medium    Atrophy of left kidney 03/28/2020     Priority: Medium    Acute pulmonary embolism - bilateral with possible right sided heart strain on CT 03/27/2020     Priority: Medium    Hypoxemia 03/27/2020     Priority: Medium    Bilateral leg edema - right more than left 03/27/2020     Priority: Medium    Shortness of breath 03/27/2020     Priority: Medium    Pulmonary emboli (H) 03/27/2020     Priority: Medium    Morbid obesity (H) 04/22/2019     Priority: Medium    Acute deep vein thrombosis (DVT) of other vein of left upper extremity (H) 08/19/2017     Priority: Medium    S/P ICD (internal cardiac defibrillator) procedure 08/19/2017     Priority: Medium    Ischemic cardiomyopathy 07/31/2017     Priority: Medium    Chronic systolic heart failure (H) 04/10/2017     Priority: Medium     Overview:   03/2017 in [place]. LVEF 30-40% by TTE      Non-STEMI (non-ST elevated myocardial  infarction) (H) 03/01/2017     Priority: Medium    Coronary artery disease of native artery of native heart with stable angina pectoris (H24)      Priority: Medium    Benign prostatic hyperplasia 10/29/2015     Priority: Medium    Intermittent asthma 06/17/2011     Priority: Medium    Hyperlipidemia 08/14/2009     Priority: Medium    Hypertension 08/14/2009     Priority: Medium    Coronary atherosclerosis 08/14/2009     Priority: Medium     Overview:   03/2008 Inferior STEMI, RCA  3.0 x 24 mm [place] JEFF  03/2017 Angiogram in [place], Non-STEMI (patent RCA stent, mild non-obstructive CAD to pLAD and LCx)       Other acute and subacute form of ischemic heart disease 04/07/2008     Priority: Medium      Past Medical History:   Diagnosis Date    Hypertension     Myocardial infarction (H)      Past Surgical History:   Procedure Laterality Date    Cardiac stent      COLONOSCOPY N/A 5/22/2019    Procedure: Colonoscopy, Polypectomy by Snare;  Surgeon: Gian Horn DO;  Location: [facility]    EXCISE GANGLION WRIST Left 11/26/2014    Procedure: EXCISE GANGLION WRIST;  Surgeon: Gian Haddad MD;  Location: [facility] OR    ICD DEVICE INTERROGATE      2017    ORTHOPEDIC SURGERY      RELEASE CARPAL TUNNEL Left 11/26/2014    Procedure: RELEASE CARPAL TUNNEL;  Surgeon: Gian Haddad MD;  Location: [facility] OR    RELEASE DEQUERVAINS WRIST Left 11/26/2014    Procedure: RELEASE DEQUERVAINS WRIST;  Surgeon: Gian Haddad MD;  Location: [facility] OR     Current Outpatient Medications   Medication Sig Dispense Refill    acetaminophen (TYLENOL) 325 MG tablet Take 325 mg by mouth 2 tablets daily      albuterol (PROAIR HFA) 108 (90 Base) MCG/ACT inhaler Inhale 1-2 puffs into the lungs every 4 hours as needed for shortness of breath 8 g 1    atorvastatin (LIPITOR) 80 MG tablet Take 1 tablet by mouth once daily 90 tablet 0    DAILY MULTI OR 1 TABLET DAILY      finasteride (PROSCAR) 5 MG tablet TAKE 1 TABLET (5MG) BY MOUTH ONCE  DAILY 90 tablet 3    furosemide (LASIX) 40 MG tablet TAKE 1 TABLET (40MG) BY MOUTH ONCE DAILY 90 tablet 3    isosorbide mononitrate (IMDUR) 30 MG 24 hr tablet TAKE 1 TABLET (30MG) BY MOUTH ONCE DAILY 90 tablet 3    losartan (COZAAR) 50 MG tablet TAKE 1 TABLET (50MG) BY MOUTH ONCE DAILY 90 tablet 3    metoprolol succinate ER (TOPROL XL) 50 MG 24 hr tablet TAKE 1 TABLET (50MG) BY MOUTH ONCE DAILY 90 tablet 3    tamsulosin (FLOMAX) 0.4 MG capsule TAKE 1 CAPSULE (0.4MG) BY MOUTH ONCE DAILY 90 capsule 3    warfarin ANTICOAGULANT (COUMADIN) 5 MG tablet Take 2.5 mg every Mon, Wed, Fri; 5 mg all other days or as directed by the INR clinic. 66 tablet 1    nitroGLYcerin (NITROSTAT) 0.4 MG sublingual tablet 0.4 mg As needed      potassium chloride ER (KLOR-CON M) 20 MEQ CR tablet Take 1 tablet by mouth once daily (Patient not taking: Reported on 6/5/2024) 90 tablet 3       Allergies   Allergen Reactions    Pine Shortness Of Breath     Pine Pollen  Pine Pollen  Pine Pollen    No Clinical Screening - See Comments Rash     Other reaction(s): Rash    No Known Allergies Other (See Comments)        Social History     Tobacco Use    Smoking status: Never    Smokeless tobacco: Never   Substance Use Topics    Alcohol use: Yes     Alcohol/week: 3.3 standard drinks of alcohol     Types: 4 Glasses of wine per week     Comment: occasional      Family History   Problem Relation Age of Onset    Arthritis Mother     Diabetes Father     Allergies Father         sulfa    Eye Disorder Father         cataract    Heart Disease Father         by pass     History   Drug Use No             Review of Systems  CONSTITUTIONAL: NEGATIVE for fever, chills, change in weight  ENT/MOUTH: NEGATIVE for ear, mouth and throat problems  RESP: NEGATIVE for significant cough or SOB  CV: POSITIVE for irregular heart beat and palpitations and NEGATIVE for chest pain/chest pressure, claudication, cyanosis, diaphoresis, dyspnea on exertion, lower extremity edema,  "orthopnea, paroxysmal nocturnal dyspnea, and syncope or near-syncope  MUSCULOSKELETAL: POSITIVE  for joint pain bilateral knee pain  ROS otherwise negative    Objective    /80 (BP Location: Left arm, Patient Position: Chair)   Pulse 80   Temp 97.7  F (36.5  C) (Temporal)   Resp 18   Ht 1.778 m (5' 10\")   Wt 130 kg (286 lb 9.6 oz)   SpO2 97%   BMI 41.12 kg/m     Estimated body mass index is 41.12 kg/m  as calculated from the following:    Height as of this encounter: 1.778 m (5' 10\").    Weight as of this encounter: 130 kg (286 lb 9.6 oz).  Physical Exam  GENERAL: alert, no distress, and obese  EYES: Eyes grossly normal to inspection, PERRL and conjunctivae and sclerae normal  HENT: ear canals and TM's normal, nose and mouth without ulcers or lesions  NECK: no adenopathy, no asymmetry, masses, or scars  RESP: lungs clear to auscultation - no rales, rhonchi or wheezes  CV: irregularly irregular rhythm, normal S1 S2, no S3 or S4, no murmur, click or rub, peripheral pulses strong, and trace + bilateral lower extremity pitting edema to ankles  ABDOMEN: soft, nontender, no hepatosplenomegaly, no masses and bowel sounds normal  MS: no gross musculoskeletal defects noted, no edema  NEURO: Normal strength and tone, mentation intact and speech normal  PSYCH: mentation appears normal, affect normal/bright  LYMPH: no cervical, supraclavicular, axillary, or inguinal adenopathy    Recent Labs   Lab Test 05/29/24  0819 05/20/24  1104 07/13/23  1102 07/10/23  1740   HGB  --  13.1*  --   --    PLT  --  147*  --   --    INR 2.3* 4.7*   < >  --    NA  --  139  --  139   POTASSIUM  --  4.6  --  4.8   CR  --  1.27*  --  1.28*    < > = values in this interval not displayed.        Diagnostics  Recent Results (from the past 720 hour(s))   INR point of care    Collection Time: 05/20/24 11:04 AM   Result Value Ref Range    INR 4.7 (H) 0.9 - 1.1   TSH WITH FREE T4 REFLEX    Collection Time: 05/20/24 11:04 AM   Result Value Ref " Range    TSH 1.69 0.30 - 4.20 uIU/mL   CBC with Platelets    Collection Time: 05/20/24 11:04 AM   Result Value Ref Range    WBC Count 6.8 4.0 - 11.0 10e3/uL    RBC Count 3.88 (L) 4.40 - 5.90 10e6/uL    Hemoglobin 13.1 (L) 13.3 - 17.7 g/dL    Hematocrit 38.8 (L) 40.0 - 53.0 %     78 - 100 fL    MCH 33.8 (H) 26.5 - 33.0 pg    MCHC 33.8 31.5 - 36.5 g/dL    RDW 14.6 10.0 - 15.0 %    Platelet Count 147 (L) 150 - 450 10e3/uL   ALT    Collection Time: 05/20/24 11:04 AM   Result Value Ref Range    ALT 37 0 - 70 U/L   BASIC METABOLIC PANEL    Collection Time: 05/20/24 11:04 AM   Result Value Ref Range    Sodium 139 135 - 145 mmol/L    Potassium 4.6 3.4 - 5.3 mmol/L    Chloride 109 (H) 98 - 107 mmol/L    Carbon Dioxide (CO2) 18 (L) 22 - 29 mmol/L    Anion Gap 12 7 - 15 mmol/L    Urea Nitrogen 19.6 8.0 - 23.0 mg/dL    Creatinine 1.27 (H) 0.67 - 1.17 mg/dL    GFR Estimate 58 (L) >60 mL/min/1.73m2    Calcium 8.8 8.8 - 10.2 mg/dL    Glucose 118 (H) 70 - 99 mg/dL    Patient Fasting > 8hrs? Yes    Lipid panel reflex to direct LDL Non-fasting    Collection Time: 05/20/24 11:04 AM   Result Value Ref Range    Cholesterol 113 <200 mg/dL    Triglycerides 91 <150 mg/dL    Direct Measure HDL 52 >=40 mg/dL    LDL Cholesterol Calculated 43 <=100 mg/dL    Non HDL Cholesterol 61 <130 mg/dL    Patient Fasting > 8hrs? Yes    INR point of care    Collection Time: 05/29/24  8:19 AM   Result Value Ref Range    INR 2.3 (H) 0.9 - 1.1   BNP-N terminal pro    Collection Time: 06/05/24  5:38 PM   Result Value Ref Range    N Terminal Pro BNP Outpatient 4,233 (H) 0 - 1,800 pg/mL      EKG: atrial fibrillation, rate 90, normal axis, normal intervals, no acute ST/T changes c/w ischemia, no LVH by voltage criteria, unchanged from previous tracings    Revised Cardiac Risk Index (RCRI)  The patient has the following serious cardiovascular risks for perioperative complications:   - No serious cardiac risks = 0 points     RCRI Interpretation: 0 points:  Class I (very low risk - 0.4% complication rate)         Signed Electronically by: Marcin Rios MD  Copy of this evaluation report is provided to requesting physician.

## 2024-06-05 NOTE — TELEPHONE ENCOUNTER
Mitali,  I agree with holding coumadin 5 days with out bridging and post procedure Lovenox until therapeutic.  Marcin Rios MD

## 2024-06-06 ENCOUNTER — HOSPITAL ENCOUNTER (OUTPATIENT)
Dept: CT IMAGING | Facility: CLINIC | Age: 79
Discharge: HOME | End: 2024-06-06
Attending: NURSE PRACTITIONER | Admitting: NURSE PRACTITIONER
Payer: COMMERCIAL

## 2024-06-06 ENCOUNTER — MYC MEDICAL ADVICE (OUTPATIENT)
Dept: ANTICOAGULATION | Facility: CLINIC | Age: 79
End: 2024-06-06
Payer: COMMERCIAL

## 2024-06-06 DIAGNOSIS — M17.12 PRIMARY OSTEOARTHRITIS OF LEFT KNEE: ICD-10-CM

## 2024-06-06 PROCEDURE — 73700 CT LOWER EXTREMITY W/O DYE: CPT | Mod: TC,LT

## 2024-06-06 NOTE — TELEPHONE ENCOUNTER
11:05 AM    Patient was instructed in the pre-op to hold warfarin for 5 days. Writer sent a my chart message with pre and post procedure instructions. Will need to order Lovenox if patient is not discharged with a prescription.     Julia Vaz RN, BSN, N  Anticoagulation Clinic   [phone]

## 2024-06-07 ASSESSMENT — KOOS JR
GOING UP OR DOWN STAIRS: MODERATE
STANDING UPRIGHT: MODERATE
TWISING OR PIVOTING ON KNEE: SEVERE
HOW SEVERE IS YOUR KNEE STIFFNESS AFTER FIRST WAKING IN MORNING: MILD
STRAIGHTENING KNEE FULLY: MILD
BENDING TO THE FLOOR TO PICK UP OBJECT: MODERATE
KOOS JR SCORING: 57.14
RISING FROM SITTING: MILD

## 2024-06-07 NOTE — TELEPHONE ENCOUNTER
8:22 AM    My chart instructions read. Writer will PP to procedure date on 6/18. Verify if patient is going to be inpatient and if Lovenox will need to be ordered.     Julia Vaz RN, BSN, PHN  Anticoagulation Clinic   [phone]

## 2024-06-12 ENCOUNTER — OFFICE VISIT (OUTPATIENT)
Dept: ORTHOPEDICS | Facility: CLINIC | Age: 79
End: 2024-06-12
Payer: COMMERCIAL

## 2024-06-12 VITALS
HEIGHT: 70 IN | BODY MASS INDEX: 38.73 KG/M2 | WEIGHT: 270.5 LBS | SYSTOLIC BLOOD PRESSURE: 122 MMHG | TEMPERATURE: 97.5 F | DIASTOLIC BLOOD PRESSURE: 80 MMHG

## 2024-06-12 DIAGNOSIS — M17.12 PRIMARY OSTEOARTHRITIS OF LEFT KNEE: Primary | ICD-10-CM

## 2024-06-12 PROCEDURE — 99207 PR NO CHARGE LOS: CPT | Performed by: ORTHOPAEDIC SURGERY

## 2024-06-12 NOTE — LETTER
"6/12/2024      Ky Martinez  [address]  [place] [address]      Dear Colleague,    Thank you for referring your patient, Ky Martinez, to the [facility]. Please see a copy of my visit note below.    1 week prior to left knee replacement    He did call his cardiology on 6/5/24 about increasing shortness of breath at night. In reviewing EPIC his diuretic was increased. Since then had his dierutic and metoprolol adjusted and states feeling really good.    His weight was 286 on 6/5 and now 270 today.      Reviewed H&P: History of atrial fibrillation. He also is a history of hypertension, hyperlipidemia, ischemic cardiomyopathy status post ICD July 2017, CAD status post PCI to RCA in 2008. Takes metoprolol 150mg BID.   Warfin recommendations from his PCP/Coumadin clinic:     6/19/24, Restart enoxaparin every 24 hours, if okay with provider doing the procedure, and 2.5 mg warfarin in the evening.      6/20/24, Enoxaparin every 24 hours and 5 mg warfarin in the evening.     6/21/24, Enoxaparin every 24 hours and 2.5 mg warfarin in the evening.     6/22/24, Enoxaparin every 24 hours and 5 mg warfarin in the evening.     6/23/24, Enoxaparin every 24 hours and 5 mg warfarin in the evening.     6/24/24, Enoxaparin in the AM. Recheck INR. Anticoagulation clinic to call with further dosing instructions.     With the significance of his cardiac history will plan for hospitalist consult following surgery    Plan for discharge: POD1 discharge home with his wife    Plan for therapy: direct to outpatient physical therapy with [facility]. Referral placed today.    Patient reports had a dental cleaning yesterday. Reports was given a \"clean bill of health\" denied any gum bleeding, states they avoided disrupting the gums.  Routine cleaning. No infection.  Discussed this with Dr. Hernandez he is comfortable proceeding with surgery.       SHANNON Escalante, CNP  Orthopedic " Surgery        Again, thank you for allowing me to participate in the care of your patient.        Sincerely,        Nathan Hernandez, DO

## 2024-06-12 NOTE — PROGRESS NOTES
"1 week prior to left knee replacement    He did call his cardiology on 6/5/24 about increasing shortness of breath at night. In reviewing EPIC his diuretic was increased. Since then had his dierutic and metoprolol adjusted and states feeling really good.    His weight was 286 on 6/5 and now 270 today.      Reviewed H&P: History of atrial fibrillation. He also is a history of hypertension, hyperlipidemia, ischemic cardiomyopathy status post ICD July 2017, CAD status post PCI to RCA in 2008. Takes metoprolol 150mg BID.   [place]in recommendations from his PCP/Coumadin clinic:     6/19/24, Restart enoxaparin every 24 hours, if okay with provider doing the procedure, and 2.5 mg warfarin in the evening.      6/20/24, Enoxaparin every 24 hours and 5 mg warfarin in the evening.     6/21/24, Enoxaparin every 24 hours and 2.5 mg warfarin in the evening.     6/22/24, Enoxaparin every 24 hours and 5 mg warfarin in the evening.     6/23/24, Enoxaparin every 24 hours and 5 mg warfarin in the evening.     6/24/24, Enoxaparin in the AM. Recheck INR. Anticoagulation clinic to call with further dosing instructions.     With the significance of his cardiac history will plan for hospitalist consult following surgery    Plan for discharge: POD1 discharge home with his wife    Plan for therapy: direct to outpatient physical therapy with [facility]. Referral placed today.    Patient reports had a dental cleaning yesterday. Reports was given a \"clean bill of health\" denied any gum bleeding, states they avoided disrupting the gums.  Routine cleaning. No infection.  Discussed this with Dr. Hernandez he is comfortable proceeding with surgery.       SHANNON Escalante, CNP  Orthopedic Surgery      "

## 2024-06-18 ENCOUNTER — ANESTHESIA (OUTPATIENT)
Dept: SURGERY | Facility: CLINIC | Age: 79
End: 2024-06-18
Payer: COMMERCIAL

## 2024-06-18 ENCOUNTER — APPOINTMENT (OUTPATIENT)
Dept: PHYSICAL THERAPY | Facility: CLINIC | Age: 79
End: 2024-06-18
Attending: ORTHOPAEDIC SURGERY
Payer: COMMERCIAL

## 2024-06-18 ENCOUNTER — APPOINTMENT (OUTPATIENT)
Dept: GENERAL RADIOLOGY | Facility: CLINIC | Age: 79
End: 2024-06-18
Attending: PHYSICIAN ASSISTANT
Payer: COMMERCIAL

## 2024-06-18 ENCOUNTER — HOSPITAL ENCOUNTER (OUTPATIENT)
Facility: CLINIC | Age: 79
Discharge: HOME | End: 2024-06-19
Attending: ORTHOPAEDIC SURGERY | Admitting: ORTHOPAEDIC SURGERY
Payer: COMMERCIAL

## 2024-06-18 ENCOUNTER — ANESTHESIA EVENT (OUTPATIENT)
Dept: SURGERY | Facility: CLINIC | Age: 79
End: 2024-06-18
Payer: COMMERCIAL

## 2024-06-18 DIAGNOSIS — Z96.652 S/P TOTAL KNEE ARTHROPLASTY, LEFT: Primary | ICD-10-CM

## 2024-06-18 DIAGNOSIS — I10 BENIGN ESSENTIAL HYPERTENSION: ICD-10-CM

## 2024-06-18 DIAGNOSIS — E78.5 HYPERLIPIDEMIA LDL GOAL <100: ICD-10-CM

## 2024-06-18 LAB
GLUCOSE BLDC GLUCOMTR-MCNC: 93 MG/DL (ref 70–99)
HOLD SPECIMEN: NORMAL
HOLD SPECIMEN: NORMAL
INR PPP: 1.36 (ref 0.85–1.15)
LACTATE SERPL-SCNC: 0.9 MMOL/L (ref 0.7–2)
POTASSIUM SERPL-SCNC: 4.5 MMOL/L (ref 3.4–5.3)

## 2024-06-18 PROCEDURE — 360N000080 HC SURGERY LEVEL 7, PER MIN: Performed by: ORTHOPAEDIC SURGERY

## 2024-06-18 PROCEDURE — 27447 TOTAL KNEE ARTHROPLASTY: CPT | Mod: LT | Performed by: ORTHOPAEDIC SURGERY

## 2024-06-18 PROCEDURE — 250N000009 HC RX 250: Performed by: NURSE ANESTHETIST, CERTIFIED REGISTERED

## 2024-06-18 PROCEDURE — 97110 THERAPEUTIC EXERCISES: CPT | Mod: GP | Performed by: PHYSICAL THERAPIST

## 2024-06-18 PROCEDURE — 258N000003 HC RX IP 258 OP 636: Mod: JZ | Performed by: NURSE ANESTHETIST, CERTIFIED REGISTERED

## 2024-06-18 PROCEDURE — 710N000010 HC RECOVERY PHASE 1, LEVEL 2, PER MIN: Performed by: ORTHOPAEDIC SURGERY

## 2024-06-18 PROCEDURE — 250N000011 HC RX IP 250 OP 636: Mod: JZ | Performed by: ORTHOPAEDIC SURGERY

## 2024-06-18 PROCEDURE — 258N000003 HC RX IP 258 OP 636: Mod: JZ | Performed by: HOSPITALIST

## 2024-06-18 PROCEDURE — 250N000013 HC RX MED GY IP 250 OP 250 PS 637: Performed by: NURSE PRACTITIONER

## 2024-06-18 PROCEDURE — 250N000013 HC RX MED GY IP 250 OP 250 PS 637: Performed by: ORTHOPAEDIC SURGERY

## 2024-06-18 PROCEDURE — 271N000001 HC OR GENERAL SUPPLY NON-STERILE: Performed by: ORTHOPAEDIC SURGERY

## 2024-06-18 PROCEDURE — 73560 X-RAY EXAM OF KNEE 1 OR 2: CPT | Mod: LT

## 2024-06-18 PROCEDURE — C1776 JOINT DEVICE (IMPLANTABLE): HCPCS | Performed by: ORTHOPAEDIC SURGERY

## 2024-06-18 PROCEDURE — 85610 PROTHROMBIN TIME: CPT | Performed by: NURSE PRACTITIONER

## 2024-06-18 PROCEDURE — 370N000017 HC ANESTHESIA TECHNICAL FEE, PER MIN: Performed by: ORTHOPAEDIC SURGERY

## 2024-06-18 PROCEDURE — 250N000013 HC RX MED GY IP 250 OP 250 PS 637

## 2024-06-18 PROCEDURE — 250N000011 HC RX IP 250 OP 636: Performed by: NURSE PRACTITIONER

## 2024-06-18 PROCEDURE — 272N000001 HC OR GENERAL SUPPLY STERILE: Performed by: ORTHOPAEDIC SURGERY

## 2024-06-18 PROCEDURE — 82962 GLUCOSE BLOOD TEST: CPT

## 2024-06-18 PROCEDURE — 83605 ASSAY OF LACTIC ACID: CPT | Performed by: ORTHOPAEDIC SURGERY

## 2024-06-18 PROCEDURE — C1713 ANCHOR/SCREW BN/BN,TIS/BN: HCPCS | Performed by: ORTHOPAEDIC SURGERY

## 2024-06-18 PROCEDURE — 97162 PT EVAL MOD COMPLEX 30 MIN: CPT | Mod: GP | Performed by: PHYSICAL THERAPIST

## 2024-06-18 PROCEDURE — 99205 OFFICE O/P NEW HI 60 MIN: CPT

## 2024-06-18 PROCEDURE — 258N000003 HC RX IP 258 OP 636: Mod: JZ | Performed by: PHYSICIAN ASSISTANT

## 2024-06-18 PROCEDURE — 84132 ASSAY OF SERUM POTASSIUM: CPT

## 2024-06-18 PROCEDURE — 97116 GAIT TRAINING THERAPY: CPT | Mod: GP | Performed by: PHYSICAL THERAPIST

## 2024-06-18 PROCEDURE — 250N000011 HC RX IP 250 OP 636: Mod: JZ | Performed by: NURSE ANESTHETIST, CERTIFIED REGISTERED

## 2024-06-18 PROCEDURE — 27447 TOTAL KNEE ARTHROPLASTY: CPT | Mod: AS | Performed by: PHYSICIAN ASSISTANT

## 2024-06-18 PROCEDURE — 97530 THERAPEUTIC ACTIVITIES: CPT | Mod: GP | Performed by: PHYSICAL THERAPIST

## 2024-06-18 PROCEDURE — 36415 COLL VENOUS BLD VENIPUNCTURE: CPT | Performed by: NURSE PRACTITIONER

## 2024-06-18 PROCEDURE — 250N000011 HC RX IP 250 OP 636: Performed by: PHYSICIAN ASSISTANT

## 2024-06-18 PROCEDURE — 250N000013 HC RX MED GY IP 250 OP 250 PS 637: Performed by: PHYSICIAN ASSISTANT

## 2024-06-18 PROCEDURE — 36415 COLL VENOUS BLD VENIPUNCTURE: CPT | Performed by: ORTHOPAEDIC SURGERY

## 2024-06-18 PROCEDURE — 258N000003 HC RX IP 258 OP 636: Mod: JZ | Performed by: ORTHOPAEDIC SURGERY

## 2024-06-18 PROCEDURE — 999N000141 HC STATISTIC PRE-PROCEDURE NURSING ASSESSMENT: Performed by: ORTHOPAEDIC SURGERY

## 2024-06-18 DEVICE — PIN FIXATION STRAIGHT L140 MM OD4 MM KNEE STERILE 144140: Type: IMPLANTABLE DEVICE | Site: KNEE | Status: FUNCTIONAL

## 2024-06-18 DEVICE — IMP COMP FEM STRK TRIATHLN CR LT 6 5510-F-601: Type: IMPLANTABLE DEVICE | Site: KNEE | Status: FUNCTIONAL

## 2024-06-18 DEVICE — IMPLANTABLE DEVICE: Type: IMPLANTABLE DEVICE | Site: KNEE | Status: FUNCTIONAL

## 2024-06-18 DEVICE — BONE CEMENT RADIOPAQUE SIMPLEX HV FULL DOSE 6194-1-001: Type: IMPLANTABLE DEVICE | Site: KNEE | Status: FUNCTIONAL

## 2024-06-18 DEVICE — PIN FIXATION L110 MM OD4 MM BONE STERILE 144110: Type: IMPLANTABLE DEVICE | Site: KNEE | Status: FUNCTIONAL

## 2024-06-18 DEVICE — IMP BASEPLATE TIBIAL HOWM TRI 6 5520-B-600: Type: IMPLANTABLE DEVICE | Site: KNEE | Status: FUNCTIONAL

## 2024-06-18 RX ORDER — MEPERIDINE HYDROCHLORIDE 25 MG/ML
12.5 INJECTION INTRAMUSCULAR; INTRAVENOUS; SUBCUTANEOUS EVERY 5 MIN PRN
Status: DISCONTINUED | OUTPATIENT
Start: 2024-06-18 | End: 2024-06-18 | Stop reason: HOSPADM

## 2024-06-18 RX ORDER — ACETAMINOPHEN 325 MG/1
975 TABLET ORAL EVERY 8 HOURS
Qty: 27 TABLET | Refills: 0 | Status: DISCONTINUED | OUTPATIENT
Start: 2024-06-18 | End: 2024-06-19 | Stop reason: HOSPADM

## 2024-06-18 RX ORDER — LIDOCAINE HYDROCHLORIDE 10 MG/ML
INJECTION, SOLUTION INFILTRATION; PERINEURAL PRN
Status: DISCONTINUED | OUTPATIENT
Start: 2024-06-18 | End: 2024-06-18

## 2024-06-18 RX ORDER — ONDANSETRON 4 MG/1
4 TABLET, ORALLY DISINTEGRATING ORAL EVERY 30 MIN PRN
Status: DISCONTINUED | OUTPATIENT
Start: 2024-06-18 | End: 2024-06-18 | Stop reason: HOSPADM

## 2024-06-18 RX ORDER — SODIUM CHLORIDE, SODIUM LACTATE, POTASSIUM CHLORIDE, CALCIUM CHLORIDE 600; 310; 30; 20 MG/100ML; MG/100ML; MG/100ML; MG/100ML
INJECTION, SOLUTION INTRAVENOUS CONTINUOUS
Status: ACTIVE | OUTPATIENT
Start: 2024-06-18 | End: 2024-06-19

## 2024-06-18 RX ORDER — ONDANSETRON 2 MG/ML
4 INJECTION INTRAMUSCULAR; INTRAVENOUS EVERY 6 HOURS PRN
Status: DISCONTINUED | OUTPATIENT
Start: 2024-06-18 | End: 2024-06-19 | Stop reason: HOSPADM

## 2024-06-18 RX ORDER — CALCIUM CARBONATE 500 MG/1
500 TABLET, CHEWABLE ORAL 4 TIMES DAILY PRN
Status: DISCONTINUED | OUTPATIENT
Start: 2024-06-18 | End: 2024-06-19 | Stop reason: HOSPADM

## 2024-06-18 RX ORDER — BUPIVACAINE HYDROCHLORIDE AND EPINEPHRINE 5; 5 MG/ML; UG/ML
INJECTION, SOLUTION PERINEURAL PRN
Status: DISCONTINUED | OUTPATIENT
Start: 2024-06-18 | End: 2024-06-18

## 2024-06-18 RX ORDER — LABETALOL HYDROCHLORIDE 5 MG/ML
10 INJECTION, SOLUTION INTRAVENOUS
Status: DISCONTINUED | OUTPATIENT
Start: 2024-06-18 | End: 2024-06-18 | Stop reason: HOSPADM

## 2024-06-18 RX ORDER — WARFARIN SODIUM 5 MG/1
5 TABLET ORAL
Status: COMPLETED | OUTPATIENT
Start: 2024-06-18 | End: 2024-06-18

## 2024-06-18 RX ORDER — FINASTERIDE 5 MG/1
5 TABLET, FILM COATED ORAL DAILY
Status: DISCONTINUED | OUTPATIENT
Start: 2024-06-18 | End: 2024-06-19 | Stop reason: HOSPADM

## 2024-06-18 RX ORDER — NALOXONE HYDROCHLORIDE 0.4 MG/ML
0.4 INJECTION, SOLUTION INTRAMUSCULAR; INTRAVENOUS; SUBCUTANEOUS
Status: DISCONTINUED | OUTPATIENT
Start: 2024-06-18 | End: 2024-06-19 | Stop reason: HOSPADM

## 2024-06-18 RX ORDER — OXYCODONE HYDROCHLORIDE 5 MG/1
10 TABLET ORAL EVERY 4 HOURS PRN
Status: DISCONTINUED | OUTPATIENT
Start: 2024-06-18 | End: 2024-06-19 | Stop reason: HOSPADM

## 2024-06-18 RX ORDER — TIZANIDINE 2 MG/1
2 TABLET ORAL EVERY 6 HOURS PRN
Status: DISCONTINUED | OUTPATIENT
Start: 2024-06-18 | End: 2024-06-19 | Stop reason: HOSPADM

## 2024-06-18 RX ORDER — NALOXONE HYDROCHLORIDE 0.4 MG/ML
0.1 INJECTION, SOLUTION INTRAMUSCULAR; INTRAVENOUS; SUBCUTANEOUS
Status: DISCONTINUED | OUTPATIENT
Start: 2024-06-18 | End: 2024-06-18 | Stop reason: HOSPADM

## 2024-06-18 RX ORDER — ONDANSETRON 2 MG/ML
4 INJECTION INTRAMUSCULAR; INTRAVENOUS EVERY 30 MIN PRN
Status: DISCONTINUED | OUTPATIENT
Start: 2024-06-18 | End: 2024-06-18 | Stop reason: HOSPADM

## 2024-06-18 RX ORDER — ISOSORBIDE MONONITRATE 30 MG/1
30 TABLET, EXTENDED RELEASE ORAL DAILY
Status: DISCONTINUED | OUTPATIENT
Start: 2024-06-18 | End: 2024-06-19 | Stop reason: HOSPADM

## 2024-06-18 RX ORDER — HYDROMORPHONE HYDROCHLORIDE 1 MG/ML
0.5 INJECTION, SOLUTION INTRAMUSCULAR; INTRAVENOUS; SUBCUTANEOUS EVERY 5 MIN PRN
Status: DISCONTINUED | OUTPATIENT
Start: 2024-06-18 | End: 2024-06-18 | Stop reason: HOSPADM

## 2024-06-18 RX ORDER — FENTANYL CITRATE 50 UG/ML
INJECTION, SOLUTION INTRAMUSCULAR; INTRAVENOUS PRN
Status: DISCONTINUED | OUTPATIENT
Start: 2024-06-18 | End: 2024-06-18

## 2024-06-18 RX ORDER — NALOXONE HYDROCHLORIDE 0.4 MG/ML
0.2 INJECTION, SOLUTION INTRAMUSCULAR; INTRAVENOUS; SUBCUTANEOUS
Status: DISCONTINUED | OUTPATIENT
Start: 2024-06-18 | End: 2024-06-19 | Stop reason: HOSPADM

## 2024-06-18 RX ORDER — SODIUM CHLORIDE, SODIUM LACTATE, POTASSIUM CHLORIDE, CALCIUM CHLORIDE 600; 310; 30; 20 MG/100ML; MG/100ML; MG/100ML; MG/100ML
INJECTION, SOLUTION INTRAVENOUS CONTINUOUS
Status: DISCONTINUED | OUTPATIENT
Start: 2024-06-18 | End: 2024-06-18 | Stop reason: HOSPADM

## 2024-06-18 RX ORDER — NITROGLYCERIN 0.4 MG/1
0.4 TABLET SUBLINGUAL EVERY 5 MIN PRN
Status: DISCONTINUED | OUTPATIENT
Start: 2024-06-18 | End: 2024-06-19 | Stop reason: HOSPADM

## 2024-06-18 RX ORDER — FENTANYL CITRATE 50 UG/ML
50 INJECTION, SOLUTION INTRAMUSCULAR; INTRAVENOUS EVERY 5 MIN PRN
Status: DISCONTINUED | OUTPATIENT
Start: 2024-06-18 | End: 2024-06-18 | Stop reason: HOSPADM

## 2024-06-18 RX ORDER — AMOXICILLIN 250 MG
1 CAPSULE ORAL 2 TIMES DAILY
Status: DISCONTINUED | OUTPATIENT
Start: 2024-06-18 | End: 2024-06-19 | Stop reason: HOSPADM

## 2024-06-18 RX ORDER — BUPIVACAINE HYDROCHLORIDE 7.5 MG/ML
INJECTION, SOLUTION INTRASPINAL PRN
Status: DISCONTINUED | OUTPATIENT
Start: 2024-06-18 | End: 2024-06-18

## 2024-06-18 RX ORDER — WARFARIN SODIUM 5 MG/1
5 TABLET ORAL
COMMUNITY

## 2024-06-18 RX ORDER — ONDANSETRON 2 MG/ML
INJECTION INTRAMUSCULAR; INTRAVENOUS PRN
Status: DISCONTINUED | OUTPATIENT
Start: 2024-06-18 | End: 2024-06-18

## 2024-06-18 RX ORDER — ONDANSETRON 4 MG/1
4 TABLET, ORALLY DISINTEGRATING ORAL EVERY 6 HOURS PRN
Status: DISCONTINUED | OUTPATIENT
Start: 2024-06-18 | End: 2024-06-19 | Stop reason: HOSPADM

## 2024-06-18 RX ORDER — OXYCODONE HYDROCHLORIDE 5 MG/1
5-10 TABLET ORAL EVERY 4 HOURS PRN
Qty: 33 TABLET | Refills: 0 | Status: SHIPPED | OUTPATIENT
Start: 2024-06-18 | End: 2024-07-24

## 2024-06-18 RX ORDER — SODIUM CHLORIDE, SODIUM LACTATE, POTASSIUM CHLORIDE, CALCIUM CHLORIDE 600; 310; 30; 20 MG/100ML; MG/100ML; MG/100ML; MG/100ML
INJECTION, SOLUTION INTRAVENOUS CONTINUOUS
Status: DISCONTINUED | OUTPATIENT
Start: 2024-06-18 | End: 2024-06-18

## 2024-06-18 RX ORDER — ALBUTEROL SULFATE 0.83 MG/ML
2.5 SOLUTION RESPIRATORY (INHALATION) EVERY 4 HOURS PRN
Status: DISCONTINUED | OUTPATIENT
Start: 2024-06-18 | End: 2024-06-18 | Stop reason: HOSPADM

## 2024-06-18 RX ORDER — FENTANYL CITRATE 50 UG/ML
25 INJECTION, SOLUTION INTRAMUSCULAR; INTRAVENOUS EVERY 5 MIN PRN
Status: DISCONTINUED | OUTPATIENT
Start: 2024-06-18 | End: 2024-06-18 | Stop reason: HOSPADM

## 2024-06-18 RX ORDER — LIDOCAINE 40 MG/G
CREAM TOPICAL
Status: DISCONTINUED | OUTPATIENT
Start: 2024-06-18 | End: 2024-06-19 | Stop reason: HOSPADM

## 2024-06-18 RX ORDER — ACETAMINOPHEN 325 MG/1
650 TABLET ORAL EVERY 4 HOURS PRN
Status: DISCONTINUED | OUTPATIENT
Start: 2024-06-21 | End: 2024-06-18

## 2024-06-18 RX ORDER — DEXAMETHASONE SODIUM PHOSPHATE 4 MG/ML
INJECTION, SOLUTION INTRA-ARTICULAR; INTRALESIONAL; INTRAMUSCULAR; INTRAVENOUS; SOFT TISSUE PRN
Status: DISCONTINUED | OUTPATIENT
Start: 2024-06-18 | End: 2024-06-18

## 2024-06-18 RX ORDER — PROPOFOL 10 MG/ML
INJECTION, EMULSION INTRAVENOUS CONTINUOUS PRN
Status: DISCONTINUED | OUTPATIENT
Start: 2024-06-18 | End: 2024-06-18

## 2024-06-18 RX ORDER — ATORVASTATIN CALCIUM 40 MG/1
80 TABLET, FILM COATED ORAL DAILY
Status: DISCONTINUED | OUTPATIENT
Start: 2024-06-18 | End: 2024-06-19 | Stop reason: HOSPADM

## 2024-06-18 RX ORDER — ENOXAPARIN SODIUM 100 MG/ML
40 INJECTION SUBCUTANEOUS EVERY 24 HOURS
Status: DISCONTINUED | OUTPATIENT
Start: 2024-06-19 | End: 2024-06-19 | Stop reason: HOSPADM

## 2024-06-18 RX ORDER — CEFAZOLIN SODIUM/WATER 3 G/30 ML
3 SYRINGE (ML) INTRAVENOUS
Status: COMPLETED | OUTPATIENT
Start: 2024-06-18 | End: 2024-06-18

## 2024-06-18 RX ORDER — POLYETHYLENE GLYCOL 3350 17 G/17G
17 POWDER, FOR SOLUTION ORAL DAILY
Status: DISCONTINUED | OUTPATIENT
Start: 2024-06-19 | End: 2024-06-19 | Stop reason: HOSPADM

## 2024-06-18 RX ORDER — CEFAZOLIN SODIUM/WATER 2 G/20 ML
2 SYRINGE (ML) INTRAVENOUS EVERY 8 HOURS
Qty: 40 ML | Refills: 0 | Status: COMPLETED | OUTPATIENT
Start: 2024-06-18 | End: 2024-06-18

## 2024-06-18 RX ORDER — DEXAMETHASONE SODIUM PHOSPHATE 10 MG/ML
4 INJECTION, SOLUTION INTRAMUSCULAR; INTRAVENOUS
Status: DISCONTINUED | OUTPATIENT
Start: 2024-06-18 | End: 2024-06-18 | Stop reason: HOSPADM

## 2024-06-18 RX ORDER — FAMOTIDINE 20 MG/1
20 TABLET, FILM COATED ORAL 2 TIMES DAILY
Status: DISCONTINUED | OUTPATIENT
Start: 2024-06-18 | End: 2024-06-19 | Stop reason: HOSPADM

## 2024-06-18 RX ORDER — OXYCODONE HYDROCHLORIDE 5 MG/1
5 TABLET ORAL EVERY 4 HOURS PRN
Status: DISCONTINUED | OUTPATIENT
Start: 2024-06-18 | End: 2024-06-19 | Stop reason: HOSPADM

## 2024-06-18 RX ORDER — IPRATROPIUM BROMIDE AND ALBUTEROL SULFATE 2.5; .5 MG/3ML; MG/3ML
3 SOLUTION RESPIRATORY (INHALATION) EVERY 4 HOURS PRN
Status: DISCONTINUED | OUTPATIENT
Start: 2024-06-18 | End: 2024-06-19 | Stop reason: HOSPADM

## 2024-06-18 RX ORDER — PROCHLORPERAZINE MALEATE 5 MG
5 TABLET ORAL EVERY 6 HOURS PRN
Status: DISCONTINUED | OUTPATIENT
Start: 2024-06-18 | End: 2024-06-19 | Stop reason: HOSPADM

## 2024-06-18 RX ORDER — HYDROMORPHONE HCL IN WATER/PF 6 MG/30 ML
0.2 PATIENT CONTROLLED ANALGESIA SYRINGE INTRAVENOUS
Status: DISCONTINUED | OUTPATIENT
Start: 2024-06-18 | End: 2024-06-19 | Stop reason: HOSPADM

## 2024-06-18 RX ORDER — TRANEXAMIC ACID 650 MG/1
1950 TABLET ORAL ONCE
Status: COMPLETED | OUTPATIENT
Start: 2024-06-18 | End: 2024-06-18

## 2024-06-18 RX ORDER — POTASSIUM CHLORIDE 1500 MG/1
20 TABLET, EXTENDED RELEASE ORAL DAILY
Status: DISCONTINUED | OUTPATIENT
Start: 2024-06-18 | End: 2024-06-19 | Stop reason: HOSPADM

## 2024-06-18 RX ORDER — AMOXICILLIN 250 MG
1-2 CAPSULE ORAL 2 TIMES DAILY
Qty: 30 TABLET | Refills: 0 | Status: SHIPPED | OUTPATIENT
Start: 2024-06-18

## 2024-06-18 RX ORDER — TAMSULOSIN HYDROCHLORIDE 0.4 MG/1
0.4 CAPSULE ORAL DAILY
Status: DISCONTINUED | OUTPATIENT
Start: 2024-06-18 | End: 2024-06-19 | Stop reason: HOSPADM

## 2024-06-18 RX ORDER — CEFAZOLIN SODIUM/WATER 3 G/30 ML
3 SYRINGE (ML) INTRAVENOUS SEE ADMIN INSTRUCTIONS
Status: DISCONTINUED | OUTPATIENT
Start: 2024-06-18 | End: 2024-06-18 | Stop reason: HOSPADM

## 2024-06-18 RX ORDER — ACETAMINOPHEN 325 MG/1
650 TABLET ORAL EVERY 4 HOURS PRN
Qty: 100 TABLET | Refills: 0 | Status: SHIPPED | OUTPATIENT
Start: 2024-06-18

## 2024-06-18 RX ORDER — HYDROMORPHONE HYDROCHLORIDE 1 MG/ML
0.2 INJECTION, SOLUTION INTRAMUSCULAR; INTRAVENOUS; SUBCUTANEOUS EVERY 5 MIN PRN
Status: DISCONTINUED | OUTPATIENT
Start: 2024-06-18 | End: 2024-06-18 | Stop reason: HOSPADM

## 2024-06-18 RX ORDER — PROPOFOL 10 MG/ML
INJECTION, EMULSION INTRAVENOUS PRN
Status: DISCONTINUED | OUTPATIENT
Start: 2024-06-18 | End: 2024-06-18

## 2024-06-18 RX ORDER — ALBUTEROL SULFATE 90 UG/1
1-2 AEROSOL, METERED RESPIRATORY (INHALATION) EVERY 4 HOURS PRN
Status: DISCONTINUED | OUTPATIENT
Start: 2024-06-18 | End: 2024-06-19 | Stop reason: HOSPADM

## 2024-06-18 RX ORDER — FENTANYL CITRATE-0.9 % NACL/PF 10 MCG/ML
PLASTIC BAG, INJECTION (ML) INTRAVENOUS CONTINUOUS PRN
Status: DISCONTINUED | OUTPATIENT
Start: 2024-06-18 | End: 2024-06-18

## 2024-06-18 RX ORDER — BISACODYL 10 MG
10 SUPPOSITORY, RECTAL RECTAL DAILY PRN
Status: DISCONTINUED | OUTPATIENT
Start: 2024-06-18 | End: 2024-06-19 | Stop reason: HOSPADM

## 2024-06-18 RX ORDER — FUROSEMIDE 40 MG
40 TABLET ORAL DAILY
Status: DISCONTINUED | OUTPATIENT
Start: 2024-06-18 | End: 2024-06-19 | Stop reason: HOSPADM

## 2024-06-18 RX ORDER — HYDROMORPHONE HCL IN WATER/PF 6 MG/30 ML
0.4 PATIENT CONTROLLED ANALGESIA SYRINGE INTRAVENOUS
Status: DISCONTINUED | OUTPATIENT
Start: 2024-06-18 | End: 2024-06-19 | Stop reason: HOSPADM

## 2024-06-18 RX ADMIN — Medication 2 G: at 22:43

## 2024-06-18 RX ADMIN — PROPOFOL 30 MG: 10 INJECTION, EMULSION INTRAVENOUS at 07:43

## 2024-06-18 RX ADMIN — FENTANYL CITRATE 50 MCG: 50 INJECTION INTRAMUSCULAR; INTRAVENOUS at 07:19

## 2024-06-18 RX ADMIN — TAMSULOSIN HYDROCHLORIDE 0.4 MG: 0.4 CAPSULE ORAL at 12:56

## 2024-06-18 RX ADMIN — TRANEXAMIC ACID 1950 MG: 650 TABLET ORAL at 06:15

## 2024-06-18 RX ADMIN — DOCUSATE SODIUM AND SENNOSIDES 1 TABLET: 8.6; 5 TABLET, FILM COATED ORAL at 12:56

## 2024-06-18 RX ADMIN — PROPOFOL 150 MCG/KG/MIN: 10 INJECTION, EMULSION INTRAVENOUS at 07:43

## 2024-06-18 RX ADMIN — LIDOCAINE HYDROCHLORIDE 50 MG: 10 INJECTION, SOLUTION INFILTRATION; PERINEURAL at 07:43

## 2024-06-18 RX ADMIN — BUPIVACAINE HYDROCHLORIDE IN DEXTROSE 1.6 ML: 7.5 INJECTION, SOLUTION SUBARACHNOID at 07:40

## 2024-06-18 RX ADMIN — ACETAMINOPHEN 975 MG: 325 TABLET, FILM COATED ORAL at 11:45

## 2024-06-18 RX ADMIN — DOCUSATE SODIUM AND SENNOSIDES 1 TABLET: 8.6; 5 TABLET, FILM COATED ORAL at 20:37

## 2024-06-18 RX ADMIN — OXYCODONE HYDROCHLORIDE 10 MG: 5 TABLET ORAL at 12:57

## 2024-06-18 RX ADMIN — Medication 40 MCG/MIN: at 07:47

## 2024-06-18 RX ADMIN — POTASSIUM CHLORIDE 20 MEQ: 1500 TABLET, EXTENDED RELEASE ORAL at 11:46

## 2024-06-18 RX ADMIN — Medication 2 G: at 15:03

## 2024-06-18 RX ADMIN — MIDAZOLAM 1 MG: 1 INJECTION INTRAMUSCULAR; INTRAVENOUS at 07:41

## 2024-06-18 RX ADMIN — SODIUM CHLORIDE, POTASSIUM CHLORIDE, SODIUM LACTATE AND CALCIUM CHLORIDE: 600; 310; 30; 20 INJECTION, SOLUTION INTRAVENOUS at 06:54

## 2024-06-18 RX ADMIN — FINASTERIDE 5 MG: 5 TABLET, FILM COATED ORAL at 12:56

## 2024-06-18 RX ADMIN — FAMOTIDINE 20 MG: 20 TABLET ORAL at 20:37

## 2024-06-18 RX ADMIN — FAMOTIDINE 20 MG: 20 TABLET ORAL at 13:16

## 2024-06-18 RX ADMIN — DEXAMETHASONE SODIUM PHOSPHATE 5 MG: 4 INJECTION, SOLUTION INTRA-ARTICULAR; INTRALESIONAL; INTRAMUSCULAR; INTRAVENOUS; SOFT TISSUE at 09:39

## 2024-06-18 RX ADMIN — LIDOCAINE HYDROCHLORIDE 0.1 ML: 10 INJECTION, SOLUTION EPIDURAL; INFILTRATION; INTRACAUDAL; PERINEURAL at 06:55

## 2024-06-18 RX ADMIN — METOPROLOL SUCCINATE 150 MG: 100 TABLET, EXTENDED RELEASE ORAL at 20:38

## 2024-06-18 RX ADMIN — SODIUM CHLORIDE, POTASSIUM CHLORIDE, SODIUM LACTATE AND CALCIUM CHLORIDE: 600; 310; 30; 20 INJECTION, SOLUTION INTRAVENOUS at 11:44

## 2024-06-18 RX ADMIN — FENTANYL CITRATE 25 MCG: 50 INJECTION, SOLUTION INTRAMUSCULAR; INTRAVENOUS at 07:40

## 2024-06-18 RX ADMIN — SODIUM CHLORIDE, POTASSIUM CHLORIDE, SODIUM LACTATE AND CALCIUM CHLORIDE: 600; 310; 30; 20 INJECTION, SOLUTION INTRAVENOUS at 09:02

## 2024-06-18 RX ADMIN — ACETAMINOPHEN 975 MG: 325 TABLET, FILM COATED ORAL at 19:21

## 2024-06-18 RX ADMIN — Medication 3 G: at 07:41

## 2024-06-18 RX ADMIN — ATORVASTATIN CALCIUM 80 MG: 40 TABLET, FILM COATED ORAL at 12:56

## 2024-06-18 RX ADMIN — WARFARIN SODIUM 5 MG: 5 TABLET ORAL at 18:21

## 2024-06-18 RX ADMIN — OXYCODONE HYDROCHLORIDE 10 MG: 5 TABLET ORAL at 18:12

## 2024-06-18 RX ADMIN — MIDAZOLAM 1 MG: 1 INJECTION INTRAMUSCULAR; INTRAVENOUS at 07:19

## 2024-06-18 RX ADMIN — BUPIVACAINE HYDROCHLORIDE AND EPINEPHRINE BITARTRATE 20 ML: 5; .005 INJECTION, SOLUTION PERINEURAL at 09:39

## 2024-06-18 RX ADMIN — FENTANYL CITRATE 25 MCG: 50 INJECTION INTRAMUSCULAR; INTRAVENOUS at 07:41

## 2024-06-18 RX ADMIN — ONDANSETRON 4 MG: 2 INJECTION INTRAMUSCULAR; INTRAVENOUS at 07:18

## 2024-06-18 RX ADMIN — SODIUM CHLORIDE, POTASSIUM CHLORIDE, SODIUM LACTATE AND CALCIUM CHLORIDE: 600; 310; 30; 20 INJECTION, SOLUTION INTRAVENOUS at 20:36

## 2024-06-18 ASSESSMENT — ACTIVITIES OF DAILY LIVING (ADL)
ADLS_ACUITY_SCORE: 26
ADLS_ACUITY_SCORE: 33
ADLS_ACUITY_SCORE: 32
ADLS_ACUITY_SCORE: 33
ADLS_ACUITY_SCORE: 31
ADLS_ACUITY_SCORE: 31
ADLS_ACUITY_SCORE: 33
ADLS_ACUITY_SCORE: 33
ADLS_ACUITY_SCORE: 26
ADLS_ACUITY_SCORE: 32
ADLS_ACUITY_SCORE: 26
ADLS_ACUITY_SCORE: 33
ADLS_ACUITY_SCORE: 33
ADLS_ACUITY_SCORE: 32
ADLS_ACUITY_SCORE: 32
ADLS_ACUITY_SCORE: 31
ADLS_ACUITY_SCORE: 29
ADLS_ACUITY_SCORE: 33

## 2024-06-18 ASSESSMENT — ENCOUNTER SYMPTOMS: DYSRHYTHMIAS: 1

## 2024-06-18 NOTE — ANESTHESIA PROCEDURE NOTES
Adductor canal Procedure Note    Pre-Procedure   Staff -        Performed By: CRNA and GUS       Location: post-op       Pre-Anesthestic Checklist: patient identified, IV checked, site marked, risks and benefits discussed, informed consent, monitors and equipment checked, pre-op evaluation, at physician/surgeon's request and post-op pain management  Timeout:       Correct Patient: Yes        Correct Procedure: Yes        Correct Site: Yes        Correct Position: Yes        Correct Laterality: Yes        Site Marked: Yes  Procedure Documentation  Procedure: Adductor canal       Patient Position: supine       Patient Prep/Sterile Barriers: sterile gloves, mask       Skin prep: Chloraprep       Local skin infiltrated with 3 mL of 2% lidocaine.        Needle Type: insulated       Needle Gauge: 22.        Needle Length (millimeters): 100        Ultrasound guided       1. Ultrasound was used to identify targeted nerve, plexus, vascular marker, or fascial plane and place a needle adjacent to it in real-time.       2. Ultrasound was used to visualize the spread of anesthetic in close proximity to the above referenced structure.       3. A permanent image is entered into the patient's record.    Assessment/Narrative         The placement was negative for: blood aspirated, painful injection and site bleeding       Paresthesias: No.       Test dose of mL at.         Test dose negative, 3 minutes after injection, for signs of intravascular, subdural, or intrathecal injection.       Bolus given via needle..        Secured via.        Insertion/Infusion Method: Single Shot       Complications: none       Injection made incrementally with aspirations every 5 mL.     Comments:  Placed with the assistance of an RN.  Good visualization of the femoral artery and the local spread lateral to the artery.  No HR increase with injections and no other complications noted.  I will follow up with the pt if needed.      FOR [facility] ([facility]) ONLY:   Pain Team Contact information: please page the Pain Team Via [facility]. Search \"Pain\". During daytime hours, please page the attending first. At night please page the resident first.      "

## 2024-06-18 NOTE — ANESTHESIA PROCEDURE NOTES
"Intrathecal Procedure Note    Pre-Procedure   Staff -        Performed By: CRNA and SRNA       Location: OR       Pre-Anesthestic Checklist: patient identified, IV checked, risks and benefits discussed, informed consent, monitors and equipment checked and pre-op evaluation  Timeout:       Correct Patient: Yes        Correct Procedure: Yes        Correct Site: Yes        Correct Position: Yes   Procedure Documentation  Procedure: intrathecal       Patient Position: sitting       Patient Prep/Sterile Barriers: sterile gloves, mask, patient draped       Skin prep: Betadine       Insertion Site: L3-4. (midline approach).       Needle Gauge: 25.        Needle Length (Inches): 5        Spinal Needle Type: Pencan       Introducer used       Introducer: 20 G       # of attempts: 2 and  # of redirects:  2    Assessment/Narrative         Paresthesias: No.       CSF fluid: clear.       Opening pressure was cmH2O while  Sitting.        FOR [facility] ([facility] [place]) ONLY:   Pain Team Contact information: please page the Pain Team Via [place]. Search \"Pain\". During daytime hours, please page the attending first. At night please page the resident first.      "

## 2024-06-18 NOTE — PROGRESS NOTES
PRIMARY DIAGNOSIS: Total Left Knee Replacement   OUTPATIENT/OBSERVATION GOALS TO BE MET BEFORE DISCHARGE:  ADLs back to baseline: No    Activity and level of assistance: Assist of 1 with gait belt and walker.     Pain status: Improved-controlled with oral pain medications.    Return to near baseline physical activity: No     Discharge Planner Nurse   Safe discharge environment identified: Yes  Barriers to discharge: Yes       Entered by: Liliam Laura RN 06/18/2024 6:50 PM

## 2024-06-18 NOTE — ANESTHESIA CARE TRANSFER NOTE
Patient: Ky Martinez    Procedure: Procedure(s):  ALON ASSISTED Left TOTAL KNEE ARTHROPLASTY       Diagnosis: Primary osteoarthritis of right knee [M17.11]  Diagnosis Additional Information: No value filed.    Anesthesia Type:   Spinal     Note:    Oropharynx: oropharynx clear of all foreign objects and spontaneously breathing  Level of Consciousness: drowsy  Oxygen Supplementation: face mask    Independent Airway: airway patency satisfactory and stable  Dentition: dentition unchanged  Vital Signs Stable: post-procedure vital signs reviewed and stable  Report to RN Given: handoff report given  Patient transferred to: PACU    Handoff Report: Identifed the Patient, Identified the Reponsible Provider, Reviewed the pertinent medical history, Discussed the surgical course, Reviewed Intra-OP anesthesia mangement and issues during anesthesia, Set expectations for post-procedure period and Allowed opportunity for questions and acknowledgement of understanding      Vitals:  Vitals Value Taken Time   /103 06/18/24 1000   Temp 95.72  F (35.4  C) 06/18/24 1000   Pulse 83 06/18/24 1001   Resp 19 06/18/24 1001   SpO2 100 % 06/18/24 1001   Vitals shown include unfiled device data.    Electronically Signed By: SHANNON Fulton CRNA  June 18, 2024  10:02 AM  
never

## 2024-06-18 NOTE — ANESTHESIA PREPROCEDURE EVALUATION
Anesthesia Pre-Procedure Evaluation    Patient: Ky Martinez   MRN: [id number] : 1945        Procedure : Procedure(s):  ALON ASSISTED Left TOTAL KNEE ARTHROPLASTY          Past Medical History:   Diagnosis Date    Hypertension     Myocardial infarction (H)       Past Surgical History:   Procedure Laterality Date    Cardiac stent      COLONOSCOPY N/A 2019    Procedure: Colonoscopy, Polypectomy by Snare;  Surgeon: Gian Horn DO;  Location: [facility]    EXCISE GANGLION WRIST Left 2014    Procedure: EXCISE GANGLION WRIST;  Surgeon: Gian Haddad MD;  Location: [facility]    ICD DEVICE INTERROGATE      2017    ORTHOPEDIC SURGERY      RELEASE CARPAL TUNNEL Left 2014    Procedure: RELEASE CARPAL TUNNEL;  Surgeon: Gian Haddad MD;  Location: [facility]    RELEASE DEQUERVAINS WRIST Left 2014    Procedure: RELEASE DEQUERVAINS WRIST;  Surgeon: Gian Haddad MD;  Location: [facility]      Allergies   Allergen Reactions    Pine Shortness Of Breath     Pine Pollen  Pine Pollen  Pine Pollen    No Clinical Screening - See Comments Rash     Other reaction(s): Rash    No Known Allergies Other (See Comments)      Social History     Tobacco Use    Smoking status: Never    Smokeless tobacco: Never   Substance Use Topics    Alcohol use: Yes     Alcohol/week: 3.3 standard drinks of alcohol     Types: 4 Glasses of wine per week     Comment: occasional       Wt Readings from Last 1 Encounters:   24 122.7 kg (270 lb 8 oz)        Anesthesia Evaluation            ROS/MED HX  ENT/Pulmonary:     (+)                      asthma                  Neurologic:  - neg neurologic ROS     Cardiovascular:     (+) Dyslipidemia hypertension- -  CAD angina- past MI - stent-   Taking blood thinners           pacemaker,     ICD     dysrhythmias, a-fib,        Previous cardiac testing   Echo: Date:  Results:  Interpretation Summary     1. The left ventricle is normal in size. The visual  ejection fraction is  estimated at 40%. Inferior hypokinesis. Septal wall motion abnormality may  reflect pacemaker activation.  2. The right ventricle is normal in structure, function and size.  3. No valve disease.  4. The ascending aorta is Mildly dilated. Sinus of Valsalva 4.1cm, ascending  aorta 3.7cm.     Echo from 3/2016 showed EF 40-45% with inferolateral hypokinesis, aorta 3.6cm.  _____________________________________________________________________________  __        Left Ventricle  The left ventricle is normal in size. There is mild concentric left  ventricular hypertrophy. The visual ejection fraction is estimated at 40%.  Grade I or early diastolic dysfunction. Inferior hypokinesis. Septal wall  motion abnormality may reflect pacemaker activation.     Right Ventricle  The right ventricle is normal in structure, function and size. There is a  pacemaker lead in the right ventricle.     Atria  Normal left atrial size. Right atrial size is normal. There is no atrial shunt  seen.     Mitral Valve  There is no mitral regurgitation noted.        Tricuspid Valve  No tricuspid regurgitation. Right ventricular systolic pressure could not be  approximated due to inadequate tricuspid regurgitation.     Aortic Valve  The aortic valve is normal in structure and function.     Pulmonic Valve  The pulmonic valve is normal in structure and function.     Vessels  The ascending aorta is Mildly dilated. The inferior vena cava was normal in  size with preserved respiratory variability.     Pericardium  There is no pericardial effusion.        Rhythm  Sinus rhythm was noted.    Stress Test:  Date: Results:    ECG Reviewed:  Date: 6/5/24 Results:  Afib with BBB and occasional PVC  Cath:  Date: Results:      METS/Exercise Tolerance:     Hematologic:     (+) History of blood clots,    pt is anticoagulated,           Musculoskeletal:   (+)  arthritis,             GI/Hepatic:  - neg GI/hepatic ROS     Renal/Genitourinary:     (+)  "renal disease, type: CRI,            Endo:     (+)               Obesity,       Psychiatric/Substance Use:  - neg psychiatric ROS     Infectious Disease:       Malignancy:  - neg malignancy ROS     Other:  - neg other ROS          Physical Exam    Airway  airway exam normal      Mallampati: II   TM distance: > 3 FB   Neck ROM: full   Mouth opening: > 3 cm    Respiratory Devices and Support         Dental           Cardiovascular   cardiovascular exam normal       Rhythm and rate: regular and normal     Pulmonary   pulmonary exam normal        breath sounds clear to auscultation           OUTSIDE LABS:  CBC:   Lab Results   Component Value Date    WBC 6.8 05/20/2024    WBC 6.7 03/30/2020    HGB 13.1 (L) 05/20/2024    HGB 14.0 03/30/2020    HCT 38.8 (L) 05/20/2024    HCT 41.0 03/30/2020     (L) 05/20/2024     03/30/2020     03/30/2020     BMP:   Lab Results   Component Value Date     05/20/2024     07/10/2023    POTASSIUM 4.6 05/20/2024    POTASSIUM 4.8 07/10/2023    CHLORIDE 109 (H) 05/20/2024    CHLORIDE 106 07/10/2023    CO2 18 (L) 05/20/2024    CO2 24 07/10/2023    BUN 19.6 05/20/2024    BUN 21.4 07/10/2023    CR 1.27 (H) 05/20/2024    CR 1.28 (H) 07/10/2023     (H) 05/20/2024    GLC 93 07/10/2023     COAGS:   Lab Results   Component Value Date    PTT 30 03/27/2020    INR 1.36 (H) 06/18/2024     POC: No results found for: \"BGM\", \"HCG\", \"HCGS\"  HEPATIC:   Lab Results   Component Value Date    ALBUMIN 3.6 04/18/2022    PROTTOTAL 7.0 04/18/2022    ALT 37 05/20/2024    AST 29 04/18/2022    ALKPHOS 97 04/18/2022    BILITOTAL 0.7 04/18/2022     OTHER:   Lab Results   Component Value Date    AUGUSTUS 8.8 05/20/2024    MAG 2.2 03/27/2008    TSH 1.69 05/20/2024       Anesthesia Plan    ASA Status:  3    NPO Status:  NPO Appropriate    Anesthesia Type: Spinal.   Induction: Intravenous, Propofol.   Maintenance: Balanced.        Consents    Anesthesia Plan(s) and associated risks, benefits, " "and realistic alternatives discussed. Questions answered and patient/representative(s) expressed understanding.     - Discussed:     - Discussed with:  Patient       Use of blood products discussed: No .     Postoperative Care    Pain management: IV analgesics, Oral pain medications, Peripheral nerve block (Single Shot).   PONV prophylaxis: Ondansetron (or other 5HT-3), Dexamethasone or Solumedrol, Background Propofol Infusion     Comments:    Other Comments: The risks and benefits of anesthesia, and the alternatives where applicable, have been discussed with the patient, and they wish to proceed.              SHANNON Fulton CRNA    I have reviewed the pertinent notes and labs in the chart from the past 30 days and (re)examined the patient.  Any updates or changes from those notes are reflected in this note.            # Drug Induced Coagulation Defect: home medication list includes an anticoagulant medication   # Obesity: Estimated body mass index is 38.81 kg/m  as calculated from the following:    Height as of 6/12/24: 1.778 m (5' 10\").    Weight as of 6/12/24: 122.7 kg (270 lb 8 oz).      "

## 2024-06-18 NOTE — CONSULTS
[facility]  Consult Note - Hospitalist Service  Date of Admission:  6/18/2024  Consult Requested by: Diomedes Keller PA-C  Reason for Consult: 'hx DVT, hx PE, hx MI, Afib, on coum, HTN s/p ICD, ischemic cardiomyopathy'     Assessment & Plan   Ky Martinez is a 77 yo M with pmhx of afib on warfarin, bilateral Pes, DVT, CAD s/p PCI to RCA (2008), ischemic cardiomyopathy s/p ICD (2017) with EF of 40%, CKD stage 3, BPH, intermittent asthma who was admitted on 6/18/2024 for planned L TKA now s/p L TKA on 6/18/2025 with Dr. Hernandez. Medicine consulted for post-op medical co management.     S/p L TKA (6/18/2024)  Post-Op Issues  S/p above procedure with Dr. Hernandez on 6/18, no noted complications and EBL less than 10 ml. Is recieving periop ancef. Was seen in clinic for a pre-op eval 6/5/24.  - Pain control: currently ordered for scheduled APAP 1 g TID, prn oxycodone 5-10 mg q4h, prn HM 0.2-0.4 mg q2h, tizanidine 2 mg q6h prn   - Bowel Reg: Scheduled senna- docusate and peg daily  - DVT ppx: Scheduled to start prophylactic Lovenox 6/19 AM, re-starting on home warfarin 6/18  - Management per ortho primary     Bilateral Pes (2020)  Hx of DVT (2017)  Paroxysmal Afib   DVT of LUE in 2017, patient on eliquis for several months. Represented in 2020 after long road trip found to have bilateral Pes with concern for possible right heart strain on CT. Had hypercoagulable workup that was negative for factor 2 and factor 5 mutations. Patient notably has paroxysmal AF, currenly in AF with plan for outpatient BEBO/ cardioversion following surgery. Outpatient warfarin regimen typically is 2.5 mg M, W, F and 5 mg all other days, goal INR 2-3. He stopped warfarin 5d prior to case without bridging pre-procedurally as outlined by anticoag clinic.  - Has Hgb check ordered for POD 1   - Pharmacy consulted for warfarin dosing, has a plan in place from outpatient anticoag clinic for post-op AC (6/6/24 telephone  encounter)- is ordered for Lovenox starting 6/19 AM  Post-Procedure:  Resume warfarin dose if okay with provider doing procedure on night of procedure, 06/18/2024 PM: 5mg  Start enoxaparin (Lovenox) 40 mg subq Q 24 hrs (prophylaxis dose) ~ 24 hours post procedure when okay with provider doing procedure. Continue until INR >= 2.0  Recheck INR ~5-6 days after resuming warfarin  - Planning for BEBO/ cardioversion following surgery, follow-up outpatient with cardiology as planned  - Cont metop as below     CAD s/p PCI RCA (2008) and NSTEMI (2017)  HTN  HLD   S/p RCA stent in 2008, repeat angiogram in 2017 following NSTEMI showed RCA stent patent, mild non-obstructive CAD to pLAD and LCx. Patient denies any chest pain or shortness of breath currently. Home reg: losartan 50 mg daily, toprol  mg BID, lasix 40 mg daily, imdur 30 mg daily, atorvastatin 80 mg.  - Cont pta statin   - Cont pta metoprolol (added hold parameters)  - Hold losartan, imdur and lasix post-op, monitor Bps and Is/Os and restart as indicated likely POD1  - Takes KCl 20 daily at home with lasix, will hold as not getting lasix today and K this am 4.5, BMP ordered for 6/19 AM can likely restart then     Ischemic Cardiomyopathy  Chronic combined systolic and diastolic HF    S/p ICD (2017)  Most recent echo 3/2020 with LVEF 40%, grade I diastolic dysfunction.  Follows outpatient with cardiology, last seen 5/9/24. Patient with no LE, no e/o acute exacerbation.   - Daily weights, Is/Os  - Home meds as discussed above   - Discontinued mIVF, patient tolerating PO     BPH: No current urinary symptoms. Cont pta flomax and proscar   Intermittent Asthma: Not on any maintence medications, no e/o acute exacerbation on exam. Prn albuterol available while admitted.   CKD Stage 3: Baseline Scr ~ 1.2. Check scr in am (BMP ordered), avoid nephrotoxins.      The patient's care was discussed with the pharmacist, Bedside Nurse, Patient, Patient's Family, and Primary  "team.    Clinically Significant Risk Factors Present on Admission               # Drug Induced Coagulation Defect: home medication list includes an anticoagulant medication    # Hypertension: Noted on problem list   # Circulatory Shock: required vasopressors within past 24 hours              # Obesity: Estimated body mass index is 38.71 kg/m  as calculated from the following:    Height as of this encounter: 1.803 m (5' 11\").    Weight as of this encounter: 125.9 kg (277 lb 9 oz).       # Asthma: noted on problem list  # ICD device present  # Pacemaker present       Elissa Hernandez PA-C  Hospitalist Service  Securely message with [employer] (more info)  Text page via [facility] Paging/Directory   ______________________________________________________________________    Chief Complaint   Post-Op L TKA    History is obtained from the patient    History of Present Illness   Ky Martinez is a 77 yo M with pmhx of afib on warfarin, bilateral Pes, DVT, CAD s/p PCI to RCA (2008), ischemic cardiomyopathy s/p ICD (2017) with EF of 40%, CKD stage 3, BPH, intermittent asthma who was admitted on 6/18/2024 for planned L TKA now s/p L TKA on 6/18/2025 with Dr. Hernandez. Medicine consulted for post-op medical co management.     Pt reports he is feeling \"really good,\" he is eating some lunch now and that is going well. He can wiggle his feet and is starting to feel some pain but currently reports it is \"quite tolerable.\" He was feeling in his normal state of health prior to procedure this morning. He has been off warfarin for 5 days prior to his surgery, was not taking any thing else. Discussed plan as above with patient and wife who were in agreement. They had no other questions or concerns at this time.     Pt denies fevers, chills, rhinorrhea, sore throat, CP, SOB, abdominal pain, dysuria, diarrhea, LE edema.    From Dr. Hernandez Note 6/12/24:     6/19/24, Restart enoxaparin every 24 hours, if okay with provider doing the procedure, and 2.5 mg " warfarin in the evening.      6/20/24, Enoxaparin every 24 hours and 5 mg warfarin in the evening.     6/21/24, Enoxaparin every 24 hours and 2.5 mg warfarin in the evening.     6/22/24, Enoxaparin every 24 hours and 5 mg warfarin in the evening.     6/23/24, Enoxaparin every 24 hours and 5 mg warfarin in the evening.     6/24/24, Enoxaparin in the AM. Recheck INR. Anticoagulation clinic to call with further dosing instructions.    Past Medical History    Past Medical History:   Diagnosis Date    Hypertension     Myocardial infarction (H)      Past Surgical History   Past Surgical History:   Procedure Laterality Date    Cardiac stent      COLONOSCOPY N/A 5/22/2019    Procedure: Colonoscopy, Polypectomy by Snare;  Surgeon: Gian Horn DO;  [facility]    EXCISE GANGLION WRIST Left 11/26/2014    Procedure: EXCISE GANGLION WRIST;  Surgeon: Gian Haddad MD;  [facility]    ICD DEVICE INTERROGATE      2017    ORTHOPEDIC SURGERY      RELEASE CARPAL TUNNEL Left 11/26/2014    Procedure: RELEASE CARPAL TUNNEL;  Surgeon: Gian Haddad MD;  [facility]    RELEASE DEQUERVAINS WRIST Left 11/26/2014    Procedure: RELEASE DEQUERVAINS WRIST;  Surgeon: Gian Haddad MD;  [facility]     Medications   Discussed home medications with patient and wife and re-ordered as above.        Physical Exam   Vital Signs: Temp: 97.7  F (36.5  C) Temp src: Oral BP: 96/57 Pulse: 85   Resp: 18 SpO2: 97 % O2 Device: None (Room air) Oxygen Delivery: 10 LPM  Weight: 277 lbs 8.95 oz  Constitutional: sitting up in bed, appears comfortable, no apparent distress. Eating lunch, wife at bedside.   HEENT: Mucous membranes moist, no scleral icterus   Respiratory: No increased work of breathing, clear to auscultation bilaterally anteriorly, breathing comfortably on RA   Cardiovascular: irregular rhythm   Skin: normal skin color, texture, and no jaundice  Musculoskeletal: no LE edema, ace wrap around LLE,  sensation intact to b/l LE   Neuro: awake, alert, answering all questions appropriately during exam    Medical Decision Making       60 MINUTES SPENT BY ME on the date of service doing chart review, history, exam, documentation & further activities per the note.      Data     I have personally reviewed the following data over the past 24 hrs:    INR:  1.36 (H) PTT:  N/A   D-dimer:  N/A Fibrinogen:  N/A       Imaging results reviewed over the past 24 hrs:   Recent Results (from the past 24 hour(s))   XR Knee Port Left 1/2 Views    Narrative    XR KNEE PORT LEFT 1/2 VIEWS   6/18/2024 11:28 AM     HISTORY: Post-Op Total Knee  COMPARISON: None.       Impression    IMPRESSION: Recent left total knee arthroplasty. Alignment is  satisfactory. No acute fracture. Postoperative gas in the knee joint  and soft tissues.    CARLOS WALTERS MD         SYSTEM ID:  [id number]

## 2024-06-18 NOTE — DISCHARGE INSTRUCTIONS
Total Knee Replacement Discharge Instructions                                     [phone]  B[facility] and [facility] Service Line for issues or concerns    Discharge prescriptions:  Oxycodone 5 mg 1-2 tabs every 4-6 hours as needed for pain #90  Oxycontin 10 mg every 12 hours for 3 weeks #42  Celebrex 200mg twice a day x 4 weeks #56  Colace 100mg twice a day while taking narcotics #60  Zofran 4 mg every 8 hours as needed for nausea  #8  Xarelto 10 mg once a day for 12 days  #12    General Care:  After surgery you may feel tired/sleepy. This is normal. If you have any question along the way please contact the office. If you feel it is an issue cannot wait for normal office h    Diet:  Start with non-alcoholic liquids at first, particularly water or sports drinks after surgery. Progress to bland foods such as crackers and bread and finally to your normal diet if you have no problems. Avoid alcohol when taking narcotic pain medications.      Pain control:  Take your pain medications as prescribed. These medications may make you sleepy. Do not drive, operate equipment, or drink alcohol when taking these.  You may take Tylenol (Generic name is acetaminophen) as directed on the bottle for additional relief or in place of the prescribed pain medications as your pain gets better. Do not take any other NSAIDs (Motrin, Ibuprofen, Aleve, Naproxen) while taking the blood thinner. If the medications cause a reaction such as nausea or skin rash, stop taking them and contact your doctor. Please plan accordingly, pain medications will not be re-filled on the weekends or at night. Call the office during the day if you need more medications.    Blood thinner:  It is very important to take it as prescribed. It is a medication to help prevent blood clots in your legs or lungs. No medication is perfect, so if you notice a sudden onset of pain/swelling in your calf area call your doctor. If you notice a sudden onset of troubles breathing  and/or chest pain call 911.     Icing:  It is common for some swelling, aching and stiffness to occur for up to 6-9 months after a knee replacement. If you knee swells, get off your feet, elevate your leg and apply some ice packs. Apply for 20 minutes at a time. For the first 1-2 weeks apply ice 2-3 x day or more after therapy.    Walker/crutches:  Use a walker/crutches when you go home. You will transition to the use of a cane and finally to no additional support.     Braces:  You will go home with a knee immobilizer. You can take it off when you are lying or sitting down. Wear it when you are walking. This immobilizer can come off after you are doing a straight leg raise. Your physician and or physical therapist will help to determine when to stop wearing it.     Physical Therapy:  The success of your knee replacement is based on doing physical therapy. You will have some pain and discomfort along the way. If you feel your pain is limiting your progress make sure to take some pain medication prior to your therapy session. If you pain medications are not working talk to your surgeon.   For the first 2 weeks after going home you will have in-home physical therapy. The goal is to work on range of motion. While it is important to working on bending your knee, it is equally important to make sure you knee comes out all the way straight. To assist in this do not place any bumps, pillows and or blankets under your knee when you are lying down. You should have an outpatient physical therapy appointment scheduled for about 2 weeks after surgery.     Activity:  Unless otherwise instructed, you can weight bear as tolerated. While laying or sitting down you should straighten your knee all the way out and then gently work on bending the knee back. Do not worry at first if your knee feels stiff and will not bend like normal, this will get better. Never put a pillow or bump under you knee, instead put a pillow under your ankle  so your knee will straighten out all the way.     Normal findings after surgery:  Numbness and tenderness around the incisions and to the outside of the incision is normal.  You may have bruising around the incisions and down the lower leg.   Your knee will be swollen for months after surgery. It will feel  tight  to move.   Low grade fevers less than 100.5 degrees Fahrenheit are normal.   You may have some minor swelling in the leg/calf area.  You will have some increased pain after your therapy sessions.     When to call the Office:  Temperature greater than 101.5 degrees Fahreheit.  Fever, chills, and increasing pain in the knee.  Excessive drainage from the incisions that include bright red blood.  Drainage from the incisions sites that appear yellow, pus-like, or foul smelling.  Increasing pain the knee not relieved by the prescribed pain medications or ice.  Persistent nausea or vomiting not helped by the Zofran.  Increased pain or swelling in your calf area (in back above your ankle) that wasn t there when in the hospital.  Any other effects you feel are significant.  Call 911 if you experience any chest pain and/or shortness or breath.

## 2024-06-18 NOTE — PROGRESS NOTES
PRIMARY DIAGNOSIS: Total Left Knee Replacement.   OUTPATIENT/OBSERVATION GOALS TO BE MET BEFORE DISCHARGE:  ADLs back to baseline: No    Activity and level of assistance: No feeling to lower extremities at this time. Bedrest.     Pain status: Improved-controlled with oral pain medications.    Return to near baseline physical activity: No     Discharge Planner Nurse   Safe discharge environment identified: Yes  Barriers to discharge: Yes       Entered by: Liliam Laura RN 06/18/2024 2:50 PM

## 2024-06-18 NOTE — PROCEDURES
[facility] Operative Report    Pre-operative diagnosis: left knee primary osteoarthritis   Post-operative diagnosis: same  Procedure: Procedure(s): left total knee arthroplasty-robotic assisted (patella not resurfaced)  Surgeon: Nathan Hernandez DO  Anesthesia: spinal  Assistant(s): Jhonny Keller PA-C (A Physician Assisstant was necessary for her expertise, exposure and surgical assistance throughout the case.)  Estimated blood loss: Less than 10ml  Urine output: na  Fluids: (See anesthesia record)  1100 ml crystalloids   Specimens: none  Complications: None  Counts: correct     Findings: Varus deformity arthritic wear with complete loss of articular cartilage to the medial femoral condyle.  Full-thickness cartilage loss through the medial weightbearing lateral femoral condyle.    Implants:    Donaldo size 6 triathlon cruciate retaining femoral component, 9 mm thickness X3 tibial bearing insert, size 6 tibial baseplate triathlon      Ky Martinez is a pleasant 78 year old-year-old patient with a complaint of knee pain.  The pain is been progressive and longstanding.  Despite conservative care the pain is impacting their quality of life.  The radiographs are clinically correlated.  Given their continued pain refractory to conservative care we discussed proceeding with Damian assisted total knee arthroplasty.  The risk, benefits, complications, alternatives was reviewed in the office.  The risk including bleeding, infection, neurovascular injury, fracture, blood clot, stiffness, implant issues was discussed.  We also reviewed length of recovery.  Once this was thoroughly reviewed with an understanding, the patient opted to proceed surgically.    They were seen preoperatively and again informed consent was obtained.  The extremity was marked and the consent was signed.  They were wheeled back to an operative suite.  Safely transferred to an operative table.  When deemed appropriate by anesthesia the extremity was  sterilely prepped and draped in normal manner.  Prior to beginning, a timeout was performed.  The appropriate patient, laterality, surgery was identified.  Antibiotics were confirmed to have been administered.  Prior to scrubbing into the surgery, the preoperative plan was evaluated and the sizes were selected with placement of the prosthesis on the Damian system.    An Esmarch was utilized to exsanguinate the extremity and tourniquet was inflated on the upper thigh.  A midline incision was followed with a medial parapatellar arthrotomy.  The anterior fat pad was excised.  No releases were performed.  The ACL excised.  Rimming osteophytes removed.  The checkpoints were placed in the femur as well as the proximal tibia.  This was followed with placement of the arrays including 2 pins intra-articular approximately 1.5 cm proximal to the condyle.  This was followed with 2 pins in the arrays placed approximately 5 cm distal to the knee along the medial tibial crest.  With that completed the knee was registered with the Damian robot.  This included obtaining checkpoints along the medial and lateral malleoli as well as obtaining the hip center of rotation.  Once this was completed the knee was evaluated and extension as well as 90 degrees of flexion to determine the extension and flexion gap.  With this completed the gaps were analyzed with the Damian robot.  The gaps were balanced with the robot.  Once this was acceptable the knee was positioned.    The patella was not resurfaced.  Rimming osteophytes were removed.    Using the Damian robot with care to protect the collateral ligaments and patellar tendon the cuts on the femur were performed with no issues.  Attention was turned to the tibia, the cut on the proximal tibia was performed with no issues.    Trial components were placed.  The knee was taken through full range of motion showing the knee to be well balanced throughout the arc of motion with no evidence of instability  or patellar maltracking.    The tibia and femur were then prepared.  The final components as listed above were cemented into position and held in approximately 20 degrees of flexion until the cement cured.  The knee was carefully inspected and excess bone and cement fragments were removed.   This was followed pulse lavage.       The arthrotomy was then closed with 0 Vicryl in interrupted manner.  The knee was taken through motion showing a tight closure.  This was followed with 2-0 Vicryl subcutaneous followed a running Monocryl subcuticular stitch and skin glue.  A sterile bandage was applied and they were transferred to the PACU in stable condition.  They will be brought in the hospital for continued orthopedic care, DVT prophylaxis, pain management, physical therapy.    Chavo Hernandez D.O.

## 2024-06-18 NOTE — MEDICATION SCRIBE - ADMISSION MEDICATION HISTORY
Medication Scribe Admission Medication History    Admission medication history is complete. The information provided in this note is only as accurate as the sources available at the time of the update.    Information Source(s): Patient and CareEverywhere/SureScripts via in-person    Pertinent Information: post-op inpatient    Changes made to PTA medication list:  Added: None  Deleted: tylenol, albuterol inh  Changed: None    Allergies reviewed with patient and updates made in EHR: yes    Medication History Completed By: BALTAZAR LASSITER 6/18/2024 4:36 PM    Current Facility-Administered Medications for the 6/18/24 encounter (Hospital Encounter)   Medication    2 mL bupivacaine (MARCAINE) preservative free injection 0.5% (20 mL vial)    lidocaine 1 % injection 2 mL    triamcinolone (KENALOG-40) injection 40 mg     PTA Med List   Medication Sig Note Last Dose    acetaminophen (TYLENOL) 325 MG tablet Take 2 tablets (650 mg) by mouth every 4 hours as needed for other (mild pain)      albuterol (PROAIR HFA) 108 (90 Base) MCG/ACT inhaler Inhale 1-2 puffs into the lungs every 4 hours as needed for shortness of breath  More than a month at unkn    atorvastatin (LIPITOR) 80 MG tablet Take 1 tablet by mouth once daily  6/16/2024 at am    DAILY MULTI OR Take 1 tablet by mouth daily  6/16/2024 at am    finasteride (PROSCAR) 5 MG tablet TAKE 1 TABLET (5MG) BY MOUTH ONCE DAILY (Patient taking differently: Take 5 mg by mouth daily)  6/16/2024 at am    furosemide (LASIX) 40 MG tablet TAKE 1 TABLET (40MG) BY MOUTH ONCE DAILY (Patient taking differently: Take 40 mg by mouth daily)  6/16/2024 at am    isosorbide mononitrate (IMDUR) 30 MG 24 hr tablet TAKE 1 TABLET (30MG) BY MOUTH ONCE DAILY (Patient taking differently: Take 30 mg by mouth daily)  6/16/2024 at am    losartan (COZAAR) 50 MG tablet TAKE 1 TABLET (50MG) BY MOUTH ONCE DAILY (Patient taking differently: Take 50 mg by mouth daily)  6/16/2024 at am    metoprolol succinate ER  (TOPROL XL) 50 MG 24 hr tablet Take 3 tablets (150 mg) by mouth 2 times daily  6/17/2024 at hs    nitroGLYcerin (NITROSTAT) 0.4 MG sublingual tablet 0.4 mg As needed  on hand at never used    oxyCODONE (ROXICODONE) 5 MG tablet Take 1-2 tablets (5-10 mg) by mouth every 4 hours as needed for moderate to severe pain      potassium chloride ER (KLOR-CON M) 20 MEQ CR tablet Take 1 tablet by mouth once daily  6/16/2024 at am    senna-docusate (SENOKOT-S/PERICOLACE) 8.6-50 MG tablet Take 1-2 tablets by mouth 2 times daily Take while on oral narcotics to prevent or treat constipation.      tamsulosin (FLOMAX) 0.4 MG capsule TAKE 1 CAPSULE (0.4MG) BY MOUTH ONCE DAILY (Patient taking differently: Take 0.4 mg by mouth daily)  6/16/2024 at am    warfarin ANTICOAGULANT (COUMADIN) 5 MG tablet Take 5 mg by mouth MORNINGS: Tuesdays, Thursdays, Saturdays and Sundays. Take 2.5 mg Mondays, Wednesdays and Fridays or as directed by anticoagulation clinic. NEXT INR 06/18/2024 6/13/2024 at am    warfarin ANTICOAGULANT (COUMADIN) 5 MG tablet Take 2.5 mg every Mon, Wed, Fri; 5 mg all other days or as directed by the INR clinic. (Patient taking differently: Take 2.5 mg by mouth MORNINGS: every Mon, Wed, Fri; take 5 mg all other days or as directed by the INR clinic. NEXT INR 06/18/24)  6/12/2024 at am    [DISCONTINUED] metoprolol succinate ER (TOPROL XL) 50 MG 24 hr tablet TAKE 1 TABLET (50MG) BY MOUTH ONCE DAILY 6/4/2024: 3 tablets BID

## 2024-06-18 NOTE — PROGRESS NOTES
"[facility]    Post Op Check Note    78 year old male POD#0 s/p left total knee arthroplasty Damian assisted by Dr. Hernandez  S: Patient seen at sitting in his chair in no apparent distress, alert and pleasant.  He is very happy however things been going.  I discussed surgery with the patient as well as Coumadin and Lovenox as well as bandage as well as physical therapy.  He denies major pain issues.  Discussed discharge also.    O: CMS intact left LE except slight decrease sensation in the foot,             DF/PF intact and 5 out of 5 strength       Able to do straight leg raise       Ace Dressing on, clean, dry and intact       Knee Immobilizer on and intact        Unable to visualize skin secondary to Ace and knee immobilizer       bilateral calves soft and non tender    Vital signs:  Temp: 97.9  F (36.6  C) Temp src: Oral BP: 116/68 Pulse: 71   Resp: 20 SpO2: 95 % O2 Device: None (Room air) Oxygen Delivery: 10 LPM Height: 180.3 cm (5' 11\") Weight: 125.9 kg (277 lb 9 oz)  Estimated body mass index is 38.71 kg/m  as calculated from the following:    Height as of this encounter: 1.803 m (5' 11\").    Weight as of this encounter: 125.9 kg (277 lb 9 oz).      Hemoglobin   Date Value Ref Range Status   05/20/2024 13.1 (L) 13.3 - 17.7 g/dL Final   03/30/2020 14.0 13.3 - 17.7 g/dL Final     INR   Date Value Ref Range Status   06/18/2024 1.36 (H) 0.85 - 1.15 Final   04/03/2020 2.10 (H) 0.86 - 1.14 Final     Comment:     This test is intended for monitoring Coumadin therapy.  Results are not   accurate in patients with prolonged INR due to factor deficiency.       INR Point of Care   Date Value Ref Range Status   06/03/2021 2.6 (H) 0.86 - 1.14 Final     Comment:     This test is intended for monitoring Coumadin therapy.  Results are not   accurate in patients with prolonged INR due to factor deficiency.       INR (External)   Date Value Ref Range Status   03/15/2023 2.6 (A) 0.9 - 1.1 Final "         A/P:  1. Pain-controlled   2. DVT Prophylaxis/Afib Treatment-starting Lovenox 40 mg every 24 hours starting tomorrow.  Patient can get Coumadin 5 mg this evening per Coumadin clinic and okay by Ortho team.  Continue Lovenox 40 mg every 24 hours until INR is greater than 2.  Coumadin clinic has recommendations for Coumadin dosing and pharmacy is following this as the patient is inpatient.  3. Weight Bearing Status-WBAT left LE  4. Physical Therapy-recommending home with assist and outpatient physical therapy.  Outpatient physical therapy set up at [facility] on next week Thursday  5. Dispo-anticipate discharge to home tomorrow as long as hospitalist team agrees.  6. Plan-I will see the patient tomorrow.    FRANKY MazaC  6/18/2024

## 2024-06-18 NOTE — PROGRESS NOTES
PRIMARY DIAGNOSIS: Total Left Knee Replacement   OUTPATIENT/OBSERVATION GOALS TO BE MET BEFORE DISCHARGE:  ADLs back to baseline: No    Activity and level of assistance: Assist of one with walker and gait belt.     Pain status: Improved-controlled with oral pain medications.    Return to near baseline physical activity: No     Discharge Planner Nurse   Safe discharge environment identified: Yes  Barriers to discharge: Yes       Entered by: Liliam Laura RN 06/18/2024 6:51 PM     Patient A&O x 4. VSS on RA. Void occurred after surgery 400 ml. Pain being well managed with oral Oxy 10 mg PRN. Two doses administered this shift. Progressing towards goals.

## 2024-06-18 NOTE — PROGRESS NOTES
06/18/24 4411   Appointment Info   Signing Clinician's Name / Credentials (PT) Amanda Love PT, DPT, ATC, LAT   Rehab Comments (PT) PT eval and treat post op knee surgery. Activity order: up with assist, ambulate, immobilizer on when up, ice, elevate, position at rest, walk, WBAT   Quick Adds   Quick Adds Certification       Present no   Living Environment   People in Home spouse   Current Living Arrangements house   Home Accessibility stairs to enter home;stairs within home   Number of Stairs, Main Entrance 2  (ramp entry)   Number of Stairs, Within Home, Primary greater than 10 stairs   Stair Railings, Within Home, Primary railing on left side (ascending)   Transportation Anticipated family or friend will provide   Living Environment Comments tub/shower- uses 2WW in the shower. all rugs picked up. sleeps in recliner   Self-Care   Usual Activity Tolerance moderate   Current Activity Tolerance fair   Regular Exercise No   Equipment Currently Used at Home cane, straight;shower chair;walker, rolling;raised toilet seat  (2WW and 4WW)   Fall history within last six months no   General Information   Onset of Illness/Injury or Date of Surgery 06/18/24   Referring Physician Diomedes Keller PA-C   Patient/Family Therapy Goals Statement (PT) home with family and OP PT   Pertinent History of Current Problem (include personal factors and/or comorbidities that impact the POC) Patient is a 78 year old male, day 0 status post left total knee arthroplasty, ALON robot assisted, by Dr. Hernandez, <10mL EBL, spinal with adductor block. Patient with a previous medical history of CHF, OA, HLD, GOLDY, ICD, AFib, PE, LE edema, NSTEMI, CAD, asthma.   Existing Precautions/Restrictions fall;brace worn when out of bed  (brace on at all times)   Weight-Bearing Status - LUE full weight-bearing   Weight-Bearing Status - RUE full weight-bearing   Weight-Bearing Status - LLE weight-bearing as tolerated   Weight-Bearing Status  - RLE full weight-bearing   General Observations Patient on room air, pleasant and agreeable, immobilizer on in bed   Cognition   Affect/Mental Status (Cognition) WFL   Orientation Status (Cognition) oriented x 4   Follows Commands (Cognition) WFL   Pain Assessment   Patient Currently in Pain Yes, see Vital Sign flowsheet  (5/10 left knee post medication)   Integumentary/Edema   Integumentary/Edema Comments post op dressings CDI. immobilizer on in bed.   Posture    Posture Forward head position;Protracted shoulders   Range of Motion (ROM)   ROM Comment left knee AROM 0-90   Strength (Manual Muscle Testing)   Strength Comments able to SLR flexion with good control   Bed Mobility   Bed Mobility supine-sit   Supine-Sit [place] (Bed Mobility) modified independence   Bed Mobility Limitations decreased ability to use legs for bridging/pushing;impaired ability to control trunk for mobility   Impairments Contributing to Impaired Bed Mobility decreased strength   Assistive Device (Bed Mobility) bed rails  (HOB elevated)   Transfers   Transfers sit-stand transfer   Maintains Weight-bearing Status (Transfers) able to maintain   Sit-Stand Transfer   Sit-Stand [place] (Transfers) contact guard;verbal cues   Assistive Device (Sit-Stand Transfers) walker, 4-wheeled   Gait/Stairs (Locomotion)   [place] Level (Gait) contact guard;verbal cues   Assistive Device (Gait) walker, 4-wheeled   Distance in Feet (Gait) 5   Pattern (Gait) step-to   Deviations/Abnormal Patterns (Gait) stride length decreased;gait speed decreased;base of support, narrow   Maintains Weight-bearing Status (Gait) able to maintain   Balance   Balance Comments good UE support on walker and good LLE control, good safety use of the walker.   Sensory Examination   Sensory Perception Comments impaired secondary to nerve block LLE   Coordination   Coordination no deficits were identified   Muscle Tone   Muscle Tone no deficits were identified   Clinical  Impression   Criteria for Skilled Therapeutic Intervention Yes, treatment indicated   PT Diagnosis (PT) muscle weakness, impaired mobility, joint stiffness   Influenced by the following impairments day 0 status post L TKA   Functional limitations due to impairments use of walker and immobilizer, pain, decreased activity tolerance   Clinical Presentation (PT Evaluation Complexity) evolving   Clinical Presentation Rationale POD 0, medical history, clinical judgement   Clinical Decision Making (Complexity) moderate complexity   Planned Therapy Interventions (PT) balance training;bed mobility training;gait training;cryotherapy;home exercise program;patient/family education;ROM (range of motion);strengthening;stair training;transfer training;progressive activity/exercise;home program guidelines   Risk & Benefits of therapy have been explained evaluation/treatment results reviewed;participants voiced agreement with care plan;participants included;patient   Clinical Impression Comments Patient presents with impairments consistent with POD 0 TKA. Currently patient with limited activity tolerance and functional mobility secondary to post operative acuity. Anticipate with medical management he will make good progress towards discharge home with family support and OP PT.   PT Total Evaluation Time   PT Eval, Moderate Complexity Minutes (21256) 10   Therapy Certification   Start of care date 06/18/24   Certification date from 06/18/24   Certification date to 06/19/24   Medical Diagnosis s/p L TKA   Physical Therapy Goals   PT Frequency Daily   PT Predicted Duration/Target Date for Goal Attainment 06/19/24   PT Goals Bed Mobility;Transfers;Gait;Stairs   PT: Bed Mobility Independent;Supine to/from sit;Within precautions   PT: Transfers Modified independent;Sit to/from stand;Assistive device;Within precautions   PT: Gait Modified independent;Rolling walker;Within precautions;150 feet   PT: Stairs Supervision/stand-by assist;2  stairs;Rail on both sides   PT Discharge Planning   PT Plan Daily. ambulate, exercise, dc   PT Discharge Recommendation (DC Rec) home with assist;home with outpatient physical therapy   PT Rationale for DC Rec Patient from home with wife, IND at baseline, has a walker at home for use at discharge. Currently patient with limited activity tolerance and functional mobility secondary to post operative acuity. Anticipate with medical management he will make good progress towards discharge home with family support and OP PT.   PT Brief overview of current status MOD IND bed mobility. CGA sit to/from stand, bed to/from chair with 4WW. Ambulated 50' wit 4WW. dependent immobilizer management. Good SLR flexion LLE and 0-90 AROM   Total Session Time   Total Session Time (sum of timed and untimed services) 10     [facility]  OUTPATIENT PHYSICAL THERAPY EVALUATION  PLAN OF TREATMENT FOR OUTPATIENT REHABILITATION  (COMPLETE FOR INITIAL CLAIMS ONLY)  Patient's Last Name, First Name, M.I.  YOB: 1945  JuanKy  K                        Provider's Name  [facility] Medical Record No.  [id number]                             Onset Date:  06/18/24   Start of Care Date:  06/18/24   Type:     _X_PT   ___OT   ___SLP Medical Diagnosis:  s/p L TKA              PT Diagnosis:  muscle weakness, impaired mobility, joint stiffness Visits from SOC:  1     See note for plan of treatment, functional goals and certification details    I CERTIFY THE NEED FOR THESE SERVICES FURNISHED UNDER        THIS PLAN OF TREATMENT AND WHILE UNDER MY CARE     (Physician co-signature of this document indicates review and certification of the therapy plan).              Thank you for your referral.  Amanda Love, PT, DPT, ATC, LAT    [facility]  O: [phone]  E: [email or web address]

## 2024-06-18 NOTE — PHARMACY-ANTICOAGULATION SERVICE
Clinical Pharmacy - Warfarin Dosing Consult     Pharmacy has been consulted to manage this patient s warfarin therapy.  Indication: Atrial Fibrillation;DVT/PE Prophylaxis  Therapy Goal: INR 2-3  Provider/Team: Marcin Rios MD  [facility]: [facility]  Warfarin Prior to Admission: Yes  Warfarin PTA Regimen: 2.5 mg every Mon, Wed, Fri; 5 mg all other days    INR   Date Value Ref Range Status   06/18/2024 1.36 (H) 0.85 - 1.15 Final   05/29/2024 2.3 (H) 0.9 - 1.1 Final       Recommend warfarin 5 mg today.  Pharmacy will monitor Ky Martinez daily and order warfarin doses to achieve specified goal.      Please contact pharmacy as soon as possible if the warfarin needs to be held for a procedure or if the warfarin goals change.      Thank you,   Crissy Graves [facility] on 6/18/2024 at 11:20 AM

## 2024-06-19 ENCOUNTER — APPOINTMENT (OUTPATIENT)
Dept: PHYSICAL THERAPY | Facility: CLINIC | Age: 79
End: 2024-06-19
Attending: ORTHOPAEDIC SURGERY
Payer: COMMERCIAL

## 2024-06-19 ENCOUNTER — DOCUMENTATION ONLY (OUTPATIENT)
Dept: ANTICOAGULATION | Facility: CLINIC | Age: 79
End: 2024-06-19
Payer: COMMERCIAL

## 2024-06-19 VITALS
HEART RATE: 70 BPM | TEMPERATURE: 97.8 F | WEIGHT: 277.56 LBS | SYSTOLIC BLOOD PRESSURE: 128 MMHG | RESPIRATION RATE: 18 BRPM | DIASTOLIC BLOOD PRESSURE: 81 MMHG | OXYGEN SATURATION: 99 % | BODY MASS INDEX: 38.86 KG/M2 | HEIGHT: 71 IN

## 2024-06-19 DIAGNOSIS — I48.0 PAROXYSMAL ATRIAL FIBRILLATION WITH RAPID VENTRICULAR RESPONSE (H): ICD-10-CM

## 2024-06-19 DIAGNOSIS — I48.0 PAROXYSMAL ATRIAL FIBRILLATION (H): ICD-10-CM

## 2024-06-19 DIAGNOSIS — I26.99 ACUTE PULMONARY EMBOLISM, UNSPECIFIED PULMONARY EMBOLISM TYPE, UNSPECIFIED WHETHER ACUTE COR PULMONALE PRESENT (H): Primary | ICD-10-CM

## 2024-06-19 LAB
ANION GAP SERPL CALCULATED.3IONS-SCNC: 9 MMOL/L (ref 7–15)
BUN SERPL-MCNC: 30.9 MG/DL (ref 8–23)
CALCIUM SERPL-MCNC: 8.6 MG/DL (ref 8.8–10.2)
CHLORIDE SERPL-SCNC: 106 MMOL/L (ref 98–107)
CREAT SERPL-MCNC: 1.45 MG/DL (ref 0.67–1.17)
DEPRECATED HCO3 PLAS-SCNC: 21 MMOL/L (ref 22–29)
EGFRCR SERPLBLD CKD-EPI 2021: 50 ML/MIN/1.73M2
GLUCOSE SERPL-MCNC: 170 MG/DL (ref 70–99)
HGB BLD-MCNC: 13.2 G/DL (ref 13.3–17.7)
INR PPP: 1.54 (ref 0.85–1.15)
POTASSIUM SERPL-SCNC: 5.2 MMOL/L (ref 3.4–5.3)
SODIUM SERPL-SCNC: 136 MMOL/L (ref 135–145)

## 2024-06-19 PROCEDURE — 85018 HEMOGLOBIN: CPT | Performed by: PHYSICIAN ASSISTANT

## 2024-06-19 PROCEDURE — 85610 PROTHROMBIN TIME: CPT | Performed by: PHYSICIAN ASSISTANT

## 2024-06-19 PROCEDURE — 250N000013 HC RX MED GY IP 250 OP 250 PS 637

## 2024-06-19 PROCEDURE — 80048 BASIC METABOLIC PNL TOTAL CA: CPT

## 2024-06-19 PROCEDURE — 250N000011 HC RX IP 250 OP 636: Mod: JZ | Performed by: PHYSICIAN ASSISTANT

## 2024-06-19 PROCEDURE — 99214 OFFICE O/P EST MOD 30 MIN: CPT | Performed by: NURSE PRACTITIONER

## 2024-06-19 PROCEDURE — 97116 GAIT TRAINING THERAPY: CPT | Mod: GP | Performed by: PHYSICAL THERAPIST

## 2024-06-19 PROCEDURE — 97110 THERAPEUTIC EXERCISES: CPT | Mod: GP | Performed by: PHYSICAL THERAPIST

## 2024-06-19 PROCEDURE — 250N000013 HC RX MED GY IP 250 OP 250 PS 637: Performed by: PHYSICIAN ASSISTANT

## 2024-06-19 PROCEDURE — 96372 THER/PROPH/DIAG INJ SC/IM: CPT | Performed by: PHYSICIAN ASSISTANT

## 2024-06-19 PROCEDURE — 97530 THERAPEUTIC ACTIVITIES: CPT | Mod: GP | Performed by: PHYSICAL THERAPIST

## 2024-06-19 PROCEDURE — 36415 COLL VENOUS BLD VENIPUNCTURE: CPT

## 2024-06-19 RX ORDER — ENOXAPARIN SODIUM 100 MG/ML
40 INJECTION SUBCUTANEOUS EVERY 24 HOURS
Qty: 2.8 ML | Refills: 0 | Status: SHIPPED | OUTPATIENT
Start: 2024-06-19 | End: 2024-06-26

## 2024-06-19 RX ORDER — LOSARTAN POTASSIUM 50 MG/1
TABLET ORAL
Qty: 90 TABLET | Refills: 3 | Status: SHIPPED | OUTPATIENT
Start: 2024-06-19 | End: 2024-09-30

## 2024-06-19 RX ADMIN — ATORVASTATIN CALCIUM 80 MG: 40 TABLET, FILM COATED ORAL at 08:51

## 2024-06-19 RX ADMIN — FINASTERIDE 5 MG: 5 TABLET, FILM COATED ORAL at 08:52

## 2024-06-19 RX ADMIN — ENOXAPARIN SODIUM 40 MG: 40 INJECTION SUBCUTANEOUS at 09:00

## 2024-06-19 RX ADMIN — ACETAMINOPHEN 975 MG: 325 TABLET, FILM COATED ORAL at 04:18

## 2024-06-19 RX ADMIN — METOPROLOL SUCCINATE 150 MG: 100 TABLET, EXTENDED RELEASE ORAL at 08:51

## 2024-06-19 RX ADMIN — DOCUSATE SODIUM AND SENNOSIDES 1 TABLET: 8.6; 5 TABLET, FILM COATED ORAL at 08:59

## 2024-06-19 RX ADMIN — POLYETHYLENE GLYCOL 3350 17 G: 17 POWDER, FOR SOLUTION ORAL at 08:54

## 2024-06-19 RX ADMIN — FAMOTIDINE 20 MG: 20 TABLET ORAL at 08:52

## 2024-06-19 RX ADMIN — TAMSULOSIN HYDROCHLORIDE 0.4 MG: 0.4 CAPSULE ORAL at 08:59

## 2024-06-19 ASSESSMENT — ACTIVITIES OF DAILY LIVING (ADL)
ADLS_ACUITY_SCORE: 32

## 2024-06-19 NOTE — ANESTHESIA POSTPROCEDURE EVALUATION
Patient: Ky Martinez    Procedure: Procedure(s):  ALON ASSISTED Left TOTAL KNEE ARTHROPLASTY       Anesthesia Type:  Spinal    Note:  Disposition: Inpatient   Postop Pain Control: Uneventful            Sign Out: Well controlled pain   PONV: No   Neuro/Psych: Uneventful            Sign Out: Acceptable/Baseline neuro status   Airway/Respiratory: Uneventful            Sign Out: Acceptable/Baseline resp. status   CV/Hemodynamics: Uneventful            Sign Out: Acceptable CV status   Other NRE: NONE   DID A NON-ROUTINE EVENT OCCUR? No    Event details/Postop Comments:  Patient discharged early this morning, and unable to reach via phone. No complications per surgical team.  I will follow up with the pt if needed.           Last vitals:  Vitals Value Taken Time   /81 06/18/24 1045   Temp 95.72  F (35.4  C) 06/18/24 1043   Pulse 99 06/18/24 1046   Resp 17 06/18/24 1046   SpO2 97 % 06/18/24 1046   Vitals shown include unfiled device data.    Electronically Signed By: SHANNON Agarwal CRNA  June 19, 2024  11:59 AM

## 2024-06-19 NOTE — PROGRESS NOTES
[facility]    Medicine Progress Note - Hospitalist Service    Date of Admission:  6/18/2024    Assessment & Plan   Consult Note - Hospitalist Service  Date of Admission:  6/18/2024  Consult Requested by: Diomedes Keller PA-C  Reason for Consult: 'hx DVT, hx PE, hx MI, Afib, on coum, HTN s/p ICD, ischemic cardiomyopathy'      Assessment & Plan  Ky Martinez is a 77 yo M with pmhx of afib on warfarin, bilateral Pes, DVT, CAD s/p PCI to RCA (2008), ischemic cardiomyopathy s/p ICD (2017) with EF of 40%, CKD stage 3, BPH, intermittent asthma who was admitted on 6/18/2024 for planned L TKA now s/p L TKA on 6/18/2025 with Dr. Hernandez. Medicine consulted for post-op medical co management.      S/p L TKA (6/18/2024) POD 1  Post-Op Issues  No noted complications and EBL less than 10 ml. Received periop ancef. Was seen in clinic for a pre-op eval 6/5/24.  Discharge recommendations per orthopedics.     Bilateral Pes (2020)  Hx of DVT (2017)  Paroxysmal Afib   DVT of LUE in 2017, patient on eliquis for several months. Represented in 2020 after long road trip found to have bilateral Pes with concern for possible right heart strain on CT. Had hypercoagulable workup that was negative for factor 2 and factor 5 mutations. Patient notably has paroxysmal AF, currenly in AF with plan for outpatient BEBO/ cardioversion following surgery. Outpatient warfarin regimen typically is 2.5 mg M, W, F and 5 mg all other days, goal INR 2-3. He stopped warfarin 5d prior to case without bridging pre-procedurally as outlined by anticoag clinic.  Hemoglobin stable at 13.2.  He Will continue with Lovenox at home in addition to usual home dose warfarin and then follow up in coumadin clinic as planned as there is a plan for BEBO/ cardioversion in the near future.  Follow-up outpatient with cardiology as planned  Continue metoprolol succinate as at home.     CAD s/p PCI RCA (2008) and NSTEMI (2017)  HTN  HLD   S/p RCA stent in  "2008, repeat angiogram in 2017 following NSTEMI showed RCA stent patent, mild non-obstructive CAD to pLAD and LCx. Patient denies any chest pain or shortness of breath currently. Home reg: losartan 50 mg daily, toprol  mg BID, lasix 40 mg daily, imdur 30 mg daily, atorvastatin 80 mg.  Cont pta statin, metoprolol succinate, losartan at reduced dosing until he is seen by his primary care physician next week, continue imdur an furosemide as at home as well.     I have asked him to check his blood pressure intermittently at home as well.     Ischemic Cardiomyopathy  Chronic combined systolic and diastolic HF    S/p ICD (2017)  Most recent echo 3/2020 with LVEF 40%, grade I diastolic dysfunction.  Follows outpatient with cardiology, last seen 5/9/24. Patient with no LE, no e/o acute exacerbation.   Continue home regimen with changes as above.    BPH: No current urinary symptoms. Cont pta flomax and proscar     Intermittent Asthma: Not on any maintence medications, no e/o acute exacerbation on exam. Prn albuterol available while admitted.     CKD Stage 3: Baseline Scr ~ 1.2.    Creatinine 1.48 on POD 1.  I have decreased the losartan dose at discharge and he will follow up with his primary care provider on Monday June 24 and he may have recheck creatinine at that time.          Diet: Advance Diet as Tolerated: Regular Diet Adult  Discharge Instruction - Regular Diet Adult    DVT Prophylaxis: Warfarin  Gallardo Catheter: Not present  Lines: None     Cardiac Monitoring: None  Code Status: Full Code      Clinically Significant Risk Factors Present on Admission               # Drug Induced Coagulation Defect: home medication list includes an anticoagulant medication    # Hypertension: Noted on problem list             # Obesity: Estimated body mass index is 38.71 kg/m  as calculated from the following:    Height as of this encounter: 1.803 m (5' 11\").    Weight as of this encounter: 125.9 kg (277 lb 9 oz).       # Asthma: " noted on problem list  # ICD device present  # Pacemaker present       Disposition Plan     Medically Ready for Discharge: Anticipated Today           The patient's care was discussed with the  entire care team .    Kelsey Brizuela CNP  Hospitalist Service  [facility]  Securely message with [employer] (more info)  Text page via [employer] Paging/Directory   ______________________________________________________________________    Interval History   No acute events overnight    Physical Exam   Vital Signs: Temp: 97.8  F (36.6  C) Temp src: Oral BP: 128/81 Pulse: 70   Resp: 18 SpO2: 99 % O2 Device: None (Room air)    Weight: 277 lbs 8.95 oz    Gen:  Lying in bed no acute distress  HEENT:  normocephalic, atraumatic, oropharynx clear  Resp:  CTA posteriorly, normal respiratory effort  Card:  S1,S2, RRR no murmur, rub or gallop  Skin:  Left knee wrapped and in immobilizer  Abd:  Soft nondistended, non tender,  normoactive bowel sounds   Neuro:  Neuro exam non focal      Medical Decision Making       45 MINUTES SPENT BY ME on the date of service doing chart review, history, exam, documentation & further activities per the note.        Data     I have personally reviewed the following data over the past 24 hrs:    N/A  \   13.2 (L)   / N/A     136 106 30.9 (H) /  170 (H)   5.2 21 (L) 1.45 (H) \     Procal: N/A CRP: N/A Lactic Acid: 0.9       INR:  1.54 (H) PTT:  N/A   D-dimer:  N/A Fibrinogen:  N/A       Imaging results reviewed over the past 24 hrs:   Recent Results (from the past 24 hour(s))   XR Knee Port Left 1/2 Views    Narrative    XR KNEE PORT LEFT 1/2 VIEWS   6/18/2024 11:28 AM     HISTORY: Post-Op Total Knee  COMPARISON: None.       Impression    IMPRESSION: Recent left total knee arthroplasty. Alignment is  satisfactory. No acute fracture. Postoperative gas in the knee joint  and soft tissues.    CARLOS WALTERS MD         SYSTEM ID:  [id number]      No

## 2024-06-19 NOTE — PROGRESS NOTES
ANTICOAGULATION  MANAGEMENT: Discharge Review    Ky Martinez chart reviewed for anticoagulation continuity of care    Hospital Admission on 6/18/2024 for total knee.    Discharge disposition: Home    Results:    Recent labs: (last 7 days)     06/18/24  0609 06/19/24  0540   INR 1.36* 1.54*     Anticoagulation inpatient management:     home regimen continued    Anticoagulation discharge instructions:     Warfarin dosing: home regimen continued   Bridging: bridging with enoxaparin (Lovenox)40 every 24 hours for 7 days   INR goal change: No      Medication changes affecting anticoagulation: Yes: Narcotic Roxicodone    Additional factors affecting anticoagulation: No     PLAN     No adjustment to anticoagulation plan needed    Spoke with endy surgery went well . Instructed on lovenox.     Anticoagulation Calendar updated    Jailene Whitfield RN

## 2024-06-19 NOTE — PROGRESS NOTES
"[facility]    Progress note    78 year old male POD#1 s/p left total knee arthroplasty Damian assisted by Dr. Hernandez   S: Patient seen at bedside in no apparent distress, alert and pleasant.  I discussed surgery and rehabilitation with the patient.  He denies numbness, tingling or major pain issues.  Things x-rays and gave them to him and explained them.  Discussed discharge and anticoagulation.    O: CMS intact left LE, DF/PF intact and able to do a straight leg raise.       Knee immobilizer on and intact, Ace on and intact.       Ace removed and dressing removed.  Dressing clean.       Incisions no erythema swelling or drainage.  Dermabond on.       Left knee and leg with minimal swelling and no erythema or ecchymosis        Bilateral calves soft and non tender       Aquacel is applied x 2.  Floyd applied.    Vital signs:  Temp: 97.8  F (36.6  C) Temp src: Oral BP: 124/80 Pulse: 93   Resp: 18 SpO2: 95 % O2 Device: None (Room air) Oxygen Delivery: 10 LPM Height: 180.3 cm (5' 11\") Weight: 125.9 kg (277 lb 9 oz)  Estimated body mass index is 38.71 kg/m  as calculated from the following:    Height as of this encounter: 1.803 m (5' 11\").    Weight as of this encounter: 125.9 kg (277 lb 9 oz).      Hemoglobin   Date Value Ref Range Status   06/19/2024 13.2 (L) 13.3 - 17.7 g/dL Final   03/30/2020 14.0 13.3 - 17.7 g/dL Final     INR   Date Value Ref Range Status   06/19/2024 1.54 (H) 0.85 - 1.15 Final   04/03/2020 2.10 (H) 0.86 - 1.14 Final     Comment:     This test is intended for monitoring Coumadin therapy.  Results are not   accurate in patients with prolonged INR due to factor deficiency.       INR Point of Care   Date Value Ref Range Status   06/03/2021 2.6 (H) 0.86 - 1.14 Final     Comment:     This test is intended for monitoring Coumadin therapy.  Results are not   accurate in patients with prolonged INR due to factor deficiency.       INR (External)   Date Value Ref Range Status "   03/15/2023 2.6 (A) 0.9 - 1.1 Final         A/P:  1. Pain-controlled with Tylenol, oxycodone 10 to 5 mg, Zanaflex available but not taken.  2.  DVT Prophylaxis/Afib Treatment-starting Lovenox 40 mg every 24 hours starting today.  Coumadin started yesterday.  Continue Lovenox 40 mg every 24 hours until INR is greater than 2.  Coumadin clinic has recommendations for Coumadin dosing and pharmacy is following this as the patient is inpatient.   3. Weight Bearing Status-WBAT left LE  4. Physical Therapy-recommending home with assist and outpatient physical therapy. Outpatient physical therapy set up at [place] [facility] on next week Thursday   5. Hospitalist-David MCKEON saw the patient yesterday and is following.  Thank you.  Secure message sent to Kelsey Brizuela this morning, NP.  6. Incision-no signs or symptoms of infection.  Dressing removed and Aquacel was placed this morning as well as Floyd stocking.  7. Plan-anticipate discharge to home with wife today.  Outpatient physical therapy set up in [place] next week.  Follow-up with RITIKA Rocha 7/3/2024.  All discharge orders in except for hospital med rec.  Discussed plan with charge RN and secure message to hospitalist and message sent to Dr. Hernandez.    Diomedes Keller PA-C  6/19/2024

## 2024-06-19 NOTE — PLAN OF CARE
Physical Therapy Discharge Summary    Reason for therapy discharge:    All goals and outcomes met, no further needs identified.    Progress towards therapy goal(s). See goals on Care Plan in [facility] electronic health record for goal details.  Goals met    Therapy recommendation(s):    Continued therapy is recommended.  Rationale/Recommendations:   .Patient would benefit from continued skilled therapeutic intervention in order to progress them towards a higher level of function in accordance with their surgeon's protocol.        Thank you for your referral.  Amanda Love, PT, DPT, ATC, [facility]  O: [phone]  E: [email or web address]

## 2024-06-19 NOTE — PLAN OF CARE
Goal Outcome Evaluation:      Plan of Care Reviewed With: patient    Overall Patient Progress: improvingOverall Patient Progress: improving     Vitals stable on RA, Pt on CAPNO. A&Ox4. LR running at 100 ml/hr. CMS present in LLE. Pt walked 80' this shift and voided.

## 2024-06-19 NOTE — PROGRESS NOTES
S-(situation): Patient discharged to home via wheelchair with spouse    B-(background): Observation goals met     A-(assessment): VSS on room air, A&Ox4, up walking with 4W walker, denies pain, tolerating oral intake and medications.    R-(recommendations): Discharge instructions reviewed with patient and wife. Listed belongings gathered and returned to patient.  Patient Education resolved: Yes  New medications-Pt. Has been educated about reason of use and side effects Yes  Home medications returned to patient NA  Medication Bin checked and emptied on discharge Yes

## 2024-06-21 DIAGNOSIS — E78.5 HYPERLIPIDEMIA LDL GOAL <100: ICD-10-CM

## 2024-06-21 DIAGNOSIS — N40.0 BENIGN PROSTATIC HYPERPLASIA, UNSPECIFIED WHETHER LOWER URINARY TRACT SYMPTOMS PRESENT: ICD-10-CM

## 2024-06-21 RX ORDER — TAMSULOSIN HYDROCHLORIDE 0.4 MG/1
CAPSULE ORAL
Qty: 90 CAPSULE | Refills: 0 | Status: SHIPPED | OUTPATIENT
Start: 2024-06-21 | End: 2024-07-24

## 2024-06-21 RX ORDER — FUROSEMIDE 40 MG
TABLET ORAL
Qty: 90 TABLET | Refills: 0 | Status: SHIPPED | OUTPATIENT
Start: 2024-06-21 | End: 2024-07-24

## 2024-06-21 RX ORDER — ISOSORBIDE MONONITRATE 30 MG/1
TABLET, EXTENDED RELEASE ORAL
Qty: 90 TABLET | Refills: 0 | Status: SHIPPED | OUTPATIENT
Start: 2024-06-21 | End: 2024-07-24

## 2024-06-24 ENCOUNTER — OFFICE VISIT (OUTPATIENT)
Dept: FAMILY MEDICINE | Facility: CLINIC | Age: 79
End: 2024-06-24
Payer: COMMERCIAL

## 2024-06-24 ENCOUNTER — LAB (OUTPATIENT)
Dept: LAB | Facility: CLINIC | Age: 79
End: 2024-06-24
Payer: COMMERCIAL

## 2024-06-24 ENCOUNTER — ANTICOAGULATION THERAPY VISIT (OUTPATIENT)
Dept: ANTICOAGULATION | Facility: CLINIC | Age: 79
End: 2024-06-24

## 2024-06-24 VITALS
HEIGHT: 71 IN | SYSTOLIC BLOOD PRESSURE: 112 MMHG | TEMPERATURE: 97.2 F | RESPIRATION RATE: 20 BRPM | DIASTOLIC BLOOD PRESSURE: 78 MMHG | BODY MASS INDEX: 37.8 KG/M2 | OXYGEN SATURATION: 98 % | WEIGHT: 270 LBS | HEART RATE: 88 BPM

## 2024-06-24 DIAGNOSIS — I50.22 CHRONIC SYSTOLIC HEART FAILURE (H): Primary | ICD-10-CM

## 2024-06-24 DIAGNOSIS — I50.22 CHRONIC SYSTOLIC HEART FAILURE (H): ICD-10-CM

## 2024-06-24 DIAGNOSIS — I26.99 ACUTE PULMONARY EMBOLISM, UNSPECIFIED PULMONARY EMBOLISM TYPE, UNSPECIFIED WHETHER ACUTE COR PULMONALE PRESENT (H): ICD-10-CM

## 2024-06-24 DIAGNOSIS — I48.0 PAROXYSMAL ATRIAL FIBRILLATION (H): ICD-10-CM

## 2024-06-24 DIAGNOSIS — N18.31 STAGE 3A CHRONIC KIDNEY DISEASE (H): ICD-10-CM

## 2024-06-24 DIAGNOSIS — I26.99 ACUTE PULMONARY EMBOLISM, UNSPECIFIED PULMONARY EMBOLISM TYPE, UNSPECIFIED WHETHER ACUTE COR PULMONALE PRESENT (H): Primary | ICD-10-CM

## 2024-06-24 DIAGNOSIS — I48.0 PAROXYSMAL ATRIAL FIBRILLATION WITH RAPID VENTRICULAR RESPONSE (H): ICD-10-CM

## 2024-06-24 LAB
ANION GAP SERPL CALCULATED.3IONS-SCNC: 10 MMOL/L (ref 7–15)
BUN SERPL-MCNC: 20.3 MG/DL (ref 8–23)
CALCIUM SERPL-MCNC: 8.8 MG/DL (ref 8.8–10.2)
CHLORIDE SERPL-SCNC: 109 MMOL/L (ref 98–107)
CREAT SERPL-MCNC: 1.28 MG/DL (ref 0.67–1.17)
DEPRECATED HCO3 PLAS-SCNC: 21 MMOL/L (ref 22–29)
EGFRCR SERPLBLD CKD-EPI 2021: 58 ML/MIN/1.73M2
GLUCOSE SERPL-MCNC: 100 MG/DL (ref 70–99)
INR BLD: 2.3 (ref 0.9–1.1)
NT-PROBNP SERPL-MCNC: 3595 PG/ML (ref 0–1800)
POTASSIUM SERPL-SCNC: 4.8 MMOL/L (ref 3.4–5.3)
SODIUM SERPL-SCNC: 140 MMOL/L (ref 135–145)

## 2024-06-24 PROCEDURE — 99214 OFFICE O/P EST MOD 30 MIN: CPT | Mod: 24 | Performed by: FAMILY MEDICINE

## 2024-06-24 PROCEDURE — 85610 PROTHROMBIN TIME: CPT

## 2024-06-24 PROCEDURE — 36416 COLLJ CAPILLARY BLOOD SPEC: CPT

## 2024-06-24 PROCEDURE — 83880 ASSAY OF NATRIURETIC PEPTIDE: CPT

## 2024-06-24 PROCEDURE — 36415 COLL VENOUS BLD VENIPUNCTURE: CPT

## 2024-06-24 PROCEDURE — 80048 BASIC METABOLIC PNL TOTAL CA: CPT

## 2024-06-24 ASSESSMENT — PAIN SCALES - GENERAL: PAINLEVEL: MILD PAIN (3)

## 2024-06-24 NOTE — PROGRESS NOTES
ANTICOAGULATION MANAGEMENT     Ky Martinez 78 year old male is on warfarin with therapeutic INR result. (Goal INR 2.0-3.0)    Recent labs: (last 7 days)     06/24/24  1146   INR 2.3*       ASSESSMENT     Source(s): Chart Review and Patient/Caregiver Call     Warfarin doses taken: Booster dose(s) recently taken which may be affecting INR  Diet: No new diet changes identified  Medication/supplement changes: None noted  New illness, injury, or hospitalization: Knee replacement on 6/18/24  Signs or symptoms of bleeding or clotting: No  Previous result: Subtherapeutic  Additional findings: None and Bridging with Enoxaparin until today, ok to stop with INR at 2.3       PLAN     Recommended plan for temporary change(s) affecting INR     Dosing Instructions: Continue your current warfarin dose with next INR in 10 days       Summary  As of 6/24/2024      Full warfarin instructions:  2.5 mg every Mon, Wed, Fri; 5 mg all other days   Next INR check:  7/3/2024               Telephone call with Tarik who verbalizes understanding and agrees to plan and who agrees to plan and repeated back plan correctly    Lab visit scheduled    Education provided:   Goal range and lab monitoring: Importance of therapeutic range and Importance of following up at instructed interval  Contact [phone]  with any changes, questions or concerns.     Plan made per [facility] anticoagulation protocol    Jessica Chavez RN  Anticoagulation Clinic  6/24/2024    _______________________________________________________________________     Anticoagulation Episode Summary       Current INR goal:  2.0-3.0   TTR:  78.4% (1 y)   Target end date:  Indefinite   Send INR reminders to:  BRANDY MEHTA    Indications    Acute pulmonary embolism  unspecified pulmonary embolism type  unspecified whether acute cor pulmonale present (H) [I26.99]  Paroxysmal atrial fibrillation with rapid ventricular response (H) [I48.0]  Paroxysmal atrial fibrillation (H) [I48.0]              Comments:               Anticoagulation Care Providers       Provider Role Specialty Phone number    Ky Major MD Referring Family Practice     Marcin Rios MD Referring Family Medicine [phone]

## 2024-06-24 NOTE — CONFIDENTIAL NOTE
General Call    Contacts       Contact Date/Time Type Contact Phone/Fax    06/24/2024 12:51 PM CDT Phone (Outgoing) Tarik Martinez (Self) [phone] (M)    No Answer/Busy -  [phone]          Reason for Call:  Anticoag    What are your questions or concerns:  Patient is returning call to [facility] regarding today's INR. Please advise at the number provided.    Date of last appointment with provider: n/a    Could we send this information to you in [employer] or would you prefer to receive a phone call?:   Patient would prefer a phone call   Okay to leave a detailed message?: Yes at Cell number on file:    Telephone Information:   Mobile [phone]

## 2024-06-24 NOTE — PROGRESS NOTES
Assessment & Plan     Chronic systolic heart failure (H)  Tarik is a 78-year-old male who presents to clinic today in follow-up of hospital stay for left total knee replacement.  He has a history of chronic systolic heart failure and stage IIIa chronic kidney disease.  Preoperatively his BNP was elevated and his Lasix was increased.  Symptomatically this dramatically improved his breathing.  Postoperatively his creatinine had bumped from 1.25-1.47.  His losartan was decreased from 100 mg to 50 mg daily.  He continues on that today.  He needs a creatinine rechecked.    On exam he is well-appearing in no distress.  Blood pressure 112/78.  Pulse is 88 and irregular.  Lungs are clear to auscultation.  Cardiac exam is irregularly irregular.  Right lower extremity demonstrates no peripheral edema.  Left lower extremity is in a knee immobilizer.    Will recheck basic metabolic profile specifically his creatinine.  Will also recheck a BNP today.  Will just losartan and Lasix based on outcomes.  - Basic metabolic panel  (Ca, Cl, CO2, Creat, Gluc, K, Na, BUN); Future  - BNP-N terminal pro; Future    Stage 3a chronic kidney disease (H)  Tarik is a 78-year-old male who presents to clinic today in follow-up of hospital stay for left total knee replacement.  He has a history of chronic systolic heart failure and stage IIIa chronic kidney disease.  Preoperatively his BNP was elevated and his Lasix was increased.  Symptomatically this dramatically improved his breathing.  Postoperatively his creatinine had bumped from 1.25-1.47.  His losartan was decreased from 100 mg to 50 mg daily.  He continues on that today.  He needs a creatinine rechecked.    On exam he is well-appearing in no distress.  Blood pressure 112/78.  Pulse is 88 and irregular.  Lungs are clear to auscultation.  Cardiac exam is irregularly irregular.  Right lower extremity demonstrates no peripheral edema.  Left lower extremity is in a knee immobilizer.    Will  "recheck basic metabolic profile specifically his creatinine.  Will also recheck a BNP today.  Will just losartan and Lasix based on outcomes.  - Basic metabolic panel  (Ca, Cl, CO2, Creat, Gluc, K, Na, BUN); Future  - BNP-N terminal pro; Future        MED REC REQUIRED  Post Medication Reconciliation Status: discharge medications reconciled, continue medications without change  BMI  Estimated body mass index is 37.66 kg/m  as calculated from the following:    Height as of this encounter: 1.803 m (5' 11\").    Weight as of this encounter: 122.5 kg (270 lb).   Weight management plan: Discussed healthy diet and exercise guidelines      No results found for any visits on 06/24/24.      Adrien Montanez is a 78 year old, presenting for the following health issues:  Hospital F/U        6/24/2024    10:56 AM   Additional Questions   Roomed by Kandace MURRELL     [facility]         Hospital Follow-up Visit:    Hospital/Nursing Home/IP Rehab Facility: [facility]  Date of Admission: 06/18/2024  Date of Discharge: 06/19/2024  Reason(s) for Admission: Knee replacement  Was the patient in the ICU or did the patient experience delirium during hospitalization?  No  Do you have any other stressors you would like to discuss with your provider? No    Problems taking medications regularly:  None  Medication changes since discharge: Changed losartan from 1 tab to 1/2 tab daily  Problems adhering to non-medication therapy:  None    Summary of hospitalization:  [facility] discharge summary reviewed  Diagnostic Tests/Treatments reviewed.  Follow up needed: recheck labs  Other Healthcare Providers Involved in Patient s Care:         Surgical follow-up appointment - Ortho  Update since discharge: improved.     [facility]     Medicine Progress Note - Hospitalist Service     Date of Admission:  6/18/2024        Assessment & Plan  Consult Note - Hospitalist Service  Date of Admission: "  6/18/2024  Consult Requested by: Diomedes Keller PA-C  Reason for Consult: 'hx DVT, hx PE, hx MI, Afib, on coum, HTN s/p ICD, ischemic cardiomyopathy'      Assessment & Plan  Ky Martinez is a 77 yo M with pmhx of afib on warfarin, bilateral Pes, DVT, CAD s/p PCI to RCA (2008), ischemic cardiomyopathy s/p ICD (2017) with EF of 40%, CKD stage 3, BPH, intermittent asthma who was admitted on 6/18/2024 for planned L TKA now s/p L TKA on 6/18/2025 with Dr. Hernandez. Medicine consulted for post-op medical co management.      S/p L TKA (6/18/2024) POD 1  Post-Op Issues  No noted complications and EBL less than 10 ml. Received periop ancef. Was seen in clinic for a pre-op eval 6/5/24.  Discharge recommendations per orthopedics.     Bilateral Pes (2020)  Hx of DVT (2017)  Paroxysmal Afib   DVT of LUE in 2017, patient on eliquis for several months. Represented in 2020 after long road trip found to have bilateral Pes with concern for possible right heart strain on CT. Had hypercoagulable workup that was negative for factor 2 and factor 5 mutations. Patient notably has paroxysmal AF, currenly in AF with plan for outpatient BEBO/ cardioversion following surgery. Outpatient warfarin regimen typically is 2.5 mg M, W, F and 5 mg all other days, goal INR 2-3. He stopped warfarin 5d prior to case without bridging pre-procedurally as outlined by anticoag clinic.  Hemoglobin stable at 13.2.  He Will continue with Lovenox at home in addition to usual home dose warfarin and then follow up in coumadin clinic as planned as there is a plan for BEBO/ cardioversion in the near future.  Follow-up outpatient with cardiology as planned  Continue metoprolol succinate as at home.     CAD s/p PCI RCA (2008) and NSTEMI (2017)  HTN  HLD   S/p RCA stent in 2008, repeat angiogram in 2017 following NSTEMI showed RCA stent patent, mild non-obstructive CAD to pLAD and LCx. Patient denies any chest pain or shortness of breath currently. Home reg: losartan 50 mg  "daily, toprol  mg BID, lasix 40 mg daily, imdur 30 mg daily, atorvastatin 80 mg.  Cont pta statin, metoprolol succinate, losartan at reduced dosing until he is seen by his primary care physician next week, continue imdur an furosemide as at home as well.     I have asked him to check his blood pressure intermittently at home as well.     Ischemic Cardiomyopathy  Chronic combined systolic and diastolic HF    S/p ICD (2017)  Most recent echo 3/2020 with LVEF 40%, grade I diastolic dysfunction.  Follows outpatient with cardiology, last seen 5/9/24. Patient with no LE, no e/o acute exacerbation.   Continue home regimen with changes as above.     BPH: No current urinary symptoms. Cont pta flomax and proscar      Intermittent Asthma: Not on any maintence medications, no e/o acute exacerbation on exam. Prn albuterol available while admitted.     CKD Stage 3: Baseline Scr ~ 1.2.    Creatinine 1.48 on POD 1.  I have decreased the losartan dose at discharge and he will follow up with his primary care provider on Monday June 24 and he may have recheck creatinine at that time.           Diet: Advance Diet as Tolerated: Regular Diet Adult  Discharge Instruction - Regular Diet Adult    DVT Prophylaxis: Warfarin  Gallardo Catheter: Not present  Lines: None     Cardiac Monitoring: None  Code Status: Full Code          Clinically Significant Risk Factors Present on Admission               # Drug Induced Coagulation Defect: home medication list includes an anticoagulant medication    # Hypertension: Noted on problem list                # Obesity: Estimated body mass index is 38.71 kg/m  as calculated from the following:    Height as of this encounter: 1.803 m (5' 11\").    Weight as of this encounter: 125.9 kg (277 lb 9 oz).       # Asthma: noted on problem list  # ICD device present  # Pacemaker present              Disposition Plan     Medically Ready for Discharge: Anticipated Today                 The patient's care was " discussed with the  entire care team .     Kelsey Brizuela CNP  Hospitalist Service  [facility]    Plan of care communicated with patient           Patient Active Problem List   Diagnosis    Other acute and subacute form of ischemic heart disease    Intermittent asthma    Hyperlipidemia    Benign prostatic hyperplasia    Coronary artery disease of native artery of native heart with stable angina pectoris (H24)    Acute deep vein thrombosis (DVT) of other vein of left upper extremity (H)    S/P ICD (internal cardiac defibrillator) procedure    Chronic systolic heart failure (H)    Hypertension    Ischemic cardiomyopathy    Non-STEMI (non-ST elevated myocardial infarction) (H)    Coronary atherosclerosis    Morbid obesity (H)    Acute pulmonary embolism - bilateral with possible right sided heart strain on CT    Hypoxemia    Bilateral leg edema - right more than left    Shortness of breath    Pulmonary emboli (H)    Atrophy of left kidney    Acute pulmonary embolism, unspecified pulmonary embolism type, unspecified whether acute cor pulmonale present (H)    Primary osteoarthritis of right shoulder    Paroxysmal atrial fibrillation with rapid ventricular response (H)    Paroxysmal atrial fibrillation (H)    S/P total knee arthroplasty, left       Current Outpatient Medications   Medication Sig Dispense Refill    acetaminophen (TYLENOL) 325 MG tablet Take 2 tablets (650 mg) by mouth every 4 hours as needed for other (mild pain) 100 tablet 0    atorvastatin (LIPITOR) 80 MG tablet Take 1 tablet by mouth once daily 90 tablet 0    DAILY MULTI OR Take 1 tablet by mouth daily      enoxaparin ANTICOAGULANT (LOVENOX) 40 MG/0.4ML syringe Inject 0.4 mLs (40 mg) Subcutaneous every 24 hours for 7 days 2.8 mL 0    finasteride (PROSCAR) 5 MG tablet TAKE 1 TABLET (5MG) BY MOUTH ONCE DAILY (Patient taking differently: Take 5 mg by mouth daily) 90 tablet 3    furosemide (LASIX) 40 MG tablet Take 1 tablet by  mouth once daily 90 tablet 0    isosorbide mononitrate (IMDUR) 30 MG 24 hr tablet Take 1 tablet by mouth once daily 90 tablet 0    losartan (COZAAR) 50 MG tablet TAKE 1 TABLET (50MG) BY MOUTH ONCE DAILY 90 tablet 3    metoprolol succinate ER (TOPROL XL) 50 MG 24 hr tablet Take 3 tablets (150 mg) by mouth 2 times daily      potassium chloride ER (KLOR-CON M) 20 MEQ CR tablet Take 1 tablet by mouth once daily 90 tablet 3    senna-docusate (SENOKOT-S/PERICOLACE) 8.6-50 MG tablet Take 1-2 tablets by mouth 2 times daily Take while on oral narcotics to prevent or treat constipation. 30 tablet 0    tamsulosin (FLOMAX) 0.4 MG capsule Take 1 capsule by mouth once daily 90 capsule 0    warfarin ANTICOAGULANT (COUMADIN) 5 MG tablet Take 5 mg by mouth MORNINGS: Tuesdays, Thursdays, Saturdays and Sundays. Take 2.5 mg Mondays, Wednesdays and Fridays or as directed by anticoagulation clinic. NEXT INR 06/18/2024      warfarin ANTICOAGULANT (COUMADIN) 5 MG tablet Take 2.5 mg every Mon, Wed, Fri; 5 mg all other days or as directed by the INR clinic. (Patient taking differently: Take 2.5 mg by mouth MORNINGS: every Mon, Wed, Fri; take 5 mg all other days or as directed by the INR clinic. NEXT INR 06/18/24) 66 tablet 1    albuterol (PROAIR HFA) 108 (90 Base) MCG/ACT inhaler Inhale 1-2 puffs into the lungs every 4 hours as needed for shortness of breath (Patient not taking: Reported on 6/24/2024) 8 g 1    nitroGLYcerin (NITROSTAT) 0.4 MG sublingual tablet 0.4 mg As needed (Patient not taking: Reported on 6/24/2024)      oxyCODONE (ROXICODONE) 5 MG tablet Take 1-2 tablets (5-10 mg) by mouth every 4 hours as needed for moderate to severe pain (Patient not taking: Reported on 6/24/2024) 33 tablet 0             Review of Systems  CONSTITUTIONAL: NEGATIVE for fever, chills, change in weight  ENT/MOUTH: NEGATIVE for ear, mouth and throat problems  RESP: NEGATIVE for significant cough or SOB  CV: NEGATIVE for chest pain, palpitations or  "peripheral edema  MUSCULOSKELETAL: NEGATIVE for significant arthralgias or myalgia and POSITIVE  for status post left knee replacement  ROS otherwise negative      Objective    /78   Pulse 88   Temp 97.2  F (36.2  C) (Temporal)   Resp 20   Ht 1.803 m (5' 11\")   Wt 122.5 kg (270 lb)   SpO2 98%   BMI 37.66 kg/m    Body mass index is 37.66 kg/m .  Physical Exam   GENERAL: alert, no distress, and obese  NECK: no adenopathy, no asymmetry, masses, or scars  RESP: lungs clear to auscultation - no rales, rhonchi or wheezes  CV: irregularly irregular rhythm, normal S1 S2, no S3 or S4, no murmur, click or rub, peripheral pulses strong, and no peripheral edema  ABDOMEN: soft, nontender, no hepatosplenomegaly, no masses and bowel sounds normal  MS: LLE knee immobilizer    Admission on 06/18/2024, Discharged on 06/19/2024   Component Date Value Ref Range Status    INR 06/18/2024 1.36 (H)  0.85 - 1.15 Final    Hold Specimen 06/18/2024 [facility]   Final    Hold Specimen 06/18/2024 [facility]   Final    GLUCOSE BY METER POCT 06/18/2024 93  70 - 99 mg/dL Final    Lactic Acid, Initial 06/18/2024 0.9  0.7 - 2.0 mmol/L Final    Potassium 06/18/2024 4.5  3.4 - 5.3 mmol/L Final    Hemoglobin 06/19/2024 13.2 (L)  13.3 - 17.7 g/dL Final    INR 06/19/2024 1.54 (H)  0.85 - 1.15 Final    Sodium 06/19/2024 136  135 - 145 mmol/L Final    Reference intervals for this test were updated on 09/26/2023 to more accurately reflect our healthy population. There may be differences in the flagging of prior results with similar values performed with this method. Interpretation of those prior results can be made in the context of the updated reference intervals.     Potassium 06/19/2024 5.2  3.4 - 5.3 mmol/L Final    Chloride 06/19/2024 106  98 - 107 mmol/L Final    Carbon Dioxide (CO2) 06/19/2024 21 (L)  22 - 29 mmol/L Final    Anion Gap 06/19/2024 9  7 - 15 mmol/L Final    Urea Nitrogen 06/19/2024 30.9 (H)  8.0 - 23.0 mg/dL Final    Creatinine 06/19/2024 " 1.45 (H)  0.67 - 1.17 mg/dL Final    GFR Estimate 06/19/2024 50 (L)  >60 mL/min/1.73m2 Final    eGFR calculated using 2021 CKD-EPI equation.    Calcium 06/19/2024 8.6 (L)  8.8 - 10.2 mg/dL Final    Glucose 06/19/2024 170 (H)  70 - 99 mg/dL Final     No results found for this or any previous visit (from the past 24 hour(s)).        Signed Electronically by: Marcin Rios MD

## 2024-06-27 ENCOUNTER — TRANSFERRED RECORDS (OUTPATIENT)
Dept: HEALTH INFORMATION MANAGEMENT | Facility: CLINIC | Age: 79
End: 2024-06-27

## 2024-06-27 DIAGNOSIS — Z96.652 S/P TOTAL KNEE ARTHROPLASTY, LEFT: Primary | ICD-10-CM

## 2024-06-28 ENCOUNTER — DOCUMENTATION ONLY (OUTPATIENT)
Dept: OTHER | Facility: CLINIC | Age: 79
End: 2024-06-28
Payer: COMMERCIAL

## 2024-06-28 ENCOUNTER — TRANSFERRED RECORDS (OUTPATIENT)
Dept: HEALTH INFORMATION MANAGEMENT | Facility: CLINIC | Age: 79
End: 2024-06-28
Payer: COMMERCIAL

## 2024-06-28 ENCOUNTER — TELEPHONE (OUTPATIENT)
Dept: ANTICOAGULATION | Facility: CLINIC | Age: 79
End: 2024-06-28
Payer: COMMERCIAL

## 2024-06-28 NOTE — TELEPHONE ENCOUNTER
12:38 PM    Writer spoke with the patient and scheduled an INR for 7/10/24.    Julia Vaz RN, BSN, PHN  Anticoagulation Clinic   [phone]

## 2024-06-28 NOTE — TELEPHONE ENCOUNTER
[facility] is calling from [facility]. Tarik is scheduled with them on July 17 for a cardioversion. She is wanting another INR scheduled for 7/10. The patient may not need to do the BEBO if he stays in range with his INR.    Please call patient and schedule INR.    Ninoska Guerrero RN    [facility]

## 2024-07-02 ASSESSMENT — KOOS JR
STRAIGHTENING KNEE FULLY: MODERATE
STANDING UPRIGHT: MILD
HOW SEVERE IS YOUR KNEE STIFFNESS AFTER FIRST WAKING IN MORNING: MODERATE
BENDING TO THE FLOOR TO PICK UP OBJECT: MILD
TWISING OR PIVOTING ON KNEE: MILD
RISING FROM SITTING: MILD
KOOS JR SCORING: 61.58
GOING UP OR DOWN STAIRS: MODERATE

## 2024-07-03 ENCOUNTER — ANTICOAGULATION THERAPY VISIT (OUTPATIENT)
Dept: ANTICOAGULATION | Facility: CLINIC | Age: 79
End: 2024-07-03

## 2024-07-03 ENCOUNTER — ANCILLARY PROCEDURE (OUTPATIENT)
Dept: GENERAL RADIOLOGY | Facility: CLINIC | Age: 79
End: 2024-07-03
Attending: NURSE PRACTITIONER
Payer: COMMERCIAL

## 2024-07-03 ENCOUNTER — OFFICE VISIT (OUTPATIENT)
Dept: ORTHOPEDICS | Facility: CLINIC | Age: 79
End: 2024-07-03
Payer: COMMERCIAL

## 2024-07-03 ENCOUNTER — LAB (OUTPATIENT)
Dept: LAB | Facility: CLINIC | Age: 79
End: 2024-07-03
Payer: COMMERCIAL

## 2024-07-03 VITALS
HEART RATE: 91 BPM | SYSTOLIC BLOOD PRESSURE: 111 MMHG | BODY MASS INDEX: 38.65 KG/M2 | HEIGHT: 70 IN | WEIGHT: 270 LBS | DIASTOLIC BLOOD PRESSURE: 74 MMHG

## 2024-07-03 DIAGNOSIS — I48.0 PAROXYSMAL ATRIAL FIBRILLATION (H): ICD-10-CM

## 2024-07-03 DIAGNOSIS — I48.0 PAROXYSMAL ATRIAL FIBRILLATION WITH RAPID VENTRICULAR RESPONSE (H): ICD-10-CM

## 2024-07-03 DIAGNOSIS — Z96.652 S/P TOTAL KNEE ARTHROPLASTY, LEFT: Primary | ICD-10-CM

## 2024-07-03 DIAGNOSIS — I26.99 ACUTE PULMONARY EMBOLISM, UNSPECIFIED PULMONARY EMBOLISM TYPE, UNSPECIFIED WHETHER ACUTE COR PULMONALE PRESENT (H): ICD-10-CM

## 2024-07-03 DIAGNOSIS — Z96.652 S/P TOTAL KNEE ARTHROPLASTY, LEFT: ICD-10-CM

## 2024-07-03 DIAGNOSIS — I26.99 ACUTE PULMONARY EMBOLISM, UNSPECIFIED PULMONARY EMBOLISM TYPE, UNSPECIFIED WHETHER ACUTE COR PULMONALE PRESENT (H): Primary | ICD-10-CM

## 2024-07-03 LAB — INR BLD: 3 (ref 0.9–1.1)

## 2024-07-03 PROCEDURE — 36416 COLLJ CAPILLARY BLOOD SPEC: CPT

## 2024-07-03 PROCEDURE — 85610 PROTHROMBIN TIME: CPT

## 2024-07-03 PROCEDURE — 73562 X-RAY EXAM OF KNEE 3: CPT | Mod: TC | Performed by: RADIOLOGY

## 2024-07-03 PROCEDURE — 99024 POSTOP FOLLOW-UP VISIT: CPT | Performed by: NURSE PRACTITIONER

## 2024-07-03 NOTE — LETTER
7/3/2024      Ky Martinez  [id number]  [place] [id number]      Dear Colleague,    Thank you for referring your patient, Ky Martinez, to the [facility]. Please see a copy of my visit note below.    Orthopedic Clinic Post-Operative Note    CHIEF COMPLAINT: post-op check. Total knee replacement on 6/18/24      HISTORY OF PRESENT ILLNESS    Here with his wife. Doing very well. Minimum pain. Been using tylenol only since surgery. Attending therapy at [facility]. States sore after this but tylenol icing has worked.  No incision concerns. Voiding, eating. No CHF concerns. Reports getting cardioversion in a few weeks. Normally on coumadin.  Resumed this. Initially bridged with lovenox.  Last INR was 2.3.  using LIAM.         Surgery on 6/18/24 with Dr. Mary [facility] ASSISTED Left TOTAL KNEE ARTHROPLASTY         Patient's past medical, surgical, social and family histories reviewed.     Past Medical History:   Diagnosis Date     Hypertension      Myocardial infarction (H)        Past Surgical History:   Procedure Laterality Date     ARTHROPLASTY, KNEE, ROBOT ASSISTED, USING ALON Left 6/18/2024    Procedure: ALON ASSISTED Left TOTAL KNEE ARTHROPLASTY;  Surgeon: Nathan Hernandez DO;  Location: [facility] OR     Cardiac stent       COLONOSCOPY N/A 5/22/2019    Procedure: Colonoscopy, Polypectomy by Snare;  Surgeon: Gian Horn DO;  Location: [facility] GI     EXCISE GANGLION WRIST Left 11/26/2014    Procedure: EXCISE GANGLION WRIST;  Surgeon: Gian Haddad MD;  Location: [facility] OR     ICD DEVICE INTERROGATE      2017     ORTHOPEDIC SURGERY       RELEASE CARPAL TUNNEL Left 11/26/2014    Procedure: RELEASE CARPAL TUNNEL;  Surgeon: Gian Haddad MD;  Location: [facility] OR     RELEASE DEQUERVAINS WRIST Left 11/26/2014    Procedure: RELEASE DEQUERVAINS WRIST;  Surgeon: Gian Haddad MD;  Location: [facility] OR       Medications:  Current Outpatient Medications    Medication Sig Dispense Refill     acetaminophen (TYLENOL) 325 MG tablet Take 2 tablets (650 mg) by mouth every 4 hours as needed for other (mild pain) 100 tablet 0     albuterol (PROAIR HFA) 108 (90 Base) MCG/ACT inhaler Inhale 1-2 puffs into the lungs every 4 hours as needed for shortness of breath (Patient not taking: Reported on 6/24/2024) 8 g 1     atorvastatin (LIPITOR) 80 MG tablet Take 1 tablet by mouth once daily 90 tablet 0     DAILY MULTI OR Take 1 tablet by mouth daily       finasteride (PROSCAR) 5 MG tablet TAKE 1 TABLET (5MG) BY MOUTH ONCE DAILY (Patient taking differently: Take 5 mg by mouth daily) 90 tablet 3     furosemide (LASIX) 40 MG tablet Take 1 tablet by mouth once daily 90 tablet 0     isosorbide mononitrate (IMDUR) 30 MG 24 hr tablet Take 1 tablet by mouth once daily 90 tablet 0     losartan (COZAAR) 50 MG tablet TAKE 1 TABLET (50MG) BY MOUTH ONCE DAILY 90 tablet 3     metoprolol succinate ER (TOPROL XL) 50 MG 24 hr tablet Take 3 tablets (150 mg) by mouth 2 times daily       nitroGLYcerin (NITROSTAT) 0.4 MG sublingual tablet 0.4 mg As needed (Patient not taking: Reported on 6/24/2024)       oxyCODONE (ROXICODONE) 5 MG tablet Take 1-2 tablets (5-10 mg) by mouth every 4 hours as needed for moderate to severe pain (Patient not taking: Reported on 6/24/2024) 33 tablet 0     potassium chloride ER (KLOR-CON M) 20 MEQ CR tablet Take 1 tablet by mouth once daily 90 tablet 3     senna-docusate (SENOKOT-S/PERICOLACE) 8.6-50 MG tablet Take 1-2 tablets by mouth 2 times daily Take while on oral narcotics to prevent or treat constipation. 30 tablet 0     tamsulosin (FLOMAX) 0.4 MG capsule Take 1 capsule by mouth once daily 90 capsule 0     warfarin ANTICOAGULANT (COUMADIN) 5 MG tablet Take 5 mg by mouth MORNINGS: Tuesdays, Thursdays, Saturdays and Sundays. Take 2.5 mg Mondays, Wednesdays and Fridays or as directed by anticoagulation clinic. NEXT INR 06/18/2024       warfarin ANTICOAGULANT (COUMADIN) 5  "MG tablet Take 2.5 mg every Mon, Wed, Fri; 5 mg all other days or as directed by the INR clinic. (Patient taking differently: Take 2.5 mg by mouth MORNINGS: every Mon, Wed, Fri; take 5 mg all other days or as directed by the INR clinic. NEXT INR 06/18/24) 66 tablet 1     No current facility-administered medications for this visit.       Allergies   Allergen Reactions     No Known Allergies Other (See Comments)     Pine Difficulty breathing     Pine Pollen         Social History     Occupational History     Not on file   Tobacco Use     Smoking status: Never     Smokeless tobacco: Never   Vaping Use     Vaping status: Never Used   Substance and Sexual Activity     Alcohol use: Yes     Alcohol/week: 3.3 standard drinks of alcohol     Types: 4 Glasses of wine per week     Comment: occasional      Drug use: No     Sexual activity: Not Currently     Partners: Female     Birth control/protection: None       Family History   Problem Relation Age of Onset     Arthritis Mother      Diabetes Father      Allergies Father         sulfa     Eye Disorder Father         cataract     Heart Disease Father         by pass       REVIEW OF SYSTEMS  General: negative for, night sweats, dizziness, fatigue  Resp: No shortness of breath and no cough  CV: negative for chest pain, syncope or near-syncope  GI: negative for nausea, vomiting and diarrhea  : negative for dysuria and hematuria  Musculoskeletal: as above  Neurologic: negative for syncope   Hematologic: negative for bleeding disorder    Physical Exam:  Vitals: /74   Pulse 91   Ht 1.778 m (5' 10\")   Wt 122.5 kg (270 lb)   BMI 38.74 kg/m    BMI= Body mass index is 38.74 kg/m .  Constitutional: healthy, alert and no acute distress   Psychiatric: mentation appears normal and affect normal/bright  NEURO: no focal deficits  SKIN: .healing well, without signs of infection including no erythema, incision breakdown or purlent drainage  JOINT/EXTREMITIES: left knee; some swelling " to the knee and mild swelling distal of the knee.  CECILIA 10-85. PROM 5-95. Extensor intact. Able to do straight leg raise.  No instability. Mild bruising.  Distal neurovascular grossly intact. Calf is soft non-tender, no focal masses, erythema, or increased warmth.    GAIT: not tested       Diagnostic Modalities:  left knee X-ray: The prosthesis has acceptable alignment. No fractures or dislocations. Prosthesis is well seated with no evidence of loosening  Independent visualization of the images was performed.      Impression: S/p total knee replacement 2 weeks from surgery (DOS: 6/18/24).      Plan:   Overall doing very well.   Activity: slow increase as tolerated. Continue physical therapy. Ok to stop the immobilizer.   Abbi/Sutures out: Not applicable. Monocryl and glue closure.  Do not soak or submerge. Ok to get incision wet in the shower. Ok to leave uncovered.   Pain controlled: Yes. Continue to use: tylenol  Discussed strict edema control with elevation about the heart, icing, compression.    Return to clinic 4, week(s), or sooner as needed for changes.    Re-x-ray on return: No    SHANNON Escalante, CNP  Orthopedic Surgery      Again, thank you for allowing me to participate in the care of your patient.        Sincerely,        SHANNON Jerry CNP

## 2024-07-03 NOTE — PROGRESS NOTES
ANTICOAGULATION MANAGEMENT     Ky Martinez 78 year old male is on warfarin with therapeutic INR result. (Goal INR 2.0-3.0)    Recent labs: (last 7 days)     07/03/24  1003   INR 3.0*       ASSESSMENT     Source(s): Chart Review     Warfarin doses taken: Warfarin taken as instructed  Diet: No new diet changes identified  Medication/supplement changes: None noted  New illness, injury, or hospitalization: No  Signs or symptoms of bleeding or clotting: No  Previous result: Therapeutic last visit; previously outside of goal range  Additional findings: None       PLAN     Recommended plan for no diet, medication or health factor changes affecting INR     Dosing Instructions: Continue your current warfarin dose with next INR in 1 week       Summary  As of 7/3/2024      Full warfarin instructions:  2.5 mg every Mon, Wed, Fri; 5 mg all other days   Next INR check:                 Telephone call with Tarik who verbalizes understanding and agrees to plan    Lab visit scheduled    Education provided: Please call back if any changes to your diet, medications or how you've been taking warfarin    Plan made per [facility] anticoagulation protocol    Jailene Whitfield, RN  Anticoagulation Clinic  7/3/2024    _______________________________________________________________________     Anticoagulation Episode Summary       Current INR goal:  2.0-3.0   TTR:  78.4% (1 y)   Target end date:  Indefinite   Send INR reminders to:  BRANDY MEHTA    Indications    Acute pulmonary embolism  unspecified pulmonary embolism type  unspecified whether acute cor pulmonale present (H) [I26.99]  Paroxysmal atrial fibrillation with rapid ventricular response (H) [I48.0]  Paroxysmal atrial fibrillation (H) [I48.0]             Comments:               Anticoagulation Care Providers       Provider Role Specialty Phone number    Ky Major MD Referring Family Practice     Marcin Rios MD Referring Family Medicine [phone]

## 2024-07-03 NOTE — PROGRESS NOTES
Orthopedic Clinic Post-Operative Note    CHIEF COMPLAINT: post-op check. Total knee replacement on 6/18/24      HISTORY OF PRESENT ILLNESS    Here with his wife. Doing very well. Minimum pain. Been using tylenol only since surgery. Attending therapy at [facility]. States sore after this but tylenol icing has worked.  No incision concerns. Voiding, eating. No CHF concerns. Reports getting cardioversion in a few weeks. Normally on coumadin.  Resumed this. Initially bridged with lovenox.  Last INR was 2.3.  using LIAM.         Surgery on 6/18/24 with Dr. Mary [facility] ASSISTED Left TOTAL KNEE ARTHROPLASTY         Patient's past medical, surgical, social and family histories reviewed.     Past Medical History:   Diagnosis Date    Hypertension     Myocardial infarction (H)        Past Surgical History:   Procedure Laterality Date    ARTHROPLASTY, KNEE, ROBOT ASSISTED, USING ALON Left 6/18/2024    Procedure: ALON ASSISTED Left TOTAL KNEE ARTHROPLASTY;  Surgeon: Nathan Hernandez DO;  Location: [facility]    Cardiac stent      COLONOSCOPY N/A 5/22/2019    Procedure: Colonoscopy, Polypectomy by Snare;  Surgeon: Gian Horn DO;  Location: [facility]    EXCISE GANGLION WRIST Left 11/26/2014    Procedure: EXCISE GANGLION WRIST;  Surgeon: Gian Haddad MD;  Location: [facility] OR    ICD DEVICE INTERROGATE      2017    ORTHOPEDIC SURGERY      RELEASE CARPAL TUNNEL Left 11/26/2014    Procedure: RELEASE CARPAL TUNNEL;  Surgeon: Gian Haddad MD;  Location: [facility]    RELEASE DEQUERVAINS WRIST Left 11/26/2014    Procedure: RELEASE DEQUERVAINS WRIST;  Surgeon: Gian Haddad MD;  Location: [facility] OR       Medications:  Current Outpatient Medications   Medication Sig Dispense Refill    acetaminophen (TYLENOL) 325 MG tablet Take 2 tablets (650 mg) by mouth every 4 hours as needed for other (mild pain) 100 tablet 0    albuterol (PROAIR HFA) 108 (90 Base) MCG/ACT inhaler Inhale 1-2 puffs into the  lungs every 4 hours as needed for shortness of breath (Patient not taking: Reported on 6/24/2024) 8 g 1    atorvastatin (LIPITOR) 80 MG tablet Take 1 tablet by mouth once daily 90 tablet 0    DAILY MULTI OR Take 1 tablet by mouth daily      finasteride (PROSCAR) 5 MG tablet TAKE 1 TABLET (5MG) BY MOUTH ONCE DAILY (Patient taking differently: Take 5 mg by mouth daily) 90 tablet 3    furosemide (LASIX) 40 MG tablet Take 1 tablet by mouth once daily 90 tablet 0    isosorbide mononitrate (IMDUR) 30 MG 24 hr tablet Take 1 tablet by mouth once daily 90 tablet 0    losartan (COZAAR) 50 MG tablet TAKE 1 TABLET (50MG) BY MOUTH ONCE DAILY 90 tablet 3    metoprolol succinate ER (TOPROL XL) 50 MG 24 hr tablet Take 3 tablets (150 mg) by mouth 2 times daily      nitroGLYcerin (NITROSTAT) 0.4 MG sublingual tablet 0.4 mg As needed (Patient not taking: Reported on 6/24/2024)      oxyCODONE (ROXICODONE) 5 MG tablet Take 1-2 tablets (5-10 mg) by mouth every 4 hours as needed for moderate to severe pain (Patient not taking: Reported on 6/24/2024) 33 tablet 0    potassium chloride ER (KLOR-CON M) 20 MEQ CR tablet Take 1 tablet by mouth once daily 90 tablet 3    senna-docusate (SENOKOT-S/PERICOLACE) 8.6-50 MG tablet Take 1-2 tablets by mouth 2 times daily Take while on oral narcotics to prevent or treat constipation. 30 tablet 0    tamsulosin (FLOMAX) 0.4 MG capsule Take 1 capsule by mouth once daily 90 capsule 0    warfarin ANTICOAGULANT (COUMADIN) 5 MG tablet Take 5 mg by mouth MORNINGS: Tuesdays, Thursdays, Saturdays and Sundays. Take 2.5 mg Mondays, Wednesdays and Fridays or as directed by anticoagulation clinic. NEXT INR 06/18/2024      warfarin ANTICOAGULANT (COUMADIN) 5 MG tablet Take 2.5 mg every Mon, Wed, Fri; 5 mg all other days or as directed by the INR clinic. (Patient taking differently: Take 2.5 mg by mouth MORNINGS: every Mon, Wed, Fri; take 5 mg all other days or as directed by the INR clinic. NEXT INR 06/18/24) 66 tablet  "1     No current facility-administered medications for this visit.       Allergies   Allergen Reactions    No Known Allergies Other (See Comments)    Pine Difficulty breathing     Pine Pollen         Social History     Occupational History    Not on file   Tobacco Use    Smoking status: Never    Smokeless tobacco: Never   Vaping Use    Vaping status: Never Used   Substance and Sexual Activity    Alcohol use: Yes     Alcohol/week: 3.3 standard drinks of alcohol     Types: 4 Glasses of wine per week     Comment: occasional     Drug use: No    Sexual activity: Not Currently     Partners: Female     Birth control/protection: None       Family History   Problem Relation Age of Onset    Arthritis Mother     Diabetes Father     Allergies Father         sulfa    Eye Disorder Father         cataract    Heart Disease Father         by pass       REVIEW OF SYSTEMS  General: negative for, night sweats, dizziness, fatigue  Resp: No shortness of breath and no cough  CV: negative for chest pain, syncope or near-syncope  GI: negative for nausea, vomiting and diarrhea  : negative for dysuria and hematuria  Musculoskeletal: as above  Neurologic: negative for syncope   Hematologic: negative for bleeding disorder    Physical Exam:  Vitals: /74   Pulse 91   Ht 1.778 m (5' 10\")   Wt 122.5 kg (270 lb)   BMI 38.74 kg/m    BMI= Body mass index is 38.74 kg/m .  Constitutional: healthy, alert and no acute distress   Psychiatric: mentation appears normal and affect normal/bright  NEURO: no focal deficits  SKIN: .healing well, without signs of infection including no erythema, incision breakdown or purlent drainage  JOINT/EXTREMITIES: left knee; some swelling to the knee and mild swelling distal of the knee.  CECILIA 10-85. PROM 5-95. Extensor intact. Able to do straight leg raise.  No instability. Mild bruising.  Distal neurovascular grossly intact. Calf is soft non-tender, no focal masses, erythema, or increased warmth.    GAIT: not " tested       Diagnostic Modalities:  left knee X-ray: The prosthesis has acceptable alignment. No fractures or dislocations. Prosthesis is well seated with no evidence of loosening  Independent visualization of the images was performed.      Impression: S/p total knee replacement 2 weeks from surgery (DOS: 6/18/24).      Plan:   Overall doing very well.   Activity: slow increase as tolerated. Continue physical therapy. Ok to stop the immobilizer.   Douglas/Sutures out: Not applicable. Monocryl and glue closure.  Do not soak or submerge. Ok to get incision wet in the shower. Ok to leave uncovered.   Pain controlled: Yes. Continue to use: tylenol  Discussed strict edema control with elevation about the heart, icing, compression.    Return to clinic 4, week(s), or sooner as needed for changes.    Re-x-ray on return: No    SHANNON Escalante, CNP  Orthopedic Surgery

## 2024-07-06 DIAGNOSIS — E78.5 HYPERLIPIDEMIA LDL GOAL <100: ICD-10-CM

## 2024-07-08 RX ORDER — ATORVASTATIN CALCIUM 80 MG/1
TABLET, FILM COATED ORAL
Qty: 90 TABLET | Refills: 0 | Status: SHIPPED | OUTPATIENT
Start: 2024-07-08 | End: 2024-07-24

## 2024-07-10 ENCOUNTER — LAB (OUTPATIENT)
Dept: LAB | Facility: CLINIC | Age: 79
End: 2024-07-10
Payer: COMMERCIAL

## 2024-07-10 ENCOUNTER — ANTICOAGULATION THERAPY VISIT (OUTPATIENT)
Dept: ANTICOAGULATION | Facility: CLINIC | Age: 79
End: 2024-07-10

## 2024-07-10 DIAGNOSIS — I48.0 PAROXYSMAL ATRIAL FIBRILLATION (H): ICD-10-CM

## 2024-07-10 DIAGNOSIS — I48.0 PAROXYSMAL ATRIAL FIBRILLATION WITH RAPID VENTRICULAR RESPONSE (H): ICD-10-CM

## 2024-07-10 DIAGNOSIS — I26.99 ACUTE PULMONARY EMBOLISM, UNSPECIFIED PULMONARY EMBOLISM TYPE, UNSPECIFIED WHETHER ACUTE COR PULMONALE PRESENT (H): ICD-10-CM

## 2024-07-10 DIAGNOSIS — I26.99 ACUTE PULMONARY EMBOLISM, UNSPECIFIED PULMONARY EMBOLISM TYPE, UNSPECIFIED WHETHER ACUTE COR PULMONALE PRESENT (H): Primary | ICD-10-CM

## 2024-07-10 LAB — INR BLD: 3.4 (ref 0.9–1.1)

## 2024-07-10 PROCEDURE — 85610 PROTHROMBIN TIME: CPT

## 2024-07-10 PROCEDURE — 36416 COLLJ CAPILLARY BLOOD SPEC: CPT

## 2024-07-10 NOTE — PROGRESS NOTES
ANTICOAGULATION MANAGEMENT     Ky Martinez 78 year old male is on warfarin with supratherapeutic INR result. (Goal INR 2.0-3.0)    Recent labs: (last 7 days)     07/10/24  1333   INR 3.4*       ASSESSMENT     Source(s): Chart Review and Patient/Caregiver Call     Warfarin doses taken: Warfarin taken as instructed  Diet: Decreased greens/vitamin K in diet; plans to resume previous intake  Medication/supplement changes: None noted  New illness, injury, or hospitalization: No  Signs or symptoms of bleeding or clotting: No  Previous result: Therapeutic last 2(+) visits  Additional findings: Upcoming Cardioversion; weekly INR monitoring. To notify EP pool and cardiologist if INR < 2 if scheduled, INR > 3.3 within a week of ablation/PVI, or non-compliance with monitoring > 1 week. With [facility] at [facility] cardiology.       PLAN     Recommended plan for temporary change(s) affecting INR     Dosing Instructions: Continue your current warfarin dose with next INR in 1 week - will send reminder to check care everywhere for INR results from [facility].       Summary  As of 7/10/2024      Full warfarin instructions:  2.5 mg every Mon, Wed, Fri; 5 mg all other days   Next INR check:  7/17/2024               Telephone call with Tarik who verbalizes understanding and agrees to plan and who agrees to plan and repeated back plan correctly    Patient using outside facility for labs    Education provided: Dietary considerations: importance of consistent vitamin K intake    Plan made per [facility] anticoagulation protocol    Lucia Canales, RN  Anticoagulation Clinic  7/10/2024    _______________________________________________________________________     Anticoagulation Episode Summary       Current INR goal:  2.0-3.0   TTR:  76.4% (1 y)   Target end date:  Indefinite   Send INR reminders to:  BRANDY MEHTA    Indications    Acute pulmonary embolism  unspecified pulmonary embolism type  unspecified whether acute cor pulmonale present (H)  [I26.99]  Paroxysmal atrial fibrillation with rapid ventricular response (H) [I48.0]  Paroxysmal atrial fibrillation (H) [I48.0]             Comments:               Anticoagulation Care Providers       Provider Role Specialty Phone number    Ky Major MD Referring Family Practice     Marcin Rios MD Referring Family Medicine [phone]

## 2024-07-12 DIAGNOSIS — E78.5 HYPERLIPIDEMIA LDL GOAL <100: ICD-10-CM

## 2024-07-12 DIAGNOSIS — I25.10 CORONARY ARTERY DISEASE INVOLVING NATIVE CORONARY ARTERY OF NATIVE HEART WITHOUT ANGINA PECTORIS: ICD-10-CM

## 2024-07-12 RX ORDER — FINASTERIDE 5 MG/1
TABLET, FILM COATED ORAL
Qty: 90 TABLET | Refills: 0 | Status: SHIPPED | OUTPATIENT
Start: 2024-07-12 | End: 2024-07-24

## 2024-07-12 RX ORDER — POTASSIUM CHLORIDE 1500 MG/1
TABLET, EXTENDED RELEASE ORAL
Qty: 90 TABLET | Refills: 3 | Status: SHIPPED | OUTPATIENT
Start: 2024-07-12

## 2024-07-17 ENCOUNTER — ANTICOAGULATION THERAPY VISIT (OUTPATIENT)
Dept: ANTICOAGULATION | Facility: CLINIC | Age: 79
End: 2024-07-17
Payer: COMMERCIAL

## 2024-07-17 DIAGNOSIS — I48.0 PAROXYSMAL ATRIAL FIBRILLATION (H): ICD-10-CM

## 2024-07-17 DIAGNOSIS — I48.0 PAROXYSMAL ATRIAL FIBRILLATION WITH RAPID VENTRICULAR RESPONSE (H): ICD-10-CM

## 2024-07-17 DIAGNOSIS — I26.99 ACUTE PULMONARY EMBOLISM, UNSPECIFIED PULMONARY EMBOLISM TYPE, UNSPECIFIED WHETHER ACUTE COR PULMONALE PRESENT (H): Primary | ICD-10-CM

## 2024-07-17 LAB — INR (EXTERNAL): 2.7

## 2024-07-17 NOTE — PROGRESS NOTES
ANTICOAGULATION MANAGEMENT     Ky Martinez 78 year old male is on warfarin with therapeutic INR result. (Goal INR 2.0-3.0)    Recent labs: (last 7 days)     07/17/24  0800   INR 2.7       ASSESSMENT     Source(s): Chart Review     Warfarin doses taken:  NA  Diet:  NA  Medication/supplement changes:  Amiodarone started on 7/17/24 subsequent INRs may be increased. Closer INR monitoring recommended. Decrease Toprol XL to 50 mg twice daily   New illness, injury, or hospitalization: Yes: 7/17 [facility] and cardioversion.   Signs or symptoms of bleeding or clotting: No  Previous result: Supratherapeutic  Additional findings: Upcoming Atrial fib/flutter ablation; weekly INR monitoring. To notify EP pool and cardiologist if INR < 2 if scheduled, INR > 3.3 within a week of ablation/PVI, or non-compliance with monitoring > 1 week. Needs to be scheduled still. Will be through [facility].        PLAN     Recommended plan for temporary change(s) affecting INR     Dosing Instructions: Continue your current warfarin dose with next INR in 1 week       Summary  As of 7/17/2024      Full warfarin instructions:  2.5 mg every Mon, Wed, Fri; 5 mg all other days   Next INR check:  7/24/2024               Sent [employer] message with dosing and follow up instructions    Lab visit scheduled    Education provided: Interaction IS anticipated between warfarin and Amiodarone  Contact [phone] with any changes, questions or concerns.     Plan made per [facility] anticoagulation protocol    uJlia CHARLES RN  Anticoagulation Clinic  7/17/2024    _______________________________________________________________________     Anticoagulation Episode Summary       Current INR goal:  2.0-3.0   TTR:  75.3% (1 y)   Target end date:  Indefinite   Send INR reminders to:  BRANDY MEHTA    Indications    Acute pulmonary embolism  unspecified pulmonary embolism type  unspecified whether acute cor pulmonale present (H) [I26.99]  Paroxysmal atrial fibrillation with  rapid ventricular response (H) [I48.0]  Paroxysmal atrial fibrillation (H) [I48.0]             Comments:               Anticoagulation Care Providers       Provider Role Specialty Phone number    Ky Major MD Referring Family Practice     Marcin Rios MD Referring Family Medicine [phone]

## 2024-07-19 ENCOUNTER — ANTICOAGULATION THERAPY VISIT (OUTPATIENT)
Dept: ANTICOAGULATION | Facility: CLINIC | Age: 79
End: 2024-07-19

## 2024-07-19 ENCOUNTER — LAB (OUTPATIENT)
Dept: LAB | Facility: CLINIC | Age: 79
End: 2024-07-19
Payer: COMMERCIAL

## 2024-07-19 DIAGNOSIS — I26.99 ACUTE PULMONARY EMBOLISM, UNSPECIFIED PULMONARY EMBOLISM TYPE, UNSPECIFIED WHETHER ACUTE COR PULMONALE PRESENT (H): ICD-10-CM

## 2024-07-19 DIAGNOSIS — I26.99 ACUTE PULMONARY EMBOLISM, UNSPECIFIED PULMONARY EMBOLISM TYPE, UNSPECIFIED WHETHER ACUTE COR PULMONALE PRESENT (H): Primary | ICD-10-CM

## 2024-07-19 DIAGNOSIS — I48.0 PAROXYSMAL ATRIAL FIBRILLATION (H): ICD-10-CM

## 2024-07-19 DIAGNOSIS — I48.0 PAROXYSMAL ATRIAL FIBRILLATION WITH RAPID VENTRICULAR RESPONSE (H): ICD-10-CM

## 2024-07-19 LAB — INR BLD: 2.3 (ref 0.9–1.1)

## 2024-07-19 PROCEDURE — 36416 COLLJ CAPILLARY BLOOD SPEC: CPT

## 2024-07-19 PROCEDURE — 85610 PROTHROMBIN TIME: CPT

## 2024-07-19 NOTE — PROGRESS NOTES
ANTICOAGULATION MANAGEMENT     Ky Martinez 78 year old male is on warfarin with therapeutic INR result. (Goal INR 2.0-3.0)    Recent labs: (last 7 days)     07/19/24  1036   INR 2.3*       ASSESSMENT     Source(s): Chart Review  Previous INR was Therapeutic last visit (7/17/24); previously outside of goal range  Medication, diet, health changes since last INR chart reviewed; none identified         PLAN     Recommended plan for no diet, medication or health factor changes affecting INR     Dosing Instructions: Continue your current warfarin dose with next INR in 1 week       Summary  As of 7/19/2024      Full warfarin instructions:  2.5 mg every Mon, Wed, Fri; 5 mg all other days   Next INR check:  7/24/2024               Detailed voice message left for Tarik with dosing instructions and follow up date.     Check at provider office visit    Education provided: Please call back if any changes to your diet, medications or how you've been taking warfarin  Goal range and lab monitoring: goal range and significance of current result    Plan made per [facility] anticoagulation protocol    Tatyana Gilbert RN  Anticoagulation Clinic  7/19/2024    _______________________________________________________________________     Anticoagulation Episode Summary       Current INR goal:  2.0-3.0   TTR:  75.4% (1 y)   Target end date:  Indefinite   Send INR reminders to:  BRANDY MEHTA    Indications    Acute pulmonary embolism  unspecified pulmonary embolism type  unspecified whether acute cor pulmonale present (H) [I26.99]  Paroxysmal atrial fibrillation with rapid ventricular response (H) [I48.0]  Paroxysmal atrial fibrillation (H) [I48.0]             Comments:               Anticoagulation Care Providers       Provider Role Specialty Phone number    Ky Major MD Referring Family Practice     Marcin Rios MD Referring Family Medicine [phone]

## 2024-07-24 ENCOUNTER — ANTICOAGULATION THERAPY VISIT (OUTPATIENT)
Dept: ANTICOAGULATION | Facility: CLINIC | Age: 79
End: 2024-07-24

## 2024-07-24 ENCOUNTER — LAB (OUTPATIENT)
Dept: LAB | Facility: CLINIC | Age: 79
End: 2024-07-24
Payer: COMMERCIAL

## 2024-07-24 ENCOUNTER — OFFICE VISIT (OUTPATIENT)
Dept: FAMILY MEDICINE | Facility: CLINIC | Age: 79
End: 2024-07-24
Attending: FAMILY MEDICINE
Payer: COMMERCIAL

## 2024-07-24 VITALS
TEMPERATURE: 98.1 F | HEART RATE: 80 BPM | BODY MASS INDEX: 39.43 KG/M2 | DIASTOLIC BLOOD PRESSURE: 74 MMHG | HEIGHT: 70 IN | WEIGHT: 275.4 LBS | RESPIRATION RATE: 16 BRPM | SYSTOLIC BLOOD PRESSURE: 122 MMHG | OXYGEN SATURATION: 95 %

## 2024-07-24 DIAGNOSIS — N40.0 BENIGN PROSTATIC HYPERPLASIA, UNSPECIFIED WHETHER LOWER URINARY TRACT SYMPTOMS PRESENT: ICD-10-CM

## 2024-07-24 DIAGNOSIS — I25.10 CORONARY ARTERY DISEASE INVOLVING NATIVE CORONARY ARTERY OF NATIVE HEART WITHOUT ANGINA PECTORIS: ICD-10-CM

## 2024-07-24 DIAGNOSIS — Z00.00 ENCOUNTER FOR MEDICARE ANNUAL WELLNESS EXAM: Primary | ICD-10-CM

## 2024-07-24 DIAGNOSIS — I26.99 ACUTE PULMONARY EMBOLISM, UNSPECIFIED PULMONARY EMBOLISM TYPE, UNSPECIFIED WHETHER ACUTE COR PULMONALE PRESENT (H): ICD-10-CM

## 2024-07-24 DIAGNOSIS — I50.43 ACUTE ON CHRONIC COMBINED SYSTOLIC AND DIASTOLIC HEART FAILURE (H): ICD-10-CM

## 2024-07-24 DIAGNOSIS — Z12.11 SPECIAL SCREENING FOR MALIGNANT NEOPLASMS, COLON: ICD-10-CM

## 2024-07-24 DIAGNOSIS — I26.99 ACUTE PULMONARY EMBOLISM, UNSPECIFIED PULMONARY EMBOLISM TYPE, UNSPECIFIED WHETHER ACUTE COR PULMONALE PRESENT (H): Primary | ICD-10-CM

## 2024-07-24 DIAGNOSIS — I25.118 CORONARY ARTERY DISEASE OF NATIVE ARTERY OF NATIVE HEART WITH STABLE ANGINA PECTORIS (H): ICD-10-CM

## 2024-07-24 DIAGNOSIS — N18.31 CHRONIC KIDNEY DISEASE, STAGE 3A (H): ICD-10-CM

## 2024-07-24 DIAGNOSIS — I48.0 PAROXYSMAL ATRIAL FIBRILLATION WITH RAPID VENTRICULAR RESPONSE (H): ICD-10-CM

## 2024-07-24 DIAGNOSIS — J45.20 INTERMITTENT ASTHMA, UNCOMPLICATED: ICD-10-CM

## 2024-07-24 DIAGNOSIS — D68.69 HYPERCOAGULABLE STATE DUE TO PAROXYSMAL ATRIAL FIBRILLATION (H): ICD-10-CM

## 2024-07-24 DIAGNOSIS — E78.5 HYPERLIPIDEMIA LDL GOAL <100: ICD-10-CM

## 2024-07-24 DIAGNOSIS — E66.01 MORBID OBESITY (H): ICD-10-CM

## 2024-07-24 DIAGNOSIS — I48.0 PAROXYSMAL ATRIAL FIBRILLATION (H): ICD-10-CM

## 2024-07-24 DIAGNOSIS — I48.0 HYPERCOAGULABLE STATE DUE TO PAROXYSMAL ATRIAL FIBRILLATION (H): ICD-10-CM

## 2024-07-24 PROBLEM — I48.91 A-FIB (H): Status: ACTIVE | Noted: 2020-11-10

## 2024-07-24 PROBLEM — Z79.01 ON WARFARIN THERAPY: Status: ACTIVE | Noted: 2020-11-10

## 2024-07-24 PROBLEM — Z95.810 DUAL ICD (IMPLANTABLE CARDIOVERTER-DEFIBRILLATOR) IN PLACE: Status: ACTIVE | Noted: 2020-11-10

## 2024-07-24 LAB — INR BLD: 2.7 (ref 0.9–1.1)

## 2024-07-24 PROCEDURE — 85610 PROTHROMBIN TIME: CPT

## 2024-07-24 PROCEDURE — 36416 COLLJ CAPILLARY BLOOD SPEC: CPT

## 2024-07-24 PROCEDURE — G0439 PPPS, SUBSEQ VISIT: HCPCS | Performed by: FAMILY MEDICINE

## 2024-07-24 PROCEDURE — 99214 OFFICE O/P EST MOD 30 MIN: CPT | Mod: 25 | Performed by: FAMILY MEDICINE

## 2024-07-24 RX ORDER — FUROSEMIDE 40 MG
TABLET ORAL
Qty: 90 TABLET | Refills: 3 | Status: SHIPPED | OUTPATIENT
Start: 2024-07-24

## 2024-07-24 RX ORDER — ATORVASTATIN CALCIUM 80 MG/1
TABLET, FILM COATED ORAL
Qty: 90 TABLET | Refills: 3 | Status: SHIPPED | OUTPATIENT
Start: 2024-07-24

## 2024-07-24 RX ORDER — ALBUTEROL SULFATE 90 UG/1
1-2 AEROSOL, METERED RESPIRATORY (INHALATION) EVERY 4 HOURS PRN
Qty: 8 G | Refills: 1 | Status: SHIPPED | OUTPATIENT
Start: 2024-07-24

## 2024-07-24 RX ORDER — TAMSULOSIN HYDROCHLORIDE 0.4 MG/1
CAPSULE ORAL
Qty: 90 CAPSULE | Refills: 3 | Status: SHIPPED | OUTPATIENT
Start: 2024-07-24

## 2024-07-24 RX ORDER — MULTIVITAMIN,THERAPEUTIC
1 TABLET ORAL EVERY MORNING
COMMUNITY

## 2024-07-24 RX ORDER — FINASTERIDE 5 MG/1
TABLET, FILM COATED ORAL
Qty: 90 TABLET | Refills: 3 | Status: SHIPPED | OUTPATIENT
Start: 2024-07-24

## 2024-07-24 RX ORDER — METOPROLOL SUCCINATE 50 MG/1
50 TABLET, EXTENDED RELEASE ORAL 2 TIMES DAILY
Qty: 180 TABLET | Refills: 3 | Status: SHIPPED | OUTPATIENT
Start: 2024-07-24

## 2024-07-24 RX ORDER — AMIODARONE HYDROCHLORIDE 200 MG/1
TABLET ORAL
COMMUNITY
Start: 2024-07-17 | End: 2024-11-14

## 2024-07-24 RX ORDER — COVID-19 VACCINE, MRNA 0.23 MG/2.25ML
INJECTION, SUSPENSION INTRAMUSCULAR
COMMUNITY
Start: 2023-10-02

## 2024-07-24 RX ORDER — ISOSORBIDE MONONITRATE 30 MG/1
TABLET, EXTENDED RELEASE ORAL
Qty: 90 TABLET | Refills: 3 | Status: SHIPPED | OUTPATIENT
Start: 2024-07-24

## 2024-07-24 RX ORDER — A/SINGAPORE/GP1908/2015 IVR-180 (AN A/MICHIGAN/45/2015 (H1N1)PDM09-LIKE VIRUS, A/HONG KONG/4801/2014, NYMC X-263B (H3N2) (AN A/HONG KONG/4801/2014-LIKE VIRUS), AND B/BRISBANE/60/2008, WILD TYPE (A B/BRISBANE/60/2008-LIKE VIRUS) 15; 15; 15 UG/.5ML; UG/.5ML; UG/.5ML
INJECTION, SUSPENSION INTRAMUSCULAR
COMMUNITY
Start: 2023-10-02

## 2024-07-24 SDOH — HEALTH STABILITY: PHYSICAL HEALTH: ON AVERAGE, HOW MANY DAYS PER WEEK DO YOU ENGAGE IN MODERATE TO STRENUOUS EXERCISE (LIKE A BRISK WALK)?: 2 DAYS

## 2024-07-24 ASSESSMENT — ASTHMA QUESTIONNAIRES
QUESTION_2 LAST FOUR WEEKS HOW OFTEN HAVE YOU HAD SHORTNESS OF BREATH: NOT AT ALL
QUESTION_5 LAST FOUR WEEKS HOW WOULD YOU RATE YOUR ASTHMA CONTROL: COMPLETELY CONTROLLED
QUESTION_1 LAST FOUR WEEKS HOW MUCH OF THE TIME DID YOUR ASTHMA KEEP YOU FROM GETTING AS MUCH DONE AT WORK, SCHOOL OR AT HOME: NONE OF THE TIME
QUESTION_3 LAST FOUR WEEKS HOW OFTEN DID YOUR ASTHMA SYMPTOMS (WHEEZING, COUGHING, SHORTNESS OF BREATH, CHEST TIGHTNESS OR PAIN) WAKE YOU UP AT NIGHT OR EARLIER THAN USUAL IN THE MORNING: NOT AT ALL
ACT_TOTALSCORE: 25
QUESTION_4 LAST FOUR WEEKS HOW OFTEN HAVE YOU USED YOUR RESCUE INHALER OR NEBULIZER MEDICATION (SUCH AS ALBUTEROL): NOT AT ALL
ACT_TOTALSCORE: 25

## 2024-07-24 ASSESSMENT — PAIN SCALES - GENERAL: PAINLEVEL: MILD PAIN (2)

## 2024-07-24 ASSESSMENT — SOCIAL DETERMINANTS OF HEALTH (SDOH): HOW OFTEN DO YOU GET TOGETHER WITH FRIENDS OR RELATIVES?: TWICE A WEEK

## 2024-07-24 NOTE — PROGRESS NOTES
Preventive Care Visit  [facility]  Marcin Rios MD, Family Medicine  Jul 24, 2024      Assessment & Plan     Encounter for Medicare annual wellness exam  Israel is a 78-year-old male who presents to clinic for his annual Medicare wellness visit.  He has a history of chronic combined systolic diastolic heart failure, paroxysmal atrial fibrillation currently followed by [facility] cardiology.  He has a history of previous DVT with PE and is currently on Xarelto.  He has a history of coronary artery disease and remains asymptomatic.  He has a history of benign prostatic hypertrophy.  He recently underwent left total knee replacement.  Postoperatively he developed rapid atrial fibrillation.  He has been working with [facility] cardiology and is currently on amiodarone having failed initial cardioversion.  He continues on Coumadin and plan is to repeat attempt at cardioversion versus ablation in 1 month.    All age and gender specific screening recommendations were reviewed.  He is current with colon cancer screening having undergone colonoscopy in May 2019.  At that time he had 4, 4 to 8 mm polyps which were removed.  They were all tubular adenomas.  Recommendation was for a 5-year follow-up.  We reviewed his screening history and I would recommend that he undergo 1 more screening colonoscopy.  I would not recommend any other screening protocols because of the Coumadin that he is currently on.    All vaccine recommendations and schedules were reviewed.  He did receive the COVID-vaccine last fall and will plan to do so again this fall.  He did receive flu vaccine last fall and plan to do so again this year.  He received pneumonia vaccine with Prevnar 13 in 2015 but did not receive a second dose of Pneumovax.  So I recommended that he get a Prevnar 20.  He has not had shingles vaccination yet.  He has not had RSV vaccination yet.  He will obtain these vaccines through his local pharmacy  later this fall.    On exam this is a pleasant well-developed overweight elderly male in no acute distress.  His blood pressure is 122/74.  His pulse is 80 and irregular.  He is afebrile.  Oxygen saturations 95% on room air.  HEENT exam is unremarkable.  Neck is supple without adenopathy or carotid bruits.  There is no thyromegaly or nodularity.  Lungs are clear to auscultation without wheezing rales or rhonchi.  Cardiac exam is irregularly irregular without murmur rub or gallop.  Extremities no clubbing cyanosis he does have 2+ edema to the midcalf bilaterally.  Well-healing left knee total arthroplasty scar.  Neurologic exam is nonfocal poor.  Mood and affect are excellent.    Results for orders placed or performed in visit on 07/24/24   INR point of care     Status: Abnormal   Result Value Ref Range    INR 2.7 (H) 0.9 - 1.1    Narrative    This test is intended for monitoring Coumadin therapy. Results are not accurate in patients with prolonged INR due to factor deficiency.     Assessment: 78-year-old male who presents for Medicare annual wellness visit.  He is current with all recommendations for screening and will plan to repeat colonoscopy later this fall.  Reviewed recommended vaccinations and he will obtain these through his local pharmacy at the appropriate time.  Continue current medication and plan.- PRIMARY CARE FOLLOW-UP SCHEDULING    Acute on chronic combined systolic and diastolic heart failure (H)  Chronic, stable.  Currently being followed by [facility] cardiology.  He failed attempt at recent cardioversion and is currently on amiodarone 200 mg daily.  Will attempt repeat follow-up cardioversion in the near future.  If not successful would consider ablation.    Hypercoagulable state due to paroxysmal atrial fibrillation (H)  Currently anticoagulated with Coumadin.  Continue current medication and plan.    Acute pulmonary embolism, unspecified pulmonary embolism type, unspecified whether acute cor  pulmonale present (H)  Currently anticoagulated with Coumadin.  Continue current medication plan.    Morbid obesity (H)  Chronic, stable.  His weight has been unchanged since his surgery.  He is now able to do a little more activity after his knee surgery.  Encouraged additional weight loss with regular exercise progressing slowly as recommended by his orthopedist and with dietary restriction.    Coronary artery disease of native artery of native heart with stable angina pectoris (H24)  Chronic stable no recurrent anginal symptoms.  He is currently followed by [facility] cardiology.  He is on high-dose statin therapy, beta-blocker, losartan, Imdur and Lasix 40 mg daily.  Continue current medication and plan.  Chronic kidney disease, stage 3a (H)  Chronic, stable.  Continue current medication and plan.    Hyperlipidemia LDL goal <100  Chronic, stable.  Continue current medication plan.  - metoprolol succinate ER (TOPROL XL) 50 MG 24 hr tablet; Take 1 tablet (50 mg) by mouth 2 times daily  - atorvastatin (LIPITOR) 80 MG tablet; Take 1 tablet by mouth once daily  - furosemide (LASIX) 40 MG tablet; Take 1 tablet by mouth once daily  - isosorbide mononitrate (IMDUR) 30 MG 24 hr tablet; Take 1 tablet by mouth once daily    Intermittent asthma, uncomplicated  Chronic, stable.  Very intermittent use of albuterol inhaler.  Does not believe that he has used it in the last 6 months.  Will authorize additional refills if necessary.  - albuterol (PROAIR HFA) 108 (90 Base) MCG/ACT inhaler; Inhale 1-2 puffs into the lungs every 4 hours as needed for shortness of breath    Benign prostatic hyperplasia, unspecified whether lower urinary tract symptoms present  Chronic, stable.  Continue Flomax and Proscar.  - tamsulosin (FLOMAX) 0.4 MG capsule; Take 1 capsule by mouth once daily    Special screening for malignant neoplasms, colon  Prior colonoscopy in 2019 with multiple tubular adenoma polyps.  Recommend repeat colonoscopy  -  Colonoscopy Screening  Referral; Future    Patient has been advised of split billing requirements and indicates understanding: Yes        Counseling  Appropriate preventive services were addressed with this patient via screening, questionnaire, or discussion as appropriate for fall prevention, nutrition, physical activity, Tobacco-use cessation, weight loss and cognition.  Checklist reviewing preventive services available has been given to the patient.  Reviewed patient's diet, addressing concerns and/or questions.   He is at risk for lack of exercise and has been provided with information to increase physical activity for the benefit of his well-being.   He is at risk for psychosocial distress and has been provided with information to reduce risk.   The patient was provided with written information regarding signs of hearing loss.   Information on urinary incontinence and treatment options given to patient.       SELF MONITORING:       - Please check blood pressure readings daily       - Please monitor pulse       - Daily weights  Work on weight loss  Regular exercise   Follow-up Visit   Expected date:  Jul 31, 2025 (Approximate)      Follow Up Appointment Details:     Follow-up with whom?: PCP    Follow-Up for what?: Medicare Wellness    Welcome or Annual?: Annual Wellness    How?: In Person                     Adrien Montanez is a 78 year old, presenting for the following:  Wellness Visit        7/24/2024     4:07 PM   Additional Questions   Roomed by Lizzy HURST         7/24/2024     4:07 PM   Patient Reported Additional Medications   Patient reports taking the following new medications Symbicort 160-4.5 mcg prn, metoprolol 50 mg 1 tablet twice daily,         Health Care Directive  Patient has a Health Care Directive on file  Advance care planning document is on file and is current.    HPI      Hyperlipidemia Follow-Up    Are you regularly taking any medication or supplement to lower your cholesterol?    Yes  Are you having muscle aches or other side effects that you think could be caused by your cholesterol lowering medication?  No    Hypertension Follow-up    Do you check your blood pressure regularly outside of the clinic?  Yes  Are you following a low salt diet?  Yes  Are your blood pressures ever more than 140 on the top number (systolic) OR more   than 90 on the bottom number (diastolic), for example 140/90?  No    Atrial Fibrillation Follow-up    Symptoms: no recent chest pain, significant palpitations, dizziness/lightheadedness, dyspnea, or increased peripheral edema.  Stroke prevention: Coumadin (Warfarin)        6/18/2024    10:30 PM 6/19/2024     7:22 AM 6/24/2024    11:09 AM 7/3/2024     9:35 AM 7/24/2024     4:09 PM   Date   Pulse 93 70 88 91 80     Current [id number] Score: 10 points - A score of 5 or greater represents a 7.2 - 12.2% annual risk of major embolic event, without anti-coagulation or an LAAO device.       Vascular Disease Follow-up    How often do you take nitroglycerin? Never  Do you take an aspirin every day? No    Heart Failure Follow-up   Are you experiencing any shortness of breath? No  Are you experiencing any swelling in your legs or feet?  Stable  Are you using more pillows than usual? No  Do you cough at night?  Yes  Do you check your weight daily?  No  Have you had a weight change recently?  No  Are you having any of the following side effects from your medications? (Select all that apply)  The patient does not report symptoms of dizziness, fatigue, cough, swelling, or slow heart beat.  Since your last visit, how many times have you gone to the cardiologist, urgent care, emergency room, or hospital because of your heart failure?   1 time  Last Echo:   Echo result w/o MOPS: Interpretation Summary 1. The left ventricle is normal in size. The visual ejection fraction isestimated at 40%. Inferior hypokinesis. Septal wall motion abnormality mayreflect pacemaker activation.2. The  right ventricle is normal in structure, function and size.3. No valve disease.4. The ascending aorta is Mildly dilated. Sinus of Valsalva 4.1cm, ascendingaorta 3.7cm. Echo from 3/2016 showed EF 40-45% with inferolateral hypokinesis, aorta 3.6cm.      Chronic Kidney Disease Follow-up    Do you take any over the counter pain medicine?: No        7/24/2024   General Health   How would you rate your overall physical health? Good   Feel stress (tense, anxious, or unable to sleep) Only a little      (!) STRESS CONCERN      7/24/2024   Nutrition   Diet: Low salt            7/24/2024   Exercise   Days per week of moderate/strenous exercise 2 days      (!) EXERCISE CONCERN      7/24/2024   Social Factors   Frequency of gathering with friends or relatives Twice a week   Worry food won't last until get money to buy more No   Food not last or not have enough money for food? No   Do you have housing? (Housing is defined as stable permanent housing and does not include staying ouside in a car, in a tent, in an abandoned building, in an overnight shelter, or couch-surfing.) Yes   Are you worried about losing your housing? No   Lack of transportation? No   Unable to get utilities (heat,electricity)? No            7/24/2024   Fall Risk   Fallen 2 or more times in the past year? Yes    No   Trouble with walking or balance? No    No   Reason Gait Speed Test Not Completed Recent surgery       Multiple values from one day are sorted in reverse-chronological order          7/24/2024   Activities of Daily Living- Home Safety   Needs help with the following daily activites None of the above   Safety concerns in the home None of the above            7/24/2024   Dental   Dentist two times every year? Yes            7/24/2024   Hearing Screening   Hearing concerns? (!) IT'S HARD TO FOLLOW A CONVERSATION IN A NOISY RESTAURANT OR CROWDED ROOM.            7/24/2024   Driving Risk Screening   Patient/family members have concerns about driving No             7/24/2024   General Alertness/Fatigue Screening   Have you been more tired than usual lately? No            7/24/2024   Urinary Incontinence Screening   Bothered by leaking urine in past 6 months Yes            7/24/2024   TB Screening   Were you born outside of the [place]? No            Today's PHQ-2 Score:       7/24/2024     3:40 PM   PHQ-2 ( 1999 [employer])   Q1: Little interest or pleasure in doing things 0   Q2: Feeling down, depressed or hopeless 0   PHQ-2 Score 0   Q1: Little interest or pleasure in doing things Not at all   Q2: Feeling down, depressed or hopeless Not at all   PHQ-2 Score 0           7/24/2024   Substance Use   Alcohol more than 3/day or more than 7/wk No   Do you have a current opioid prescription? No   How severe/bad is pain from 1 to 10? 2/10   Do you use any other substances recreationally? No        Social History     Tobacco Use    Smoking status: Never    Smokeless tobacco: Never   Vaping Use    Vaping status: Never Used   Substance Use Topics    Alcohol use: Yes     Alcohol/week: 3.3 standard drinks of alcohol     Types: 4 Glasses of wine per week     Comment: occasional     Drug use: No       ASCVD Risk   The ASCVD Risk score (Ynes URIBE, et al., 2019) failed to calculate for the following reasons:    The patient has a prior MI or stroke diagnosis            Reviewed and updated as needed this visit by Provider                    Lab work is in process  Labs reviewed in EPIC  BP Readings from Last 3 Encounters:   07/24/24 122/74   07/03/24 111/74   06/24/24 112/78    Wt Readings from Last 3 Encounters:   07/24/24 124.9 kg (275 lb 6.4 oz)   07/03/24 122.5 kg (270 lb)   06/24/24 122.5 kg (270 lb)                  Patient Active Problem List   Diagnosis    Other acute and subacute form of ischemic heart disease    Intermittent asthma    Hyperlipidemia    Benign prostatic hyperplasia    Coronary artery disease of native artery of native heart with stable angina pectoris  (H24)    Acute deep vein thrombosis (DVT) of other vein of left upper extremity (H)    S/P ICD (internal cardiac defibrillator) procedure    Chronic systolic heart failure (H)    Hypertension    Ischemic cardiomyopathy    Non-STEMI (non-ST elevated myocardial infarction) (H)    Coronary atherosclerosis    Morbid obesity (H)    Acute pulmonary embolism - bilateral with possible right sided heart strain on CT    Hypoxemia    Bilateral leg edema - right more than left    Shortness of breath    Pulmonary emboli (H)    Atrophy of left kidney    Acute pulmonary embolism, unspecified pulmonary embolism type, unspecified whether acute cor pulmonale present (H)    Primary osteoarthritis of right shoulder    A-fib (H)    Paroxysmal atrial fibrillation (H)    S/P total knee arthroplasty, left    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    On warfarin therapy    Other pulmonary embolism without acute cor pulmonale (H)    Acute on chronic combined systolic and diastolic heart failure (H)    Hypercoagulable state due to paroxysmal atrial fibrillation (H)    Chronic kidney disease, stage 3a (H)     Past Surgical History:   Procedure Laterality Date    ARTHROPLASTY, KNEE, ROBOT ASSISTED, USING ALON Left 6/18/2024    Procedure: ALON ASSISTED Left TOTAL KNEE ARTHROPLASTY;  Surgeon: Nathan Hernandez DO;  Location: [facility] OR    Cardiac stent      COLONOSCOPY N/A 5/22/2019    Procedure: Colonoscopy, Polypectomy by Snare;  Surgeon: Gian Horn DO;  Location: [facility] GI    EXCISE GANGLION WRIST Left 11/26/2014    Procedure: EXCISE GANGLION WRIST;  Surgeon: Gian Haddad MD;  Location: [facility] OR    ICD DEVICE INTERROGATE      2017    ORTHOPEDIC SURGERY      RELEASE CARPAL TUNNEL Left 11/26/2014    Procedure: RELEASE CARPAL TUNNEL;  Surgeon: Gian Haddad MD;  Location: [facility] OR    RELEASE DEQUERVAINS WRIST Left 11/26/2014    Procedure: RELEASE DEQUERVAINS WRIST;  Surgeon: Gian Haddad MD;   Location:  OR       Social History     Tobacco Use    Smoking status: Never    Smokeless tobacco: Never   Substance Use Topics    Alcohol use: Yes     Alcohol/week: 3.3 standard drinks of alcohol     Types: 4 Glasses of wine per week     Comment: occasional      Family History   Problem Relation Age of Onset    Arthritis Mother     Diabetes Father     Allergies Father         sulfa    Eye Disorder Father         cataract    Heart Disease Father         by pass         Current Outpatient Medications   Medication Sig Dispense Refill    acetaminophen (TYLENOL) 325 MG tablet Take 2 tablets (650 mg) by mouth every 4 hours as needed for other (mild pain) 100 tablet 0    albuterol (PROAIR HFA) 108 (90 Base) MCG/ACT inhaler Inhale 1-2 puffs into the lungs every 4 hours as needed for shortness of breath 8 g 1    amiodarone (PACERONE) 200 MG tablet Take 1 Tablet (200 mg) by mouth twice daily for 30 days, THEN 1 Tablet (200 mg) in the morning.      atorvastatin (LIPITOR) 80 MG tablet Take 1 tablet by mouth once daily 90 tablet 0    COMIRNATY 30 MCG/0.3ML injection       DAILY MULTI OR Take 1 tablet by mouth daily      finasteride (PROSCAR) 5 MG tablet Take 1 tablet by mouth once daily 90 tablet 0    FLUAD QUADRIVALENT 0.5 ML PRSY injection       furosemide (LASIX) 40 MG tablet Take 1 tablet by mouth once daily 90 tablet 0    isosorbide mononitrate (IMDUR) 30 MG 24 hr tablet Take 1 tablet by mouth once daily 90 tablet 0    losartan (COZAAR) 50 MG tablet TAKE 1 TABLET (50MG) BY MOUTH ONCE DAILY 90 tablet 3    nitroGLYcerin (NITROSTAT) 0.4 MG sublingual tablet 0.4 mg As needed      potassium chloride jody ER (KLOR-CON M20) 20 MEQ CR tablet TAKE 1  BY MOUTH ONCE DAILY 90 tablet 3    tamsulosin (FLOMAX) 0.4 MG capsule Take 1 capsule by mouth once daily 90 capsule 0    warfarin ANTICOAGULANT (COUMADIN) 5 MG tablet Take 5 mg by mouth MORNINGS: Tuesdays, Thursdays, Saturdays and Sundays. Take 2.5 mg Mondays, Wednesdays and Fridays  or as directed by anticoagulation clinic. NEXT INR 06/18/2024      metoprolol succinate ER (TOPROL XL) 50 MG 24 hr tablet Take 3 tablets (150 mg) by mouth 2 times daily      Multiple Vitamin (THERA-TABS) TABS Take 1 tablet by mouth every morning      oxyCODONE (ROXICODONE) 5 MG tablet Take 1-2 tablets (5-10 mg) by mouth every 4 hours as needed for moderate to severe pain (Patient not taking: Reported on 6/24/2024) 33 tablet 0    senna-docusate (SENOKOT-S/PERICOLACE) 8.6-50 MG tablet Take 1-2 tablets by mouth 2 times daily Take while on oral narcotics to prevent or treat constipation. (Patient not taking: Reported on 7/24/2024) 30 tablet 0    warfarin ANTICOAGULANT (COUMADIN) 5 MG tablet Take 2.5 mg every Mon, Wed, Fri; 5 mg all other days or as directed by the INR clinic. (Patient taking differently: Take 2.5 mg by mouth MORNINGS: every Mon, Wed, Fri; take 5 mg all other days or as directed by the INR clinic. NEXT INR 06/18/24) 66 tablet 1     Allergies   Allergen Reactions    No Known Allergies Other (See Comments)    Pine Difficulty breathing     Pine Pollen       Recent Labs   Lab Test 06/24/24  1146 06/19/24  0540 06/18/24  0609 05/20/24  1104 07/10/23  1740 04/26/23  1013 04/18/22  1013 01/28/21  1223 03/30/20  0622   LDL  --   --   --  43  --  61 45 48  --    HDL  --   --   --  52  --  46 56 52  --    TRIG  --   --   --  91  --  121 86 111  --    ALT  --   --   --  37  --   --  45 36  --    CR 1.28* 1.45*  --  1.27*   < >  --  1.20 1.25 1.26*   GFRESTIMATED 58* 50*  --  58*   < >  --  63 56* 56*   GFRESTBLACK  --   --   --   --   --   --   --  65 65   POTASSIUM 4.8 5.2   < > 4.6   < >  --  4.9 4.7 4.0   TSH  --   --   --  1.69  --   --   --   --   --     < > = values in this interval not displayed.      Current providers sharing in care for this patient include:  Patient Care Team:  Marcin Rios MD as PCP - General (Family Medicine)  Marcin Rios MD as Assigned PCP  Eduardo Soriano DO as Assigned  "Neuroscience Provider  Nathan Hernandez, DO as Assigned Musculoskeletal Provider    The following health maintenance items are reviewed in Epic and correct as of today:  Health Maintenance   Topic Date Due    HF ACTION PLAN  Never done    ZOSTER IMMUNIZATION (1 of 2) Never done    RSV VACCINE (Pregnancy & 60+) (1 - 1-dose 60+ series) Never done    Pneumococcal Vaccine: 65+ Years (2 of 2 - PPSV23 or PCV20) 12/24/2015    ASTHMA ACTION PLAN  04/22/2020    COVID-19 Vaccine (7 - 2023-24 season) 02/02/2024    COLORECTAL CANCER SCREENING  05/22/2024    ANNUAL REVIEW OF HM ORDERS  07/10/2024    MEDICARE ANNUAL WELLNESS VISIT  07/10/2024    INFLUENZA VACCINE (1) 09/01/2024    BMP  12/24/2024    ASTHMA CONTROL TEST  01/24/2025    ALT  05/20/2025    LIPID  05/20/2025    CBC  05/20/2025    FALL RISK ASSESSMENT  07/24/2025    DTAP/TDAP/TD IMMUNIZATION (3 - Td or Tdap) 10/28/2026    GLUCOSE  06/24/2027    ADVANCE CARE PLANNING  06/28/2029    TSH W/FREE T4 REFLEX  Completed    HEPATITIS C SCREENING  Completed    PHQ-2 (once per calendar year)  Completed    IPV IMMUNIZATION  Aged Out    HPV IMMUNIZATION  Aged Out    MENINGITIS IMMUNIZATION  Aged Out    RSV MONOCLONAL ANTIBODY  Aged Out         Review of Systems  CONSTITUTIONAL: NEGATIVE for fever, chills, change in weight  ENT/MOUTH: NEGATIVE for ear, mouth and throat problems  RESP: NEGATIVE for significant cough or SOB  CV: NEGATIVE for chest pain, palpitations or peripheral edema  ROS otherwise negative     Objective    Exam  /74 (BP Location: Left arm, Patient Position: Chair)   Pulse 80   Temp 98.1  F (36.7  C) (Temporal)   Resp 16   Ht 1.772 m (5' 9.75\")   Wt 124.9 kg (275 lb 6.4 oz)   SpO2 95%   BMI 39.80 kg/m     Estimated body mass index is 39.8 kg/m  as calculated from the following:    Height as of this encounter: 1.772 m (5' 9.75\").    Weight as of this encounter: 124.9 kg (275 lb 6.4 oz).    Physical Exam  GENERAL: alert, no distress, and " obese  NECK: no adenopathy, no asymmetry, masses, or scars  RESP: lungs clear to auscultation - no rales, rhonchi or wheezes  CV: irregularly irregular rhythm, normal S1 S2, no S3 or S4, no murmur, click or rub, peripheral pulses strong, and 1+ bilateral lower extremity pitting edema to shins    ABDOMEN: soft, nontender, no hepatosplenomegaly, no masses and bowel sounds normal  MS: status post left TKA  NEURO: Normal strength and tone, mentation intact and speech normal  PSYCH: mentation appears normal, affect normal/bright  LYMPH: no cervical, supraclavicular, axillary, or inguinal adenopathy        7/24/2024   Mini Cog   Clock Draw Score 2 Normal   3 Item Recall 3 objects recalled   Mini Cog Total Score 5                 Signed Electronically by: Marcin Rios MD

## 2024-07-24 NOTE — NURSING NOTE
Prior to immunization administration, verified patients identity using patient s name and date of birth. Please see Immunization Activity for additional information.     Screening Questionnaire for Adult Immunization    Are you sick today?   No   Do you have allergies to medications, food, a vaccine component or latex?   No   Have you ever had a serious reaction after receiving a vaccination?   No   Do you have a long-term health problem with heart, lung, kidney, or metabolic disease (e.g., diabetes), asthma, a blood disorder, no spleen, complement component deficiency, a cochlear implant, or a spinal fluid leak?  Are you on long-term aspirin therapy?   Yes   Do you have cancer, leukemia, HIV/AIDS, or any other immune system problem?   No   Do you have a parent, brother, or sister with an immune system problem?   No   In the past 3 months, have you taken medications that affect  your immune system, such as prednisone, other steroids, or anticancer drugs; drugs for the treatment of rheumatoid arthritis, Crohn s disease, or psoriasis; or have you had radiation treatments?   No   Have you had a seizure, or a brain or other nervous system problem?   No   During the past year, have you received a transfusion of blood or blood    products, or been given immune (gamma) globulin or antiviral drug?   No   For women: Are you pregnant or is there a chance you could become       pregnant during the next month?   No   Have you received any vaccinations in the past 4 weeks?   No     Immunization questionnaire was positive for at least one answer.  Notified Dr. Rios.      Patient instructed to remain in clinic for 15 minutes afterwards, and to report any adverse reactions.     Screening performed by Lizzy Strauss LPN on 7/24/2024 at 4:11 PM.

## 2024-07-24 NOTE — PATIENT INSTRUCTIONS
Patient Education   Preventive Care Advice   This is general advice given by our system to help you stay healthy. However, your care team may have specific advice just for you. Please talk to your care team about your preventive care needs.  Nutrition  Eat 5 or more servings of fruits and vegetables each day.  Try wheat bread, brown rice and whole grain pasta (instead of white bread, rice, and pasta).  Get enough calcium and vitamin D. Check the label on foods and aim for 100% of the RDA (recommended daily allowance).  Lifestyle  Exercise at least 150 minutes each week  (30 minutes a day, 5 days a week).  Do muscle strengthening activities 2 days a week. These help control your weight and prevent disease.  No smoking.  Wear sunscreen to prevent skin cancer.  Have a dental exam and cleaning every 6 months.  Yearly exams  See your health care team every year to talk about:  Any changes in your health.  Any medicines your care team has prescribed.  Preventive care, family planning, and ways to prevent chronic diseases.  Shots (vaccines)   HPV shots (up to age 26), if you've never had them before.  Hepatitis B shots (up to age 59), if you've never had them before.  COVID-19 shot: Get this shot when it's due.  Flu shot: Get a flu shot every year.  Tetanus shot: Get a tetanus shot every 10 years.  Pneumococcal, hepatitis A, and RSV shots: Ask your care team if you need these based on your risk. Prevnar 20  Shingles shot (for age 50 and up) Shingrix  General health tests  Diabetes screening:  Starting at age 35, Get screened for diabetes at least every 3 years.  If you are younger than age 35, ask your care team if you should be screened for diabetes.  Cholesterol test: At age 39, start having a cholesterol test every 5 years, or more often if advised.  Bone density scan (DEXA): At age 50, ask your care team if you should have this scan for osteoporosis (brittle bones).  Hepatitis C: Get tested at least once in your  life.  STIs (sexually transmitted infections)  Before age 24: Ask your care team if you should be screened for STIs.  After age 24: Get screened for STIs if you're at risk. You are at risk for STIs (including HIV) if:  You are sexually active with more than one person.  You don't use condoms every time.  You or a partner was diagnosed with a sexually transmitted infection.  If you are at risk for HIV, ask about PrEP medicine to prevent HIV.  Get tested for HIV at least once in your life, whether you are at risk for HIV or not.  Cancer screening tests  Cervical cancer screening: If you have a cervix, begin getting regular cervical cancer screening tests starting at age 21.  Breast cancer scan (mammogram): If you've ever had breasts, begin having regular mammograms starting at age 40. This is a scan to check for breast cancer.  Colon cancer screening: It is important to start screening for colon cancer at age 45.  Have a colonoscopy test every 10 years (or more often if you're at risk) Or, ask your provider about stool tests like a FIT test every year or Cologuard test every 3 years.  To learn more about your testing options, visit:   .  For help making a decision, visit:   https://bit.ly/cd38519.  Prostate cancer screening test: If you have a prostate, ask your care team if a prostate cancer screening test (PSA) at age 55 is right for you.  Lung cancer screening: If you are a current or former smoker ages 50 to 80, ask your care team if ongoing lung cancer screenings are right for you.  For informational purposes only. Not to replace the advice of your health care provider. Copyright   2023 [facility]. All rights reserved. Clinically reviewed by the [facility]. [employer] [id number] - REV 01/24.  Nutrition for Older Adults: Care Instructions  Overview     Good nutrition is important at any age. But it is especially important for older adults. Eating healthy foods helps keep your  body strong. And it can help lower your risk for disease.  As you get older, your body needs more of certain nutrients. These include vitamin B12, calcium, and vitamin D. But it may be harder for you to get these and other important nutrients. This could be for many reasons. You may not feel as hungry as you used to. Or you could have problems with your teeth or mouth that make it hard to chew. Or you may not enjoy planning and preparing meals, especially if you live alone.  Talk with your doctor if you want help getting the most nutrition from what you eat. They may have you work with a dietitian to help you plan meals.  Follow-up care is a key part of your treatment and safety. Be sure to make and go to all appointments, and call your doctor if you are having problems. It's also a good idea to know your test results and keep a list of the medicines you take.  How can you care for yourself at home?  To stay healthy  Eat a variety of foods. The more you vary the foods you eat, the more vitamins, minerals, and other nutrients you get.  Ask your doctor if you should take a multivitamin. Choose one with about 100% of the daily value (DV) for vitamins and minerals. Do not take more than 100% of the daily value for any vitamin or mineral unless your doctor tells you to. Talk with your doctor if you are not sure which multivitamin is right for you.  Try to eat lots of fruits and vegetables. Fresh or frozen vegetables and fruits are healthy choices. Choose canned vegetables that have no salt added and fruits that are canned in their own juice or light syrup.  Include foods that are high in vitamin B12 in your diet. Good choices are fortified breakfast cereal, nonfat or low-fat milk and other dairy products, meat, poultry, fish, and eggs.  Get enough calcium and vitamin D. Good choices include nonfat or low-fat milk, cheese, and yogurt. Other good options are tofu, orange juice with added calcium, and some leafy green  vegetables, such as raymond greens and kale. If you don't use milk products, talk to your doctor about calcium and vitamin D supplements.  Try to eat protein foods every day. Good choices include lean meat, fish, poultry, eggs, and cheese. Other good options are cooked beans, peanut butter, and nuts and seeds.  Choose whole grains for half of the grains you eat. Look for 100% whole wheat bread, whole-grain cereals, brown rice, and other whole grains.  If you have constipation  Eat high-fiber foods every day if you can. These include fruits, vegetables, cooked dried beans, and whole grains.  Drink plenty of fluids. If you have kidney, heart, or liver disease and have to limit fluids, talk with your doctor before you increase the amount of fluids you drink.  Ask your doctor if stool softeners may help keep your bowels regular.  If you have mouth problems that make chewing hard  Pick canned or cooked fruits and vegetables. These are often softer.  Chop or shred meat, poultry, and fish. Add sauce or gravy to the meat to help keep it moist.  Pick other protein foods that are soft. These include cheese, peanut butter, cooked beans, cottage cheese, and eggs.  If you have trouble shopping for yourself  Ask a local food store to deliver groceries to your home.  Contact your local area agency on aging and ask about resources that can help.  Ask a family member or neighbor to help you.  If you have trouble preparing meals  Try easier cooking methods such as using a slow cooker or microwave oven.  Let the grocery store do some of the work for you. Look for precut, washed, and ready-to-eat foods.  Take part in group meal programs. You can find these through senior citizen programs.  Have meals brought to your home. Your community may offer programs that deliver meals, such as Meals on Wheels. Or you could use an online meal delivery service.  If you are able, take a cooking class.  If your appetite is poor  Try to eat meals on a  "regular schedule. It may help to eat smaller meals more often throughout the day.  If you can, eat some meals with other people. You could ask family or friends to eat with you. Or you could take part in group meal programs offered in your community.  Ask your doctor if your medicines could cause appetite or taste problems. If so, ask about changing medicines.  Add spices and herbs to increase the flavor of food.  If you think you are depressed, ask your doctor for help. Depression can affect your appetite. And it can make it hard to do everyday activities like grocery shopping and making meals. Treatment can help.  When should you call for help?  Watch closely for changes in your health, and be sure to contact your doctor if you have any problems.  Where can you learn more?  Go to https://www.[employer].net/patiented  Enter L643 in the search box to learn more about \"Nutrition for Older Adults: Care Instructions.\"  Current as of: September 25, 2023               Content Version: 14.0    4451-7195 [employer].   Care instructions adapted under license by your healthcare professional. If you have questions about a medical condition or this instruction, always ask your healthcare professional. [employer] disclaims any warranty or liability for your use of this information.      Preventing Falls: Care Instructions  Injuries and health problems such as trouble walking or poor eyesight can increase your risk of falling. So can some medicines. But there are things you can do to help prevent falls. You can exercise to get stronger. You can also arrange your home to make it safer.    Talk to your doctor about the medicines you take. Ask if any of them increase the risk of falls and whether they can be changed or stopped.   Try to exercise regularly. It can help improve your strength and balance. This can help lower your risk of falling.     Practice fall safety and prevention.    Wear low-heeled " "shoes that fit well and give your feet good support. Talk to your doctor if you have foot problems that make this hard.  Carry a cellphone or wear a medical alert device that you can use to call for help.  Use stepladders instead of chairs to reach high objects. Don't climb if you're at risk for falls. Ask for help, if needed.  Wear the correct eyeglasses, if you need them.    Make your home safer.    Remove rugs, cords, clutter, and furniture from walkways.  Keep your house well lit. Use night-lights in hallways and bathrooms.  Install and use sturdy handrails on stairways.  Wear nonskid footwear, even inside. Don't walk barefoot or in socks without shoes.    Be safe outside.    Use handrails, curb cuts, and ramps whenever possible.  Keep your hands free by using a shoulder bag or backpack.  Try to walk in well-lit areas. Watch out for uneven ground, changes in pavement, and debris.  Be careful in the winter. Walk on the grass or gravel when sidewalks are slippery. Use de-icer on steps and walkways. Add non-slip devices to shoes.    Put grab bars and nonskid mats in your shower or tub and near the toilet. Try to use a shower chair or bath bench when bathing.   Get into a tub or shower by putting in your weaker leg first. Get out with your strong side first. Have a phone or medical alert device in the bathroom with you.   Where can you learn more?  Go to https://www.[employer].net/patiented  Enter G117 in the search box to learn more about \"Preventing Falls: Care Instructions.\"  Current as of: July 17, 2023               Content Version: 14.0    2793-4705 [employer].   Care instructions adapted under license by your healthcare professional. If you have questions about a medical condition or this instruction, always ask your healthcare professional. [employer] disclaims any warranty or liability for your use of this information.      Hearing Loss: Care Instructions  Overview     Hearing " loss is a sudden or slow decrease in how well you hear. It can range from slight to profound. Permanent hearing loss can occur with aging. It also can happen when you are exposed long-term to loud noise. Examples include listening to loud music, riding motorcycles, or being around other loud machines.  Hearing loss can affect your work and home life. It can make you feel lonely or depressed. You may feel that you have lost your independence. But hearing aids and other devices can help you hear better and feel connected to others.  Follow-up care is a key part of your treatment and safety. Be sure to make and go to all appointments, and call your doctor if you are having problems. It's also a good idea to know your test results and keep a list of the medicines you take.  How can you care for yourself at home?  Avoid loud noises whenever possible. This helps keep your hearing from getting worse.  Always wear hearing protection around loud noises.  Wear a hearing aid as directed.  A professional can help you pick a hearing aid that will work best for you.  You can also get hearing aids over the counter for mild to moderate hearing loss.  Have hearing tests as your doctor suggests. They can show whether your hearing has changed. Your hearing aid may need to be adjusted.  Use other devices as needed. These may include:  Telephone amplifiers and hearing aids that can connect to a television, stereo, radio, or microphone.  Devices that use lights or vibrations. These alert you to the doorbell, a ringing telephone, or a baby monitor.  Television closed-captioning. This shows the words at the bottom of the screen. Most new TVs can do this.  TTY (text telephone). This lets you type messages back and forth on the telephone instead of talking or listening. These devices are also called TDD. When messages are typed on the keyboard, they are sent over the phone line to a receiving TTY. The message is shown on a monitor.  Use text  "messaging, social media, and email if it is hard for you to communicate by telephone.  Try to learn a listening technique called speechreading. It is not lipreading. You pay attention to people's gestures, expressions, posture, and tone of voice. These clues can help you understand what a person is saying. Face the person you are talking to, and have them face you. Make sure the lighting is good. You need to see the other person's face clearly.  Think about counseling if you need help to adjust to your hearing loss.  When should you call for help?  Watch closely for changes in your health, and be sure to contact your doctor if:    You think your hearing is getting worse.     You have new symptoms, such as dizziness or nausea.   Where can you learn more?  Go to https://www.[employer].net/patiented  Enter R798 in the search box to learn more about \"Hearing Loss: Care Instructions.\"  Current as of: September 27, 2023               Content Version: 14.0    3399-5872 [employer].   Care instructions adapted under license by your healthcare professional. If you have questions about a medical condition or this instruction, always ask your healthcare professional. [employer] disclaims any warranty or liability for your use of this information.      Bladder Training: Care Instructions  Your Care Instructions     Bladder training is used to treat urge incontinence and stress incontinence. Urge incontinence means that the need to urinate comes on so fast that you can't get to a toilet in time. Stress incontinence means that you leak urine because of pressure on your bladder. For example, it may happen when you laugh, cough, or lift something heavy.  Bladder training can increase how long you can wait before you have to urinate. It can also help your bladder hold more urine. And it can give you better control over the urge to urinate.  It is important to remember that bladder training takes a few " weeks to a few months to make a difference. You may not see results right away, but don't give up.  Follow-up care is a key part of your treatment and safety. Be sure to make and go to all appointments, and call your doctor if you are having problems. It's also a good idea to know your test results and keep a list of the medicines you take.  How can you care for yourself at home?  Work with your doctor to come up with a bladder training program that is right for you. You may use one or more of the following methods.  Delayed urination  In the beginning, try to keep from urinating for 5 minutes after you first feel the need to go.  While you wait, take deep, slow breaths to relax. Kegel exercises can also help you delay the need to go to the bathroom.  After some practice, when you can easily wait 5 minutes to urinate, try to wait 10 minutes before you urinate.  Slowly increase the waiting period until you are able to control when you have to urinate.  Scheduled urination  Empty your bladder when you first wake up in the morning.  Schedule times throughout the day when you will urinate.  Start by going to the bathroom every hour, even if you don't need to go.  Slowly increase the time between trips to the bathroom.  When you have found a schedule that works well for you, keep doing it.  If you wake up during the night and have to urinate, do it. Apply your schedule to waking hours only.  Kegel exercises  These tighten and strengthen pelvic muscles, which can help you control the flow of urine. (If doing these exercises causes pain, stop doing them and talk with your doctor.) To do Kegel exercises:  Squeeze your muscles as if you were trying not to pass gas. Or squeeze your muscles as if you were stopping the flow of urine. Your belly, legs, and buttocks shouldn't move.  Hold the squeeze for 3 seconds, then relax for 5 to 10 seconds.  Start with 3 seconds, then add 1 second each week until you are able to squeeze for  "10 seconds.  Repeat the exercise 10 times a session. Do 3 to 8 sessions a day.  When should you call for help?  Watch closely for changes in your health, and be sure to contact your doctor if:    Your incontinence is getting worse.     You do not get better as expected.   Where can you learn more?  Go to https://www.[employer].net/patiented  Enter V684 in the search box to learn more about \"Bladder Training: Care Instructions.\"  Current as of: November 15, 2023               Content Version: 14.0    3448-7075 [employer].   Care instructions adapted under license by your healthcare professional. If you have questions about a medical condition or this instruction, always ask your healthcare professional. [employer] disclaims any warranty or liability for your use of this information.         "

## 2024-07-24 NOTE — PROGRESS NOTES
ANTICOAGULATION MANAGEMENT     Ky Martinez 78 year old male is on warfarin with therapeutic INR result. (Goal INR 2.0-3.0)    Recent labs: (last 7 days)     07/24/24  1530   INR 2.7*       ASSESSMENT     Source(s): Chart Review  Previous INR was Therapeutic last 2(+) visits  Medication, diet, health changes since last INR recent cardioversion, office appointment today did decrease metoprolol.         PLAN     Unable to reach Tarik today.    Left message to continue current dose of warfarin 2.5 mg tonight. Request call back for assessment.    Follow up required to confirm warfarin dose taken and assess for changes, discuss dosing instructions and confirm understanding of instructions, and SCHEDULED NEXT 2 INRS WEEKLY POST CARDIOVERSION    Meghann Foley RN  Anticoagulation Clinic  7/24/2024

## 2024-07-25 NOTE — PROGRESS NOTES
ANTICOAGULATION MANAGEMENT     Ky Martinez 78 year old male is on warfarin with therapeutic INR result. (Goal INR 2.0-3.0)    Recent labs: (last 7 days)     07/24/24  1530   INR 2.7*       ASSESSMENT     Source(s): Chart Review     Warfarin doses taken: Reviewed in chart  Diet:  na  Medication/supplement changes:  Amio started 7/17/24.  New illness, injury, or hospitalization: Cardioversion 7/17/24- weekly INR's  Signs or symptoms of bleeding or clotting: No  Previous result: Therapeutic last 2(+) visits  Additional findings: None       PLAN     Recommended plan for ongoing change(s) affecting INR     Dosing Instructions:Empiric dose reduction (day 7) decrease your warfarin dose (9.1% change) with next INR in 1 week       Summary  As of 7/24/2024      Full warfarin instructions:  5 mg every Sun, Tue, Thu; 2.5 mg all other days   Next INR check:  7/31/2024               Detailed voice message left for Tarik with dosing instructions and follow up date.   Sent [employer] message with dosing and follow up instructions    Contact [phone] to schedule and with any changes, questions or concerns.     Education provided: Please call back if any changes to your diet, medications or how you've been taking warfarin    Plan made with [facility] Pharmacist Mitali Canales, BASSEM  Anticoagulation Clinic  7/25/2024    _______________________________________________________________________     Anticoagulation Episode Summary       Current INR goal:  2.0-3.0   TTR:  75.3% (1 y)   Target end date:  Indefinite   Send INR reminders to:  [facility]    Indications    Acute pulmonary embolism  unspecified pulmonary embolism type  unspecified whether acute cor pulmonale present (H) [I26.99]  A-fib (H) [I48.91]  Paroxysmal atrial fibrillation (H) [I48.0]             Comments:               Anticoagulation Care Providers       Provider Role Specialty Phone number    Ky Major MD Referring [facility]      Marcin Rios MD [facility] [phone]

## 2024-07-28 ASSESSMENT — KOOS JR
BENDING TO THE FLOOR TO PICK UP OBJECT: MILD
TWISING OR PIVOTING ON KNEE: MODERATE
KOOS JR SCORING: 70.7
RISING FROM SITTING: MILD
HOW SEVERE IS YOUR KNEE STIFFNESS AFTER FIRST WAKING IN MORNING: MILD
GOING UP OR DOWN STAIRS: MILD

## 2024-07-31 ENCOUNTER — ANTICOAGULATION THERAPY VISIT (OUTPATIENT)
Dept: ANTICOAGULATION | Facility: CLINIC | Age: 79
End: 2024-07-31

## 2024-07-31 ENCOUNTER — LAB (OUTPATIENT)
Dept: LAB | Facility: CLINIC | Age: 79
End: 2024-07-31
Payer: COMMERCIAL

## 2024-07-31 ENCOUNTER — OFFICE VISIT (OUTPATIENT)
Dept: ORTHOPEDICS | Facility: CLINIC | Age: 79
End: 2024-07-31
Payer: COMMERCIAL

## 2024-07-31 VITALS
BODY MASS INDEX: 38.65 KG/M2 | SYSTOLIC BLOOD PRESSURE: 106 MMHG | HEIGHT: 70 IN | WEIGHT: 270 LBS | TEMPERATURE: 97.1 F | DIASTOLIC BLOOD PRESSURE: 70 MMHG

## 2024-07-31 DIAGNOSIS — I26.99 ACUTE PULMONARY EMBOLISM, UNSPECIFIED PULMONARY EMBOLISM TYPE, UNSPECIFIED WHETHER ACUTE COR PULMONALE PRESENT (H): Primary | ICD-10-CM

## 2024-07-31 DIAGNOSIS — I26.99 ACUTE PULMONARY EMBOLISM, UNSPECIFIED PULMONARY EMBOLISM TYPE, UNSPECIFIED WHETHER ACUTE COR PULMONALE PRESENT (H): ICD-10-CM

## 2024-07-31 DIAGNOSIS — I48.0 PAROXYSMAL ATRIAL FIBRILLATION (H): ICD-10-CM

## 2024-07-31 DIAGNOSIS — Z96.652 S/P TOTAL KNEE ARTHROPLASTY, LEFT: Primary | ICD-10-CM

## 2024-07-31 DIAGNOSIS — I48.91 A-FIB (H): ICD-10-CM

## 2024-07-31 LAB — INR BLD: 3.1 (ref 0.9–1.1)

## 2024-07-31 PROCEDURE — 36416 COLLJ CAPILLARY BLOOD SPEC: CPT

## 2024-07-31 PROCEDURE — 99024 POSTOP FOLLOW-UP VISIT: CPT | Performed by: ORTHOPAEDIC SURGERY

## 2024-07-31 PROCEDURE — 85610 PROTHROMBIN TIME: CPT

## 2024-07-31 NOTE — PROGRESS NOTES
Orthopedic Clinic Post-Operative Note    CHIEF COMPLAINT: No chief complaint on file.      HISTORY OF PRESENT ILLNESS  Today's visit:  Overall better than presurgery.  Very happy with his progress.  Continues working with physical therapy.  No wound issues.      July 3, 2024 visit:  Here with his wife. Doing very well. Minimum pain. Been using tylenol only since surgery. Attending therapy at [facility]. States sore after this but tylenol icing has worked.  No incision concerns. Voiding, eating. No CHF concerns. Reports getting cardioversion in a few weeks. Normally on coumadin.  Resumed this. Initially bridged with lovenox.  Last INR was 2.3.  using LIAM.            Surgery on 6/18/24 with Dr. Mary CHI ASSISTED Left TOTAL KNEE ARTHROPLASTY          Patient's past medical, surgical, social and family histories reviewed.     Past Medical History:   Diagnosis Date    Hypertension     Myocardial infarction (H)        Past Surgical History:   Procedure Laterality Date    ARTHROPLASTY, KNEE, ROBOT ASSISTED, USING ALON Left 6/18/2024    Procedure: ALON ASSISTED Left TOTAL KNEE ARTHROPLASTY;  Surgeon: Nathan Hernandez DO;  [facility]    Cardiac stent      COLONOSCOPY N/A 5/22/2019    Procedure: Colonoscopy, Polypectomy by Snare;  Surgeon: Gian Horn DO;  [facility]    EXCISE GANGLION WRIST Left 11/26/2014    Procedure: EXCISE GANGLION WRIST;  Surgeon: Gian Haddad MD;  [facility]    ICD DEVICE INTERROGATE      2017    ORTHOPEDIC SURGERY      RELEASE CARPAL TUNNEL Left 11/26/2014    Procedure: RELEASE CARPAL TUNNEL;  Surgeon: Gian Haddad MD;  [facility]    RELEASE DEQUERVAINS WRIST Left 11/26/2014    Procedure: RELEASE DEQUERVAINS WRIST;  Surgeon: Gian Haddad MD;  [facility]       Medications:  Current Outpatient Medications   Medication Sig Dispense Refill    acetaminophen (TYLENOL) 325 MG tablet Take 2 tablets (650 mg) by  mouth every 4 hours as needed for other (mild pain) 100 tablet 0    albuterol (PROAIR HFA) 108 (90 Base) MCG/ACT inhaler Inhale 1-2 puffs into the lungs every 4 hours as needed for shortness of breath 8 g 1    amiodarone (PACERONE) 200 MG tablet Take 1 Tablet (200 mg) by mouth twice daily for 30 days, THEN 1 Tablet (200 mg) in the morning.      atorvastatin (LIPITOR) 80 MG tablet Take 1 tablet by mouth once daily 90 tablet 3    COMIRNATY 30 MCG/0.3ML injection       DAILY MULTI OR Take 1 tablet by mouth daily      finasteride (PROSCAR) 5 MG tablet Take 1 tablet by mouth once daily 90 tablet 3    FLUAD QUADRIVALENT 0.5 ML PRSY injection       furosemide (LASIX) 40 MG tablet Take 1 tablet by mouth once daily 90 tablet 3    isosorbide mononitrate (IMDUR) 30 MG 24 hr tablet Take 1 tablet by mouth once daily 90 tablet 3    losartan (COZAAR) 50 MG tablet TAKE 1 TABLET (50MG) BY MOUTH ONCE DAILY 90 tablet 3    metoprolol succinate ER (TOPROL XL) 50 MG 24 hr tablet Take 1 tablet (50 mg) by mouth 2 times daily 180 tablet 3    Multiple Vitamin (THERA-TABS) TABS Take 1 tablet by mouth every morning      nitroGLYcerin (NITROSTAT) 0.4 MG sublingual tablet 0.4 mg As needed      potassium chloride jody ER (KLOR-CON M20) 20 MEQ CR tablet TAKE 1  BY MOUTH ONCE DAILY 90 tablet 3    senna-docusate (SENOKOT-S/PERICOLACE) 8.6-50 MG tablet Take 1-2 tablets by mouth 2 times daily Take while on oral narcotics to prevent or treat constipation. (Patient not taking: Reported on 7/24/2024) 30 tablet 0    tamsulosin (FLOMAX) 0.4 MG capsule Take 1 capsule by mouth once daily 90 capsule 3    warfarin ANTICOAGULANT (COUMADIN) 5 MG tablet Take 5 mg by mouth MORNINGS: Tuesdays, Thursdays, Saturdays and Sundays. Take 2.5 mg Mondays, Wednesdays and Fridays or as directed by anticoagulation clinic. NEXT INR 06/18/2024      warfarin ANTICOAGULANT (COUMADIN) 5 MG tablet Take 2.5 mg every Mon, Wed, Fri; 5 mg all other days or as directed by the INR  "clinic. (Patient taking differently: Take 2.5 mg by mouth MORNINGS: every Mon, Wed, Fri; take 5 mg all other days or as directed by the INR clinic. NEXT INR 06/18/24) 66 tablet 1     No current facility-administered medications for this visit.       Allergies   Allergen Reactions    No Known Allergies Other (See Comments)    Pine Difficulty breathing     Pine Pollen         Social History     Occupational History    Not on file   Tobacco Use    Smoking status: Never    Smokeless tobacco: Never   Vaping Use    Vaping status: Never Used   Substance and Sexual Activity    Alcohol use: Yes     Alcohol/week: 3.3 standard drinks of alcohol     Types: 4 Glasses of wine per week     Comment: occasional     Drug use: No    Sexual activity: Not Currently     Partners: Female     Birth control/protection: None       Family History   Problem Relation Age of Onset    Arthritis Mother     Diabetes Father     Allergies Father         sulfa    Eye Disorder Father         cataract    Heart Disease Father         by pass       REVIEW OF SYSTEMS  General: negative for, night sweats, dizziness, fatigue  Resp: No shortness of breath and no cough  CV: negative for chest pain, syncope or near-syncope  GI: negative for nausea, vomiting and diarrhea  : negative for dysuria and hematuria  Musculoskeletal: as above  Neurologic: negative for syncope   Hematologic: negative for bleeding disorder    Physical Exam:  Vitals: /70   Temp 97.1  F (36.2  C)   Ht 1.778 m (5' 10\")   Wt 122.5 kg (270 lb)   BMI 38.74 kg/m    BMI= Body mass index is 38.74 kg/m .  Constitutional: healthy, alert and no acute distress   Psychiatric: mentation appears normal and affect normal/bright  NEURO: no focal deficits  SKIN: .well healed, no erythema, no incision breakdown and no drainage.  JOINT/EXTREMITIES: Left knee: No fullness or tenderness.  No evidence infection.  Active motion 3-105 degrees.  Passively full extension to 110.  No instability.  Distal " neurovascular tact  GAIT: not tested     Diagnostic Modalities:  None today.  Independent visualization of the images was performed.      Impression: No chief complaint on file.  6/18/24 ALON ASSISTED Left TOTAL KNEE ARTHROPLASTY     Plan:   Continue progressing.  We discussed working on range of motion.  Overall, he is very happy with his results.      Return to clinic 6, week(s), or sooner as needed for changes.    Re-x-ray on return: No    Chavo Hernandez D.O.

## 2024-07-31 NOTE — LETTER
7/31/2024      Ky Martinez  [address] [facility] [address]      Dear Colleague,    Thank you for referring your patient, Ky Martinez, to the [facility]. Please see a copy of my visit note below.    Orthopedic Clinic Post-Operative Note    CHIEF COMPLAINT: No chief complaint on file.      HISTORY OF PRESENT ILLNESS  Today's visit:  Overall better than presurgery.  Very happy with his progress.  Continues working with physical therapy.  No wound issues.      July 3, 2024 visit:  Here with his wife. Doing very well. Minimum pain. Been using tylenol only since surgery. Attending therapy at [facility]. States sore after this but tylenol icing has worked.  No incision concerns. Voiding, eating. No CHF concerns. Reports getting cardioversion in a few weeks. Normally on coumadin.  Resumed this. Initially bridged with lovenox.  Last INR was 2.3.  using LIAM.            Surgery on 6/18/24 with Dr. Mary CHI ASSISTED Left TOTAL KNEE ARTHROPLASTY          Patient's past medical, surgical, social and family histories reviewed.     Past Medical History:   Diagnosis Date     Hypertension      Myocardial infarction (H)        Past Surgical History:   Procedure Laterality Date     ARTHROPLASTY, KNEE, ROBOT ASSISTED, USING ALON Left 6/18/2024    Procedure: ALON ASSISTED Left TOTAL KNEE ARTHROPLASTY;  Surgeon: Nathan Hernandez DO;  [facility]     Cardiac stent       COLONOSCOPY N/A 5/22/2019    Procedure: Colonoscopy, Polypectomy by Snare;  Surgeon: Gian Horn DO;  [facility]     EXCISE GANGLION WRIST Left 11/26/2014    Procedure: EXCISE GANGLION WRIST;  Surgeon: Gian Haddad MD;  [facility]     ICD DEVICE INTERROGATE      2017     ORTHOPEDIC SURGERY       RELEASE CARPAL TUNNEL Left 11/26/2014    Procedure: RELEASE CARPAL TUNNEL;  Surgeon: Gian Haddad MD;  [facility]     RELEASE DEQUERVAINS WRIST Left 11/26/2014     Procedure: RELEASE DEQUERVAINS WRIST;  Surgeon: Gian Haddad MD;  [facility]       Medications:  Current Outpatient Medications   Medication Sig Dispense Refill     acetaminophen (TYLENOL) 325 MG tablet Take 2 tablets (650 mg) by mouth every 4 hours as needed for other (mild pain) 100 tablet 0     albuterol (PROAIR HFA) 108 (90 Base) MCG/ACT inhaler Inhale 1-2 puffs into the lungs every 4 hours as needed for shortness of breath 8 g 1     amiodarone (PACERONE) 200 MG tablet Take 1 Tablet (200 mg) by mouth twice daily for 30 days, THEN 1 Tablet (200 mg) in the morning.       atorvastatin (LIPITOR) 80 MG tablet Take 1 tablet by mouth once daily 90 tablet 3     COMIRNATY 30 MCG/0.3ML injection        DAILY MULTI OR Take 1 tablet by mouth daily       finasteride (PROSCAR) 5 MG tablet Take 1 tablet by mouth once daily 90 tablet 3     FLUAD QUADRIVALENT 0.5 ML PRSY injection        furosemide (LASIX) 40 MG tablet Take 1 tablet by mouth once daily 90 tablet 3     isosorbide mononitrate (IMDUR) 30 MG 24 hr tablet Take 1 tablet by mouth once daily 90 tablet 3     losartan (COZAAR) 50 MG tablet TAKE 1 TABLET (50MG) BY MOUTH ONCE DAILY 90 tablet 3     metoprolol succinate ER (TOPROL XL) 50 MG 24 hr tablet Take 1 tablet (50 mg) by mouth 2 times daily 180 tablet 3     Multiple Vitamin (THERA-TABS) TABS Take 1 tablet by mouth every morning       nitroGLYcerin (NITROSTAT) 0.4 MG sublingual tablet 0.4 mg As needed       potassium chloride jody ER (KLOR-CON M20) 20 MEQ CR tablet TAKE 1  BY MOUTH ONCE DAILY 90 tablet 3     senna-docusate (SENOKOT-S/PERICOLACE) 8.6-50 MG tablet Take 1-2 tablets by mouth 2 times daily Take while on oral narcotics to prevent or treat constipation. (Patient not taking: Reported on 7/24/2024) 30 tablet 0     tamsulosin (FLOMAX) 0.4 MG capsule Take 1 capsule by mouth once daily 90 capsule 3     warfarin ANTICOAGULANT (COUMADIN) 5 MG tablet Take 5 mg by mouth MORNINGS: Tuesdays, Thursdays,  "Saturdays and Sundays. Take 2.5 mg Mondays, Wednesdays and Fridays or as directed by anticoagulation clinic. NEXT INR 06/18/2024       warfarin ANTICOAGULANT (COUMADIN) 5 MG tablet Take 2.5 mg every Mon, Wed, Fri; 5 mg all other days or as directed by the INR clinic. (Patient taking differently: Take 2.5 mg by mouth MORNINGS: every Mon, Wed, Fri; take 5 mg all other days or as directed by the INR clinic. NEXT INR 06/18/24) 66 tablet 1     No current facility-administered medications for this visit.       Allergies   Allergen Reactions     No Known Allergies Other (See Comments)     Pine Difficulty breathing     Pine Pollen         Social History     Occupational History     Not on file   Tobacco Use     Smoking status: Never     Smokeless tobacco: Never   Vaping Use     Vaping status: Never Used   Substance and Sexual Activity     Alcohol use: Yes     Alcohol/week: 3.3 standard drinks of alcohol     Types: 4 Glasses of wine per week     Comment: occasional      Drug use: No     Sexual activity: Not Currently     Partners: Female     Birth control/protection: None       Family History   Problem Relation Age of Onset     Arthritis Mother      Diabetes Father      Allergies Father         sulfa     Eye Disorder Father         cataract     Heart Disease Father         by pass       REVIEW OF SYSTEMS  General: negative for, night sweats, dizziness, fatigue  Resp: No shortness of breath and no cough  CV: negative for chest pain, syncope or near-syncope  GI: negative for nausea, vomiting and diarrhea  : negative for dysuria and hematuria  Musculoskeletal: as above  Neurologic: negative for syncope   Hematologic: negative for bleeding disorder    Physical Exam:  Vitals: /70   Temp 97.1  F (36.2  C)   Ht 1.778 m (5' 10\")   Wt 122.5 kg (270 lb)   BMI 38.74 kg/m    BMI= Body mass index is 38.74 kg/m .  Constitutional: healthy, alert and no acute distress   Psychiatric: mentation appears normal and affect " normal/bright  NEURO: no focal deficits  SKIN: .well healed, no erythema, no incision breakdown and no drainage.  JOINT/EXTREMITIES: Left knee: No fullness or tenderness.  No evidence infection.  Active motion 3-105 degrees.  Passively full extension to 110.  No instability.  Distal neurovascular tact  GAIT: not tested     Diagnostic Modalities:  None today.  Independent visualization of the images was performed.      Impression: No chief complaint on file.  6/18/24 ALON ASSISTED Left TOTAL KNEE ARTHROPLASTY     Plan:   Continue progressing.  We discussed working on range of motion.  Overall, he is very happy with his results.      Return to clinic 6, week(s), or sooner as needed for changes.    Re-x-ray on return: No    Chavo Hernandez D.O.      Again, thank you for allowing me to participate in the care of your patient.        Sincerely,        Nathan Hernandez, DO

## 2024-07-31 NOTE — PROGRESS NOTES
ANTICOAGULATION MANAGEMENT     Ky Martinez 78 year old male is on warfarin with supratherapeutic INR result. (Goal INR 2.0-3.0)    Recent labs: (last 7 days)     07/31/24  0913   INR 3.1*       ASSESSMENT     Source(s): Chart Review and Patient/Caregiver Call     Warfarin doses taken: Warfarin taken as instructed  Diet: No new diet changes identified  Medication/supplement changes:  Amiodarone started 7/17/24 - followed Amiodarone protocol   New illness, injury, or hospitalization: No  Signs or symptoms of bleeding or clotting: No  Previous result: Therapeutic last 2(+) visits  Additional findings:  Upcoming ablation through [facility] - not yet scheduled.        PLAN     Recommended plan for ongoing change(s) affecting INR     Dosing Instructions: decrease your warfarin dose (20% change) with next INR in 7 - 10 days   - pt will be out of town next week, schedule lab appt for the first day he will be back in town    Summary  As of 7/31/2024      Full warfarin instructions:  5 mg every Tue; 2.5 mg all other days   Next INR check:  8/12/2024               Telephone call with Tarik who verbalizes understanding and agrees to plan and who agrees to plan and repeated back plan correctly    Lab visit scheduled    Education provided: Contact [phone] with any changes, questions or concerns.     Plan made with [facility] Pharmacist Mitali Chavez, RN  Anticoagulation Clinic  7/31/2024    _______________________________________________________________________     Anticoagulation Episode Summary       Current INR goal:  2.0-3.0   TTR:  74.9% (1 y)   Target end date:  Indefinite   Send INR reminders to:  BRANDY MEHTA    Indications    Acute pulmonary embolism  unspecified pulmonary embolism type  unspecified whether acute cor pulmonale present (H) [I26.99]  A-fib (H) [I48.91]  Paroxysmal atrial fibrillation (H) [I48.0]             Comments:               Anticoagulation Care Providers       Provider  Role Specialty Phone number    Ky Major MD Referring Family Practice     Marcin Rios MD Referring Family Medicine [phone]

## 2024-08-12 ENCOUNTER — LAB (OUTPATIENT)
Dept: LAB | Facility: CLINIC | Age: 79
End: 2024-08-12
Payer: COMMERCIAL

## 2024-08-12 ENCOUNTER — ANTICOAGULATION THERAPY VISIT (OUTPATIENT)
Dept: ANTICOAGULATION | Facility: CLINIC | Age: 79
End: 2024-08-12

## 2024-08-12 DIAGNOSIS — I48.91 A-FIB (H): ICD-10-CM

## 2024-08-12 DIAGNOSIS — I26.99 ACUTE PULMONARY EMBOLISM, UNSPECIFIED PULMONARY EMBOLISM TYPE, UNSPECIFIED WHETHER ACUTE COR PULMONALE PRESENT (H): ICD-10-CM

## 2024-08-12 DIAGNOSIS — I26.99 ACUTE PULMONARY EMBOLISM, UNSPECIFIED PULMONARY EMBOLISM TYPE, UNSPECIFIED WHETHER ACUTE COR PULMONALE PRESENT (H): Primary | ICD-10-CM

## 2024-08-12 DIAGNOSIS — I48.0 PAROXYSMAL ATRIAL FIBRILLATION (H): ICD-10-CM

## 2024-08-12 LAB — INR BLD: 4 (ref 0.9–1.1)

## 2024-08-12 PROCEDURE — 85610 PROTHROMBIN TIME: CPT

## 2024-08-12 PROCEDURE — 36415 COLL VENOUS BLD VENIPUNCTURE: CPT

## 2024-08-12 RX ORDER — WARFARIN SODIUM 2.5 MG/1
2.5 TABLET ORAL DAILY
Qty: 90 TABLET | Refills: 1 | Status: SHIPPED | OUTPATIENT
Start: 2024-08-12

## 2024-08-12 NOTE — PROGRESS NOTES
ANTICOAGULATION MANAGEMENT     Ky Martinez 78 year old male is on warfarin with supratherapeutic INR result. (Goal INR 2.0-3.0)    Recent labs: (last 7 days)     08/12/24  1036   INR 4.0*       ASSESSMENT     Source(s): Chart Review and Patient/Caregiver Call     Warfarin doses taken: Warfarin taken as instructed  Diet: No new diet changes identified  Medication/supplement changes:  day 25 of amiodarone start up   New illness, injury, or hospitalization: No  Signs or symptoms of bleeding or clotting: No  Previous result: Supratherapeutic  Additional findings: None       PLAN     Recommended plan for temporary change(s) affecting INR     Dosing Instructions: hold dose then decrease your warfarin dose (31.2% change) with next INR in 10 days       Summary  As of 8/12/2024      Full warfarin instructions:  8/12: Hold; Otherwise 1.25 mg every Tue, Thu, Sat; 2.5 mg all other days   Next INR check:  8/22/2024               Telephone call with Tarik who verbalizes understanding and agrees to plan    Lab visit scheduled    Education provided: Please call back if any changes to your diet, medications or how you've been taking warfarin    Plan made with [facility] Pharmacist Mitali Whitfield RN  Anticoagulation Clinic  8/12/2024    _______________________________________________________________________     Anticoagulation Episode Summary       Current INR goal:  2.0-3.0   TTR:  71.6% (1 y)   Target end date:  Indefinite   Send INR reminders to:  [facility]    Indications    Acute pulmonary embolism  unspecified pulmonary embolism type  unspecified whether acute cor pulmonale present (H) [I26.99]  A-fib (H) [I48.91]  Paroxysmal atrial fibrillation (H) [I48.0]             Comments:               Anticoagulation Care Providers       Provider Role Specialty Phone number    Ky Major MD Referring Family Practice     Marcin Rios MD Referring Family Medicine [phone]

## 2024-08-22 ENCOUNTER — LAB (OUTPATIENT)
Dept: LAB | Facility: CLINIC | Age: 79
End: 2024-08-22
Payer: COMMERCIAL

## 2024-08-22 ENCOUNTER — ANTICOAGULATION THERAPY VISIT (OUTPATIENT)
Dept: ANTICOAGULATION | Facility: CLINIC | Age: 79
End: 2024-08-22

## 2024-08-22 DIAGNOSIS — I26.99 ACUTE PULMONARY EMBOLISM, UNSPECIFIED PULMONARY EMBOLISM TYPE, UNSPECIFIED WHETHER ACUTE COR PULMONALE PRESENT (H): ICD-10-CM

## 2024-08-22 DIAGNOSIS — I48.0 PAROXYSMAL ATRIAL FIBRILLATION (H): ICD-10-CM

## 2024-08-22 DIAGNOSIS — I48.91 A-FIB (H): ICD-10-CM

## 2024-08-22 DIAGNOSIS — I26.99 ACUTE PULMONARY EMBOLISM, UNSPECIFIED PULMONARY EMBOLISM TYPE, UNSPECIFIED WHETHER ACUTE COR PULMONALE PRESENT (H): Primary | ICD-10-CM

## 2024-08-22 LAB — INR BLD: 1.9 (ref 0.9–1.1)

## 2024-08-22 PROCEDURE — 85610 PROTHROMBIN TIME: CPT

## 2024-08-22 PROCEDURE — 36416 COLLJ CAPILLARY BLOOD SPEC: CPT

## 2024-08-22 NOTE — PROGRESS NOTES
ANTICOAGULATION MANAGEMENT     Ky Martinez 78 year old male is on warfarin with subtherapeutic INR result. (Goal INR 2.0-3.0)    Recent labs: (last 7 days)     08/22/24  1037   INR 1.9*       ASSESSMENT     Source(s): Chart Review and Patient/Caregiver Call     Warfarin doses taken: Held for supra INR  recently which may be affecting INR  Diet: No new diet changes identified  Medication/supplement changes: amiodarone added 7-17  New illness, injury, or hospitalization: No  Signs or symptoms of bleeding or clotting: No  Previous result: Supratherapeutic and dose reduced per protocol  Additional findings: None       PLAN     Recommended plan for temporary change(s) and ongoing change(s) of amiodarone affecting INR     Dosing Instructions: Increase your warfarin dose (9% change) with next INR in 1 week       Summary  As of 8/22/2024      Full warfarin instructions:  1.25 mg every Tue, Sat; 2.5 mg all other days   Next INR check:  8/29/2024               Telephone call with Tarik who verbalizes understanding and agrees to plan    Patient offered & declined to schedule next visit    Education provided: Please call back if any changes to your diet, medications or how you've been taking warfarin  Contact [phone] with any changes, questions or concerns.     Plan made per [facility] anticoagulation protocol    Ninoska Hawk RN  Anticoagulation Clinic  8/22/2024    _______________________________________________________________________     Anticoagulation Episode Summary       Current INR goal:  2.0-3.0   TTR:  70.2% (1 y)   Target end date:  Indefinite   Send INR reminders to:  BRANDY MEHTA    Indications    Acute pulmonary embolism  unspecified pulmonary embolism type  unspecified whether acute cor pulmonale present (H) [I26.99]  A-fib (H) [I48.91]  Paroxysmal atrial fibrillation (H) [I48.0]             Comments:               Anticoagulation Care Providers       Provider Role Specialty Phone number    Pedrito  Ky PEREZ MD Referring Family Practice     Marcin Rios MD Referring [facility] Medicine [phone]

## 2024-08-27 ENCOUNTER — MEDICAL CORRESPONDENCE (OUTPATIENT)
Dept: HEALTH INFORMATION MANAGEMENT | Facility: CLINIC | Age: 79
End: 2024-08-27

## 2024-08-29 ENCOUNTER — ANTICOAGULATION THERAPY VISIT (OUTPATIENT)
Dept: ANTICOAGULATION | Facility: CLINIC | Age: 79
End: 2024-08-29

## 2024-08-29 ENCOUNTER — LAB (OUTPATIENT)
Dept: LAB | Facility: CLINIC | Age: 79
End: 2024-08-29
Payer: COMMERCIAL

## 2024-08-29 DIAGNOSIS — I48.91 A-FIB (H): ICD-10-CM

## 2024-08-29 DIAGNOSIS — I48.0 PAROXYSMAL ATRIAL FIBRILLATION (H): ICD-10-CM

## 2024-08-29 DIAGNOSIS — I26.99 ACUTE PULMONARY EMBOLISM, UNSPECIFIED PULMONARY EMBOLISM TYPE, UNSPECIFIED WHETHER ACUTE COR PULMONALE PRESENT (H): Primary | ICD-10-CM

## 2024-08-29 DIAGNOSIS — I26.99 ACUTE PULMONARY EMBOLISM, UNSPECIFIED PULMONARY EMBOLISM TYPE, UNSPECIFIED WHETHER ACUTE COR PULMONALE PRESENT (H): ICD-10-CM

## 2024-08-29 LAB — INR BLD: 1.7 (ref 0.9–1.1)

## 2024-08-29 PROCEDURE — 36415 COLL VENOUS BLD VENIPUNCTURE: CPT

## 2024-08-29 PROCEDURE — 85610 PROTHROMBIN TIME: CPT

## 2024-08-29 NOTE — PROGRESS NOTES
ANTICOAGULATION MANAGEMENT     Ky Martinez 78 year old male is on warfarin with subtherapeutic INR result. (Goal INR 2.0-3.0)    Recent labs: (last 7 days)     08/29/24  1010   INR 1.7*       ASSESSMENT     Source(s): Chart Review and Patient/Caregiver Call     Warfarin doses taken: Warfarin taken as instructed  Diet: No new diet changes identified  Medication/supplement changes: None noted  New illness, injury, or hospitalization: No  Signs or symptoms of bleeding or clotting: No  Previous result: Subtherapeutic  Additional findings:  continues on BID dosing of amiodarone       PLAN     Recommended plan for ongoing change(s) affecting INR     Dosing Instructions: booster dose then Increase your warfarin dose (8.3% change) with next INR in 1 week       Summary  As of 8/29/2024      Full warfarin instructions:  8/29: 3.75 mg; Otherwise 1.25 mg every Thu; 2.5 mg all other days   Next INR check:  9/5/2024               Telephone call with Tarik who verbalizes understanding and agrees to plan    Patient offered & declined to schedule next visit will call back when he knows his schedule.     Education provided: Goal range and lab monitoring: goal range and significance of current result  Contact [phone] with any changes, questions or concerns.     Plan made with [facility] Pharmacist Mitali Zuniga, RN  Anticoagulation Clinic  8/29/2024    _______________________________________________________________________     Anticoagulation Episode Summary       Current INR goal:  2.0-3.0   TTR:  68.2% (1 y)   Target end date:  Indefinite   [facility]    Indications    Acute pulmonary embolism  unspecified pulmonary embolism type  unspecified whether acute cor pulmonale present (H) [I26.99]  A-fib (H) [I48.91]  Paroxysmal atrial fibrillation (H) [I48.0]             Comments:               Anticoagulation Care Providers       Provider Role Specialty Phone number    Pedrito  Ky PEREZ MD Referring Family Practice     Marcin Rios MD Referring [facility] Medicine [phone]

## 2024-09-03 ENCOUNTER — LAB (OUTPATIENT)
Dept: LAB | Facility: CLINIC | Age: 79
End: 2024-09-03
Payer: COMMERCIAL

## 2024-09-03 ENCOUNTER — ANTICOAGULATION THERAPY VISIT (OUTPATIENT)
Dept: ANTICOAGULATION | Facility: CLINIC | Age: 79
End: 2024-09-03

## 2024-09-03 DIAGNOSIS — I48.91 A-FIB (H): ICD-10-CM

## 2024-09-03 DIAGNOSIS — I48.0 PAROXYSMAL ATRIAL FIBRILLATION (H): ICD-10-CM

## 2024-09-03 DIAGNOSIS — I26.99 ACUTE PULMONARY EMBOLISM, UNSPECIFIED PULMONARY EMBOLISM TYPE, UNSPECIFIED WHETHER ACUTE COR PULMONALE PRESENT (H): Primary | ICD-10-CM

## 2024-09-03 DIAGNOSIS — N18.31 CHRONIC KIDNEY DISEASE, STAGE 3A (H): Primary | ICD-10-CM

## 2024-09-03 DIAGNOSIS — I26.99 ACUTE PULMONARY EMBOLISM, UNSPECIFIED PULMONARY EMBOLISM TYPE, UNSPECIFIED WHETHER ACUTE COR PULMONALE PRESENT (H): ICD-10-CM

## 2024-09-03 LAB — INR BLD: 1.8 (ref 0.9–1.1)

## 2024-09-03 PROCEDURE — 85610 PROTHROMBIN TIME: CPT

## 2024-09-03 PROCEDURE — 36416 COLLJ CAPILLARY BLOOD SPEC: CPT

## 2024-09-03 NOTE — PROGRESS NOTES
ANTICOAGULATION MANAGEMENT     Ky Martinez 78 year old male is on warfarin with subtherapeutic INR result. (Goal INR 2.0-3.0)    Recent labs: (last 7 days)     09/03/24  1315   INR 1.8*       ASSESSMENT     Source(s): Chart Review and Patient/Caregiver Call     Warfarin doses taken: Warfarin taken as instructed  Diet: No new diet changes identified  Medication/supplement changes: None noted  New illness, injury, or hospitalization: No  Signs or symptoms of bleeding or clotting: No  Previous result: Subtherapeutic  Additional findings: None       PLAN     Recommended plan for no diet, medication or health factor changes affecting INR     Dosing Instructions: booster dose then continue your current warfarin dose with next INR in 1 week       Summary  As of 9/3/2024      Full warfarin instructions:  9/3: 3.75 mg; Otherwise 2.5 mg every day   Next INR check:  9/11/2024               Telephone call with Tarik who verbalizes understanding and agrees to plan    Lab visit scheduled    Education provided: Contact [phone] with any changes, questions or concerns.     Plan made per [facility] anticoagulation protocol    Julia CHARLES RN  Anticoagulation Clinic  9/3/2024    _______________________________________________________________________     Anticoagulation Episode Summary       Current INR goal:  2.0-3.0   TTR:  66.9% (1 y)   Target end date:  Indefinite   Send INR reminders to:  BRANDY MEHTA    Indications    Acute pulmonary embolism  unspecified pulmonary embolism type  unspecified whether acute cor pulmonale present (H) [I26.99]  A-fib (H) [I48.91]  Paroxysmal atrial fibrillation (H) [I48.0]             Comments:               Anticoagulation Care Providers       Provider Role Specialty Phone number    Ky Major MD Referring Family Practice     Marcin Rios MD Referring Family Medicine [phone]

## 2024-09-05 DIAGNOSIS — Z96.652 S/P TOTAL KNEE ARTHROPLASTY, LEFT: Primary | ICD-10-CM

## 2024-09-11 ENCOUNTER — LAB (OUTPATIENT)
Dept: LAB | Facility: CLINIC | Age: 79
End: 2024-09-11
Payer: COMMERCIAL

## 2024-09-11 ENCOUNTER — ANTICOAGULATION THERAPY VISIT (OUTPATIENT)
Dept: ANTICOAGULATION | Facility: CLINIC | Age: 79
End: 2024-09-11

## 2024-09-11 ENCOUNTER — OFFICE VISIT (OUTPATIENT)
Dept: ORTHOPEDICS | Facility: CLINIC | Age: 79
End: 2024-09-11
Payer: COMMERCIAL

## 2024-09-11 ENCOUNTER — ANCILLARY PROCEDURE (OUTPATIENT)
Dept: GENERAL RADIOLOGY | Facility: CLINIC | Age: 79
End: 2024-09-11
Attending: NURSE PRACTITIONER
Payer: COMMERCIAL

## 2024-09-11 VITALS
SYSTOLIC BLOOD PRESSURE: 129 MMHG | DIASTOLIC BLOOD PRESSURE: 82 MMHG | TEMPERATURE: 97.1 F | BODY MASS INDEX: 40.41 KG/M2 | WEIGHT: 282.3 LBS | HEIGHT: 70 IN

## 2024-09-11 DIAGNOSIS — I48.91 A-FIB (H): ICD-10-CM

## 2024-09-11 DIAGNOSIS — Z96.652 S/P TOTAL KNEE ARTHROPLASTY, LEFT: Primary | ICD-10-CM

## 2024-09-11 DIAGNOSIS — I48.0 PAROXYSMAL ATRIAL FIBRILLATION (H): ICD-10-CM

## 2024-09-11 DIAGNOSIS — I26.99 ACUTE PULMONARY EMBOLISM, UNSPECIFIED PULMONARY EMBOLISM TYPE, UNSPECIFIED WHETHER ACUTE COR PULMONALE PRESENT (H): Primary | ICD-10-CM

## 2024-09-11 DIAGNOSIS — I26.99 ACUTE PULMONARY EMBOLISM, UNSPECIFIED PULMONARY EMBOLISM TYPE, UNSPECIFIED WHETHER ACUTE COR PULMONALE PRESENT (H): ICD-10-CM

## 2024-09-11 DIAGNOSIS — Z96.652 S/P TOTAL KNEE ARTHROPLASTY, LEFT: ICD-10-CM

## 2024-09-11 LAB — INR BLD: 2 (ref 0.9–1.1)

## 2024-09-11 PROCEDURE — 85610 PROTHROMBIN TIME: CPT

## 2024-09-11 PROCEDURE — 73562 X-RAY EXAM OF KNEE 3: CPT | Mod: TC | Performed by: RADIOLOGY

## 2024-09-11 PROCEDURE — 36416 COLLJ CAPILLARY BLOOD SPEC: CPT

## 2024-09-11 PROCEDURE — 99024 POSTOP FOLLOW-UP VISIT: CPT | Performed by: ORTHOPAEDIC SURGERY

## 2024-09-11 NOTE — PROGRESS NOTES
Orthopedic Clinic Post-Operative Note    CHIEF COMPLAINT:   Chief Complaint   Patient presents with    Left Knee - Surgical Followup     Left total knee arthroplasty, DOS: 6/18/2024 (12w)       HISTORY OF PRESENT ILLNESS  Today's visit:  Doing very well. Happy with progress. No wound issues. Finished formal therapy. Is doing HEP and now going to the gym to work on strength.  Some stiffness but no pain.      July 31, 2024 visit:  Overall better than presurgery.  Very happy with his progress.  Continues working with physical therapy.  No wound issues.        July 3, 2024 visit:  Here with his wife. Doing very well. Minimum pain. Been using tylenol only since surgery. Attending therapy at [facility]. States sore after this but tylenol icing has worked.  No incision concerns. Voiding, eating. No CHF concerns. Reports getting cardioversion in a few weeks. Normally on coumadin.  Resumed this. Initially bridged with lovenox.  Last INR was 2.3.  using LIAM.            Surgery on 6/18/24 with Dr. Mary CHI ASSISTED Left TOTAL KNEE ARTHROPLASTY          Patient's past medical, surgical, social and family histories reviewed.     Past Medical History:   Diagnosis Date    Hypertension     Myocardial infarction (H)        Past Surgical History:   Procedure Laterality Date    ARTHROPLASTY, KNEE, ROBOT ASSISTED, USING ALON Left 6/18/2024    Procedure: ALON ASSISTED Left TOTAL KNEE ARTHROPLASTY;  Surgeon: Nathan Hernandez DO;  [facility]    Cardiac stent      COLONOSCOPY N/A 5/22/2019    Procedure: Colonoscopy, Polypectomy by Snare;  Surgeon: Gian Horn DO;  [facility]    EXCISE GANGLION WRIST Left 11/26/2014    Procedure: EXCISE GANGLION WRIST;  Surgeon: Gian Haddad MD;  [facility]    ICD DEVICE INTERROGATE      2017    ORTHOPEDIC SURGERY      RELEASE CARPAL TUNNEL Left 11/26/2014    Procedure: RELEASE CARPAL TUNNEL;  Surgeon: Gian Haddad MD;  [facility]    RELEASE DEQUERVAINS WRIST Left 11/26/2014    Procedure: RELEASE DEQUERVAINS WRIST;  Surgeon: Gian Haddad MD;  [facility]       Medications:  Current Outpatient Medications   Medication Sig Dispense Refill    acetaminophen (TYLENOL) 325 MG tablet Take 2 tablets (650 mg) by mouth every 4 hours as needed for other (mild pain) 100 tablet 0    albuterol (PROAIR HFA) 108 (90 Base) MCG/ACT inhaler Inhale 1-2 puffs into the lungs every 4 hours as needed for shortness of breath 8 g 1    amiodarone (PACERONE) 200 MG tablet Take 1 Tablet (200 mg) by mouth twice daily for 30 days, THEN 1 Tablet (200 mg) in the morning.      atorvastatin (LIPITOR) 80 MG tablet Take 1 tablet by mouth once daily 90 tablet 3    COMIRNATY 30 MCG/0.3ML injection       DAILY MULTI OR Take 1 tablet by mouth daily      finasteride (PROSCAR) 5 MG tablet Take 1 tablet by mouth once daily 90 tablet 3    FLUAD QUADRIVALENT 0.5 ML PRSY injection       furosemide (LASIX) 40 MG tablet Take 1 tablet by mouth once daily 90 tablet 3    isosorbide mononitrate (IMDUR) 30 MG 24 hr tablet Take 1 tablet by mouth once daily 90 tablet 3    losartan (COZAAR) 50 MG tablet TAKE 1 TABLET (50MG) BY MOUTH ONCE DAILY 90 tablet 3    metoprolol succinate ER (TOPROL XL) 50 MG 24 hr tablet Take 1 tablet (50 mg) by mouth 2 times daily 180 tablet 3    Multiple Vitamin (THERA-TABS) TABS Take 1 tablet by mouth every morning      nitroGLYcerin (NITROSTAT) 0.4 MG sublingual tablet 0.4 mg As needed      potassium chloride jody ER (KLOR-CON M20) 20 MEQ CR tablet TAKE 1  BY MOUTH ONCE DAILY 90 tablet 3    senna-docusate (SENOKOT-S/PERICOLACE) 8.6-50 MG tablet Take 1-2 tablets by mouth 2 times daily Take while on oral narcotics to prevent or treat constipation. (Patient not taking: Reported on 7/24/2024) 30 tablet 0    tamsulosin (FLOMAX) 0.4 MG capsule Take 1 capsule by mouth once daily 90 capsule 3    warfarin ANTICOAGULANT (COUMADIN) 2.5 MG tablet Take 1 tablet  "(2.5 mg) by mouth daily Take 1.25 Tue thurs and sat and 2.5 mg all other days 90 tablet 1    warfarin ANTICOAGULANT (COUMADIN) 5 MG tablet Take 5 mg by mouth MORNINGS: Tuesdays, Thursdays, Saturdays and Sundays. Take 2.5 mg Mondays, Wednesdays and Fridays or as directed by anticoagulation clinic. NEXT INR 06/18/2024      warfarin ANTICOAGULANT (COUMADIN) 5 MG tablet Take 2.5 mg every Mon, Wed, Fri; 5 mg all other days or as directed by the INR clinic. (Patient taking differently: Take 2.5 mg by mouth MORNINGS: every Mon, Wed, Fri; take 5 mg all other days or as directed by the INR clinic. NEXT INR 06/18/24) 66 tablet 1     No current facility-administered medications for this visit.       Allergies   Allergen Reactions    No Known Allergies Other (See Comments)    Pine Difficulty breathing     Pine Pollen         Social History     Occupational History    Not on file   Tobacco Use    Smoking status: Never    Smokeless tobacco: Never   Vaping Use    Vaping status: Never Used   Substance and Sexual Activity    Alcohol use: Yes     Alcohol/week: 3.3 standard drinks of alcohol     Types: 4 Glasses of wine per week     Comment: occasional     Drug use: No    Sexual activity: Not Currently     Partners: Female     Birth control/protection: None       Family History   Problem Relation Age of Onset    Arthritis Mother     Diabetes Father     Allergies Father         sulfa    Eye Disorder Father         cataract    Heart Disease Father         by pass       REVIEW OF SYSTEMS  General: negative for, night sweats, dizziness, fatigue  Resp: No shortness of breath and no cough  CV: negative for chest pain, syncope or near-syncope  GI: negative for nausea, vomiting and diarrhea  : negative for dysuria and hematuria  Musculoskeletal: as above  Neurologic: negative for syncope   Hematologic: negative for bleeding disorder    Physical Exam:  Vitals: /82   Temp 97.1  F (36.2  C)   Ht 1.778 m (5' 10\")   Wt 128.1 kg (282 lb " 4.8 oz)   BMI 40.51 kg/m    BMI= Body mass index is 40.51 kg/m .  Constitutional: healthy, alert and no acute distress   Psychiatric: mentation appears normal and affect normal/bright  NEURO: no focal deficits  SKIN: .well healed, no erythema, no incision breakdown and no drainage.  JOINT/EXTREMITIES: left knee: no effusion. AROM 0-115. No pain with motion. No focal tenderness. No instability with valgus or varus. Extensor strong. Patella tracks midline. Calf soft non-tender. Distal neurovascular grossly intact.  GAIT: not tested     Diagnostic Modalities:  Left knee x-ray: No acute fractures or dislocations.  Cemented total knee arthroplasty in place.  No evidence position change or loosening.  Independent visualization of the images was performed.      Impression:   Chief Complaint   Patient presents with    Left Knee - Surgical Followup     Left total knee arthroplasty, DOS: 6/18/2024 (12w)   June 18, 2024: Left total knee arthroplasty-3 months    Plan:   Activity: increase as tolerated.  Continue to progress. Discussed dental prophylaxis until 1 year post-op.    Will see at 1 year post-op, sooner if concerns or questions.        Re-x-ray on return: Yes.    Dr. Hernandez was present in the office.  He personally discussed the care plan and reviewed all appropriate imaging.    SHANNON Escalante, CNP  Orthopedic Surgery

## 2024-09-11 NOTE — LETTER
9/11/2024      Ky Martinez  [id number] [facility] [address]      Dear Colleague,    Thank you for referring your patient, Ky Martinez, to the [facility]. Please see a copy of my visit note below.    Orthopedic Clinic Post-Operative Note    CHIEF COMPLAINT:   Chief Complaint   Patient presents with     Left Knee - Surgical Followup     Left total knee arthroplasty, DOS: 6/18/2024 (12w)       HISTORY OF PRESENT ILLNESS  Today's visit:  Doing very well. Happy with progress. No wound issues. Finished formal therapy. Is doing HEP and now going to the gym to work on strength.  Some stiffness but no pain.      July 31, 2024 visit:  Overall better than presurgery.  Very happy with his progress.  Continues working with physical therapy.  No wound issues.        July 3, 2024 visit:  Here with his wife. Doing very well. Minimum pain. Been using tylenol only since surgery. Attending therapy at [facility]. States sore after this but tylenol icing has worked.  No incision concerns. Voiding, eating. No CHF concerns. Reports getting cardioversion in a few weeks. Normally on coumadin.  Resumed this. Initially bridged with lovenox.  Last INR was 2.3.  using LIAM.            Surgery on 6/18/24 with Dr. Mary CHI ASSISTED Left TOTAL KNEE ARTHROPLASTY          Patient's past medical, surgical, social and family histories reviewed.     Past Medical History:   Diagnosis Date     Hypertension      Myocardial infarction (H)        Past Surgical History:   Procedure Laterality Date     ARTHROPLASTY, KNEE, ROBOT ASSISTED, USING ALON Left 6/18/2024    Procedure: ALON ASSISTED Left TOTAL KNEE ARTHROPLASTY;  Surgeon: Nathan Hernandez DO;  [facility]     Cardiac stent       COLONOSCOPY N/A 5/22/2019    Procedure: Colonoscopy, Polypectomy by Snare;  Surgeon: Gian Horn DO;  [facility]     EXCISE GANGLION WRIST Left 11/26/2014    Procedure: EXCISE  GANGLION WRIST;  Surgeon: Gian Haddad MD;  [facility]     ICD DEVICE INTERROGATE      2017     ORTHOPEDIC SURGERY       RELEASE CARPAL TUNNEL Left 11/26/2014    Procedure: RELEASE CARPAL TUNNEL;  Surgeon: Gian Haddad MD;  [facility]     RELEASE DEQUERVAINS WRIST Left 11/26/2014    Procedure: RELEASE DEQUERVAINS WRIST;  Surgeon: Gian Haddad MD;  [facility]       Medications:  Current Outpatient Medications   Medication Sig Dispense Refill     acetaminophen (TYLENOL) 325 MG tablet Take 2 tablets (650 mg) by mouth every 4 hours as needed for other (mild pain) 100 tablet 0     albuterol (PROAIR HFA) 108 (90 Base) MCG/ACT inhaler Inhale 1-2 puffs into the lungs every 4 hours as needed for shortness of breath 8 g 1     amiodarone (PACERONE) 200 MG tablet Take 1 Tablet (200 mg) by mouth twice daily for 30 days, THEN 1 Tablet (200 mg) in the morning.       atorvastatin (LIPITOR) 80 MG tablet Take 1 tablet by mouth once daily 90 tablet 3     COMIRNATY 30 MCG/0.3ML injection        DAILY MULTI OR Take 1 tablet by mouth daily       finasteride (PROSCAR) 5 MG tablet Take 1 tablet by mouth once daily 90 tablet 3     FLUAD QUADRIVALENT 0.5 ML PRSY injection        furosemide (LASIX) 40 MG tablet Take 1 tablet by mouth once daily 90 tablet 3     isosorbide mononitrate (IMDUR) 30 MG 24 hr tablet Take 1 tablet by mouth once daily 90 tablet 3     losartan (COZAAR) 50 MG tablet TAKE 1 TABLET (50MG) BY MOUTH ONCE DAILY 90 tablet 3     metoprolol succinate ER (TOPROL XL) 50 MG 24 hr tablet Take 1 tablet (50 mg) by mouth 2 times daily 180 tablet 3     Multiple Vitamin (THERA-TABS) TABS Take 1 tablet by mouth every morning       nitroGLYcerin (NITROSTAT) 0.4 MG sublingual tablet 0.4 mg As needed       potassium chloride jody ER (KLOR-CON M20) 20 MEQ CR tablet TAKE 1  BY MOUTH ONCE DAILY 90 tablet 3     senna-docusate (SENOKOT-S/PERICOLACE) 8.6-50 MG tablet Take 1-2 tablets by mouth 2 times daily  Take while on oral narcotics to prevent or treat constipation. (Patient not taking: Reported on 7/24/2024) 30 tablet 0     tamsulosin (FLOMAX) 0.4 MG capsule Take 1 capsule by mouth once daily 90 capsule 3     warfarin ANTICOAGULANT (COUMADIN) 2.5 MG tablet Take 1 tablet (2.5 mg) by mouth daily Take 1.25 Tue thurs and sat and 2.5 mg all other days 90 tablet 1     warfarin ANTICOAGULANT (COUMADIN) 5 MG tablet Take 5 mg by mouth MORNINGS: Tuesdays, Thursdays, Saturdays and Sundays. Take 2.5 mg Mondays, Wednesdays and Fridays or as directed by anticoagulation clinic. NEXT INR 06/18/2024       warfarin ANTICOAGULANT (COUMADIN) 5 MG tablet Take 2.5 mg every Mon, Wed, Fri; 5 mg all other days or as directed by the INR clinic. (Patient taking differently: Take 2.5 mg by mouth MORNINGS: every Mon, Wed, Fri; take 5 mg all other days or as directed by the INR clinic. NEXT INR 06/18/24) 66 tablet 1     No current facility-administered medications for this visit.       Allergies   Allergen Reactions     No Known Allergies Other (See Comments)     Pine Difficulty breathing     Pine Pollen         Social History     Occupational History     Not on file   Tobacco Use     Smoking status: Never     Smokeless tobacco: Never   Vaping Use     Vaping status: Never Used   Substance and Sexual Activity     Alcohol use: Yes     Alcohol/week: 3.3 standard drinks of alcohol     Types: 4 Glasses of wine per week     Comment: occasional      Drug use: No     Sexual activity: Not Currently     Partners: Female     Birth control/protection: None       Family History   Problem Relation Age of Onset     Arthritis Mother      Diabetes Father      Allergies Father         sulfa     Eye Disorder Father         cataract     Heart Disease Father         by pass       REVIEW OF SYSTEMS  General: negative for, night sweats, dizziness, fatigue  Resp: No shortness of breath and no cough  CV: negative for chest pain, syncope or near-syncope  GI: negative  "for nausea, vomiting and diarrhea  : negative for dysuria and hematuria  Musculoskeletal: as above  Neurologic: negative for syncope   Hematologic: negative for bleeding disorder    Physical Exam:  Vitals: /82   Temp 97.1  F (36.2  C)   Ht 1.778 m (5' 10\")   Wt 128.1 kg (282 lb 4.8 oz)   BMI 40.51 kg/m    BMI= Body mass index is 40.51 kg/m .  Constitutional: healthy, alert and no acute distress   Psychiatric: mentation appears normal and affect normal/bright  NEURO: no focal deficits  SKIN: .well healed, no erythema, no incision breakdown and no drainage.  JOINT/EXTREMITIES: left knee: no effusion. AROM 0-115. No pain with motion. No focal tenderness. No instability with valgus or varus. Extensor strong. Patella tracks midline. Calf soft non-tender. Distal neurovascular grossly intact.  GAIT: not tested     Diagnostic Modalities:  Left knee x-ray: No acute fractures or dislocations.  Cemented total knee arthroplasty in place.  No evidence position change or loosening.  Independent visualization of the images was performed.      Impression:   Chief Complaint   Patient presents with     Left Knee - Surgical Followup     Left total knee arthroplasty, DOS: 6/18/2024 (12w)   June 18, 2024: Left total knee arthroplasty-3 months    Plan:   Activity: increase as tolerated.  Continue to progress. Discussed dental prophylaxis until 1 year post-op.    Will see at 1 year post-op, sooner if concerns or questions.        Re-x-ray on return: Yes.    Dr. Hernandez was present in the office.  He personally discussed the care plan and reviewed all appropriate imaging.    SHANNON Escalante, CNP  Orthopedic Surgery      Again, thank you for allowing me to participate in the care of your patient.        Sincerely,        Nathan Hernandez, DO  "

## 2024-09-11 NOTE — PROGRESS NOTES
ANTICOAGULATION MANAGEMENT     Ky Martinez 78 year old male is on warfarin with therapeutic INR result. (Goal INR 2.0-3.0)    Recent labs: (last 7 days)     09/11/24  0823   INR 2.0*       ASSESSMENT     Source(s): Chart Review and Patient/Caregiver Call     Warfarin doses taken: Warfarin taken as instructed  Diet: No new diet changes identified  Medication/supplement changes: amiodarone changed to 200 mg daily   New illness, injury, or hospitalization: No  Signs or symptoms of bleeding or clotting: No  Previous result: Subtherapeutic  Additional findings: None       PLAN     Recommended plan for ongoing change(s) affecting INR     Dosing Instructions: Continue your current warfarin dose with next INR in 1 week       Summary  As of 9/11/2024      Full warfarin instructions:  2.5 mg every day   Next INR check:  9/18/2024               Telephone call with Tarik who verbalizes understanding and agrees to plan    Lab visit scheduled    Education provided: Please call back if any changes to your diet, medications or how you've been taking warfarin    Plan made per [facility] anticoagulation protocol    Jailene Whitfield RN  9/11/2024  Anticoagulation Clinic  [employer] for routing messages: thierno MEHTA  [facility] patient phone line: [phone]        _______________________________________________________________________     Anticoagulation Episode Summary       Current INR goal:  2.0-3.0   TTR:  64.7% (1 y)   Target end date:  Indefinite   Send INR reminders to:  BRANDY MEHTA    Indications    Acute pulmonary embolism  unspecified pulmonary embolism type  unspecified whether acute cor pulmonale present (H) [I26.99]  A-fib (H) [I48.91]  Paroxysmal atrial fibrillation (H) [I48.0]             Comments:               Anticoagulation Care Providers       Provider Role Specialty Phone number    Ky Major MD Referring Family Practice     Marcin Rios MD Referring Family Medicine [phone]

## 2024-09-18 ENCOUNTER — LAB (OUTPATIENT)
Dept: LAB | Facility: CLINIC | Age: 79
End: 2024-09-18
Payer: COMMERCIAL

## 2024-09-18 ENCOUNTER — ANTICOAGULATION THERAPY VISIT (OUTPATIENT)
Dept: ANTICOAGULATION | Facility: CLINIC | Age: 79
End: 2024-09-18

## 2024-09-18 DIAGNOSIS — I48.91 A-FIB (H): ICD-10-CM

## 2024-09-18 DIAGNOSIS — I48.0 PAROXYSMAL ATRIAL FIBRILLATION (H): ICD-10-CM

## 2024-09-18 DIAGNOSIS — I26.99 ACUTE PULMONARY EMBOLISM, UNSPECIFIED PULMONARY EMBOLISM TYPE, UNSPECIFIED WHETHER ACUTE COR PULMONALE PRESENT (H): Primary | ICD-10-CM

## 2024-09-18 DIAGNOSIS — I26.99 ACUTE PULMONARY EMBOLISM, UNSPECIFIED PULMONARY EMBOLISM TYPE, UNSPECIFIED WHETHER ACUTE COR PULMONALE PRESENT (H): ICD-10-CM

## 2024-09-18 LAB — INR BLD: 1.6 (ref 0.9–1.1)

## 2024-09-18 PROCEDURE — 85610 PROTHROMBIN TIME: CPT

## 2024-09-18 PROCEDURE — 36415 COLL VENOUS BLD VENIPUNCTURE: CPT

## 2024-09-18 NOTE — PROGRESS NOTES
ANTICOAGULATION MANAGEMENT     Ky Martinez 78 year old male is on warfarin with subtherapeutic INR result. (Goal INR 2.0-3.0)    Recent labs: (last 7 days)     09/18/24  1036   INR 1.6*       ASSESSMENT     Source(s): Chart Review and Patient/Caregiver Call     Warfarin doses taken: Warfarin taken as instructed  Diet: No new diet changes identified  Medication/supplement changes:  Amio decreased to 200 mg daily about 3 weeks ago, may be the cause of low INR  New illness, injury, or hospitalization: No  Signs or symptoms of bleeding or clotting: No  Previous result: Therapeutic last visit; previously outside of goal range  Additional findings: None       PLAN     Recommended plan for ongoing change(s) affecting INR     Dosing Instructions: Increase your warfarin dose (14.3% change) with next INR in 1 week       Summary  As of 9/18/2024      Full warfarin instructions:  3.75 mg every Wed, Sat; 2.5 mg all other days   Next INR check:  9/25/2024               Telephone call with Tarik who verbalizes understanding and agrees to plan and who agrees to plan and repeated back plan correctly    Patient offered & declined to schedule next visit    Education provided: None required    Plan made per [facility] anticoagulation protocol    Lucia Canales, BASSEM  9/18/2024  Anticoagulation Clinic  [employer] for routing messages: thierno MEHTA  [facility] patient phone line: [phone]        _______________________________________________________________________     Anticoagulation Episode Summary       Current INR goal:  2.0-3.0   TTR:  62.8% (1 y)   Target end date:  Indefinite   Send INR reminders to:  BRANDY MEHTA    Indications    Acute pulmonary embolism  unspecified pulmonary embolism type  unspecified whether acute cor pulmonale present (H) [I26.99]  A-fib (H) [I48.91]  Paroxysmal atrial fibrillation (H) [I48.0]             Comments:               Anticoagulation Care Providers       Provider Role Specialty Phone number     Ky Major MD Referring Family Practice     Marcin Rios MD Referring [facility] Medicine [phone]

## 2024-09-25 ENCOUNTER — LAB (OUTPATIENT)
Dept: LAB | Facility: CLINIC | Age: 79
End: 2024-09-25
Payer: COMMERCIAL

## 2024-09-25 ENCOUNTER — ANTICOAGULATION THERAPY VISIT (OUTPATIENT)
Dept: ANTICOAGULATION | Facility: CLINIC | Age: 79
End: 2024-09-25

## 2024-09-25 DIAGNOSIS — I48.91 A-FIB (H): ICD-10-CM

## 2024-09-25 DIAGNOSIS — I48.0 PAROXYSMAL ATRIAL FIBRILLATION (H): ICD-10-CM

## 2024-09-25 DIAGNOSIS — I26.99 ACUTE PULMONARY EMBOLISM, UNSPECIFIED PULMONARY EMBOLISM TYPE, UNSPECIFIED WHETHER ACUTE COR PULMONALE PRESENT (H): Primary | ICD-10-CM

## 2024-09-25 DIAGNOSIS — I26.99 ACUTE PULMONARY EMBOLISM, UNSPECIFIED PULMONARY EMBOLISM TYPE, UNSPECIFIED WHETHER ACUTE COR PULMONALE PRESENT (H): ICD-10-CM

## 2024-09-25 LAB — INR BLD: 2 (ref 0.9–1.1)

## 2024-09-25 PROCEDURE — 36416 COLLJ CAPILLARY BLOOD SPEC: CPT

## 2024-09-25 PROCEDURE — 85610 PROTHROMBIN TIME: CPT

## 2024-09-25 NOTE — PROGRESS NOTES
ANTICOAGULATION MANAGEMENT     Ky Martinez 78 year old male is on warfarin with therapeutic INR result. (Goal INR 2.0-3.0)    Recent labs: (last 7 days)     09/25/24  1325   INR 2.0*       ASSESSMENT     Source(s): Chart Review and Patient/Caregiver Call     Warfarin doses taken: Warfarin taken as instructed  Diet: No new diet changes identified  Medication/supplement changes:  Amiodarone decreased on 9/11/24  New illness, injury, or hospitalization: No  Signs or symptoms of bleeding or clotting: No  Previous result: Subtherapeutic  Additional findings: None       PLAN     Recommended plan for ongoing change(s) affecting INR     Dosing Instructions: Increase your warfarin dose (6.2% change) with next INR in 2 weeks       Summary  As of 9/25/2024      Full warfarin instructions:  3.75 mg every Mon, Wed, Fri; 2.5 mg all other days   Next INR check:  10/9/2024               Telephone call with Tarik who verbalizes understanding and agrees to plan    Lab visit scheduled    Education provided: Interaction IS anticipated between warfarin and Amiodarone    Plan made per [facility] anticoagulation protocol    Julia CHARLES RN  9/25/2024  Anticoagulation Clinic  [employer] for routing messages: thierno MEHTA  [facility] patient phone line: [phone]        _______________________________________________________________________     Anticoagulation Episode Summary       Current INR goal:  2.0-3.0   TTR:  60.8% (1 y)   Target end date:  Indefinite   Send INR reminders to:  BRANDY MEHTA    Indications    Acute pulmonary embolism  unspecified pulmonary embolism type  unspecified whether acute cor pulmonale present (H) [I26.99]  A-fib (H) [I48.91]  Paroxysmal atrial fibrillation (H) [I48.0]             Comments:               Anticoagulation Care Providers       Provider Role Specialty Phone number    Ky Major MD Referring Family Practice     Marcin Rios MD Referring Family Medicine [phone]

## 2024-09-30 DIAGNOSIS — I10 BENIGN ESSENTIAL HYPERTENSION: ICD-10-CM

## 2024-09-30 RX ORDER — LOSARTAN POTASSIUM 50 MG/1
TABLET ORAL
Qty: 90 TABLET | Refills: 3 | Status: SHIPPED | OUTPATIENT
Start: 2024-09-30

## 2024-09-30 NOTE — TELEPHONE ENCOUNTER
Pending Prescriptions:                       Disp   Refills    losartan (COZAAR) 50 MG tablet            90 tab*3            Sig: TAKE 1 TABLET (50MG) BY MOUTH ONCE DAILY

## 2024-10-08 ENCOUNTER — ANTICOAGULATION THERAPY VISIT (OUTPATIENT)
Dept: ANTICOAGULATION | Facility: CLINIC | Age: 79
End: 2024-10-08

## 2024-10-08 ENCOUNTER — LAB (OUTPATIENT)
Dept: LAB | Facility: CLINIC | Age: 79
End: 2024-10-08
Payer: COMMERCIAL

## 2024-10-08 DIAGNOSIS — I26.99 ACUTE PULMONARY EMBOLISM, UNSPECIFIED PULMONARY EMBOLISM TYPE, UNSPECIFIED WHETHER ACUTE COR PULMONALE PRESENT (H): Primary | ICD-10-CM

## 2024-10-08 DIAGNOSIS — I48.91 A-FIB (H): ICD-10-CM

## 2024-10-08 DIAGNOSIS — I26.99 ACUTE PULMONARY EMBOLISM, UNSPECIFIED PULMONARY EMBOLISM TYPE, UNSPECIFIED WHETHER ACUTE COR PULMONALE PRESENT (H): ICD-10-CM

## 2024-10-08 DIAGNOSIS — I48.0 PAROXYSMAL ATRIAL FIBRILLATION (H): ICD-10-CM

## 2024-10-08 LAB — INR BLD: 2.2 (ref 0.9–1.1)

## 2024-10-08 PROCEDURE — 36416 COLLJ CAPILLARY BLOOD SPEC: CPT

## 2024-10-08 PROCEDURE — 85610 PROTHROMBIN TIME: CPT

## 2024-10-08 NOTE — PROGRESS NOTES
ANTICOAGULATION MANAGEMENT     Ky INMAN Juan 78 year old male is on warfarin with therapeutic INR result. (Goal INR 2.0-3.0)    Recent labs: (last 7 days)     10/08/24  1308   INR 2.2*       ASSESSMENT     Source(s): Chart Review and Patient/Caregiver Call     Warfarin doses taken: Warfarin taken as instructed  Diet: No new diet changes identified  Medication/supplement changes: None noted  New illness, injury, or hospitalization: No  Signs or symptoms of bleeding or clotting: No  Previous result: Therapeutic last visit; previously outside of goal range  Additional findings:  amiodarone decreased 9/ 11/24       PLAN     Recommended plan for no diet, medication or health factor changes affecting INR     Dosing Instructions: Continue your current warfarin dose with next INR in 2 weeks       Summary  As of 10/8/2024      Full warfarin instructions:  3.75 mg every Mon, Wed, Fri; 2.5 mg all other days   Next INR check:  10/22/2024               Telephone call with Tarik who verbalizes understanding and agrees to plan    Patient offered & declined to schedule next visit    Education provided: Contact [phone] with any changes, questions or concerns.     Plan made per [facility] anticoagulation protocol    Dennise Zuniga RN  10/8/2024  Anticoagulation Clinic  [employer] for routing messages: thierno MEHTA  [facility] patient phone line: [phone]        _______________________________________________________________________     Anticoagulation Episode Summary       Current INR goal:  2.0-3.0   TTR:  60.8% (1 y)   Target end date:  Indefinite   Send INR reminders to:  BRANDY MEHTA    Indications    Acute pulmonary embolism  unspecified pulmonary embolism type  unspecified whether acute cor pulmonale present (H) [I26.99]  A-fib (H) [I48.91]  Paroxysmal atrial fibrillation (H) [I48.0]             Comments:               Anticoagulation Care Providers       Provider Role Specialty Phone number    Ky Major,  MD Referring Family Practice     Marcin Rios MD Referring Family Medicine [phone]

## 2024-10-22 ENCOUNTER — ANTICOAGULATION THERAPY VISIT (OUTPATIENT)
Dept: ANTICOAGULATION | Facility: CLINIC | Age: 79
End: 2024-10-22

## 2024-10-22 ENCOUNTER — LAB (OUTPATIENT)
Dept: LAB | Facility: CLINIC | Age: 79
End: 2024-10-22
Payer: COMMERCIAL

## 2024-10-22 DIAGNOSIS — I48.0 PAROXYSMAL ATRIAL FIBRILLATION (H): ICD-10-CM

## 2024-10-22 DIAGNOSIS — I26.99 ACUTE PULMONARY EMBOLISM, UNSPECIFIED PULMONARY EMBOLISM TYPE, UNSPECIFIED WHETHER ACUTE COR PULMONALE PRESENT (H): Primary | ICD-10-CM

## 2024-10-22 DIAGNOSIS — I48.91 A-FIB (H): ICD-10-CM

## 2024-10-22 DIAGNOSIS — I26.99 ACUTE PULMONARY EMBOLISM, UNSPECIFIED PULMONARY EMBOLISM TYPE, UNSPECIFIED WHETHER ACUTE COR PULMONALE PRESENT (H): ICD-10-CM

## 2024-10-22 LAB — INR BLD: 3.5 (ref 0.9–1.1)

## 2024-10-22 PROCEDURE — 85610 PROTHROMBIN TIME: CPT

## 2024-10-22 PROCEDURE — 36416 COLLJ CAPILLARY BLOOD SPEC: CPT

## 2024-10-22 NOTE — PROGRESS NOTES
ANTICOAGULATION MANAGEMENT     Ky Martinez 78 year old male is on warfarin with supratherapeutic INR result. (Goal INR 2.0-3.0)    Recent labs: (last 7 days)     10/22/24  1144   INR 3.5*       ASSESSMENT     Source(s): Chart Review and Patient/Caregiver Call     Warfarin doses taken: Warfarin taken as instructed  Diet: Change in alcohol intake may be affecting INR. Had a few beers last night   Medication/supplement changes: None noted  New illness, injury, or hospitalization: No  Signs or symptoms of bleeding or clotting: No  Previous result: Therapeutic last 2(+) visits  Additional findings: None       PLAN     Recommended plan for temporary change(s) affecting INR     Dosing Instructions: Continue your current warfarin dose with next INR in 2 weeks       Summary  As of 10/22/2024      Full warfarin instructions:  3.75 mg every Mon, Wed, Fri; 2.5 mg all other days   Next INR check:  11/5/2024               Telephone call with Tarik who verbalizes understanding and agrees to plan and who agrees to plan and repeated back plan correctly    Patient offered & declined to schedule next visit    Education provided: Healthy lifestyle considerations: potential interaction between warfarin and alcohol    Plan made per [facility] anticoagulation protocol    Lucia Canales RN  10/22/2024  Anticoagulation Clinic  Plivo for routing messages: thierno MEHTA  [facility] patient phone line: [phone]        _______________________________________________________________________     Anticoagulation Episode Summary       Current INR goal:  2.0-3.0   TTR:  59.4% (1 y)   Target end date:  Indefinite   Send INR reminders to:  BRANDY MEHTA    Indications    Acute pulmonary embolism  unspecified pulmonary embolism type  unspecified whether acute cor pulmonale present (H) [I26.99]  A-fib (H) [I48.91]  Paroxysmal atrial fibrillation (H) [I48.0]             Comments:               Anticoagulation Care Providers       Provider Role  Specialty Phone number    Ky Major MD Referring Family Practice     Marcin Rios MD Referring Family Medicine [phone]             Ndc (200 Mg Prefilled Syringe): [address]-07 Ndc (200 Mg Prefilled Syringe): [address]-23

## 2024-10-31 ENCOUNTER — PATIENT OUTREACH (OUTPATIENT)
Dept: CARE COORDINATION | Facility: CLINIC | Age: 79
End: 2024-10-31
Payer: COMMERCIAL

## 2024-11-06 ENCOUNTER — ANTICOAGULATION THERAPY VISIT (OUTPATIENT)
Dept: ANTICOAGULATION | Facility: CLINIC | Age: 79
End: 2024-11-06

## 2024-11-06 ENCOUNTER — LAB (OUTPATIENT)
Dept: LAB | Facility: CLINIC | Age: 79
End: 2024-11-06
Payer: COMMERCIAL

## 2024-11-06 DIAGNOSIS — I48.91 A-FIB (H): ICD-10-CM

## 2024-11-06 DIAGNOSIS — I26.99 ACUTE PULMONARY EMBOLISM, UNSPECIFIED PULMONARY EMBOLISM TYPE, UNSPECIFIED WHETHER ACUTE COR PULMONALE PRESENT (H): Primary | ICD-10-CM

## 2024-11-06 DIAGNOSIS — I48.0 PAROXYSMAL ATRIAL FIBRILLATION (H): ICD-10-CM

## 2024-11-06 DIAGNOSIS — I26.99 ACUTE PULMONARY EMBOLISM, UNSPECIFIED PULMONARY EMBOLISM TYPE, UNSPECIFIED WHETHER ACUTE COR PULMONALE PRESENT (H): ICD-10-CM

## 2024-11-06 LAB — INR BLD: 2.5 (ref 0.9–1.1)

## 2024-11-06 PROCEDURE — 36416 COLLJ CAPILLARY BLOOD SPEC: CPT

## 2024-11-06 PROCEDURE — 85610 PROTHROMBIN TIME: CPT

## 2024-11-06 NOTE — PROGRESS NOTES
ANTICOAGULATION MANAGEMENT     Ky Martinez 78 year old male is on warfarin with therapeutic INR result. (Goal INR 2.0-3.0)    Recent labs: (last 7 days)     11/06/24  1043   INR 2.5*       ASSESSMENT     Source(s): Chart Review and Patient/Caregiver Call     Warfarin doses taken: Warfarin taken as instructed  Diet: No new diet changes identified  Medication/supplement changes: None noted  New illness, injury, or hospitalization: No  Signs or symptoms of bleeding or clotting: No  Previous result: Supratherapeutic  Additional findings: None       PLAN     Recommended plan for no diet, medication or health factor changes affecting INR     Dosing Instructions: Continue your current warfarin dose with next INR in 3 weeks       Summary  As of 11/6/2024      Full warfarin instructions:  3.75 mg every Mon, Wed, Fri; 2.5 mg all other days   Next INR check:  11/27/2024               Telephone call with Tarik who verbalizes understanding and agrees to plan    Patient offered & declined to schedule next visit    Education provided: Contact [phone] with any changes, questions or concerns.     Plan made per [facility] anticoagulation protocol    Dennise Zuniga RN  11/6/2024  Anticoagulation Clinic  Plair for routing messages: thierno MEHTA  [facility] patient phone line: [phone]        _______________________________________________________________________     Anticoagulation Episode Summary       Current INR goal:  2.0-3.0   TTR:  57.3% (1 y)   Target end date:  Indefinite   Send INR reminders to:  BRANDY MEHTA    Indications    Acute pulmonary embolism  unspecified pulmonary embolism type  unspecified whether acute cor pulmonale present (H) [I26.99]  A-fib (H) [I48.91]  Paroxysmal atrial fibrillation (H) [I48.0]             Comments:  --             Anticoagulation Care Providers       Provider Role Specialty Phone number    Ky Major MD Referring [facility]     Marcin iRos MD Referring  Family Medicine [phone]

## 2024-11-11 ENCOUNTER — OFFICE VISIT (OUTPATIENT)
Dept: PODIATRY | Facility: CLINIC | Age: 79
End: 2024-11-11
Payer: COMMERCIAL

## 2024-11-11 VITALS
WEIGHT: 287 LBS | SYSTOLIC BLOOD PRESSURE: 118 MMHG | HEIGHT: 70 IN | DIASTOLIC BLOOD PRESSURE: 76 MMHG | BODY MASS INDEX: 41.09 KG/M2

## 2024-11-11 DIAGNOSIS — L85.9 HYPERKERATOSIS: ICD-10-CM

## 2024-11-11 DIAGNOSIS — M20.42 HAMMERTOE OF LEFT FOOT: Primary | ICD-10-CM

## 2024-11-11 PROCEDURE — 99203 OFFICE O/P NEW LOW 30 MIN: CPT | Performed by: PODIATRIST

## 2024-11-11 ASSESSMENT — PAIN SCALES - GENERAL: PAINLEVEL_OUTOF10: MILD PAIN (2)

## 2024-11-11 NOTE — PROGRESS NOTES
HPI:  Ky Martinez is a 78 year old male who is seen in consultation at the request of self.    Pt presents for eval of:   (Onset, Location, L/R, Character, Treatments, Injury if yes)    WARFARIN     Ongoing for year, corn medial Left 5th toe.  Constant dull ache. Intermittent sharp and throbbing pain with pressure    Corn padding.    Retired [employer].    Target INR 2.5 with A fib and has ICD.  Not pacing.     ROS:  10 point ROS neg other than the symptoms noted above in the HPI.    Patient Active Problem List   Diagnosis    Other acute and subacute form of ischemic heart disease    Intermittent asthma    Hyperlipidemia    Benign prostatic hyperplasia    Coronary artery disease of native artery of native heart with stable angina pectoris (H)    Acute deep vein thrombosis (DVT) of other vein of left upper extremity (H)    S/P ICD (internal cardiac defibrillator) procedure    Chronic systolic heart failure (H)    Hypertension    Ischemic cardiomyopathy    Non-STEMI (non-ST elevated myocardial infarction) (H)    Coronary atherosclerosis    Morbid obesity (H)    Acute pulmonary embolism - bilateral with possible right sided heart strain on CT    Hypoxemia    Bilateral leg edema - right more than left    Shortness of breath    Pulmonary emboli (H)    Atrophy of left kidney    Acute pulmonary embolism, unspecified pulmonary embolism type, unspecified whether acute cor pulmonale present (H)    Primary osteoarthritis of right shoulder    A-fib (H)    Paroxysmal atrial fibrillation (H)    S/P total knee arthroplasty, left    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    On warfarin therapy    Other pulmonary embolism without acute cor pulmonale (H)    Acute on chronic combined systolic and diastolic heart failure (H)    Hypercoagulable state due to paroxysmal atrial fibrillation (H)    Chronic kidney disease, stage 3a (H)       PAST MEDICAL HISTORY:   Past Medical History:   Diagnosis Date    Hypertension      Myocardial infarction (H)         PAST SURGICAL HISTORY:   Past Surgical History:   Procedure Laterality Date    ARTHROPLASTY, KNEE, ROBOT ASSISTED, USING ALON Left 6/18/2024    Procedure: ALON ASSISTED Left TOTAL KNEE ARTHROPLASTY;  Surgeon: Nathan Hernandez DO;  Location: [facility] OR    Cardiac stent      COLONOSCOPY N/A 5/22/2019    Procedure: Colonoscopy, Polypectomy by Snare;  Surgeon: Gian Horn DO;  Location: [facility] GI    EXCISE GANGLION WRIST Left 11/26/2014    Procedure: EXCISE GANGLION WRIST;  Surgeon: Gian Haddad MD;  Location: [facility] OR    ICD DEVICE INTERROGATE      2017    ORTHOPEDIC SURGERY      RELEASE CARPAL TUNNEL Left 11/26/2014    Procedure: RELEASE CARPAL TUNNEL;  Surgeon: Gian Haddad MD;  Location: [facility] OR    RELEASE DEQUERVAINS WRIST Left 11/26/2014    Procedure: RELEASE DEQUERVAINS WRIST;  Surgeon: Gian Haddad MD;  Location: [facility] OR        MEDICATIONS:   Current Outpatient Medications:     acetaminophen (TYLENOL) 325 MG tablet, Take 2 tablets (650 mg) by mouth every 4 hours as needed for other (mild pain), Disp: 100 tablet, Rfl: 0    amiodarone (PACERONE) 200 MG tablet, Take 1 Tablet (200 mg) by mouth twice daily for 30 days, THEN 1 Tablet (200 mg) in the morning., Disp: , Rfl:     atorvastatin (LIPITOR) 80 MG tablet, Take 1 tablet by mouth once daily, Disp: 90 tablet, Rfl: 3    COMIRNATY 30 MCG/0.3ML injection, , Disp: , Rfl:     DAILY MULTI OR, Take 1 tablet by mouth daily, Disp: , Rfl:     finasteride (PROSCAR) 5 MG tablet, Take 1 tablet by mouth once daily, Disp: 90 tablet, Rfl: 3    FLUAD QUADRIVALENT 0.5 ML PRSY injection, , Disp: , Rfl:     furosemide (LASIX) 40 MG tablet, Take 1 tablet by mouth once daily, Disp: 90 tablet, Rfl: 3    isosorbide mononitrate (IMDUR) 30 MG 24 hr tablet, Take 1 tablet by mouth once daily, Disp: 90 tablet, Rfl: 3    losartan (COZAAR) 50 MG tablet, TAKE 1 TABLET (50MG) BY MOUTH ONCE DAILY, Disp: 90 tablet, Rfl: 3     metoprolol succinate ER (TOPROL XL) 50 MG 24 hr tablet, Take 1 tablet (50 mg) by mouth 2 times daily, Disp: 180 tablet, Rfl: 3    Multiple Vitamin (THERA-TABS) TABS, Take 1 tablet by mouth every morning, Disp: , Rfl:     nitroGLYcerin (NITROSTAT) 0.4 MG sublingual tablet, 0.4 mg As needed, Disp: , Rfl:     potassium chloride jody ER (KLOR-CON M20) 20 MEQ CR tablet, TAKE 1  BY MOUTH ONCE DAILY, Disp: 90 tablet, Rfl: 3    tamsulosin (FLOMAX) 0.4 MG capsule, Take 1 capsule by mouth once daily, Disp: 90 capsule, Rfl: 3    warfarin ANTICOAGULANT (COUMADIN) 2.5 MG tablet, Take 1 tablet (2.5 mg) by mouth daily Take 1.25 Tue thurs and sat and 2.5 mg all other days, Disp: 90 tablet, Rfl: 1    warfarin ANTICOAGULANT (COUMADIN) 5 MG tablet, Take 5 mg by mouth MORNINGS: Tuesdays, Thursdays, Saturdays and Sundays. Take 2.5 mg Mondays, Wednesdays and Fridays or as directed by anticoagulation clinic. NEXT INR 06/18/2024, Disp: , Rfl:     warfarin ANTICOAGULANT (COUMADIN) 5 MG tablet, Take 2.5 mg every Mon, Wed, Fri; 5 mg all other days or as directed by the INR clinic. (Patient taking differently: Take 2.5 mg by mouth. MORNINGS: every Mon, Wed, Fri; take 5 mg all other days or as directed by the INR clinic. NEXT INR 06/18/24), Disp: 66 tablet, Rfl: 1    albuterol (PROAIR HFA) 108 (90 Base) MCG/ACT inhaler, Inhale 1-2 puffs into the lungs every 4 hours as needed for shortness of breath (Patient not taking: Reported on 11/11/2024), Disp: 8 g, Rfl: 1    senna-docusate (SENOKOT-S/PERICOLACE) 8.6-50 MG tablet, Take 1-2 tablets by mouth 2 times daily Take while on oral narcotics to prevent or treat constipation. (Patient not taking: Reported on 11/11/2024), Disp: 30 tablet, Rfl: 0     ALLERGIES:    Allergies   Allergen Reactions    No Known Allergies Other (See Comments)    Pine Difficulty breathing     Pine Pollen          SOCIAL HISTORY:   Social History     Socioeconomic History    Marital status:      Spouse name: Not on file     Number of children: Not on file    Years of education: Not on file    Highest education level: Not on file   Occupational History    Not on file   Tobacco Use    Smoking status: Never    Smokeless tobacco: Never   Vaping Use    Vaping status: Never Used   Substance and Sexual Activity    Alcohol use: Yes     Alcohol/week: 3.3 standard drinks of alcohol     Types: 4 Glasses of wine per week     Comment: occasional     Drug use: No    Sexual activity: Not Currently     Partners: Female     Birth control/protection: None   Other Topics Concern    Parent/sibling w/ CABG, MI or angioplasty before 65F 55M? No   Social History Narrative    Not on file     Social Drivers of Health     Financial Resource Strain: Low Risk  (7/24/2024)    Financial Resource Strain     Within the past 12 months, have you or your family members you live with been unable to get utilities (heat, electricity) when it was really needed?: No   Food Insecurity: Low Risk  (7/24/2024)    Food Insecurity     Within the past 12 months, did you worry that your food would run out before you got money to buy more?: No     Within the past 12 months, did the food you bought just not last and you didn t have money to get more?: No   Transportation Needs: Low Risk  (7/24/2024)    Transportation Needs     Within the past 12 months, has lack of transportation kept you from medical appointments, getting your medicines, non-medical meetings or appointments, work, or from getting things that you need?: No   Physical Activity: Unknown (7/24/2024)    Exercise Vital Sign     Days of Exercise per Week: 2 days     Minutes of Exercise per Session: Not on file   Stress: No Stress Concern Present (7/24/2024)    [place] [employer] - Occupational Stress Questionnaire     Feeling of Stress : Only a little   Social Connections: Unknown (7/24/2024)    Social Connection and Isolation Panel [NHANES]     Frequency of Communication with Friends and Family:  "Not on file     Frequency of Social Gatherings with Friends and Family: Twice a week     Attends [employer] Services: Not on file     Active Member of Clubs or Organizations: Not on file     Attends Club or Organization Meetings: Not on file     Marital Status: Not on file   Interpersonal Safety: Low Risk  (7/24/2024)    Interpersonal Safety     Do you feel physically and emotionally safe where you currently live?: Yes     Within the past 12 months, have you been hit, slapped, kicked or otherwise physically hurt by someone?: No     Within the past 12 months, have you been humiliated or emotionally abused in other ways by your partner or ex-partner?: No   Housing Stability: Low Risk  (7/24/2024)    Housing Stability     Do you have housing? : Yes     Are you worried about losing your housing?: No        FAMILY HISTORY:   Family History   Problem Relation Age of Onset    Arthritis Mother     Diabetes Father     Allergies Father         sulfa    Eye Disorder Father         cataract    Heart Disease Father         by pass        EXAM:Vitals: /76 (BP Location: Left arm, Patient Position: Sitting, Cuff Size: Adult Large)   Ht 1.778 m (5' 10\")   Wt 130.2 kg (287 lb)   BMI 41.18 kg/m    BMI= Body mass index is 41.18 kg/m .    General appearance: Patient is alert and fully cooperative with history & exam.  No sign of distress is noted during the visit.     Psychiatric: Affect is pleasant & appropriate.  Patient appears motivated to improve health.     Respiratory: Breathing is regular & unlabored while sitting.     HEENT: Hearing is intact to spoken word.  Speech is clear.  No gross evidence of visual impairment that would impact ambulation.     Vascular: DP & PT pulses are intact & regular bilaterally.  No significant edema or varicosities noted.  CFT and skin temperature is normal to both lower extremities.     Neurologic: Lower extremity sensation is intact to light touch.  No evidence of weakness or contracture " in the lower extremities.  No evidence of neuropathy.    Dermatologic: Skin is intact to both lower extremities with adequate texture, turgor and tone about the integument.  No paronychia or evidence of soft tissue infection is noted.  Painful nucleated hyperkeratotic lesion noted on the medial aspect of the left fifth digit toe.    Musculoskeletal: Patient is ambulatory without assistive device or brace.  Lesser digits are semirigid the contracted.    Creatinine (mg/dL)   Date Value   06/24/2024 1.28 (H)   06/19/2024 1.45 (H)   05/20/2024 1.27 (H)   07/10/2023 1.28 (H)   04/18/2022 1.20   01/28/2021 1.25   03/30/2020 1.26 (H)   03/29/2020 1.42 (H)   03/28/2020 1.24   03/27/2020 1.19   12/09/2019 1.40 (H)   12/13/2010 1.1     ASSESSMENT:       ICD-10-CM    1. Hammertoe of left foot  M20.42       2. Hyperkeratosis  L85.9            PLAN:  Reviewed patient's chart in [facility].      11/11/2024   Debrided the lesion sharply and written instructions dispensed on how to manage abrasive debridement with this.  Discussed surgical options if this remains symptomatic  Dispensed written instructions on interdigital spacers and wide shoes  Follow-up if this remains symptomatic.  All questions were answered.        Patrice Dallas DPM

## 2024-11-11 NOTE — PATIENT INSTRUCTIONS
Calluses, Corns, IPKs, Porokeratosis    When there is excessive friction or pressure on the skin, the body responds by making the skin thicker.  While this may protect the deeper structures, the thickened skin can take up more space and thus increase pressure over a bony prominence or become an open sore or skin ulcer as this skin becomes less flexible.    Flat, diffuse thickening are simple calluses and they are usually caused by friction.  Often these are the result of rubbing on a shoe or going barefoot.    Calluses with a central core between the toes are called corns.  These result from prominent joints on adjacent toes rubbing together.  Theses are a symptom of bone malalignment and will always recur unless the underlying bones are addressed surgically.    Most of these lesions can be kept comfortable with routine maintenance. This consists of filing them with a Ped Egg, callus file, or 120 grit sandpaper on a block, every day during your bath or shower.  Most people prefer battery operated pham such as an Amope', Personal Pedi and Emjoi for regular use or heavy painful callouses.  Heavy creams or ointments can be applied 1-2 times every day to keep them soft. Toe spacers can be used for corns, gel pads can be used for other lesions on the bottom of the foot. If there is a deformity noted, such as a prominent bone, often this can be addressed to minimize recurrence. However, sometimes the pressure and lesion simply migrates to another spot after surgery, so it is not a guaranteed cure.     www.[email or web address].com is the best source for all sorts of foot pads and cushions    If you have severe callouses and cracking, you may apply heavy greasy cream or ointments that you scoop up such as Cetaphil cream, Eucerin, Aquaphor or Vaseline.  Be sure to obtain cream or ointment in these brands and not lotion (lotion is water based and not durable enough for feet). For more aggressive help apply heavy creams or ointment  under occlusive dressings such as Saran Wrap or Jelly Feet while sleeping.   Jelly Feet can be obtained at www.jellyfeet.com.     To be successful with managing hyperkeratotic skin, you must manage hygiene daily.  At your bath or shower time is the easiest time to work on this.  There is no medical or surgical treatment that will absolutely eliminate many of these and symptoms.    Please call with any additional questions.         [employer], or [phone].  They have many types of pads and splints.    Reliable shoe stores: To maximize your experience and provide the best possible fit.  Be sure to show them your foot concerns and tell them Dr. Dallas sent you.      Stores listed in bold have only athletic shoes, and stores that are not bold are mostly casual or variety of shoes    [employer]  [address] [place] [address]  [phone]     [facility]  [address] [place] [address]  [phone]     [employer] [place]  [address] [place] [address]  [phone]    [employer]  [address] [facility]  [place] [address]  [phone]    [employer]  [address] [place], [place] [address] [phone]    [employer]  [address] [place] [address]  [phone]                         [employer] Locations:     [address] [place] [address]   [phone]     [address] [place] [address]   [phone]     [address] [place] [address]   [phone]     [address] [place] [address]   [phone]     [address] [place] [address]   [phone]     [address] [facility] [place] [address]   [phone]     [address] [facility] [address]   [place] [address]   [phone]     [address]   [place] [address]   [phone]             How to find reasonable shoes          The correct width    Correct Fitting    Correct Length       Foot Distortion    Posture Distortion                          Torsional Rigidity      Grasp behind the heel and underneath the foot and twist      Bad    Excessive torsion/twist in midfoot     Less torsion/twist in midfoot is better                   Heel Counter Rigidity      Grasp just above   midsole and squeeze      Bad    Soft heel counter      Good    Rigid Heel Counter      Flexion Rigidity      Grasp shoe and bend from forefoot to rearfoot

## 2024-11-11 NOTE — LETTER
11/11/2024      Ky Martinez  [id number] [place] [address]      Dear Colleague,    Thank you for referring your patient, Ky Martinez, to the [facility]. Please see a copy of my visit note below.    HPI:  Ky Martinez is a 78 year old male who is seen in consultation at the request of self.    Pt presents for eval of:   (Onset, Location, L/R, Character, Treatments, Injury if yes)    WARFARIN     Ongoing for year, corn medial Left 5th toe.  Constant dull ache. Intermittent sharp and throbbing pain with pressure    Corn padding.    Retired [employer].    Target INR 2.5 with A fib and has ICD.  Not pacing.     ROS:  10 point ROS neg other than the symptoms noted above in the HPI.    Patient Active Problem List   Diagnosis     Other acute and subacute form of ischemic heart disease     Intermittent asthma     Hyperlipidemia     Benign prostatic hyperplasia     Coronary artery disease of native artery of native heart with stable angina pectoris (H)     Acute deep vein thrombosis (DVT) of other vein of left upper extremity (H)     S/P ICD (internal cardiac defibrillator) procedure     Chronic systolic heart failure (H)     Hypertension     Ischemic cardiomyopathy     Non-STEMI (non-ST elevated myocardial infarction) (H)     Coronary atherosclerosis     Morbid obesity (H)     Acute pulmonary embolism - bilateral with possible right sided heart strain on CT     Hypoxemia     Bilateral leg edema - right more than left     Shortness of breath     Pulmonary emboli (H)     Atrophy of left kidney     Acute pulmonary embolism, unspecified pulmonary embolism type, unspecified whether acute cor pulmonale present (H)     Primary osteoarthritis of right shoulder     A-fib (H)     Paroxysmal atrial fibrillation (H)     S/P total knee arthroplasty, left     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     On warfarin therapy     Other pulmonary embolism without acute cor pulmonale  (H)     Acute on chronic combined systolic and diastolic heart failure (H)     Hypercoagulable state due to paroxysmal atrial fibrillation (H)     Chronic kidney disease, stage 3a (H)       PAST MEDICAL HISTORY:   Past Medical History:   Diagnosis Date     Hypertension      Myocardial infarction (H)         PAST SURGICAL HISTORY:   Past Surgical History:   Procedure Laterality Date     ARTHROPLASTY, KNEE, ROBOT ASSISTED, USING ALON Left 6/18/2024    Procedure: ALON ASSISTED Left TOTAL KNEE ARTHROPLASTY;  Surgeon: Nathan Hernandez DO;  Location: [facility] OR     Cardiac stent       COLONOSCOPY N/A 5/22/2019    Procedure: Colonoscopy, Polypectomy by Snare;  Surgeon: Gian Horn DO;  Location: [facility] GI     EXCISE GANGLION WRIST Left 11/26/2014    Procedure: EXCISE GANGLION WRIST;  Surgeon: Gian Haddad MD;  Location: [facility] OR     ICD DEVICE INTERROGATE      2017     ORTHOPEDIC SURGERY       RELEASE CARPAL TUNNEL Left 11/26/2014    Procedure: RELEASE CARPAL TUNNEL;  Surgeon: Gian Haddad MD;  Location: [facility] OR     RELEASE DEQUERVAINS WRIST Left 11/26/2014    Procedure: RELEASE DEQUERVAINS WRIST;  Surgeon: Gian Haddad MD;  Location: [facility] OR        MEDICATIONS:   Current Outpatient Medications:      acetaminophen (TYLENOL) 325 MG tablet, Take 2 tablets (650 mg) by mouth every 4 hours as needed for other (mild pain), Disp: 100 tablet, Rfl: 0     amiodarone (PACERONE) 200 MG tablet, Take 1 Tablet (200 mg) by mouth twice daily for 30 days, THEN 1 Tablet (200 mg) in the morning., Disp: , Rfl:      atorvastatin (LIPITOR) 80 MG tablet, Take 1 tablet by mouth once daily, Disp: 90 tablet, Rfl: 3     COMIRNATY 30 MCG/0.3ML injection, , Disp: , Rfl:      DAILY MULTI OR, Take 1 tablet by mouth daily, Disp: , Rfl:      finasteride (PROSCAR) 5 MG tablet, Take 1 tablet by mouth once daily, Disp: 90 tablet, Rfl: 3     FLUAD QUADRIVALENT 0.5 ML PRSY injection, , Disp: , Rfl:      furosemide (LASIX) 40  MG tablet, Take 1 tablet by mouth once daily, Disp: 90 tablet, Rfl: 3     isosorbide mononitrate (IMDUR) 30 MG 24 hr tablet, Take 1 tablet by mouth once daily, Disp: 90 tablet, Rfl: 3     losartan (COZAAR) 50 MG tablet, TAKE 1 TABLET (50MG) BY MOUTH ONCE DAILY, Disp: 90 tablet, Rfl: 3     metoprolol succinate ER (TOPROL XL) 50 MG 24 hr tablet, Take 1 tablet (50 mg) by mouth 2 times daily, Disp: 180 tablet, Rfl: 3     Multiple Vitamin (THERA-TABS) TABS, Take 1 tablet by mouth every morning, Disp: , Rfl:      nitroGLYcerin (NITROSTAT) 0.4 MG sublingual tablet, 0.4 mg As needed, Disp: , Rfl:      potassium chloride jody ER (KLOR-CON M20) 20 MEQ CR tablet, TAKE 1  BY MOUTH ONCE DAILY, Disp: 90 tablet, Rfl: 3     tamsulosin (FLOMAX) 0.4 MG capsule, Take 1 capsule by mouth once daily, Disp: 90 capsule, Rfl: 3     warfarin ANTICOAGULANT (COUMADIN) 2.5 MG tablet, Take 1 tablet (2.5 mg) by mouth daily Take 1.25 Tue thurs and sat and 2.5 mg all other days, Disp: 90 tablet, Rfl: 1     warfarin ANTICOAGULANT (COUMADIN) 5 MG tablet, Take 5 mg by mouth MORNINGS: Tuesdays, Thursdays, Saturdays and Sundays. Take 2.5 mg Mondays, Wednesdays and Fridays or as directed by anticoagulation clinic. NEXT INR 06/18/2024, Disp: , Rfl:      warfarin ANTICOAGULANT (COUMADIN) 5 MG tablet, Take 2.5 mg every Mon, Wed, Fri; 5 mg all other days or as directed by the INR clinic. (Patient taking differently: Take 2.5 mg by mouth. MORNINGS: every Mon, Wed, Fri; take 5 mg all other days or as directed by the INR clinic. NEXT INR 06/18/24), Disp: 66 tablet, Rfl: 1     albuterol (PROAIR HFA) 108 (90 Base) MCG/ACT inhaler, Inhale 1-2 puffs into the lungs every 4 hours as needed for shortness of breath (Patient not taking: Reported on 11/11/2024), Disp: 8 g, Rfl: 1     senna-docusate (SENOKOT-S/PERICOLACE) 8.6-50 MG tablet, Take 1-2 tablets by mouth 2 times daily Take while on oral narcotics to prevent or treat constipation. (Patient not taking: Reported  on 11/11/2024), Disp: 30 tablet, Rfl: 0     ALLERGIES:    Allergies   Allergen Reactions     No Known Allergies Other (See Comments)     Pine Difficulty breathing     Pine Pollen          SOCIAL HISTORY:   Social History     Socioeconomic History     Marital status:      Spouse name: Not on file     Number of children: Not on file     Years of education: Not on file     Highest education level: Not on file   Occupational History     Not on file   Tobacco Use     Smoking status: Never     Smokeless tobacco: Never   Vaping Use     Vaping status: Never Used   Substance and Sexual Activity     Alcohol use: Yes     Alcohol/week: 3.3 standard drinks of alcohol     Types: 4 Glasses of wine per week     Comment: occasional      Drug use: No     Sexual activity: Not Currently     Partners: Female     Birth control/protection: None   Other Topics Concern     Parent/sibling w/ CABG, MI or angioplasty before 65F 55M? No   Social History Narrative     Not on file     Social Drivers of Health     Financial Resource Strain: Low Risk  (7/24/2024)    Financial Resource Strain      Within the past 12 months, have you or your family members you live with been unable to get utilities (heat, electricity) when it was really needed?: No   Food Insecurity: Low Risk  (7/24/2024)    Food Insecurity      Within the past 12 months, did you worry that your food would run out before you got money to buy more?: No      Within the past 12 months, did the food you bought just not last and you didn t have money to get more?: No   Transportation Needs: Low Risk  (7/24/2024)    Transportation Needs      Within the past 12 months, has lack of transportation kept you from medical appointments, getting your medicines, non-medical meetings or appointments, work, or from getting things that you need?: No   Physical Activity: Unknown (7/24/2024)    Exercise Vital Sign      Days of Exercise per Week: 2 days      Minutes of Exercise per Session: Not on  "file   Stress: No Stress Concern Present (7/24/2024)    [employer] - Occupational Stress Questionnaire      Feeling of Stress : Only a little   Social Connections: Unknown (7/24/2024)    Social Connection and Isolation Panel [NHANES]      Frequency of Communication with Friends and Family: Not on file      Frequency of Social Gatherings with Friends and Family: Twice a week      Attends Taoism Services: Not on file      Active Member of Clubs or Organizations: Not on file      Attends Club or Organization Meetings: Not on file      Marital Status: Not on file   Interpersonal Safety: Low Risk  (7/24/2024)    Interpersonal Safety      Do you feel physically and emotionally safe where you currently live?: Yes      Within the past 12 months, have you been hit, slapped, kicked or otherwise physically hurt by someone?: No      Within the past 12 months, have you been humiliated or emotionally abused in other ways by your partner or ex-partner?: No   Housing Stability: Low Risk  (7/24/2024)    Housing Stability      Do you have housing? : Yes      Are you worried about losing your housing?: No        FAMILY HISTORY:   Family History   Problem Relation Age of Onset     Arthritis Mother      Diabetes Father      Allergies Father         sulfa     Eye Disorder Father         cataract     Heart Disease Father         by pass        EXAM:Vitals: /76 (BP Location: Left arm, Patient Position: Sitting, Cuff Size: Adult Large)   Ht 1.778 m (5' 10\")   Wt 130.2 kg (287 lb)   BMI 41.18 kg/m    BMI= Body mass index is 41.18 kg/m .    General appearance: Patient is alert and fully cooperative with history & exam.  No sign of distress is noted during the visit.     Psychiatric: Affect is pleasant & appropriate.  Patient appears motivated to improve health.     Respiratory: Breathing is regular & unlabored while sitting.     HEENT: Hearing is intact to spoken word.  Speech is clear.  No gross " evidence of visual impairment that would impact ambulation.     Vascular: DP & PT pulses are intact & regular bilaterally.  No significant edema or varicosities noted.  CFT and skin temperature is normal to both lower extremities.     Neurologic: Lower extremity sensation is intact to light touch.  No evidence of weakness or contracture in the lower extremities.  No evidence of neuropathy.    Dermatologic: Skin is intact to both lower extremities with adequate texture, turgor and tone about the integument.  No paronychia or evidence of soft tissue infection is noted.  Painful nucleated hyperkeratotic lesion noted on the medial aspect of the left fifth digit toe.    Musculoskeletal: Patient is ambulatory without assistive device or brace.  Lesser digits are semirigid the contracted.    Creatinine (mg/dL)   Date Value   06/24/2024 1.28 (H)   06/19/2024 1.45 (H)   05/20/2024 1.27 (H)   07/10/2023 1.28 (H)   04/18/2022 1.20   01/28/2021 1.25   03/30/2020 1.26 (H)   03/29/2020 1.42 (H)   03/28/2020 1.24   03/27/2020 1.19   12/09/2019 1.40 (H)   12/13/2010 1.1     ASSESSMENT:       ICD-10-CM    1. Hammertoe of left foot  M20.42       2. Hyperkeratosis  L85.9            PLAN:  Reviewed patient's chart in [facility].      11/11/2024   Debrided the lesion sharply and written instructions dispensed on how to manage abrasive debridement with this.  Discussed surgical options if this remains symptomatic  Dispensed written instructions on interdigital spacers and wide shoes  Follow-up if this remains symptomatic.  All questions were answered.        Patrice Dallas DPM          Again, thank you for allowing me to participate in the care of your patient.        Sincerely,        Patrice Dallas DPM

## 2024-11-20 NOTE — DISCHARGE INSTRUCTIONS
Anticoagulation Clinic will be calling you to set up your phone appointment with them.    If you do not hear from them with in 48hrs please call the [facility].  [phone]  Thank you    Dr. Major's team will be calling you to set up a phone visit or office visit.    If you do not hear from them within 48 hrs. Please call the clinic.   [phone]     93

## 2024-11-27 ENCOUNTER — LAB (OUTPATIENT)
Dept: LAB | Facility: CLINIC | Age: 79
End: 2024-11-27
Payer: COMMERCIAL

## 2024-11-27 ENCOUNTER — ANTICOAGULATION THERAPY VISIT (OUTPATIENT)
Dept: ANTICOAGULATION | Facility: CLINIC | Age: 79
End: 2024-11-27

## 2024-11-27 DIAGNOSIS — I48.91 A-FIB (H): ICD-10-CM

## 2024-11-27 DIAGNOSIS — I48.0 PAROXYSMAL ATRIAL FIBRILLATION (H): ICD-10-CM

## 2024-11-27 DIAGNOSIS — I26.99 ACUTE PULMONARY EMBOLISM, UNSPECIFIED PULMONARY EMBOLISM TYPE, UNSPECIFIED WHETHER ACUTE COR PULMONALE PRESENT (H): Primary | ICD-10-CM

## 2024-11-27 DIAGNOSIS — I26.99 ACUTE PULMONARY EMBOLISM, UNSPECIFIED PULMONARY EMBOLISM TYPE, UNSPECIFIED WHETHER ACUTE COR PULMONALE PRESENT (H): ICD-10-CM

## 2024-11-27 LAB — INR BLD: 2 (ref 0.9–1.1)

## 2024-11-27 PROCEDURE — 85610 PROTHROMBIN TIME: CPT

## 2024-11-27 PROCEDURE — 36416 COLLJ CAPILLARY BLOOD SPEC: CPT

## 2024-11-27 NOTE — PROGRESS NOTES
ANTICOAGULATION MANAGEMENT     Ky Martinez 79 year old male is on warfarin with therapeutic INR result. (Goal INR 2.0-3.0)    Recent labs: (last 7 days)     11/27/24  1047   INR 2.0*       ASSESSMENT     Source(s): Chart Review  Previous INR was Therapeutic last visit; previously outside of goal range  Medication, diet, health changes since last INR chart reviewed; none identified         PLAN     Recommended plan for no diet, medication or health factor changes affecting INR     Dosing Instructions: Continue your current warfarin dose with next INR in 4 weeks       Summary  As of 11/27/2024      Full warfarin instructions:  3.75 mg every Mon, Wed, Fri; 2.5 mg all other days   Next INR check:  12/26/2024               Detailed voice message left for Tarik with dosing instructions and follow up date.     Contact [phone] to schedule and with any changes, questions or concerns.     Education provided: Contact [phone] with any changes, questions or concerns.     Plan made per [facility] anticoagulation protocol    Dennise Zuniga RN  11/27/2024  Anticoagulation Clinic  [employer] for routing messages: thierno MEHTA  [facility] patient phone line: [phone]        _______________________________________________________________________     Anticoagulation Episode Summary       Current INR goal:  2.0-3.0   TTR:  57.3% (1 y)   Target end date:  Indefinite   Send INR reminders to:  BRANDY MEHTA    Indications    Acute pulmonary embolism  unspecified pulmonary embolism type  unspecified whether acute cor pulmonale present (H) [I26.99]  A-fib (H) [I48.91]  Paroxysmal atrial fibrillation (H) [I48.0]             Comments:  --             Anticoagulation Care Providers       Provider Role Specialty Phone number    Ky Major MD Referring Family Practice     Marcin Rios MD Referring Family Medicine [phone]

## 2024-12-12 ENCOUNTER — TELEPHONE (OUTPATIENT)
Dept: ORTHOPEDICS | Facility: OTHER | Age: 79
End: 2024-12-12
Payer: COMMERCIAL

## 2024-12-12 DIAGNOSIS — Z96.652 S/P TOTAL KNEE ARTHROPLASTY, LEFT: Primary | ICD-10-CM

## 2024-12-12 RX ORDER — AMOXICILLIN 500 MG/1
2000 CAPSULE ORAL ONCE
Qty: 4 CAPSULE | Refills: 1 | Status: SHIPPED | OUTPATIENT
Start: 2024-12-12 | End: 2024-12-12

## 2024-12-12 NOTE — TELEPHONE ENCOUNTER
Medication Question or Refill    Contacts       Contact Date/Time Type Contact Phone/Fax    12/12/2024 10:57 AM CST Phone (Incoming) Tarik Martinez (Self) [phone] (M)            What medication are you calling about (include dose and sig)?: antibiotic ( patient unsure of which one)     Preferred Pharmacy:  [facility] [address] [place] [id number] [facility]  [address] [facility] [address]  Phone: [phone] Fax: [phone]      Controlled Substance Agreement on file:   CSA -- Patient Level:    CSA: None found at the patient level.       Who prescribed the medication?:n/a never had    Do you need a refill? New request    When did you use the medication last? N/a    Patient offered an appointment? No    Do you have any questions or concerns?  Yes: I have a dental teeth cleaning appt coming up on 12/18/24 and was told I will need 1 antibiotic pill before my appt.       Could we send this information to you in [facility] or would you prefer to receive a phone call?:   Patient would prefer a phone call   Okay to leave a detailed message?: Yes at Cell number on file:    Telephone Information:   Mobile [phone]

## 2024-12-12 NOTE — TELEPHONE ENCOUNTER
Attempted to call patient, no answer. Left voicemail advising that refill has been approved and requested patient call back at [phone] with any additional questions.       Beverly Candelaria RN   [facility]

## 2024-12-24 ENCOUNTER — TELEPHONE (OUTPATIENT)
Dept: FAMILY MEDICINE | Facility: CLINIC | Age: 79
End: 2024-12-24
Payer: COMMERCIAL

## 2024-12-24 NOTE — TELEPHONE ENCOUNTER
Noted.    Dennise Zuniga RN  [facility] Anticoagulation [facility]

## 2024-12-24 NOTE — TELEPHONE ENCOUNTER
General Call      Reason for Call:  patient called and scheduled next INR on December 31 at .    Thank you.    What are your questions or concerns:  no    Date of last appointment with provider: na    Could we send this information to you in [employer] [place] or would you prefer to receive a phone call?:   No preference   Okay to leave a detailed message?: N/A at Other phone number:  na*

## 2024-12-31 ENCOUNTER — ANTICOAGULATION THERAPY VISIT (OUTPATIENT)
Dept: ANTICOAGULATION | Facility: CLINIC | Age: 79
End: 2024-12-31

## 2024-12-31 ENCOUNTER — DOCUMENTATION ONLY (OUTPATIENT)
Dept: ANTICOAGULATION | Facility: CLINIC | Age: 79
End: 2024-12-31

## 2024-12-31 ENCOUNTER — LAB (OUTPATIENT)
Dept: LAB | Facility: CLINIC | Age: 79
End: 2024-12-31
Payer: COMMERCIAL

## 2024-12-31 DIAGNOSIS — I48.0 PAROXYSMAL ATRIAL FIBRILLATION (H): ICD-10-CM

## 2024-12-31 DIAGNOSIS — I48.11 LONGSTANDING PERSISTENT ATRIAL FIBRILLATION (H): Primary | ICD-10-CM

## 2024-12-31 DIAGNOSIS — I26.99 ACUTE PULMONARY EMBOLISM, UNSPECIFIED PULMONARY EMBOLISM TYPE, UNSPECIFIED WHETHER ACUTE COR PULMONALE PRESENT (H): ICD-10-CM

## 2024-12-31 DIAGNOSIS — I48.91 A-FIB (H): ICD-10-CM

## 2024-12-31 DIAGNOSIS — I26.99 ACUTE PULMONARY EMBOLISM, UNSPECIFIED PULMONARY EMBOLISM TYPE, UNSPECIFIED WHETHER ACUTE COR PULMONALE PRESENT (H): Primary | ICD-10-CM

## 2024-12-31 DIAGNOSIS — N18.31 CHRONIC KIDNEY DISEASE, STAGE 3A (H): Primary | ICD-10-CM

## 2024-12-31 LAB — INR BLD: 2 (ref 0.9–1.1)

## 2024-12-31 PROCEDURE — 36416 COLLJ CAPILLARY BLOOD SPEC: CPT

## 2024-12-31 PROCEDURE — 85610 PROTHROMBIN TIME: CPT

## 2024-12-31 NOTE — PROGRESS NOTES
ANTICOAGULATION CLINIC REFERRAL RENEWAL REQUEST       An annual renewal order is required for all patients referred to [facility].?  Please review and sign the pended referral order for Ky Martinez.       ANTICOAGULATION SUMMARY      Warfarin indication(s)   Atrial Fibrillation and PE    Mechanical heart valve present?  NO       Current goal range   INR: 2.0-3.0     Goal appropriate for indication? Goal INR 2-3, standard for indication(s) above     Time in Therapeutic Range (TTR)  (Goal > 60%) 57.3%       Office visit with referring provider's group within last year yes on 7/24/24         [facility]

## 2024-12-31 NOTE — PROGRESS NOTES
ANTICOAGULATION MANAGEMENT     Ky Martinez 79 year old male is on warfarin with therapeutic INR result. (Goal INR 2.0-3.0)    Recent labs: (last 7 days)     12/31/24  1041   INR 2.0*       ASSESSMENT     Source(s): Chart Review and Patient/Caregiver Call     Warfarin doses taken: Warfarin taken as instructed  Diet: No new diet changes identified  Medication/supplement changes: None noted  New illness, injury, or hospitalization: No  Signs or symptoms of bleeding or clotting: No  Previous result: Therapeutic last 2(+) visits  Additional findings: None       PLAN     Recommended plan for no diet, medication or health factor changes affecting INR     Dosing Instructions: Continue your current warfarin dose with next INR in 6 weeks       Summary  As of 12/31/2024      Full warfarin instructions:  3.75 mg every Mon, Wed, Fri; 2.5 mg all other days   Next INR check:  2/11/2025               Telephone call with Tarik who verbalizes understanding and agrees to plan    Patient elected to schedule next visit in 6 weeks.     Education provided: None required    Plan made per [facility] anticoagulation protocol    Julia CHARLES RN  12/31/2024  Anticoagulation Clinic  [employer] for routing messages: thierno MEHTA  [facility] patient phone line: [phone]        _______________________________________________________________________     Anticoagulation Episode Summary       Current INR goal:  2.0-3.0   TTR:  57.3% (1 y)   Target end date:  Indefinite   Send INR reminders to:  BRANDY MEHTA    Indications    Acute pulmonary embolism  unspecified pulmonary embolism type  unspecified whether acute cor pulmonale present (H) [I26.99]  A-fib (H) [I48.91]  Paroxysmal atrial fibrillation (H) [I48.0]             Comments:  --             Anticoagulation Care Providers       Provider Role Specialty Phone number    Ky Major MD Referring Family Practice     Marcin Rios MD Referring Family Medicine [phone]

## 2025-01-01 PROBLEM — I48.11 LONGSTANDING PERSISTENT ATRIAL FIBRILLATION (H): Status: ACTIVE | Noted: 2025-01-01

## 2025-01-19 ENCOUNTER — APPOINTMENT (OUTPATIENT)
Dept: GENERAL RADIOLOGY | Facility: CLINIC | Age: 80
End: 2025-01-19
Attending: EMERGENCY MEDICINE
Payer: COMMERCIAL

## 2025-01-19 ENCOUNTER — HOSPITAL ENCOUNTER (EMERGENCY)
Facility: CLINIC | Age: 80
Discharge: HOME | End: 2025-01-19
Attending: EMERGENCY MEDICINE | Admitting: EMERGENCY MEDICINE
Payer: COMMERCIAL

## 2025-01-19 VITALS
DIASTOLIC BLOOD PRESSURE: 60 MMHG | HEART RATE: 72 BPM | BODY MASS INDEX: 39.2 KG/M2 | OXYGEN SATURATION: 94 % | HEIGHT: 71 IN | WEIGHT: 280 LBS | TEMPERATURE: 99 F | SYSTOLIC BLOOD PRESSURE: 117 MMHG | RESPIRATION RATE: 22 BRPM

## 2025-01-19 DIAGNOSIS — R29.898 BILATERAL LEG WEAKNESS: ICD-10-CM

## 2025-01-19 DIAGNOSIS — E83.42 HYPOMAGNESEMIA: ICD-10-CM

## 2025-01-19 DIAGNOSIS — J10.1 INFLUENZA A: ICD-10-CM

## 2025-01-19 LAB
ALBUMIN SERPL BCG-MCNC: 4 G/DL (ref 3.5–5.2)
ALP SERPL-CCNC: 91 U/L (ref 40–150)
ALT SERPL W P-5'-P-CCNC: 34 U/L (ref 0–70)
ANION GAP SERPL CALCULATED.3IONS-SCNC: 10 MMOL/L (ref 7–15)
AST SERPL W P-5'-P-CCNC: 27 U/L (ref 0–45)
ATRIAL RATE - MUSE: 77 BPM
BASOPHILS # BLD AUTO: 0 10E3/UL (ref 0–0.2)
BASOPHILS NFR BLD AUTO: 1 %
BILIRUB SERPL-MCNC: 0.7 MG/DL
BUN SERPL-MCNC: 14.2 MG/DL (ref 8–23)
CALCIUM SERPL-MCNC: 8.5 MG/DL (ref 8.8–10.4)
CHLORIDE SERPL-SCNC: 105 MMOL/L (ref 98–107)
CREAT SERPL-MCNC: 1.4 MG/DL (ref 0.67–1.17)
DIASTOLIC BLOOD PRESSURE - MUSE: NORMAL MMHG
EGFRCR SERPLBLD CKD-EPI 2021: 51 ML/MIN/1.73M2
EOSINOPHIL # BLD AUTO: 0 10E3/UL (ref 0–0.7)
EOSINOPHIL NFR BLD AUTO: 1 %
ERYTHROCYTE [DISTWIDTH] IN BLOOD BY AUTOMATED COUNT: 13 % (ref 10–15)
FLUAV RNA SPEC QL NAA+PROBE: POSITIVE
FLUBV RNA RESP QL NAA+PROBE: NEGATIVE
GLUCOSE SERPL-MCNC: 107 MG/DL (ref 70–99)
HCO3 SERPL-SCNC: 22 MMOL/L (ref 22–29)
HCT VFR BLD AUTO: 37.1 % (ref 40–53)
HGB BLD-MCNC: 13.1 G/DL (ref 13.3–17.7)
IMM GRANULOCYTES # BLD: 0 10E3/UL
IMM GRANULOCYTES NFR BLD: 0 %
INR PPP: 2.44 (ref 0.85–1.15)
INTERPRETATION ECG - MUSE: NORMAL
LYMPHOCYTES # BLD AUTO: 0.3 10E3/UL (ref 0.8–5.3)
LYMPHOCYTES NFR BLD AUTO: 4 %
MAGNESIUM SERPL-MCNC: 1.6 MG/DL (ref 1.7–2.3)
MCH RBC QN AUTO: 34.1 PG (ref 26.5–33)
MCHC RBC AUTO-ENTMCNC: 35.3 G/DL (ref 31.5–36.5)
MCV RBC AUTO: 97 FL (ref 78–100)
MONOCYTES # BLD AUTO: 1 10E3/UL (ref 0–1.3)
MONOCYTES NFR BLD AUTO: 16 %
NEUTROPHILS # BLD AUTO: 4.8 10E3/UL (ref 1.6–8.3)
NEUTROPHILS NFR BLD AUTO: 79 %
NRBC # BLD AUTO: 0 10E3/UL
NRBC BLD AUTO-RTO: 0 /100
P AXIS - MUSE: 76 DEGREES
PHOSPHATE SERPL-MCNC: 2.3 MG/DL (ref 2.5–4.5)
PLATELET # BLD AUTO: 132 10E3/UL (ref 150–450)
POTASSIUM SERPL-SCNC: 4.4 MMOL/L (ref 3.4–5.3)
PR INTERVAL - MUSE: 206 MS
PROT SERPL-MCNC: 6.7 G/DL (ref 6.4–8.3)
QRS DURATION - MUSE: 106 MS
QT - MUSE: 404 MS
QTC - MUSE: 457 MS
R AXIS - MUSE: 39 DEGREES
RBC # BLD AUTO: 3.84 10E6/UL (ref 4.4–5.9)
RSV RNA SPEC NAA+PROBE: NEGATIVE
SARS-COV-2 RNA RESP QL NAA+PROBE: NEGATIVE
SODIUM SERPL-SCNC: 137 MMOL/L (ref 135–145)
SYSTOLIC BLOOD PRESSURE - MUSE: NORMAL MMHG
T AXIS - MUSE: 42 DEGREES
VENTRICULAR RATE- MUSE: 77 BPM
WBC # BLD AUTO: 6.1 10E3/UL (ref 4–11)

## 2025-01-19 PROCEDURE — 36415 COLL VENOUS BLD VENIPUNCTURE: CPT | Performed by: EMERGENCY MEDICINE

## 2025-01-19 PROCEDURE — 93010 ELECTROCARDIOGRAM REPORT: CPT | Performed by: EMERGENCY MEDICINE

## 2025-01-19 PROCEDURE — 83735 ASSAY OF MAGNESIUM: CPT | Performed by: EMERGENCY MEDICINE

## 2025-01-19 PROCEDURE — 80053 COMPREHEN METABOLIC PANEL: CPT | Performed by: EMERGENCY MEDICINE

## 2025-01-19 PROCEDURE — 99285 EMERGENCY DEPT VISIT HI MDM: CPT | Mod: 25 | Performed by: EMERGENCY MEDICINE

## 2025-01-19 PROCEDURE — 71045 X-RAY EXAM CHEST 1 VIEW: CPT

## 2025-01-19 PROCEDURE — 250N000011 HC RX IP 250 OP 636: Performed by: EMERGENCY MEDICINE

## 2025-01-19 PROCEDURE — 96365 THER/PROPH/DIAG IV INF INIT: CPT | Performed by: EMERGENCY MEDICINE

## 2025-01-19 PROCEDURE — 99285 EMERGENCY DEPT VISIT HI MDM: CPT | Performed by: EMERGENCY MEDICINE

## 2025-01-19 PROCEDURE — 85610 PROTHROMBIN TIME: CPT | Performed by: EMERGENCY MEDICINE

## 2025-01-19 PROCEDURE — 87637 SARSCOV2&INF A&B&RSV AMP PRB: CPT | Performed by: EMERGENCY MEDICINE

## 2025-01-19 PROCEDURE — 85004 AUTOMATED DIFF WBC COUNT: CPT | Performed by: EMERGENCY MEDICINE

## 2025-01-19 PROCEDURE — 84100 ASSAY OF PHOSPHORUS: CPT | Performed by: EMERGENCY MEDICINE

## 2025-01-19 PROCEDURE — 93005 ELECTROCARDIOGRAM TRACING: CPT | Performed by: EMERGENCY MEDICINE

## 2025-01-19 RX ORDER — OSELTAMIVIR PHOSPHATE 30 MG/1
30 CAPSULE ORAL 2 TIMES DAILY
Qty: 10 CAPSULE | Refills: 0 | Status: SHIPPED | OUTPATIENT
Start: 2025-01-19 | End: 2025-01-24

## 2025-01-19 RX ORDER — MAGNESIUM SULFATE HEPTAHYDRATE 40 MG/ML
2 INJECTION, SOLUTION INTRAVENOUS ONCE
Status: COMPLETED | OUTPATIENT
Start: 2025-01-19 | End: 2025-01-19

## 2025-01-19 RX ADMIN — MAGNESIUM SULFATE HEPTAHYDRATE 2 G: 40 INJECTION, SOLUTION INTRAVENOUS at 17:53

## 2025-01-19 ASSESSMENT — ACTIVITIES OF DAILY LIVING (ADL)
ADLS_ACUITY_SCORE: 47

## 2025-01-19 ASSESSMENT — COLUMBIA-SUICIDE SEVERITY RATING SCALE - C-SSRS
6. HAVE YOU EVER DONE ANYTHING, STARTED TO DO ANYTHING, OR PREPARED TO DO ANYTHING TO END YOUR LIFE?: NO
1. IN THE PAST MONTH, HAVE YOU WISHED YOU WERE DEAD OR WISHED YOU COULD GO TO SLEEP AND NOT WAKE UP?: NO
2. HAVE YOU ACTUALLY HAD ANY THOUGHTS OF KILLING YOURSELF IN THE PAST MONTH?: NO

## 2025-01-19 NOTE — DISCHARGE INSTRUCTIONS
Be sure to drink lots of fluids, get plenty of rest.    You can try starting Tamiflu twice daily.  Prescription sent to pharmacy.  This is an antiviral medication that hopefully will decrease the length of symptoms.    If you have any significant worsening, changes or concerns return promptly at any time.    I hope that you start to feel much better quickly!!

## 2025-01-19 NOTE — ED TRIAGE NOTES
"Pt presents by EMS after he had an episode of lower extremity weakness . Pt reports battling a cold for a couple days and he slowly went to the ground. Pt states he had been in the recliner all morning and \"had no legs\" when he got up.         "

## 2025-01-20 ENCOUNTER — TELEPHONE (OUTPATIENT)
Dept: ANTICOAGULATION | Facility: CLINIC | Age: 80
End: 2025-01-20
Payer: COMMERCIAL

## 2025-01-20 DIAGNOSIS — I48.0 PAROXYSMAL ATRIAL FIBRILLATION (H): ICD-10-CM

## 2025-01-20 DIAGNOSIS — I48.91 A-FIB (H): ICD-10-CM

## 2025-01-20 DIAGNOSIS — I26.99 ACUTE PULMONARY EMBOLISM, UNSPECIFIED PULMONARY EMBOLISM TYPE, UNSPECIFIED WHETHER ACUTE COR PULMONALE PRESENT (H): Primary | ICD-10-CM

## 2025-01-20 DIAGNOSIS — I48.11 LONGSTANDING PERSISTENT ATRIAL FIBRILLATION (H): ICD-10-CM

## 2025-01-20 NOTE — TELEPHONE ENCOUNTER
"ANTICOAGULATION  MANAGEMENT     Interacting Medication Review    Interacting medication(s): Oseltamivir (Tamiflu) with warfarin.    Duration: 5 days (1/19 to 1/24)    Indication: Influenza    New medication?: Yes, interaction may increase INR and risk of bleeding. Closer INR monitoring recommended.        PLAN     Continue your current warfarin dose with next INR in 3 days        Summary  As of 1/20/2025      Full warfarin instructions:  3.75 mg every Mon, Wed, Fri; 2.5 mg all other days   Next INR check:  1/23/2025               Telephone call with Tarik who verbalizes understanding and agrees to plan and who agrees to plan and repeated back plan correctly    New recheck date updated on anticoagulation calendar     [facility] priority set/moved to \"high\" for new major drug interaction    Plan made per [facility] anticoagulation protocol    Jessica Chavez RN  1/20/2025  Anticoagulation Clinic  [facility] for routing messages: thierno MEHTA  [facility] patient phone line: [phone]    "

## 2025-01-20 NOTE — TELEPHONE ENCOUNTER
Patient calling back. He states he IS NOT going to be taking Tamiflu, wanting to get update to INR team.     Elizabeth Gates BSN, RN

## 2025-01-20 NOTE — ED PROVIDER NOTES
History     Chief Complaint   Patient presents with    Generalized Weakness    Flu Symptoms     HPI  History per patient, wife, medical records    This is a 79-year-old male, history of PE/DVT, atrial fibrillation on Coumadin, asthma, coronary disease with CHF, ICD, presenting with weakness, flulike symptoms.  Patient started having cough symptoms yesterday.  Today he has been very weak.  He was sitting in his recliner.  he states he got up to go to the bathroom but felt like his legs were weak and he had to hold onto the walls and furniture.  He then tried to go to his bed but still felt difficulty ambulating.  He denies any fevers.  No pain including no headache, sore throat, back pain, chest pain, nausea.  He has felt slightly dizzy.  He has had chills, sweats, felt clammy.  No obvious sick contacts.  No vomiting, diarrhea, urinary symptoms.    Allergies:  Allergies   Allergen Reactions    No Known Allergies Other (See Comments)    Pine Difficulty breathing     Pine Pollen         Problem List:    Patient Active Problem List    Diagnosis Date Noted    Longstanding persistent atrial fibrillation (H) 01/01/2025     Priority: Medium    Acute on chronic combined systolic and diastolic heart failure (H) 07/24/2024     Priority: Medium    Hypercoagulable state due to paroxysmal atrial fibrillation (H) 07/24/2024     Priority: Medium    Chronic kidney disease, stage 3a (H) 07/24/2024     Priority: Medium    S/P total knee arthroplasty, left 06/18/2024     Priority: Medium    Paroxysmal atrial fibrillation (H) 03/05/2021     Priority: Medium    Primary osteoarthritis of right shoulder 01/28/2021     Priority: Medium    A-fib (H) 11/10/2020     Priority: Medium    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 11/10/2020     Priority: Medium    On warfarin therapy 11/10/2020     Priority: Medium    Other pulmonary embolism without acute cor pulmonale (H) 11/10/2020     Priority: Medium    Acute pulmonary  embolism, unspecified pulmonary embolism type, unspecified whether acute cor pulmonale present (H) 03/30/2020     Priority: Medium    Atrophy of left kidney 03/28/2020     Priority: Medium    Acute pulmonary embolism - bilateral with possible right sided heart strain on CT 03/27/2020     Priority: Medium    Hypoxemia 03/27/2020     Priority: Medium    Bilateral leg edema - right more than left 03/27/2020     Priority: Medium    Shortness of breath 03/27/2020     Priority: Medium    Pulmonary emboli (H) 03/27/2020     Priority: Medium    Morbid obesity (H) 04/22/2019     Priority: Medium    Acute deep vein thrombosis (DVT) of other vein of left upper extremity (H) 08/19/2017     Priority: Medium    S/P ICD (internal cardiac defibrillator) procedure 08/19/2017     Priority: Medium    Ischemic cardiomyopathy 07/31/2017     Priority: Medium    Chronic systolic heart failure (H) 04/10/2017     Priority: Medium     Overview:   03/2017 in [place]. LVEF 30-40% by TTE      Non-STEMI (non-ST elevated myocardial infarction) (H) 03/01/2017     Priority: Medium    Coronary artery disease of native artery of native heart with stable angina pectoris      Priority: Medium    Benign prostatic hyperplasia 10/29/2015     Priority: Medium    Intermittent asthma 06/17/2011     Priority: Medium    Hyperlipidemia 08/14/2009     Priority: Medium    Hypertension 08/14/2009     Priority: Medium    Coronary atherosclerosis 08/14/2009     Priority: Medium     Overview:   03/2008 Inferior STEMI, RCA  3.0 x 24 mm [place] JEFF  03/2017 Angiogram in [place], Non-STEMI (patent RCA stent, mild non-obstructive CAD to pLAD and LCx)       Other acute and subacute form of ischemic heart disease 04/07/2008     Priority: Medium        Past Medical History:    Past Medical History:   Diagnosis Date    Hypertension     Myocardial infarction (H)        Past Surgical History:    Past Surgical History:   Procedure Laterality Date    ARTHROPLASTY, KNEE, ROBOT  ASSISTED, USING ALON Left 6/18/2024    Procedure: ALON ASSISTED Left TOTAL KNEE ARTHROPLASTY;  Surgeon: Nathan Hernandez DO;  Location: [facility]    Cardiac stent      COLONOSCOPY N/A 5/22/2019    Procedure: Colonoscopy, Polypectomy by Snare;  Surgeon: Gian Horn DO;  Location: [facility] GI    EXCISE GANGLION WRIST Left 11/26/2014    Procedure: EXCISE GANGLION WRIST;  Surgeon: Gian Haddad MD;  Location: [facility] OR    ICD DEVICE INTERROGATE      2017    ORTHOPEDIC SURGERY      RELEASE CARPAL TUNNEL Left 11/26/2014    Procedure: RELEASE CARPAL TUNNEL;  Surgeon: Gian Haddad MD;  Location: [facility] OR    RELEASE DEQUERVAINS WRIST Left 11/26/2014    Procedure: RELEASE DEQUERVAINS WRIST;  Surgeon: Gian Haddad MD;  Location: [facility] OR       Family History:    Family History   Problem Relation Age of Onset    Arthritis Mother     Diabetes Father     Allergies Father         sulfa    Eye Disorder Father         cataract    Heart Disease Father         by pass       Social History:  Marital Status:   [2]  Social History     Tobacco Use    Smoking status: Never    Smokeless tobacco: Never   Vaping Use    Vaping status: Never Used   Substance Use Topics    Alcohol use: Yes     Alcohol/week: 3.3 standard drinks of alcohol     Types: 4 Glasses of wine per week     Comment: occasional     Drug use: No        Medications:    oseltamivir (TAMIFLU) 30 MG capsule  acetaminophen (TYLENOL) 325 MG tablet  albuterol (PROAIR HFA) 108 (90 Base) MCG/ACT inhaler  atorvastatin (LIPITOR) 80 MG tablet  COMIRNATY 30 MCG/0.3ML injection  DAILY MULTI OR  finasteride (PROSCAR) 5 MG tablet  FLUAD QUADRIVALENT 0.5 ML PRSY injection  furosemide (LASIX) 40 MG tablet  isosorbide mononitrate (IMDUR) 30 MG 24 hr tablet  losartan (COZAAR) 50 MG tablet  metoprolol succinate ER (TOPROL XL) 50 MG 24 hr tablet  Multiple Vitamin (THERA-TABS) TABS  nitroGLYcerin (NITROSTAT) 0.4 MG sublingual tablet  potassium chloride jody ER  "(KLOR-CON M20) 20 MEQ CR tablet  senna-docusate (SENOKOT-S/PERICOLACE) 8.6-50 MG tablet  tamsulosin (FLOMAX) 0.4 MG capsule  warfarin ANTICOAGULANT (COUMADIN) 2.5 MG tablet  warfarin ANTICOAGULANT (COUMADIN) 5 MG tablet  warfarin ANTICOAGULANT (COUMADIN) 5 MG tablet          Review of Systems  All other ROS reviewed and are negative or non-contributory except as stated in HPI.       Physical Exam   BP: (!) 140/70  Pulse: 82  Temp: 99  F (37.2  C)  Resp: 20  Height: 180.3 cm (5' 11\")  Weight: 127 kg (280 lb)  SpO2: 96 %      Physical Exam  Vitals and nursing note reviewed.   Constitutional:       Appearance: He is obese.      Comments: Very pleasant older gentleman sitting in the bed.  Answers all questions appropriately.  No acute distress.   HENT:      Head: Normocephalic.      Right Ear: Tympanic membrane and ear canal normal.      Left Ear: Tympanic membrane and ear canal normal.      Nose: Nose normal.      Mouth/Throat:      Mouth: Mucous membranes are moist.      Pharynx: Oropharynx is clear.   Eyes:      Extraocular Movements: Extraocular movements intact.      Conjunctiva/sclera: Conjunctivae normal.   Cardiovascular:      Rate and Rhythm: Normal rate and regular rhythm.      Pulses: Normal pulses.      Heart sounds: Normal heart sounds.      Comments: ICD in place  Pulmonary:      Effort: Pulmonary effort is normal.      Breath sounds: Normal breath sounds.   Abdominal:      Palpations: Abdomen is soft.      Tenderness: There is no abdominal tenderness.   Musculoskeletal:         General: Normal range of motion.      Cervical back: Normal range of motion and neck supple.   Skin:     General: Skin is warm and dry.   Neurological:      General: No focal deficit present.      Mental Status: He is alert and oriented to person, place, and time.      Cranial Nerves: No cranial nerve deficit.      Sensory: No sensory deficit.      Motor: No weakness.      Coordination: Coordination normal.      Comments: [facility] " stroke scale 0   Psychiatric:         Mood and Affect: Mood normal.         Behavior: Behavior normal.         ED Course (with Medical Decision Making)    Pt seen and examined by me.  RN and EPIC notes reviewed.       Patient with weakness, cough symptoms.  Concern for significant underlying infection.  There is a lot of influenza in the community so this could be the cause.    EKG was done.  This shows normal sinus rhythm with a rate of 77.  There is no ectopy.  No ST segment abnormalities.  Read by myself at 1630 5 PM.    Patient has normal white count.  Hemoglobin 13.1 which is consistent with previous.  Platelets 132  INR therapeutic at 2.44  Phosphorus 2.3  Magnesium slightly low at 1.6.  Replacement ordered  Comprehensive panel shows creatinine of 1.4 which is very slightly above his baseline.  Glucose 107.  Otherwise electrolytes and LFTs normal.  Patient is influenza A positive.    Results discussed with the patient and his wife.  We talked about Tamiflu and he would like to try this so Rx was sent.  Magnesium was replaced.  Recommend drinking lots of fluids, get plenty of rest.  Monitor symptoms closely.  If he has any significant worsening, changes or concerns return at any time.        Procedures    Results for orders placed or performed during the hospital encounter of 01/19/25 (from the past 24 hours)   Influenza A/B, RSV and SARS-CoV2 PCR (COVID-19) Nasopharyngeal    Specimen: Nasopharyngeal; Swab   Result Value Ref Range    Influenza A PCR Positive (A) Negative    Influenza B PCR Negative Negative    RSV PCR Negative Negative    SARS CoV2 PCR Negative Negative    Narrative    Testing was performed using the Xpert Xpress CoV2/Flu/RSV Assay on the [employer] GeneXpert Instrument. This test should be ordered for the detection of SARS-CoV2, influenza, and RSV viruses in individuals with signs and symptoms of respiratory tract infection. This test is for in vitro diagnostic use under the [place] FDA for  laboratories certified under CLIA to perform high or moderate complexity testing. This test has been [place] FDA cleared. A negative result does not rule out the presence of PCR inhibitors in the specimen or target RNA in concentration below the limit of detection for the assay. If only one viral target is positive but coinfection with multiple targets is suspected, the sample should be re-tested with another FDA cleared, approved, or authorized test, if coninfection would change clinical management. This test was validated by the [facility]. These laboratories are certified under the Clinical Laboratory Improvement Amendments of 1988 (CLIA-88) as qualified to perfom high complexity laboratory testing.   CBC with platelets differential    Narrative    The following orders were created for panel order CBC with platelets differential.  Procedure                               Abnormality         Status                     ---------                               -----------         ------                     CBC with platelets and d...[[id number]]  Abnormal            Final result                 Please view results for these tests on the individual orders.   Comprehensive metabolic panel   Result Value Ref Range    Sodium 137 135 - 145 mmol/L    Potassium 4.4 3.4 - 5.3 mmol/L    Carbon Dioxide (CO2) 22 22 - 29 mmol/L    Anion Gap 10 7 - 15 mmol/L    Urea Nitrogen 14.2 8.0 - 23.0 mg/dL    Creatinine 1.40 (H) 0.67 - 1.17 mg/dL    GFR Estimate 51 (L) >60 mL/min/1.73m2    Calcium 8.5 (L) 8.8 - 10.4 mg/dL    Chloride 105 98 - 107 mmol/L    Glucose 107 (H) 70 - 99 mg/dL    Alkaline Phosphatase 91 40 - 150 U/L    AST 27 0 - 45 U/L    ALT 34 0 - 70 U/L    Protein Total 6.7 6.4 - 8.3 g/dL    Albumin 4.0 3.5 - 5.2 g/dL    Bilirubin Total 0.7 <=1.2 mg/dL   Magnesium   Result Value Ref Range    Magnesium 1.6 (L) 1.7 - 2.3 mg/dL   Phosphorus   Result Value Ref Range    Phosphorus 2.3 (L) 2.5 - 4.5 mg/dL   INR   Result  Value Ref Range    INR 2.44 (H) 0.85 - 1.15   CBC with platelets and differential   Result Value Ref Range    WBC Count 6.1 4.0 - 11.0 10e3/uL    RBC Count 3.84 (L) 4.40 - 5.90 10e6/uL    Hemoglobin 13.1 (L) 13.3 - 17.7 g/dL    Hematocrit 37.1 (L) 40.0 - 53.0 %    MCV 97 78 - 100 fL    MCH 34.1 (H) 26.5 - 33.0 pg    MCHC 35.3 31.5 - 36.5 g/dL    RDW 13.0 10.0 - 15.0 %    Platelet Count 132 (L) 150 - 450 10e3/uL    % Neutrophils 79 %    % Lymphocytes 4 %    % Monocytes 16 %    % Eosinophils 1 %    % Basophils 1 %    % Immature Granulocytes 0 %    NRBCs per 100 WBC 0 <1 /100    Absolute Neutrophils 4.8 1.6 - 8.3 10e3/uL    Absolute Lymphocytes 0.3 (L) 0.8 - 5.3 10e3/uL    Absolute Monocytes 1.0 0.0 - 1.3 10e3/uL    Absolute Eosinophils 0.0 0.0 - 0.7 10e3/uL    Absolute Basophils 0.0 0.0 - 0.2 10e3/uL    Absolute Immature Granulocytes 0.0 <=0.4 10e3/uL    Absolute NRBCs 0.0 10e3/uL   EKG 12-lead, tracing only   Result Value Ref Range    Systolic Blood Pressure  mmHg    Diastolic Blood Pressure  mmHg    Ventricular Rate 77 BPM    Atrial Rate 77 BPM    NH Interval 206 ms    QRS Duration 106 ms     ms    QTc 457 ms    P Axis 76 degrees    R AXIS 39 degrees    T Axis 42 degrees    Interpretation ECG       Sinus rhythm  Normal ECG  When compared with ECG of 22-Mar-2016 10:35, (unconfirmed)  Premature ventricular complexes are no longer Present  Nonspecific T wave abnormality has replaced inverted T waves in Inferior leads  Nonspecific T wave abnormality no longer evident in Lateral leads  Confirmed by SEE ED PROVIDER NOTE FOR, ECG INTERPRETATION ([id number]),  Adam Michael ([id number]) on 1/19/2025 4:35:15 PM     XR Chest Port 1 View    Narrative    EXAM: XR CHEST PORT 1 VIEW  LOCATION: [facility]  DATE: 1/19/2025    INDICATION: cough  COMPARISON: 4/21/2023      Impression    IMPRESSION: Subclavian approach pacer/AICD. No pneumothorax or pleural effusion. No focal consolidation. No acute  osseous abnormality. Slight pulmonary vascular congestion.       Medications   magnesium sulfate 2 g in 50 mL sterile water intermittent infusion (0 g Intravenous Stopped 1/19/25 1850)       Assessments & Plan      I have reviewed the findings, diagnosis, plan and need for follow up with the patient.    Discharge Medication List as of 1/19/2025  6:51 PM        START taking these medications    Details   oseltamivir (TAMIFLU) 30 MG capsule Take 1 capsule (30 mg) by mouth 2 times daily for 5 days., Disp-10 capsule, R-0, E-Prescribe             Final diagnoses:   Influenza A   Hypomagnesemia   Bilateral leg weakness     Disposition: Patient discharged home in stable condition.  Plan as above.  Return for concerns.     Note: Chart documentation done in part with Dragon Voice Recognition software. Although reviewed after completion, some word and grammatical errors may remain.   1/19/2025   [facility] EMERGENCY DEPT       Kasie Arellano MD  01/20/25 2386

## 2025-01-20 NOTE — TELEPHONE ENCOUNTER
Spoke with pt and updated his [facility] episode to reflect this. INR in the ED was therapeutic at 2.44. So old recheck date as previously planned with be ok (2/11/25). Patient verbalized understanding and agrees with plan of care. Pt had no further questions or concerns at this time.     Jessica Chavez, RN, BSN  [facility] Anticoagulation Team

## 2025-02-10 ENCOUNTER — DOCUMENTATION ONLY (OUTPATIENT)
Dept: ANTICOAGULATION | Facility: CLINIC | Age: 80
End: 2025-02-10
Payer: COMMERCIAL

## 2025-02-10 DIAGNOSIS — I26.99 ACUTE PULMONARY EMBOLISM, UNSPECIFIED PULMONARY EMBOLISM TYPE, UNSPECIFIED WHETHER ACUTE COR PULMONALE PRESENT (H): ICD-10-CM

## 2025-02-10 DIAGNOSIS — I48.0 PAROXYSMAL ATRIAL FIBRILLATION (H): ICD-10-CM

## 2025-02-10 DIAGNOSIS — I48.91 A-FIB (H): ICD-10-CM

## 2025-02-10 RX ORDER — WARFARIN SODIUM 2.5 MG/1
TABLET ORAL
Qty: 102 TABLET | Refills: 1 | Status: SHIPPED | OUTPATIENT
Start: 2025-02-10

## 2025-02-10 NOTE — PROGRESS NOTES
ANTICOAGULATION MANAGEMENT:  Medication Refill    Anticoagulation Summary  As of 1/20/2025      Warfarin maintenance plan:  3.75 mg (2.5 mg x 1.5) every Mon, Wed, Fri; 2.5 mg (2.5 mg x 1) all other days   Next INR check:  2/11/2025   Target end date:  Indefinite    Indications    Acute pulmonary embolism  unspecified pulmonary embolism type  unspecified whether acute cor pulmonale present (H) [I26.99]  A-fib (H) [I48.91]  Paroxysmal atrial fibrillation (H) [I48.0]  Longstanding persistent atrial fibrillation (H) [I48.11]                 Anticoagulation Care Providers       Provider Role Specialty Phone number    Ky Major MD Referring Family [facility]     Marcin Rios MD Referring [facility] [phone]            Refill Criteria    Visit with referring provider/group: Meets criteria: visit within referring provider group in the last 15 months on 7/24/24    [facility] referral last signed: 01/01/2025; within last year:  Yes    Lab monitoring is up to date (not exceeding 2 weeks overdue): Yes    Ky meets all criteria for refill. Rx instructions and quantity supplied updated to match patient's current dosing plan. Warfarin 90 day supply with 1 refill granted per [facility] protocol     Jessica Chavez RN  Anticoagulation Clinic

## 2025-02-11 ENCOUNTER — ANTICOAGULATION THERAPY VISIT (OUTPATIENT)
Dept: ANTICOAGULATION | Facility: CLINIC | Age: 80
End: 2025-02-11

## 2025-02-11 ENCOUNTER — LAB (OUTPATIENT)
Dept: LAB | Facility: CLINIC | Age: 80
End: 2025-02-11
Payer: COMMERCIAL

## 2025-02-11 ENCOUNTER — TELEPHONE (OUTPATIENT)
Dept: FAMILY MEDICINE | Facility: CLINIC | Age: 80
End: 2025-02-11

## 2025-02-11 DIAGNOSIS — I48.0 PAROXYSMAL ATRIAL FIBRILLATION (H): ICD-10-CM

## 2025-02-11 DIAGNOSIS — I48.11 LONGSTANDING PERSISTENT ATRIAL FIBRILLATION (H): ICD-10-CM

## 2025-02-11 DIAGNOSIS — I26.99 ACUTE PULMONARY EMBOLISM, UNSPECIFIED PULMONARY EMBOLISM TYPE, UNSPECIFIED WHETHER ACUTE COR PULMONALE PRESENT (H): ICD-10-CM

## 2025-02-11 DIAGNOSIS — I48.91 A-FIB (H): ICD-10-CM

## 2025-02-11 DIAGNOSIS — I26.99 ACUTE PULMONARY EMBOLISM, UNSPECIFIED PULMONARY EMBOLISM TYPE, UNSPECIFIED WHETHER ACUTE COR PULMONALE PRESENT (H): Primary | ICD-10-CM

## 2025-02-11 LAB — INR BLD: 4.2 (ref 0.9–1.1)

## 2025-02-11 PROCEDURE — 36416 COLLJ CAPILLARY BLOOD SPEC: CPT

## 2025-02-11 PROCEDURE — 85610 PROTHROMBIN TIME: CPT

## 2025-02-11 NOTE — TELEPHONE ENCOUNTER
General Call    Contacts       Contact Date/Time Type Contact Phone/Fax    02/11/2025 12:37 PM CST Phone (Incoming) Tarik Martinez (Self) [phone] ()          Reason for Call:  ANTICOAGULATION    What are your questions or concerns:  Attempted to return phone call    Date of last appointment with provider: 2/11/25    Could we send this information to you in [employer] or would you prefer to receive a phone call?:   Patient would prefer a phone call   Okay to leave a detailed message?: Yes at Cell number on file:    Telephone Information:   Mobile [phone]

## 2025-02-11 NOTE — PROGRESS NOTES
ANTICOAGULATION MANAGEMENT     Ky Martinez 79 year old male is on warfarin with supratherapeutic INR result. (Goal INR 2.0-3.0)    Recent labs: (last 7 days)     02/11/25  1046   INR 4.2*       ASSESSMENT     Source(s): Chart Review and Patient/Caregiver Call     Warfarin doses taken: More warfarin taken than planned which may be affecting INR extra warfarin on Sunday due to travel  Diet: Decreased greens/vitamin K in diet; plans to resume previous intake Had been fishing in [place] and had 0 salads, usually has 2-3/wk.  Medication/supplement changes: None noted  New illness, injury, or hospitalization: No  Signs or symptoms of bleeding or clotting: No  Previous result: Therapeutic last 2(+) visits  Additional findings: None       PLAN     Recommended plan for temporary change(s) affecting INR     Dosing Instructions: Already took dose today, tomorrow hold dose then continue your current warfarin dose with next INR in 2 weeks       Summary  As of 2/11/2025      Full warfarin instructions:  2/12: Hold; Otherwise 3.75 mg every Mon, Wed, Fri; 2.5 mg all other days   Next INR check:  2/25/2025               Telephone call with Tarik who verbalizes understanding and agrees to plan and who agrees to plan and repeated back plan correctly    Patient offered & declined to schedule next visit    Education provided: Dietary considerations: importance of consistent vitamin K intake  Symptom monitoring: monitoring for bleeding signs and symptoms    Plan made per [facility] anticoagulation protocol    Lucia Canales, RN  2/11/2025  Anticoagulation Clinic  [employer] for routing messages: thierno MEHTA  [facility] patient phone line: [phone]        _______________________________________________________________________     Anticoagulation Episode Summary       Current INR goal:  2.0-3.0   TTR:  54.4% (1 y)   Target end date:  Indefinite   Send INR reminders to:  BRANDY MEHTA    Indications    Acute pulmonary  embolism  unspecified pulmonary embolism type  unspecified whether acute cor pulmonale present (H) [I26.99]  A-fib (H) [I48.91]  Paroxysmal atrial fibrillation (H) [I48.0]  Longstanding persistent atrial fibrillation (H) [I48.11]             Comments:  --             Anticoagulation Care Providers       Provider Role Specialty Phone number    Ky Major MD Referring Family Practice     Marcin Rios MD Referring [facility] Medicine [phone]

## 2025-02-17 ENCOUNTER — TELEPHONE (OUTPATIENT)
Dept: CARDIOLOGY | Facility: CLINIC | Age: 80
End: 2025-02-17
Payer: COMMERCIAL

## 2025-02-17 DIAGNOSIS — I48.0 PAROXYSMAL ATRIAL FIBRILLATION (H): ICD-10-CM

## 2025-02-17 DIAGNOSIS — I48.91 ATRIAL FIBRILLATION (H): ICD-10-CM

## 2025-02-17 DIAGNOSIS — Z45.02 ENCOUNTER FOR FITTING OR ADJUSTMENT OF IMPLANTABLE CARDIOVERTER-DEFIBRILLATOR (ICD): Primary | ICD-10-CM

## 2025-02-17 DIAGNOSIS — Z51.81 ENCOUNTER FOR MONITORING AMIODARONE THERAPY: Primary | ICD-10-CM

## 2025-02-17 DIAGNOSIS — Z79.899 ENCOUNTER FOR MONITORING AMIODARONE THERAPY: Primary | ICD-10-CM

## 2025-02-17 NOTE — TELEPHONE ENCOUNTER
Referral faxed over from [facility] heart and vascular [place] for Pt. To have an EKG and labs completed on same day at [facility] as it is closer for pt.     Referral for EKG  Ordering Provider: Taurus Fair MD.   Referral Reason: [facility]   Expected date : 2/1/25     Labs to be done at same visit: FT4, TSH,ALKP, SGOT and SGPT. Labs are in chart.     Results should be sent to Fax # [phone].       Pt. Contact info is updated.     Millicent Avila on 2/17/2025 at 2:49 PM

## 2025-02-25 ENCOUNTER — LAB (OUTPATIENT)
Dept: LAB | Facility: CLINIC | Age: 80
End: 2025-02-25
Payer: COMMERCIAL

## 2025-02-25 ENCOUNTER — HOSPITAL ENCOUNTER (OUTPATIENT)
Dept: CARDIOLOGY | Facility: CLINIC | Age: 80
Discharge: HOME | End: 2025-02-25
Attending: FAMILY MEDICINE
Payer: COMMERCIAL

## 2025-02-25 ENCOUNTER — ANTICOAGULATION THERAPY VISIT (OUTPATIENT)
Dept: ANTICOAGULATION | Facility: CLINIC | Age: 80
End: 2025-02-25

## 2025-02-25 DIAGNOSIS — I48.91 A-FIB (H): ICD-10-CM

## 2025-02-25 DIAGNOSIS — I48.91 ATRIAL FIBRILLATION (H): ICD-10-CM

## 2025-02-25 DIAGNOSIS — Z51.81 ENCOUNTER FOR MONITORING AMIODARONE THERAPY: ICD-10-CM

## 2025-02-25 DIAGNOSIS — I48.11 LONGSTANDING PERSISTENT ATRIAL FIBRILLATION (H): ICD-10-CM

## 2025-02-25 DIAGNOSIS — Z79.899 ENCOUNTER FOR MONITORING AMIODARONE THERAPY: ICD-10-CM

## 2025-02-25 DIAGNOSIS — I48.0 PAROXYSMAL ATRIAL FIBRILLATION (H): ICD-10-CM

## 2025-02-25 DIAGNOSIS — I26.99 ACUTE PULMONARY EMBOLISM, UNSPECIFIED PULMONARY EMBOLISM TYPE, UNSPECIFIED WHETHER ACUTE COR PULMONALE PRESENT (H): ICD-10-CM

## 2025-02-25 DIAGNOSIS — I26.99 ACUTE PULMONARY EMBOLISM, UNSPECIFIED PULMONARY EMBOLISM TYPE, UNSPECIFIED WHETHER ACUTE COR PULMONALE PRESENT (H): Primary | ICD-10-CM

## 2025-02-25 DIAGNOSIS — N18.31 CHRONIC KIDNEY DISEASE, STAGE 3A (H): ICD-10-CM

## 2025-02-25 LAB
ALP SERPL-CCNC: 92 U/L (ref 40–150)
ALT SERPL W P-5'-P-CCNC: 33 U/L (ref 0–70)
ANION GAP SERPL CALCULATED.3IONS-SCNC: 12 MMOL/L (ref 7–15)
AST SERPL W P-5'-P-CCNC: 31 U/L (ref 0–45)
BUN SERPL-MCNC: 19.7 MG/DL (ref 8–23)
CALCIUM SERPL-MCNC: 8.7 MG/DL (ref 8.8–10.4)
CHLORIDE SERPL-SCNC: 105 MMOL/L (ref 98–107)
CREAT SERPL-MCNC: 1.21 MG/DL (ref 0.67–1.17)
EGFRCR SERPLBLD CKD-EPI 2021: 61 ML/MIN/1.73M2
GLUCOSE SERPL-MCNC: 92 MG/DL (ref 70–99)
HCO3 SERPL-SCNC: 20 MMOL/L (ref 22–29)
INR BLD: 2.6 (ref 0.9–1.1)
POTASSIUM SERPL-SCNC: 4.2 MMOL/L (ref 3.4–5.3)
SODIUM SERPL-SCNC: 137 MMOL/L (ref 135–145)
T4 FREE SERPL-MCNC: 1.37 NG/DL (ref 0.9–1.7)
TSH SERPL DL<=0.005 MIU/L-ACNC: 1.15 UIU/ML (ref 0.3–4.2)

## 2025-02-25 PROCEDURE — 84075 ASSAY ALKALINE PHOSPHATASE: CPT

## 2025-02-25 PROCEDURE — 85610 PROTHROMBIN TIME: CPT

## 2025-02-25 PROCEDURE — 84443 ASSAY THYROID STIM HORMONE: CPT | Mod: GZ

## 2025-02-25 PROCEDURE — 80048 BASIC METABOLIC PNL TOTAL CA: CPT

## 2025-02-25 PROCEDURE — 36416 COLLJ CAPILLARY BLOOD SPEC: CPT

## 2025-02-25 PROCEDURE — 84450 TRANSFERASE (AST) (SGOT): CPT

## 2025-02-25 PROCEDURE — 36415 COLL VENOUS BLD VENIPUNCTURE: CPT

## 2025-02-25 PROCEDURE — 84460 ALANINE AMINO (ALT) (SGPT): CPT

## 2025-02-25 PROCEDURE — 84439 ASSAY OF FREE THYROXINE: CPT | Mod: GZ

## 2025-02-25 PROCEDURE — 93005 ELECTROCARDIOGRAM TRACING: CPT | Performed by: REHABILITATION PRACTITIONER

## 2025-02-25 NOTE — PROGRESS NOTES
ANTICOAGULATION MANAGEMENT     Ky Martinez 79 year old male is on warfarin with therapeutic INR result. (Goal INR 2.0-3.0)    Recent labs: (last 7 days)     02/25/25  1044   INR 2.6*       ASSESSMENT     Source(s): Chart Review and Patient/Caregiver Call     Warfarin doses taken: Warfarin taken as instructed  Diet: No new diet changes identified  Medication/supplement changes: None noted  New illness, injury, or hospitalization: No  Signs or symptoms of bleeding or clotting: No  Previous result: Supratherapeutic  Additional findings: None       PLAN     Recommended plan for no diet, medication or health factor changes affecting INR     Dosing Instructions: Continue your current warfarin dose with next INR in 4 weeks       Summary  As of 2/25/2025      Full warfarin instructions:  3.75 mg every Mon, Wed, Fri; 2.5 mg all other days   Next INR check:  3/25/2025               Telephone call with Tarik who verbalizes understanding and agrees to plan    Patient offered & declined to schedule next visit likes to make own apt    Education provided: Please call back if any changes to your diet, medications or how you've been taking warfarin    Plan made per [facility] anticoagulation protocol    Jailene Whitfield, RN  2/25/2025  Anticoagulation Clinic  [employer] for routing messages: thierno MEHTA  [facility] patient phone line: [phone]        _______________________________________________________________________     Anticoagulation Episode Summary       Current INR goal:  2.0-3.0   TTR:  52.2% (1 y)   Target end date:  Indefinite   Send INR reminders to:  BRANDY MEHTA    Indications    Acute pulmonary embolism  unspecified pulmonary embolism type  unspecified whether acute cor pulmonale present (H) [I26.99]  A-fib (H) [I48.91]  Paroxysmal atrial fibrillation (H) [I48.0]  Longstanding persistent atrial fibrillation (H) [I48.11]             Comments:  --             Anticoagulation Care Providers       Provider Role  Specialty Phone number    Ky Major MD Referring Family Practice     Marcin Rios MD Referring Family Medicine [phone]

## 2025-03-24 ENCOUNTER — ANTICOAGULATION THERAPY VISIT (OUTPATIENT)
Dept: ANTICOAGULATION | Facility: CLINIC | Age: 80
End: 2025-03-24

## 2025-03-24 ENCOUNTER — LAB (OUTPATIENT)
Dept: LAB | Facility: CLINIC | Age: 80
End: 2025-03-24
Payer: COMMERCIAL

## 2025-03-24 DIAGNOSIS — I48.91 A-FIB (H): ICD-10-CM

## 2025-03-24 DIAGNOSIS — I26.99 ACUTE PULMONARY EMBOLISM, UNSPECIFIED PULMONARY EMBOLISM TYPE, UNSPECIFIED WHETHER ACUTE COR PULMONALE PRESENT (H): ICD-10-CM

## 2025-03-24 DIAGNOSIS — I26.99 ACUTE PULMONARY EMBOLISM, UNSPECIFIED PULMONARY EMBOLISM TYPE, UNSPECIFIED WHETHER ACUTE COR PULMONALE PRESENT (H): Primary | ICD-10-CM

## 2025-03-24 DIAGNOSIS — I48.11 LONGSTANDING PERSISTENT ATRIAL FIBRILLATION (H): ICD-10-CM

## 2025-03-24 DIAGNOSIS — I48.0 PAROXYSMAL ATRIAL FIBRILLATION (H): ICD-10-CM

## 2025-03-24 LAB — INR BLD: 2.9 (ref 0.9–1.1)

## 2025-03-24 PROCEDURE — 85610 PROTHROMBIN TIME: CPT

## 2025-03-24 PROCEDURE — 36416 COLLJ CAPILLARY BLOOD SPEC: CPT

## 2025-03-24 NOTE — PROGRESS NOTES
ANTICOAGULATION MANAGEMENT     Ky Martinez 79 year old male is on warfarin with therapeutic INR result. (Goal INR 2.0-3.0)    Recent labs: (last 7 days)     03/24/25  1032   INR 2.9*       ASSESSMENT     Source(s): Chart Review and Patient/Caregiver Call     Warfarin doses taken: Warfarin taken as instructed  Diet: No new diet changes identified  Medication/supplement changes: None noted  New illness, injury, or hospitalization: No  Signs or symptoms of bleeding or clotting: No  Previous result: Therapeutic last visit; previously outside of goal range  Additional findings: None       PLAN     Recommended plan for no diet, medication or health factor changes affecting INR     Dosing Instructions: Continue your current warfarin dose with next INR in 6 weeks       Summary  As of 3/24/2025      Full warfarin instructions:  3.75 mg every Mon, Wed, Fri; 2.5 mg all other days   Next INR check:  5/5/2025               Telephone call with Tarik who verbalizes understanding and agrees to plan    Lab visit scheduled    Education provided: Contact [phone] with any changes, questions or concerns.     Plan made per [facility] anticoagulation protocol    Dennise Zuniga RN  3/24/2025  Anticoagulation Clinic  [employer] for routing messages: thierno MEHTA  [facility] patient phone line: [phone]        _______________________________________________________________________     Anticoagulation Episode Summary       Current INR goal:  2.0-3.0   TTR:  52.2% (1 y)   Target end date:  Indefinite   Send INR reminders to:  BRANDY MEHTA    Indications    Acute pulmonary embolism  unspecified pulmonary embolism type  unspecified whether acute cor pulmonale present (H) [I26.99]  A-fib (H) [I48.91]  Paroxysmal atrial fibrillation (H) [I48.0]  Longstanding persistent atrial fibrillation (H) [I48.11]             Comments:  --             Anticoagulation Care Providers       Provider Role Specialty Phone number    Ky Major,  MD Referring Family Practice     Marcin Rios MD Referring Family Medicine [phone]

## 2025-04-19 ENCOUNTER — HOSPITAL ENCOUNTER (EMERGENCY)
Facility: CLINIC | Age: 80
Discharge: HOME | End: 2025-04-19
Payer: COMMERCIAL

## 2025-04-19 ENCOUNTER — APPOINTMENT (OUTPATIENT)
Dept: GENERAL RADIOLOGY | Facility: CLINIC | Age: 80
End: 2025-04-19
Payer: COMMERCIAL

## 2025-04-19 ENCOUNTER — NURSE TRIAGE (OUTPATIENT)
Dept: NURSING | Facility: CLINIC | Age: 80
End: 2025-04-19
Payer: COMMERCIAL

## 2025-04-19 ENCOUNTER — HEALTH MAINTENANCE LETTER (OUTPATIENT)
Age: 80
End: 2025-04-19

## 2025-04-19 VITALS
TEMPERATURE: 98.1 F | HEIGHT: 71 IN | BODY MASS INDEX: 39.83 KG/M2 | OXYGEN SATURATION: 96 % | DIASTOLIC BLOOD PRESSURE: 107 MMHG | RESPIRATION RATE: 20 BRPM | HEART RATE: 81 BPM | WEIGHT: 284.5 LBS | SYSTOLIC BLOOD PRESSURE: 120 MMHG

## 2025-04-19 DIAGNOSIS — G89.29 CHRONIC RIGHT SHOULDER PAIN: ICD-10-CM

## 2025-04-19 DIAGNOSIS — M25.511 CHRONIC RIGHT SHOULDER PAIN: ICD-10-CM

## 2025-04-19 DIAGNOSIS — M19.90 ARTHRITIS: ICD-10-CM

## 2025-04-19 DIAGNOSIS — M19.011 PRIMARY OSTEOARTHRITIS OF RIGHT SHOULDER: ICD-10-CM

## 2025-04-19 PROCEDURE — 99284 EMERGENCY DEPT VISIT MOD MDM: CPT

## 2025-04-19 PROCEDURE — 73030 X-RAY EXAM OF SHOULDER: CPT | Mod: RT

## 2025-04-19 RX ORDER — OXYCODONE HYDROCHLORIDE 5 MG/1
5 TABLET ORAL EVERY 6 HOURS PRN
Qty: 12 TABLET | Refills: 0 | Status: SHIPPED | OUTPATIENT
Start: 2025-04-19 | End: 2025-04-22

## 2025-04-19 RX ORDER — PREDNISONE 20 MG/1
TABLET ORAL
Qty: 10 TABLET | Refills: 0 | Status: SHIPPED | OUTPATIENT
Start: 2025-04-19

## 2025-04-19 ASSESSMENT — COLUMBIA-SUICIDE SEVERITY RATING SCALE - C-SSRS
2. HAVE YOU ACTUALLY HAD ANY THOUGHTS OF KILLING YOURSELF IN THE PAST MONTH?: NO
6. HAVE YOU EVER DONE ANYTHING, STARTED TO DO ANYTHING, OR PREPARED TO DO ANYTHING TO END YOUR LIFE?: NO
1. IN THE PAST MONTH, HAVE YOU WISHED YOU WERE DEAD OR WISHED YOU COULD GO TO SLEEP AND NOT WAKE UP?: NO

## 2025-04-19 ASSESSMENT — ENCOUNTER SYMPTOMS
VOMITING: 0
NAUSEA: 0
MYALGIAS: 0
DIZZINESS: 0
NECK STIFFNESS: 0
DYSURIA: 0
HEADACHES: 0
FEVER: 0
BACK PAIN: 0
HEMATURIA: 0
EYE REDNESS: 0
APPETITE CHANGE: 0
RHINORRHEA: 0
DIARRHEA: 0
CHILLS: 0
ABDOMINAL PAIN: 0
JOINT SWELLING: 0
ACTIVITY CHANGE: 0
FATIGUE: 0
SORE THROAT: 0
ARTHRALGIAS: 0
SHORTNESS OF BREATH: 0
COUGH: 0

## 2025-04-19 ASSESSMENT — ACTIVITIES OF DAILY LIVING (ADL): ADLS_ACUITY_SCORE: 50

## 2025-04-19 NOTE — ED PROVIDER NOTES
History     Chief Complaint   Patient presents with    Shoulder Pain     HPI  Ky Martinez is a 79 year old male who presents to the emergency department for right shoulder pain.  He states he was carrying him up the stairs yesterday and had sudden onset of shoulder pain.  He denies falling or trauma.  He states he has had injury to this shoulder before approximately 4 years ago at which time he got an injection of a steroid which helped and states this pain feels very similar to back then.  He is currently wearing a sling that he had at home.  He denies numbness and tingling and is able to move his fingers.  Decreased range of motion.  Past medical history significant for CAD, A-fib which he is long-term use of warfarin, pacemaker placement, pulmonary embolism and ischemic heart disease.    Allergies:  Allergies   Allergen Reactions    No Known Allergies Other (See Comments)    Pine Difficulty breathing     Pine Pollen         Problem List:    Patient Active Problem List    Diagnosis Date Noted    Longstanding persistent atrial fibrillation (H) 01/01/2025     Priority: Medium    Acute on chronic combined systolic and diastolic heart failure (H) 07/24/2024     Priority: Medium    Hypercoagulable state due to paroxysmal atrial fibrillation (H) 07/24/2024     Priority: Medium    Chronic kidney disease, stage 3a (H) 07/24/2024     Priority: Medium    S/P total knee arthroplasty, left 06/18/2024     Priority: Medium    Paroxysmal atrial fibrillation (H) 03/05/2021     Priority: Medium    Primary osteoarthritis of right shoulder 01/28/2021     Priority: Medium    A-fib (H) 11/10/2020     Priority: Medium    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 11/10/2020     Priority: Medium    On warfarin therapy 11/10/2020     Priority: Medium    Other pulmonary embolism without acute cor pulmonale (H) 11/10/2020     Priority: Medium    Acute pulmonary embolism, unspecified pulmonary embolism type, unspecified  whether acute cor pulmonale present (H) 03/30/2020     Priority: Medium    Atrophy of left kidney 03/28/2020     Priority: Medium    Acute pulmonary embolism - bilateral with possible right sided heart strain on CT 03/27/2020     Priority: Medium    Hypoxemia 03/27/2020     Priority: Medium    Bilateral leg edema - right more than left 03/27/2020     Priority: Medium    Shortness of breath 03/27/2020     Priority: Medium    Pulmonary emboli (H) 03/27/2020     Priority: Medium    Morbid obesity (H) 04/22/2019     Priority: Medium    Acute deep vein thrombosis (DVT) of other vein of left upper extremity (H) 08/19/2017     Priority: Medium    S/P ICD (internal cardiac defibrillator) procedure 08/19/2017     Priority: Medium    Ischemic cardiomyopathy 07/31/2017     Priority: Medium    Chronic systolic heart failure (H) 04/10/2017     Priority: Medium     Overview:   03/2017 in [place]. LVEF 30-40% by TTE      Non-STEMI (non-ST elevated myocardial infarction) (H) 03/01/2017     Priority: Medium    Coronary artery disease of native artery of native heart with stable angina pectoris      Priority: Medium    Benign prostatic hyperplasia 10/29/2015     Priority: Medium    Intermittent asthma 06/17/2011     Priority: Medium    Hyperlipidemia 08/14/2009     Priority: Medium    Hypertension 08/14/2009     Priority: Medium    Coronary atherosclerosis 08/14/2009     Priority: Medium     Overview:   03/2008 Inferior STEMI, RCA  3.0 x 24 mm [place] JEFF  03/2017 Angiogram in [place], Non-STEMI (patent RCA stent, mild non-obstructive CAD to pLAD and LCx)       Other acute and subacute form of ischemic heart disease 04/07/2008     Priority: Medium        Past Medical History:    Past Medical History:   Diagnosis Date    Hypertension     Myocardial infarction (H)        Past Surgical History:    Past Surgical History:   Procedure Laterality Date    ARTHROPLASTY, KNEE, ROBOT ASSISTED, USING ALON Left 6/18/2024    Procedure: ALON ASSISTED  Left TOTAL KNEE ARTHROPLASTY;  Surgeon: Nathan Hernandez DO;  Location: [facility] OR    Cardiac stent      COLONOSCOPY N/A 5/22/2019    Procedure: Colonoscopy, Polypectomy by Snare;  Surgeon: Gian Horn DO;  Location: [facility] GI    EXCISE GANGLION WRIST Left 11/26/2014    Procedure: EXCISE GANGLION WRIST;  Surgeon: Gian Haddad MD;  Location: [facility] OR    ICD DEVICE INTERROGATE      2017    ORTHOPEDIC SURGERY      RELEASE CARPAL TUNNEL Left 11/26/2014    Procedure: RELEASE CARPAL TUNNEL;  Surgeon: Gian Haddad MD;  Location: [facility] OR    RELEASE DEQUERVAINS WRIST Left 11/26/2014    Procedure: RELEASE DEQUERVAINS WRIST;  Surgeon: Gian Hdadad MD;  Location: [facility] OR       Family History:    Family History   Problem Relation Age of Onset    Arthritis Mother     Diabetes Father     Allergies Father         sulfa    Eye Disorder Father         cataract    Heart Disease Father         by pass       Social History:  Marital Status:   [2]  Social History     Tobacco Use    Smoking status: Never    Smokeless tobacco: Never   Vaping Use    Vaping status: Never Used   Substance Use Topics    Alcohol use: Yes     Alcohol/week: 3.3 standard drinks of alcohol     Types: 4 Glasses of wine per week     Comment: occasional     Drug use: No        Medications:    oxyCODONE (ROXICODONE) 5 MG tablet  predniSONE (DELTASONE) 20 MG tablet  acetaminophen (TYLENOL) 325 MG tablet  albuterol (PROAIR HFA) 108 (90 Base) MCG/ACT inhaler  atorvastatin (LIPITOR) 80 MG tablet  COMIRNATY 30 MCG/0.3ML injection  DAILY MULTI OR  finasteride (PROSCAR) 5 MG tablet  FLUAD QUADRIVALENT 0.5 ML PRSY injection  furosemide (LASIX) 40 MG tablet  isosorbide mononitrate (IMDUR) 30 MG 24 hr tablet  losartan (COZAAR) 50 MG tablet  metoprolol succinate ER (TOPROL XL) 50 MG 24 hr tablet  Multiple Vitamin (THERA-TABS) TABS  nitroGLYcerin (NITROSTAT) 0.4 MG sublingual tablet  potassium chloride jody ER (KLOR-CON M20) 20 MEQ CR  "tablet  senna-docusate (SENOKOT-S/PERICOLACE) 8.6-50 MG tablet  tamsulosin (FLOMAX) 0.4 MG capsule  warfarin ANTICOAGULANT (COUMADIN) 2.5 MG tablet  warfarin ANTICOAGULANT (COUMADIN) 5 MG tablet  warfarin ANTICOAGULANT (COUMADIN) 5 MG tablet          Review of Systems   Constitutional:  Negative for activity change, appetite change, chills, fatigue and fever.   HENT:  Negative for congestion, rhinorrhea and sore throat.    Eyes:  Negative for redness.   Respiratory:  Negative for cough and shortness of breath.    Cardiovascular:  Negative for chest pain.   Gastrointestinal:  Negative for abdominal pain, diarrhea, nausea and vomiting.   Genitourinary:  Negative for dysuria and hematuria.   Musculoskeletal:  Negative for arthralgias, back pain, gait problem, joint swelling, myalgias and neck stiffness.        Right shoulder pain   Skin:  Negative for rash.   Neurological:  Negative for dizziness and headaches.       Physical Exam   BP: (!) 143/88  Pulse: 81  Temp: 98.1  F (36.7  C)  Resp: 20  Height: 180.3 cm (5' 11\")  Weight: 129 kg (284 lb 8 oz)  SpO2: 99 %      Physical Exam  Constitutional:       General: He is not in acute distress.     Appearance: Normal appearance. He is not toxic-appearing.   HENT:      Head: Atraumatic.   Eyes:      General: No scleral icterus.     Conjunctiva/sclera: Conjunctivae normal.   Cardiovascular:      Rate and Rhythm: Normal rate.      Heart sounds: Normal heart sounds.   Pulmonary:      Effort: Pulmonary effort is normal. No respiratory distress.      Breath sounds: Normal breath sounds. No wheezing.   Abdominal:      General: Bowel sounds are normal.      Palpations: Abdomen is soft.      Tenderness: There is no abdominal tenderness.   Musculoskeletal:         General: Tenderness present. No swelling or deformity.      Cervical back: Neck supple.      Comments: Right shoulder injury  Decreased range of motion with lateral movement, no bruising or swelling present.  Point " tenderness present glenohumeral joint   Skin:     General: Skin is warm.   Neurological:      Mental Status: He is alert.         ED Course        Procedures             Results for orders placed or performed during the hospital encounter of 04/19/25 (from the past 24 hours)   XR Shoulder Right G/E 3 Views    Narrative    EXAM: XR SHOULDER RIGHT G/E 3 VIEWS  LOCATION: [facility]  DATE: 04/19/2025    INDICATION: Injury, history of arthritis.  COMPARISON: None.      Impression    IMPRESSION: Severe osteoarthrosis of the glenohumeral joint. Moderate osteoarthrosis of the AC joint. There is no evidence of fracture. No subluxation or dislocation.         Medications - No data to display    Assessments & Plan (with Medical Decision Making)  79-year-old male presents with right shoulder pain after carrying a heavy ham of the stairs yesterday.  He tried to manage pain at home with Tylenol and ibuprofen along with using the sling he had available to him.  He states he has had this pain before a number of years ago and was seen by orthopedics where they gave him a steroid shot that did resolve the pain.  I did review the patient's chart and he was seen in January 2021 by his primary care doctor with similar symptoms, he was performed during that visit results are similar to today's x-ray that shows severe osteoarthrosis of the glenohumeral joint.  No evidence of fracture or dislocation today on x-ray.  I discussed findings with patient and will refer him to orthopedics so he is able to get another injection in the shoulder as that is not something we will do in the emergency department today.  In the meantime I did prescribe him a 5-day course of steroids to help with the inflammation along with some narcotic pain medication that he should take along with his Tylenol and ibuprofen along with using the sling, ice and rest.  All patient's questions and concerns answered and patient is being  discharged in stable condition.           Discharge Medication List as of 4/19/2025  1:32 PM        START taking these medications    Details   oxyCODONE (ROXICODONE) 5 MG tablet Take 1 tablet (5 mg) by mouth every 6 hours as needed for pain., Disp-12 tablet, R-0, E-Prescribe      predniSONE (DELTASONE) 20 MG tablet Take two tablets (= 40mg) each day for 5 (five) days, Disp-10 tablet, R-0, E-Prescribe             Final diagnoses:   Chronic right shoulder pain   Arthritis   Primary osteoarthritis of right shoulder       4/19/2025   [facility] EMERGENCY DEPT       Jazmin Houser, SHANNON CNP  04/19/25 0518

## 2025-04-19 NOTE — ED TRIAGE NOTES
Was lifting the ham upstairs yesterday, has severe pain in his right shoulder today.  Hx of right shoulder pain.      Triage Assessment (Adult)       Row Name 04/19/25 1217          Triage Assessment    Airway WDL WDL        Respiratory WDL    Respiratory WDL WDL        Skin Circulation/Temperature WDL    Skin Circulation/Temperature WDL WDL        Cardiac WDL    Cardiac WDL WDL        Peripheral/Neurovascular WDL    Peripheral Neurovascular WDL WDL        Cognitive/Neuro/Behavioral WDL    Cognitive/Neuro/Behavioral WDL WDL

## 2025-04-19 NOTE — TELEPHONE ENCOUNTER
"Pt calling that three years ago pt had hurt his shoulder and had xrays, pt was given a steroid infections which took care of his shoulder issues and to follow up if needed.  Pt states he hurt his right shoulder and pt wanting to know if he could get a steroid injection in the ED.  Pt states pain \"8\" on 0/10 pain scale. Pt intends to go to ED now for evaluation.  Shelli Ibrahim RN  [facility] Nurse Advisor     Reason for Disposition   Shoulder pain from overuse (work, exercise, gardening) OR from self-induced lifting injury   [1] SEVERE pain AND [2] not improved 2 hours after pain medicine    Additional Information   Negative: Serious injury with multiple fractures (broken bones)   Negative: [1] Major bleeding (e.g., actively dripping or spurting) AND [2] can't be stopped   Negative: Bullet wound, stabbed by knife, or other serious penetrating wound   Negative: Sounds like a life-threatening emergency to the triager   Negative: Wound looks infected   Negative: Passed out (i.e., lost consciousness, collapsed and was not responding)   Negative: Shock suspected (e.g., cold/pale/clammy skin, too weak to stand, low BP, rapid pulse)   Negative: [1] Similar pain previously AND [2] it was from \"heart attack\"   Negative: [1] Similar pain previously AND [2] it was from \"angina\" AND [3] not relieved by nitroglycerin   Negative: Sounds like a life-threatening emergency to the triager   Negative: Followed a shoulder injury   Negative: Chest pain   Negative: Difficulty breathing or unusual sweating (e.g., sweating without exertion)   Negative: [1] Pain lasting > 5 minutes AND [2] pain also present in chest  (Exception: Pain is clearly made worse by movement.)   Negative: [1] Age > 40 AND [2] no obvious cause AND [3] pain even when not moving the arm  (Exception: Pain is clearly made worse by moving arm or bending neck.)    Protocols used: Shoulder Injury-A-AH, Shoulder Pain-A-AH    "

## 2025-04-21 ENCOUNTER — TELEPHONE (OUTPATIENT)
Dept: ANTICOAGULATION | Facility: CLINIC | Age: 80
End: 2025-04-21
Payer: COMMERCIAL

## 2025-04-21 ENCOUNTER — PATIENT OUTREACH (OUTPATIENT)
Dept: CARE COORDINATION | Facility: CLINIC | Age: 80
End: 2025-04-21
Payer: COMMERCIAL

## 2025-04-21 DIAGNOSIS — I48.11 LONGSTANDING PERSISTENT ATRIAL FIBRILLATION (H): ICD-10-CM

## 2025-04-21 DIAGNOSIS — I48.91 A-FIB (H): ICD-10-CM

## 2025-04-21 DIAGNOSIS — I26.99 ACUTE PULMONARY EMBOLISM, UNSPECIFIED PULMONARY EMBOLISM TYPE, UNSPECIFIED WHETHER ACUTE COR PULMONALE PRESENT (H): Primary | ICD-10-CM

## 2025-04-21 DIAGNOSIS — I48.0 PAROXYSMAL ATRIAL FIBRILLATION (H): ICD-10-CM

## 2025-04-21 NOTE — TELEPHONE ENCOUNTER
"ANTICOAGULATION  MANAGEMENT: Discharge Review    Ky Martinez chart reviewed for anticoagulation continuity of care    Emergency room visit on 4/19/25 for shoulder injury/pain.    Discharge disposition: Home    Results:    No results for input(s): \"INR\", \"[id number]\", \"F2\", \"ALMWH\", \"AAUFH\" in the last 168 hours.  Anticoagulation inpatient management:     not applicable     Anticoagulation discharge instructions:     Warfarin dosing: home regimen continued   Bridging: No   INR goal change: No      Medication changes affecting anticoagulation: Yes: started on prednisone     Additional factors affecting anticoagulation: Yes: pain/inflammation can affect INR      PLAN     Recommend to check INR on 4/22/25 or 4/23/25    Left a detailed message for Tarik    Anticoagulation Calendar updated    Dennise Zuniga RN  4/21/2025  Anticoagulation Clinic  [facility] for routing messages: thierno MEHTA  [facility] patient phone line: [phone]  "

## 2025-04-22 ENCOUNTER — ANCILLARY PROCEDURE (OUTPATIENT)
Dept: ULTRASOUND IMAGING | Facility: HOSPITAL | Age: 80
End: 2025-04-22
Attending: STUDENT IN AN ORGANIZED HEALTH CARE EDUCATION/TRAINING PROGRAM
Payer: COMMERCIAL

## 2025-04-22 ENCOUNTER — ANTICOAGULATION THERAPY VISIT (OUTPATIENT)
Dept: ANTICOAGULATION | Facility: CLINIC | Age: 80
End: 2025-04-22

## 2025-04-22 ENCOUNTER — OFFICE VISIT (OUTPATIENT)
Dept: ORTHOPEDICS | Facility: CLINIC | Age: 80
End: 2025-04-22
Payer: COMMERCIAL

## 2025-04-22 ENCOUNTER — LAB (OUTPATIENT)
Dept: LAB | Facility: CLINIC | Age: 80
End: 2025-04-22
Payer: COMMERCIAL

## 2025-04-22 DIAGNOSIS — I48.0 PAROXYSMAL ATRIAL FIBRILLATION (H): ICD-10-CM

## 2025-04-22 DIAGNOSIS — I48.11 LONGSTANDING PERSISTENT ATRIAL FIBRILLATION (H): ICD-10-CM

## 2025-04-22 DIAGNOSIS — M19.011 PRIMARY OSTEOARTHRITIS OF RIGHT SHOULDER: Primary | ICD-10-CM

## 2025-04-22 DIAGNOSIS — I48.91 A-FIB (H): ICD-10-CM

## 2025-04-22 DIAGNOSIS — I26.99 ACUTE PULMONARY EMBOLISM, UNSPECIFIED PULMONARY EMBOLISM TYPE, UNSPECIFIED WHETHER ACUTE COR PULMONALE PRESENT (H): ICD-10-CM

## 2025-04-22 DIAGNOSIS — M19.011 PRIMARY OSTEOARTHRITIS OF RIGHT SHOULDER: ICD-10-CM

## 2025-04-22 DIAGNOSIS — I26.99 ACUTE PULMONARY EMBOLISM, UNSPECIFIED PULMONARY EMBOLISM TYPE, UNSPECIFIED WHETHER ACUTE COR PULMONALE PRESENT (H): Primary | ICD-10-CM

## 2025-04-22 LAB — INR BLD: 3.4 (ref 0.9–1.1)

## 2025-04-22 PROCEDURE — 20611 DRAIN/INJ JOINT/BURSA W/US: CPT | Mod: RT | Performed by: STUDENT IN AN ORGANIZED HEALTH CARE EDUCATION/TRAINING PROGRAM

## 2025-04-22 PROCEDURE — 85610 PROTHROMBIN TIME: CPT

## 2025-04-22 PROCEDURE — 36416 COLLJ CAPILLARY BLOOD SPEC: CPT

## 2025-04-22 PROCEDURE — 99213 OFFICE O/P EST LOW 20 MIN: CPT | Mod: 25 | Performed by: STUDENT IN AN ORGANIZED HEALTH CARE EDUCATION/TRAINING PROGRAM

## 2025-04-22 RX ADMIN — TRIAMCINOLONE ACETONIDE 40 MG: 40 INJECTION, SUSPENSION INTRA-ARTICULAR; INTRAMUSCULAR at 10:52

## 2025-04-22 RX ADMIN — BUPIVACAINE HYDROCHLORIDE 2 ML: 5 INJECTION, SOLUTION PERINEURAL at 10:52

## 2025-04-22 RX ADMIN — LIDOCAINE HYDROCHLORIDE 2 ML: 10 INJECTION, SOLUTION INFILTRATION; PERINEURAL at 10:52

## 2025-04-22 NOTE — PROGRESS NOTES
ANTICOAGULATION MANAGEMENT     Ky Martinez 79 year old male is on warfarin with supratherapeutic INR result. (Goal INR 2.0-3.0)    Recent labs: (last 7 days)     04/22/25  0814   INR 3.4*       ASSESSMENT     Source(s): Chart Review and Patient/Caregiver Call     Warfarin doses taken: Warfarin taken as instructed  Diet: No new diet changes identified  Medication/supplement changes:  Prednisone (stopping today) Had an injection in his shoulder  New illness, injury, or hospitalization: Yes: Shoulder pain  Signs or symptoms of bleeding or clotting: No  Previous result: Therapeutic last 2(+) visits  Additional findings: None       PLAN     Recommended plan for temporary change(s) affecting INR     Dosing Instructions: partial hold then continue your current warfarin dose with next INR in 2 weeks       Summary  As of 4/22/2025      Full warfarin instructions:  4/23: 1.25 mg; Otherwise 3.75 mg every Mon, Wed, Fri; 2.5 mg all other days   Next INR check:  5/6/2025               Telephone call with Tarik who verbalizes understanding and agrees to plan and who agrees to plan and repeated back plan correctly    Patient offered & declined to schedule next visit    Education provided: Symptom monitoring: monitoring for bleeding signs and symptoms and monitoring for clotting signs and symptoms    Plan made per [facility] anticoagulation protocol    Lucia Canales RN  4/22/2025  Anticoagulation Clinic  [employer] for routing messages: thierno MEHTA  [facility] patient phone line: [phone]        _______________________________________________________________________     Anticoagulation Episode Summary       Current INR goal:  2.0-3.0   TTR:  51.1% (1 y)   Target end date:  Indefinite   Send INR reminders to:  BRANDY MEHTA    Indications    Acute pulmonary embolism  unspecified pulmonary embolism type  unspecified whether acute cor pulmonale present (H) [I26.99]  A-fib (H) [I48.91]  Paroxysmal atrial fibrillation (H)  [I48.0]  Longstanding persistent atrial fibrillation (H) [I48.11]             Comments:  --             Anticoagulation Care Providers       Provider Role Specialty Phone number    Ky Major MD Referring Family Practice     Mracin Rios MD Referring Family Medicine [phone]

## 2025-04-22 NOTE — LETTER
2025      Ky Martinez  [id number]  [facility] [address]      Dear Colleague,    [facility] you [facility] your patient, Ky Martinez[facility] Please see a copy of my visit note below.    Ky Martinez  :  1945  DOS: 2025  MRN: [id number]  PCP: No Ref-Primary, Physician    Sports Medicine Clinic Visit      HPI  Ky Martinez is a 79 year old male who is seen as a self referral presenting with right shoulder pain.    - Mechanism of Injury:    - Acute on chronic. Lifting a 25 lb ham up the stairs on 2025.  - Pertinent history and prior evaluations:    - History of right shoulder injuries and imaging.     - 2025 ED visit: Given Prednisone  - 2025 xray right shoulder shows severe osteoarthrosis of the glenohumeral joint. Moderate osteoarthrosis of the AC joint. There is no evidence of fracture. No subluxation or dislocation.     - CAD, A-fib    - Pain Character:    - Location:  right lateral and deep shoulder  - Character:  aching  - Duration:  3 days  - Course:  improving  - Endorses:    - Decreased range of motion, pain as described above.   - Denies:    - clicking/popping, grinding, numbness, tingling  - Alleviating factors:    - Oral prednisone, rest, activity modifications  - Aggravating factors:    -  shoulder abduction and flexion  - Other treatments tried:    - ibuprofen, Tylenol, sling, corticosteroid injection 4 years ago.    - Patient Goals:    - get a formal diagnosis, discuss treatment options. Corticosteroid injection  - Social History:   - Retired.        Review of Systems  Musculoskeletal: as above  Remainder of review of systems is negative including constitutional, CV, pulmonary, GI, Skin and Neurologic except as noted in HPI or medical history.    Past Medical History:   Diagnosis Date     Hypertension      Myocardial infarction (H)      Past Surgical History:   Procedure Laterality Date     ARTHROPLASTY,  KNEE, ROBOT ASSISTED, USING ALON Left 6/18/2024    Procedure: ALON ASSISTED Left TOTAL KNEE ARTHROPLASTY;  Surgeon: Nathan Hernandez DO;  [facility]     Cardiac stent       COLONOSCOPY N/A 5/22/2019    Procedure: Colonoscopy, Polypectomy by Snare;  Surgeon: Gian Horn DO;  [facility] GI     EXCISE GANGLION WRIST Left 11/26/2014    Procedure: EXCISE GANGLION WRIST;  Surgeon: Gian Haddad MD;  [facility]     ICD DEVICE INTERROGATE      2017     ORTHOPEDIC SURGERY       RELEASE CARPAL TUNNEL Left 11/26/2014    Procedure: RELEASE CARPAL TUNNEL;  Surgeon: Gian Haddad MD;  [facility]     RELEASE DEQUERVAINS WRIST Left 11/26/2014    Procedure: RELEASE DEQUERVAINS WRIST;  Surgeon: Gian Haddad MD;  [facility]     Family History   Problem Relation Age of Onset     Arthritis Mother      Diabetes Father      Allergies Father         sulfa     Eye Disorder Father         cataract     Heart Disease Father         by pass         Objective  There were no vitals taken for this visit.    General: healthy, alert and in no acute distress.    HEENT: no scleral icterus or conjunctival erythema.   Skin: no suspicious lesions or rash. No jaundice.   CV: regular rhythm by palpation, 2+ distal pulses.  Resp: normal respiratory effort without conversational dyspnea.   Psych: normal mood and affect.    Gait: nonantalgic, appropriate coordination and balance.     Neuro:        - Sensation to light touch:    - Intact throughout the BUE including all peripheral nerve distributions.     MSK - Shoulder:       - Inspection:    - No significant swelling, erythema, warmth, ecchymosis, lesion, or atrophy noted.        - ROM:    - Limited in shoulder abduction, flexion, ER       - Palpation:    - TTP at the posterior joint line, slightly of the lateral shoulder.   - NTTP elsewhere.        - Strength:  (*antalgic)  - Shoulder Abduction   5-*    - Shoulder Flexion   5-*    -  Shoulder Internal Rotation  5    - Shoulder External Rotation  5-*   - Elbow Flexion   5   - Elbow Extension   5   - Forearm Pronation   5   - Forearm Supination   5   - Wrist Extension   5   - Wrist Flexion    5   - FDI     5   - ADM     5   - FPL     5   - APB     5   - EIP     5   - EDC     5   - APL/EPB    5          - Special tests:        - Cooper:  Pos   - Neers:  Neg    - Empty can:  Neg for pain and weakness    - [place]:  Neg    - Scarf:  Neg    - Speeds:  Neg       Radiology  I independently reviewed the available relevant imaging in the chart with my interpretations as above in HPI.       Procedure  Large Joint Injection/Arthocentesis: R glenohumeral joint    Date/Time: 4/22/2025 10:52 AM    Performed by: Eduardo Soriano DO  Authorized by: Eduardo Soriano DO    Indications:  Pain  Needle Size:  22 G  Guidance: ultrasound    Approach:  Posterolateral  Location:  Shoulder      Site:  R glenohumeral joint  Medications:  40 mg triamcinolone 40 MG/ML; 2 mL lidocaine 1 %; 2 mL BUPivacaine 0.5 %  Outcome:  Tolerated well, no immediate complications  Procedure discussed: discussed risks, benefits, and alternatives    Consent Given by:  Patient  Prep: patient was prepped and draped in usual sterile fashion     Ultrasound images of procedure were permanently stored.      Glenohumeral Injection - Ultrasound Guided  The patient was informed of the risks and the benefits of the procedure and a written consent was signed.  The patient s shoulder was prepped with chlorhexidine in sterile fashion.   40 mg of kenalog suspension was drawn up into a 5 mL syringe with 2 mL of 1% lidocaine and 2 ml of 0.5% bupivacaine.   Injection was performed using sterile technique.  Under ultrasound guidance a 2-inch 25-gauge needle was used to enter the glenohumeral joint.  Posterolateral approach was used with the patient in lateral recumbent position, arm in neutral position at the side.  Needle placement was visualized and  documented with ultrasound.  Ultrasound visualization was necessary due to the small joint space entered.  Injection performed long axis to the probe.  Injection solution visualized within the joint space.  Images were permanently stored for the patient's record.  There were no complications. The patient tolerated the procedure well. There was negligible bleeding.        Assessment  1. Primary osteoarthritis of right shoulder        Plan  Ky Martinez is a pleasant 79 year old male that presents with chronic right shoulder pain.  He describes pain in the deep right shoulder joint that feels like a aching toothache.  Associated stiffness and lack of range of motion over time.  He feels like his symptoms seem to be getting worse.  On exam, he does have pain with shoulder mobilization, no severe weakness for concern of rotator cuff tear.  Radiographs do reveal severe osteoarthritis of the right glenohumeral joint. History and physical exam appear most consistent with primary osteoarthritis of the right glenohumeral joint.    We discussed the nature of the condition and available treatment options, and mutually agreed upon the following plan:    - Imaging:          - Reviewed and independently interpreted the relevant imaging in the chart, including any imaging ordered for today's clinic.  - Reviewed results and images with patient.   - Medications:          - Discussed pharmacologic options for pain relief.   - May use NSAIDs (Ibuprofen, Naproxen) or Acetaminophen (Tylenol) as needed for pain control.   - Do not take these if previously advised to avoid them for other medical conditions.  - May also use topical medications such as lidocaine, IcyHot, BioFreeze, or Voltaren gel as needed for pain control.    - Voltaren gel is an anti-inflammatory cream that may be used up to 4 times per day over the painful area.   - He currently has a supratherapeutic INR as of this morning, attributes to recent oral prednisone.  He  has discussed a treatment plan with his anticoagulation clinic and has another appointment later today to modify his warfarin as needed after the injections.  We discussed the risks/benefits of administering the injection with an elevated INR.  He reports that this has not been an issue in the past with similar situations and he has a good plan in place with his anticoagulation clinic.  He would like to proceed with the injection today.  - Injections:          - Discussed possible injection options and alternatives.    - Injection options include: Ultrasound-guided corticosteroid injection of the right glenohumeral joint.  - Performed a corticosteroid injection of the right glenohumeral joint today in clinic. Patient tolerated the procedure well without complications.     - Post-procedure instructions:    - Keep the injection site clean and dry.   - Do not submerge the injection site for 24 hours (no baths, pools). Showers are ok.   - Rest the area for 24-48 hours before resuming normal activities. Avoid overexerting the area for the first few weeks.   - It may take 2-3 days to start noticing the effects of the injection and up to 3-4 weeks to feel significant benefits.   - Therapy:          - Discussed the benefits of therapy vs home exercise program for optimization of range of motion, flexibility, strength, stability and function.   - Preference is for a home exercise program.   - Home Exercise Program given today in clinic and recommendation given to perform HEP daily and after exacerbations.  - Modalities:          - May use ice, heat, massage or other modalities as needed.   - Surgery:          - Discussed non-operative and operative treatment options for the patient's condition.   - Goal is to continue conservative care for as long as possible before surgical intervention would need to be considered.  - Activity:          - Encouraged to remain active and participate in regular activities as symptoms allow.    Avoid or modify exacerbating activities as needed.  - Follow up:          - As needed for re-evaluation and update to treatment plan.  - May follow up sooner for new/worsening symptoms.  - May contact clinic by phone or MyChart for questions or concerns.       Eduardo Soriano DO, ABIGAIL  [facility] - Sports Medicine  [facility] - Department of Orthopedic Surgery       Disclaimer:  This note was prepared and written using Dragon Medical dictation software. As a result, there may be errors in the script that have gone undetected. Please consider this when interpreting the information in this note.        Again, thank you for allowing me to participate in the care of your patient.        Sincerely,        Eduardo Soriano DO    Electronically signed

## 2025-04-22 NOTE — PROGRESS NOTES
Ky Martinez  :  1945  DOS: 2025  MRN: [id number]  PCP: No Ref-Primary, Physician    Sports Medicine Clinic Visit      HPI  Ky Martinez is a 79 year old male who is seen as a self referral presenting with right shoulder pain.    - Mechanism of Injury:    - Acute on chronic. Lifting a 25 lb ham up the stairs on 2025.  - Pertinent history and prior evaluations:    - History of right shoulder injuries and imaging.     - 2025 ED visit: Given Prednisone  - 2025 xray right shoulder shows severe osteoarthrosis of the glenohumeral joint. Moderate osteoarthrosis of the AC joint. There is no evidence of fracture. No subluxation or dislocation.     -CAD, A-fib    - Pain Character:    - Location:  right lateral shoulder  - Character:  aching  - Duration:  3 days  - Course:  improving  - Endorses:    - Decreased range of motion  - Denies:    - clicking/popping, grinding, numbness, tingling  - Alleviating factors:    - Oral prednisone  - Aggravating factors:    -  shoulder abduction and flexion  - Other treatments tried:    - ibuprofen, Tylenol, sling, corticosteroid injection 4 years ago.    - Patient Goals:    - get a formal diagnosis, discuss treatment options. Corticosteroid injection  - Social History:   - Retired.        Review of Systems  Musculoskeletal: as above  Remainder of review of systems is negative including constitutional, CV, pulmonary, GI, Skin and Neurologic except as noted in HPI or medical history.    Past Medical History:   Diagnosis Date    Hypertension     Myocardial infarction (H)      Past Surgical History:   Procedure Laterality Date    ARTHROPLASTY, KNEE, ROBOT ASSISTED, USING ALON Left 2024    Procedure: ALON ASSISTED Left TOTAL KNEE ARTHROPLASTY;  Surgeon: Nathan Hernandez DO;  Location:  OR    Cardiac stent      COLONOSCOPY N/A 2019    Procedure: Colonoscopy, Polypectomy by Snare;  Surgeon: Gian Horn DO;  Location:  GI     EXCISE GANGLION WRIST Left 11/26/2014    Procedure: EXCISE GANGLION WRIST;  Surgeon: Gian Haddad MD;  [facility]    ICD DEVICE INTERROGATE      2017    ORTHOPEDIC SURGERY      RELEASE CARPAL TUNNEL Left 11/26/2014    Procedure: RELEASE CARPAL TUNNEL;  Surgeon: Gian Haddad MD;  [facility]    RELEASE DEQUERVAINS WRIST Left 11/26/2014    Procedure: RELEASE DEQUERVAINS WRIST;  Surgeon: Gian Haddad MD;  Location: [facility]     Family History   Problem Relation Age of Onset    Arthritis Mother     Diabetes Father     Allergies Father         sulfa    Eye Disorder Father         cataract    Heart Disease Father         by pass         Objective  There were no vitals taken for this visit.    General: healthy, alert and in no acute distress.    HEENT: no scleral icterus or conjunctival erythema.   Skin: no suspicious lesions or rash. No jaundice.   CV: regular rhythm by palpation, 2+ distal pulses.  Resp: normal respiratory effort without conversational dyspnea.   Psych: normal mood and affect.    Gait: nonantalgic, appropriate coordination and balance.     Neuro:        - Sensation to light touch:    - *** Intact throughout the BUE including all peripheral nerve distributions.   - Diminished sensation in the ***. Otherwise SILT in all other nerve distributions.        - MSR:       RUE  LUE  - Biceps  2+ 2+  - Brachioradialis 2+ 2+  - Triceps  2+ 2+       - Special tests:   - Spurling's:  Neg     MSK - Shoulder: ***       - Inspection:    - No significant swelling, erythema, warmth, ecchymosis, lesion, or atrophy noted.        - ROM:    - Full AROM/PROM with ***   - Limited in ***        - Palpation:    - TTP at the ***.   - NTTP elsewhere.        - Strength:  (*antalgic)  - Shoulder Abduction   5    - Shoulder Flexion   5    - Shoulder Internal Rotation  5    - Shoulder External Rotation  5   - Elbow Flexion   5   - Elbow Extension   5   - Forearm Pronation   5   - Forearm Supination   5   -  Wrist Extension   5   - Wrist Flexion    5   - FDI     5   - ADM     5   - FPL     5   - APB     5   - EIP     5   - EDC     5   - APL/EPB    5            - Special tests:        - Cooper:  Neg    - Neers:  Neg    - Empty can:  Neg for pain and weakness    - [place]:  Neg    - Scarf:  Neg    - Speeds:  Neg    - [place]:  Neg    - Apprehension/Relocation:  Neg       Radiology  I independently reviewed the available relevant imaging in the chart with my interpretations as above in HPI.     I independently reviewed today's new relevant imaging, with the following interpretation:  - ***      Procedure  Large Joint Injection/Arthocentesis: R glenohumeral joint    Date/Time: 4/22/2025 10:52 AM    Performed by: Eduardo Soriano DO  Authorized by: Eduardo Soriano DO    Indications:  Pain  Needle Size:  22 G  Guidance: ultrasound    Approach:  Posterolateral  Location:  Shoulder      Site:  R glenohumeral joint  Medications:  40 mg triamcinolone 40 MG/ML; 2 mL lidocaine 1 %; 2 mL BUPivacaine 0.5 %  Outcome:  Tolerated well, no immediate complications  Procedure discussed: discussed risks, benefits, and alternatives    Consent Given by:  Patient  Prep: patient was prepped and draped in usual sterile fashion     Ultrasound images of procedure were permanently stored.            Assessment  No diagnosis found.    Plan  Ky Martinez is a pleasant 79 year old male that presents with ***. History and physical exam appear most consistent with ***.     We discussed the nature of the condition and available treatment options, and mutually agreed upon the following plan:    - Imaging:          - Reviewed and independently interpreted the relevant imaging in the chart, including any imaging ordered for today's clinic.  - Reviewed results and images with patient.   - Medications:          - Discussed pharmacologic options for pain relief.   - May use NSAIDs (Ibuprofen, Naproxen) or Acetaminophen (Tylenol) as needed for pain control.    - Do not take these if previously advised to avoid them for other medical conditions.  - May also use topical medications such as lidocaine, IcyHot, BioFreeze, or Voltaren gel as needed for pain control.    - Voltaren gel is an anti-inflammatory cream that may be used up to 4 times per day over the painful area.   - Injections:          - Discussed possible injection options and alternatives.    - Injection options include:  ***     - *** Deferred injections today and will consider them in the future as needed.   - Performed a corticosteroid injection of the *** today in clinic. Patient tolerated the procedure well without complications.     - Post-procedure instructions:    - Keep the injection site clean and dry.   - Do not submerge the injection site for 24 hours (no baths, pools). Showers are ok.   - Rest the area for 24-48 hours before resuming normal activities. Avoid overexerting the area for the first few weeks.   - It may take 2-3 days to start noticing the effects of the injection and up to 3-4 weeks to feel significant benefits.   - Therapy:          - Discussed the benefits of therapy vs home exercise program for optimization of range of motion, flexibility, strength, stability and function.   - *** Preference is for a home exercise program.   - Home Exercise Program given today in clinic and recommendation given to perform HEP daily and after exacerbations.  - *** Preference is for therapy.   - *** Therapy referral placed today and instructed to call [phone] to schedule appointments.   - Modalities:          - May use ice, heat, massage or other modalities as needed.   - Bracing:          - Discussed bracing options and recommend using ***.    - Options presented in clinic and choice given to take our brace from clinic or purchase an equivalent brace from an outside source.   - Surgery:          - Discussed non-operative and operative treatment options for the patient's condition.   - Goal is to  continue conservative care for as long as possible before surgical intervention would need to be considered.  - Activity:          - *** Encouraged to remain active and participate in regular activities as symptoms allow.   Avoid or modify exacerbating activities as needed.   - *** Encouraged to rest and protect the injured area from further injury.  Avoid exacerbating activities and activities that are high-risk for falls.   - Follow up:          - *** As needed for re-evaluation and update to treatment plan.  - May follow up sooner for new/worsening symptoms.  - May contact clinic by phone or MyChart for questions or concerns.       Eduardo Soriano DO, ABIGAIL  [facility]  [facility] - Department of Orthopedic Surgery       Disclaimer:  This note was prepared and written using Dragon Medical dictation software. As a result, there may be errors in the script that have gone undetected. Please consider this when interpreting the information in this note.           Eduardo Soriano DO CAQSM  [facility] - Department of Orthopedic Surgery       Disclaimer:  This note was prepared and written using Dragon Medical dictation software. As a result, there may be errors in the script that have gone undetected. Please consider this when interpreting the information in this note.

## 2025-04-28 RX ORDER — BUPIVACAINE HYDROCHLORIDE 5 MG/ML
2 INJECTION, SOLUTION PERINEURAL
Status: COMPLETED | OUTPATIENT
Start: 2025-04-22 | End: 2025-04-22

## 2025-04-28 RX ORDER — TRIAMCINOLONE ACETONIDE 40 MG/ML
40 INJECTION, SUSPENSION INTRA-ARTICULAR; INTRAMUSCULAR
Status: COMPLETED | OUTPATIENT
Start: 2025-04-22 | End: 2025-04-22

## 2025-04-28 RX ORDER — LIDOCAINE HYDROCHLORIDE 10 MG/ML
2 INJECTION, SOLUTION INFILTRATION; PERINEURAL
Status: COMPLETED | OUTPATIENT
Start: 2025-04-22 | End: 2025-04-22

## 2025-05-05 ENCOUNTER — ANTICOAGULATION THERAPY VISIT (OUTPATIENT)
Dept: ANTICOAGULATION | Facility: CLINIC | Age: 80
End: 2025-05-05

## 2025-05-05 ENCOUNTER — LAB (OUTPATIENT)
Dept: LAB | Facility: CLINIC | Age: 80
End: 2025-05-05
Payer: COMMERCIAL

## 2025-05-05 DIAGNOSIS — I48.91 A-FIB (H): ICD-10-CM

## 2025-05-05 DIAGNOSIS — I48.11 LONGSTANDING PERSISTENT ATRIAL FIBRILLATION (H): ICD-10-CM

## 2025-05-05 DIAGNOSIS — I26.99 ACUTE PULMONARY EMBOLISM, UNSPECIFIED PULMONARY EMBOLISM TYPE, UNSPECIFIED WHETHER ACUTE COR PULMONALE PRESENT (H): ICD-10-CM

## 2025-05-05 DIAGNOSIS — I26.99 ACUTE PULMONARY EMBOLISM, UNSPECIFIED PULMONARY EMBOLISM TYPE, UNSPECIFIED WHETHER ACUTE COR PULMONALE PRESENT (H): Primary | ICD-10-CM

## 2025-05-05 DIAGNOSIS — I48.0 PAROXYSMAL ATRIAL FIBRILLATION (H): ICD-10-CM

## 2025-05-05 LAB — INR BLD: 3.3 (ref 0.9–1.1)

## 2025-05-05 PROCEDURE — 85610 PROTHROMBIN TIME: CPT

## 2025-05-05 PROCEDURE — 36416 COLLJ CAPILLARY BLOOD SPEC: CPT

## 2025-05-05 NOTE — PROGRESS NOTES
ANTICOAGULATION MANAGEMENT     Ky Martinez 79 year old male is on warfarin with supratherapeutic INR result. (Goal INR 2.0-3.0)    Recent labs: (last 7 days)     05/05/25  1057   INR 3.3*       ASSESSMENT     Source(s): Chart Review and Patient/Caregiver Call     Warfarin doses taken: Warfarin taken as instructed  Diet: Decreased greens/vitamin K in diet; plans to resume previous intake  Medication/supplement changes: None noted  New illness, injury, or hospitalization: No  Signs or symptoms of bleeding or clotting: No  Previous result: Supratherapeutic  Additional findings: Upcoming surgery/procedure working with cardiology to set up an ablation, plans to do this in June  and he has been eating less salads and is going to start eating them again, will not adjust dose at this time until we see how he does with adding greens back in  plans to start eating this week  also have a derm procedure done in June for basal cell carcinoma, needs INR in range and is checking on 5/28/25       PLAN     Recommended plan for temporary change(s) affecting INR     Dosing Instructions: Continue your current warfarin dose with next INR in 3 weeks       Summary  As of 5/5/2025      Full warfarin instructions:  3.75 mg every Mon, Wed, Fri; 2.5 mg all other days   Next INR check:  5/28/2025               Telephone call with Tarik who verbalizes understanding and agrees to plan    Patient elected to schedule next visit in 3 weeks with his INR that he needs for his upcoming derm procedure     Education provided: Goal range and lab monitoring: goal range and significance of current result  Dietary considerations: importance of consistent vitamin K intake  Contact [phone] with any changes, questions or concerns.     Plan made per [facility] anticoagulation protocol    Dennise Zuniga, RN  5/5/2025  Anticoagulation Clinic  [facility] for routing messages: [email or web address]  [facility] patient phone line:  [phone]        _______________________________________________________________________     Anticoagulation Episode Summary       Current INR goal:  2.0-3.0   TTR:  51.1% (1 y)   Target end date:  Indefinite   Send INR reminders to:  BRANDY MEHTA    Indications    Acute pulmonary embolism  unspecified pulmonary embolism type  unspecified whether acute cor pulmonale present (H) [I26.99]  A-fib (H) [I48.91]  Paroxysmal atrial fibrillation (H) [I48.0]  Longstanding persistent atrial fibrillation (H) [I48.11]             Comments:  --             Anticoagulation Care Providers       Provider Role Specialty Phone number    Ky Major MD Referring Family Practice     Marcin Rios MD Referring Family Medicine [phone]

## 2025-05-28 ENCOUNTER — LAB (OUTPATIENT)
Dept: LAB | Facility: CLINIC | Age: 80
End: 2025-05-28
Payer: COMMERCIAL

## 2025-05-28 ENCOUNTER — ANTICOAGULATION THERAPY VISIT (OUTPATIENT)
Dept: ANTICOAGULATION | Facility: CLINIC | Age: 80
End: 2025-05-28

## 2025-05-28 ENCOUNTER — RESULTS FOLLOW-UP (OUTPATIENT)
Dept: ANTICOAGULATION | Facility: CLINIC | Age: 80
End: 2025-05-28

## 2025-05-28 DIAGNOSIS — I48.11 LONGSTANDING PERSISTENT ATRIAL FIBRILLATION (H): ICD-10-CM

## 2025-05-28 DIAGNOSIS — I48.0 PAROXYSMAL ATRIAL FIBRILLATION (H): ICD-10-CM

## 2025-05-28 DIAGNOSIS — I26.99 ACUTE PULMONARY EMBOLISM, UNSPECIFIED PULMONARY EMBOLISM TYPE, UNSPECIFIED WHETHER ACUTE COR PULMONALE PRESENT (H): Primary | ICD-10-CM

## 2025-05-28 DIAGNOSIS — I26.99 ACUTE PULMONARY EMBOLISM, UNSPECIFIED PULMONARY EMBOLISM TYPE, UNSPECIFIED WHETHER ACUTE COR PULMONALE PRESENT (H): ICD-10-CM

## 2025-05-28 DIAGNOSIS — I48.91 A-FIB (H): ICD-10-CM

## 2025-05-28 LAB — INR BLD: 3.8 (ref 0.9–1.1)

## 2025-05-28 PROCEDURE — 85610 PROTHROMBIN TIME: CPT

## 2025-05-28 PROCEDURE — 36416 COLLJ CAPILLARY BLOOD SPEC: CPT

## 2025-05-28 NOTE — PROGRESS NOTES
ANTICOAGULATION MANAGEMENT     Ky Martinez 79 year old male is on warfarin with supratherapeutic INR result. (Goal INR 2.0-3.0)    Recent labs: (last 7 days)     05/28/25  0826   INR 3.8*       ASSESSMENT     Source(s): Chart Review and Patient/Caregiver Call     Warfarin doses taken: Warfarin taken as instructed  Diet: Change in alcohol intake may be affecting INR. Had several drinks over the holiday weekend, does not normally have alcohol    Medication/supplement changes: None noted  New illness, injury, or hospitalization: No  Signs or symptoms of bleeding or clotting: No  Previous result: Supratherapeutic  Additional findings: Upcoming surgery/procedure basal cell carcinoma and having derm procedure done on 6/2/25 needs INR 3 or less. He has reached out to derm clinic to alert them on his reading and waiting for advisement.        PLAN     Recommended plan for temporary change(s) affecting INR     Dosing Instructions: hold dose then continue your current warfarin dose with next INR in 2 weeks       Summary  As of 5/28/2025      Full warfarin instructions:  5/28: Hold; Otherwise 3.75 mg every Mon, Wed, Fri; 2.5 mg all other days   Next INR check:  6/11/2025               Telephone call with Tarik who verbalizes understanding and agrees to plan    Patient offered & declined to schedule next visit    Education provided: Goal range and lab monitoring: goal range and significance of current result  Healthy lifestyle considerations: potential interaction between warfarin and alcohol  Contact [phone] with any changes, questions or concerns.     Plan made per [facility] anticoagulation protocol    Dennise Zuniga RN  5/28/2025  Anticoagulation Clinic  [employer] for routing messages: thierno MEHTA  [facility] patient phone line: [phone]        _______________________________________________________________________     Anticoagulation Episode Summary       Current INR goal:  2.0-3.0   TTR:  50.8% (1 y)   Target  end date:  Indefinite   Send INR reminders to:  BRANDY Camden    Indications    Acute pulmonary embolism  unspecified pulmonary embolism type  unspecified whether acute cor pulmonale present (H) [I26.99]  A-fib (H) [I48.91]  Paroxysmal atrial fibrillation (H) [I48.0]  Longstanding persistent atrial fibrillation (H) [I48.11]             Comments:  --             Anticoagulation Care Providers       Provider Role Specialty Phone number    Ky Major MD Referring Family Practice     Marcin Rios MD Referring Family Medicine [phone]

## 2025-06-02 DIAGNOSIS — Z96.652 S/P TOTAL KNEE ARTHROPLASTY, LEFT: Primary | ICD-10-CM

## 2025-06-05 NOTE — PROGRESS NOTES
Office Visit-Follow up    Chief Complaint: Ky Martinez is a 79 year old male who is being seen for   Chief Complaint   Patient presents with    Left Knee - Follow Up       History of Present Illness:   Today's visit:  Extremely happy.  No issues.    September 11, 2024 office visit:  Doing very well. Happy with progress. No wound issues. Finished formal therapy. Is doing HEP and now going to the gym to work on strength.  Some stiffness but no pain.       July 31, 2024 visit:  Overall better than presurgery.  Very happy with his progress.  Continues working with physical therapy.  No wound issues.        July 3, 2024 visit:  Here with his wife. Doing very well. Minimum pain. Been using tylenol only since surgery. Attending therapy at [facility]. States sore after this but tylenol icing has worked.  No incision concerns. Voiding, eating. No CHF concerns. Reports getting cardioversion in a few weeks. Normally on coumadin.  Resumed this. Initially bridged with lovenox.  Last INR was 2.3.  using LIAM.            Surgery on 6/18/24 with Dr. Mary [facility] ASSISTED Left TOTAL KNEE ARTHROPLASTY          Social History     Occupational History    Not on file   Tobacco Use    Smoking status: Never    Smokeless tobacco: Never   Vaping Use    Vaping status: Never Used   Substance and Sexual Activity    Alcohol use: Yes     Alcohol/week: 3.3 standard drinks of alcohol     Types: 4 Glasses of wine per week     Comment: occasional     Drug use: No    Sexual activity: Not Currently     Partners: Female     Birth control/protection: None       REVIEW OF SYSTEMS  General: negative for, night sweats, dizziness, fatigue  Resp: No shortness of breath and no cough  CV: negative for chest pain, syncope or near-syncope  GI: negative for nausea, vomiting and diarrhea  : negative for dysuria and hematuria  Musculoskeletal: as above  Neurologic: negative for syncope   Hematologic: negative for bleeding disorder    Physical  "Exam:  Vitals: Ht 1.769 m (5' 9.65\")   Wt 125.6 kg (277 lb)   BMI 40.15 kg/m    BMI= Body mass index is 40.15 kg/m .  Constitutional: healthy, alert and no acute distress   Psychiatric: mentation appears normal and affect normal/bright  NEURO: no focal deficits  RESP: Normal with easy respirations and no use of accessory muscles to breathe, no audible wheezing or retractions  CV: LLE:  no edema           SKIN: No erythema, rashes, excoriation, or breakdown. No evidence of infection.   JOINT/EXTREMITIES:left knee: Well-healed surgical incision.  No effusion.  No soft tissue or bursal swelling.  Full active range of motion with no patellar maltracking.  No instability to varus and valgus testing  GAIT: not tested             Diagnostic Modalities:  Left knee x-rays: 3 views taken in clinic today shows no acute fractures or dislocations.  Cemented total knee arthroplasty in place.  No evidence position change or loosening.  Lucency noted along the lateral tibial plateau interface.  Unchanged from previous radiographs.  Independent visualization of the images was performed.      Impression: left total knee arthroplasty-June 18, 2024    Plan:  All of the above pertinent physical exam and imaging modalities findings was reviewed with Ky.    Doing well.  He is happy.  Radiographs unremarkable.  Activities as tolerated.  Follow-up as needed          Return to clinic PRN, or sooner as needed for changes.  Re-x-ray on return: No    Chavo Hernandez D.O.        "

## 2025-06-10 ENCOUNTER — LAB (OUTPATIENT)
Dept: LAB | Facility: CLINIC | Age: 80
End: 2025-06-10
Payer: COMMERCIAL

## 2025-06-10 ENCOUNTER — RESULTS FOLLOW-UP (OUTPATIENT)
Dept: ANTICOAGULATION | Facility: CLINIC | Age: 80
End: 2025-06-10

## 2025-06-10 ENCOUNTER — ANTICOAGULATION THERAPY VISIT (OUTPATIENT)
Dept: ANTICOAGULATION | Facility: CLINIC | Age: 80
End: 2025-06-10

## 2025-06-10 DIAGNOSIS — I48.11 LONGSTANDING PERSISTENT ATRIAL FIBRILLATION (H): ICD-10-CM

## 2025-06-10 DIAGNOSIS — I26.99 ACUTE PULMONARY EMBOLISM, UNSPECIFIED PULMONARY EMBOLISM TYPE, UNSPECIFIED WHETHER ACUTE COR PULMONALE PRESENT (H): ICD-10-CM

## 2025-06-10 DIAGNOSIS — I48.0 PAROXYSMAL ATRIAL FIBRILLATION (H): ICD-10-CM

## 2025-06-10 DIAGNOSIS — I26.99 ACUTE PULMONARY EMBOLISM, UNSPECIFIED PULMONARY EMBOLISM TYPE, UNSPECIFIED WHETHER ACUTE COR PULMONALE PRESENT (H): Primary | ICD-10-CM

## 2025-06-10 DIAGNOSIS — I48.91 A-FIB (H): ICD-10-CM

## 2025-06-10 LAB — INR BLD: 2.3 (ref 0.9–1.1)

## 2025-06-10 PROCEDURE — 36416 COLLJ CAPILLARY BLOOD SPEC: CPT

## 2025-06-10 PROCEDURE — 85610 PROTHROMBIN TIME: CPT

## 2025-06-11 ENCOUNTER — ANCILLARY PROCEDURE (OUTPATIENT)
Dept: GENERAL RADIOLOGY | Facility: CLINIC | Age: 80
End: 2025-06-11
Attending: NURSE PRACTITIONER
Payer: COMMERCIAL

## 2025-06-11 ENCOUNTER — OFFICE VISIT (OUTPATIENT)
Dept: ORTHOPEDICS | Facility: CLINIC | Age: 80
End: 2025-06-11
Payer: COMMERCIAL

## 2025-06-11 VITALS — WEIGHT: 277 LBS | BODY MASS INDEX: 39.65 KG/M2 | HEIGHT: 70 IN

## 2025-06-11 DIAGNOSIS — Z96.652 S/P TOTAL KNEE ARTHROPLASTY, LEFT: Primary | ICD-10-CM

## 2025-06-11 DIAGNOSIS — Z96.652 S/P TOTAL KNEE ARTHROPLASTY, LEFT: ICD-10-CM

## 2025-06-11 PROCEDURE — 73562 X-RAY EXAM OF KNEE 3: CPT | Mod: TC | Performed by: RADIOLOGY

## 2025-06-11 PROCEDURE — 99213 OFFICE O/P EST LOW 20 MIN: CPT | Performed by: ORTHOPAEDIC SURGERY

## 2025-06-11 NOTE — LETTER
6/11/2025      Ky Martinez  [address] [facility] [address]      Dear Colleague,    Thank you for referring your patient, Ky Martinez, to the [facility]. Please see a copy of my visit note below.    Office Visit-Follow up    Chief Complaint: Ky Martinez is a 79 year old male who is being seen for   Chief Complaint   Patient presents with     Left Knee - Follow Up       History of Present Illness:   Today's visit:  Extremely happy.  No issues.    September 11, 2024 office visit:  Doing very well. Happy with progress. No wound issues. Finished formal therapy. Is doing HEP and now going to the gym to work on strength.  Some stiffness but no pain.       July 31, 2024 visit:  Overall better than presurgery.  Very happy with his progress.  Continues working with physical therapy.  No wound issues.        July 3, 2024 visit:  Here with his wife. Doing very well. Minimum pain. Been using tylenol only since surgery. Attending therapy at [facility]. States sore after this but tylenol icing has worked.  No incision concerns. Voiding, eating. No CHF concerns. Reports getting cardioversion in a few weeks. Normally on coumadin.  Resumed this. Initially bridged with lovenox.  Last INR was 2.3.  using LIAM.            Surgery on 6/18/24 with Dr. Mary [facility] ASSISTED Left TOTAL KNEE ARTHROPLASTY          Social History     Occupational History     Not on file   Tobacco Use     Smoking status: Never     Smokeless tobacco: Never   Vaping Use     Vaping status: Never Used   Substance and Sexual Activity     Alcohol use: Yes     Alcohol/week: 3.3 standard drinks of alcohol     Types: 4 Glasses of wine per week     Comment: occasional      Drug use: No     Sexual activity: Not Currently     Partners: Female     Birth control/protection: None       REVIEW OF SYSTEMS  General: negative for, night sweats, dizziness, fatigue  Resp: No shortness of breath and no cough  CV: negative for  [facility]  [facility]     DISCHARGE SUMMARY     Niko Mast Patient Status:  Inpatient    1965 MRN [id number]   Location [facility] 2W/SW Attending No att. providers found   Hosp Day # 3 PCP Dawson Means DO     DATE OF ADMISSION: 5/3/2024  DATE OF DISCHARGE: 2024   DISPOSITION: home  CONDITION ON DISCHARGE: good    DISCHARGE DIAGNOSES:  Pulmonary embolism, submassive, saddle  DVT LLE  Obstructive sleep apnea  DJD  Glaucoma  Carotid arthrosclerosis  Abnormal troponin, due to PE    HISTORY OF PRESENT ILLNESS (COPIED FROM ADMISSION H&P)   Patient is a 58-year-old  male who has been having discomfort in his left calf area for the last 2 weeks and around yesterday started developing progressive shortness of breath and pleuritic-type chest pain.  Came in today to the emergency department for evaluation.  Upon arrival was noted to have systolic blood pressure of 113, pulse ox 97% on room air.  Troponin was 424.  CT scan of the chest, rule out pulmonary embolism, showed saddle pulmonary embolism involving the left main pulmonary artery as well as extensive bilateral pulmonary emboli in lobar and distal branches with right heart strain.  Chest x-ray showed no acute findings.  Started on IV heparin.  Stat 2D echocardiogram with Doppler was ordered.  Patient will be admitted to the hospital for further management.      Patient said he had right total hip arthroplasty in 2024, and he has been participating in physical therapy.  Around 1 week or 2 ago, started developing tightness in his left calf area.  Yesterday night, started feeling a bit of short of breath, and today he was dyspneic without exertion and decided to come into the emergency department for evaluation.  Other 12-point review of systems is negative.       HOSPITAL COURSE:  Patient was admitted, placed on heparin drip.  Seen by interventional radiology for direct thrombectomy  "chest pain, syncope or near-syncope  GI: negative for nausea, vomiting and diarrhea  : negative for dysuria and hematuria  Musculoskeletal: as above  Neurologic: negative for syncope   Hematologic: negative for bleeding disorder    Physical Exam:  Vitals: Ht 1.769 m (5' 9.65\")   Wt 125.6 kg (277 lb)   BMI 40.15 kg/m    BMI= Body mass index is 40.15 kg/m .  Constitutional: healthy, alert and no acute distress   Psychiatric: mentation appears normal and affect normal/bright  NEURO: no focal deficits  RESP: Normal with easy respirations and no use of accessory muscles to breathe, no audible wheezing or retractions  CV: LLE:  no edema           SKIN: No erythema, rashes, excoriation, or breakdown. No evidence of infection.   JOINT/EXTREMITIES:left knee: Well-healed surgical incision.  No effusion.  No soft tissue or bursal swelling.  Full active range of motion with no patellar maltracking.  No instability to varus and valgus testing  GAIT: not tested             Diagnostic Modalities:  Left knee x-rays: 3 views taken in clinic today shows no acute fractures or dislocations.  Cemented total knee arthroplasty in place.  No evidence position change or loosening.  Lucency noted along the lateral tibial plateau interface.  Unchanged from previous radiographs.  Independent visualization of the images was performed.      Impression: left total knee arthroplasty-June 18, 2024    Plan:  All of the above pertinent physical exam and imaging modalities findings was reviewed with Ky.    Doing well.  He is happy.  Radiographs unremarkable.  Activities as tolerated.  Follow-up as needed          Return to clinic PRN, or sooner as needed for changes.  Re-x-ray on return: No    Chavo Hernandez D.O.          Again, thank you for allowing me to participate in the care of your patient.        Sincerely,        Nathan Hernandez, DO    Electronically signed" which was done emergently.  The patient did well, right ventricular strain improved.  Continued on heparin drip and did very well.  Did require supplemental oxygen for short period of time but this improved.  Left lower extremity DVT also identified.  Patient switched to Xarelto.  Given the size of this pulmonary embolism and his history of a previous embolism, lifelong anticoagulation will be recommended.  He can discuss this further with his primary care physician.  Patient seen by pulmonology on the day of discharge and cleared to go home.  Extensive discussion on risk benefits and alternatives to anticoagulation prior to discharge.  Patient voiced understanding.    Patient understands return to the emergency room for increased pain, fever, discharge, shortness of breath, chest pain, new neurologic symptoms, other concerning symptoms.    PHYSICAL EXAM:  Temp:  [97.8 °F (36.6 °C)-98.4 °F (36.9 °C)] 97.8 °F (36.6 °C)  Pulse:  [70-86] 70  Resp:  [12-20] 16  BP: (102-112)/(68-84) 102/84  SpO2:  [94 %-100 %] 94 %  Gen: A+Ox3.  No distress.   HEENT: NCAT, neck supple, no carotid bruit.  CV: RRR, S1S2, and intact distal pulses. No gallop, rub, murmur.  Pulm: Effort and breath sounds normal. No distress, wheezes, rales, rhonchi.  Abd: Soft, NTND, BS normal, no mass, no HSM, no rebound/guarding.   Neuro: Normal reflexes, CN. Sensory/motor exams grossly normal deficit. Coordination  and gait normal.   MS: No joint effusions.  No peripheral edema.  Skin: Skin is warm and dry. No rashes, erythema, diaphoresis.   Psych: Normal mood and affect. Behavior and judgment normal.     DISCHARGE MEDICATIONS     Discharge Medications        START taking these medications        Instructions Prescription details   rivaroxaban 15 & 20 MG Tbpk      Take As Directed based on package instructions: Days 1-21: 15 mg by mouth twice daily Days 22-30: 20 mg by mouth once daily   Quantity: 51 each  Refills: 0            CONTINUE taking these  medications        Instructions Prescription details   brimonidine 0.2 % Soln  Commonly known as: Alphagan      Place 1 drop into both eyes in the morning and 1 drop before bedtime.   Refills: 0     latanoprost 0.005 % Soln  Commonly known as: Xalatan      INSTILL 1 DROP IN BOTH EYE(S) DAILY AT BEDTIME   Refills: 0     Mounjaro 5 MG/0.5ML Sopn  Generic drug: Tirzepatide      Inject 5 mg into the skin once a week.   Quantity: 2 mL  Refills: 3     Multi For Him 50+ Tabs      Take by mouth.   Refills: 0     OneTouch Delica Lancets 33G Misc      Test 2 x daily   Quantity: 200 each  Refills: 0     OneTouch Ultra 2 w/Device Kit      1 strip by In Vitro route daily.   Quantity: 200 kit  Refills: 0     OneTouch Verio Strp      Test 2x daily   Quantity: 200 strip  Refills: 0     pantoprazole 20 MG Tbec  Commonly known as: Protonix      Take 1 tablet (20 mg total) by mouth every morning before breakfast.   Quantity: 90 tablet  Refills: 0     Vitamin D 50 MCG (2000 UT) Caps      Take by mouth.   Refills: 0            STOP taking these medications      ibuprofen 100 MG Tabs        simvastatin 20 MG Tabs  Commonly known as: Zocor        traMADol 50 MG Tabs  Commonly known as: Ultram                  Where to Get Your Medications        These medications were sent to [facility]/pharmacy #[id number] - [address] AT [facility], [phone], [phone]  [address] [place] [address]      Phone: [phone]   rivaroxaban 15 & 20 MG Tbpk         CONSULTANTS  Consultants         Provider   Role Specialty     Valeriano Ellison MD      Consulting Physician PULMONARY DISEASES     Paramjit Hayden MD      Consulting Physician INTERVENTIONAL, RADIOLOGY            FOLLOW UP:  Valeriano Ellison MD  [address] [id number] [phone]    Schedule an appointment as soon as possible for a visit in 3 day(s)  F/u 3-4 weeks    The above plan and follow-up instructions were reviewed with the patient  and they verbalized understanding and agreement.  They understand to return to the emergency room for any concerning signs or symptoms.  Greater than 30 minutes spent on discharge.  -----------------------    Hospital Discharge Diagnoses:  PE    Lace+ Score: 41  59-90 High Risk  29-58 Medium Risk  0-28   Low Risk.    TCM Follow-Up Recommendation:  LACE 29-58: Moderate Risk of readmission after discharge from the hospital.

## 2025-07-01 ENCOUNTER — ANTICOAGULATION THERAPY VISIT (OUTPATIENT)
Dept: ANTICOAGULATION | Facility: CLINIC | Age: 80
End: 2025-07-01

## 2025-07-01 ENCOUNTER — LAB (OUTPATIENT)
Dept: LAB | Facility: CLINIC | Age: 80
End: 2025-07-01
Payer: COMMERCIAL

## 2025-07-01 ENCOUNTER — RESULTS FOLLOW-UP (OUTPATIENT)
Dept: ANTICOAGULATION | Facility: CLINIC | Age: 80
End: 2025-07-01

## 2025-07-01 DIAGNOSIS — I48.11 LONGSTANDING PERSISTENT ATRIAL FIBRILLATION (H): ICD-10-CM

## 2025-07-01 DIAGNOSIS — I48.91 A-FIB (H): ICD-10-CM

## 2025-07-01 DIAGNOSIS — I48.0 PAROXYSMAL ATRIAL FIBRILLATION (H): ICD-10-CM

## 2025-07-01 DIAGNOSIS — I26.99 ACUTE PULMONARY EMBOLISM, UNSPECIFIED PULMONARY EMBOLISM TYPE, UNSPECIFIED WHETHER ACUTE COR PULMONALE PRESENT (H): ICD-10-CM

## 2025-07-01 DIAGNOSIS — I26.99 ACUTE PULMONARY EMBOLISM, UNSPECIFIED PULMONARY EMBOLISM TYPE, UNSPECIFIED WHETHER ACUTE COR PULMONALE PRESENT (H): Primary | ICD-10-CM

## 2025-07-01 LAB — INR BLD: 2.8 (ref 0.9–1.1)

## 2025-07-01 PROCEDURE — 85610 PROTHROMBIN TIME: CPT

## 2025-07-01 PROCEDURE — 36416 COLLJ CAPILLARY BLOOD SPEC: CPT

## 2025-07-01 NOTE — PROGRESS NOTES
ANTICOAGULATION MANAGEMENT     Ky Martinez 79 year old male is on warfarin with therapeutic INR result. (Goal INR 2.0-3.0)    Recent labs: (last 7 days)     07/01/25  1050   INR 2.8*       ASSESSMENT     Source(s): Chart Review and Patient/Caregiver Call     Warfarin doses taken: Warfarin taken as instructed  Diet: No new diet changes identified  Medication/supplement changes: None noted  New illness, injury, or hospitalization: No  Signs or symptoms of bleeding or clotting: No  Previous result: Therapeutic last 2(+) visits  Additional findings: Refill needed today. Ky meets all criteria for refill (current [facility] referral, visit with referring provider/group in last 15 months unless directed to return in 2 years in last referring provider visit note, lab monitoring up to date or not exceeding 2 weeks overdue). Rx instructions and quantity match patient's current dosing plan. Warfarin 90 day supply with 1 refill granted per [facility] protocol        PLAN     Recommended plan for no diet, medication or health factor changes affecting INR     Dosing Instructions: Continue your current warfarin dose with next INR in 4 weeks       Summary  As of 7/1/2025      Full warfarin instructions:  3.75 mg every Mon, Wed, Fri; 2.5 mg all other days   Next INR check:  7/29/2025               Telephone call with Tarik who verbalizes understanding and agrees to plan    Patient offered & declined to schedule next visit    Education provided: Contact [phone] with any changes, questions or concerns.     Plan made per [facility] anticoagulation protocol    Dennise Zuniga, RN  7/1/2025  Anticoagulation Clinic  [employer] for routing messages: thierno MEHTA  [facility] patient phone line: [phone]        _______________________________________________________________________     Anticoagulation Episode Summary       Current INR goal:  2.0-3.0   TTR:  53.7% (1 y)   Target end date:  Indefinite   Send INR reminders to:  BRANDY MEHTA     Indications    Acute pulmonary embolism  unspecified pulmonary embolism type  unspecified whether acute cor pulmonale present (H) [I26.99]  A-fib (H) [I48.91]  Paroxysmal atrial fibrillation (H) [I48.0]  Longstanding persistent atrial fibrillation (H) [I48.11]             Comments:  --             Anticoagulation Care Providers       Provider Role Specialty Phone number    Ky Major MD Referring Family Practice     Marcin Rios MD Referring Family Medicine [phone]

## 2025-07-01 NOTE — TELEPHONE ENCOUNTER
ANTICOAGULATION MANAGEMENT:  Medication Refill    Anticoagulation Summary  As of 7/1/2025      Warfarin maintenance plan:  3.75 mg (2.5 mg x 1.5) every Mon, Wed, Fri; 2.5 mg (2.5 mg x 1) all other days   Next INR check:  7/29/2025   Target end date:  Indefinite    Indications    Acute pulmonary embolism  unspecified pulmonary embolism type  unspecified whether acute cor pulmonale present (H) [I26.99]  A-fib (H) [I48.91]  Paroxysmal atrial fibrillation (H) [I48.0]  Longstanding persistent atrial fibrillation (H) [I48.11]                 Anticoagulation Care Providers       Provider Role Specialty Phone number    Ky Major MD Referring Family Practice     Marcin Rios MD Referring Family Medicine [phone]            Refill Criteria    Visit with referring provider/group: Meets criteria: visit within referring provider group in the last 15 months on 7/24/24    [facility] referral last signed: 01/01/2025; within last year:  Yes    Lab monitoring is up to date (not exceeding 2 weeks overdue): Yes    Provider is no longer with clinic and staff unable to fill RX,  patient has an appointment on 9/3/25 for an annual wellness.  Can RX be filled until can be seen?      Dennise Zuniga RN  Anticoagulation Clinic

## 2025-07-06 RX ORDER — WARFARIN SODIUM 2.5 MG/1
TABLET ORAL
Qty: 102 TABLET | Refills: 1 | Status: SHIPPED | OUTPATIENT
Start: 2025-07-06

## 2025-07-29 ENCOUNTER — TELEPHONE (OUTPATIENT)
Dept: FAMILY MEDICINE | Facility: CLINIC | Age: 80
End: 2025-07-29

## 2025-07-29 ENCOUNTER — LAB (OUTPATIENT)
Dept: LAB | Facility: CLINIC | Age: 80
End: 2025-07-29
Payer: COMMERCIAL

## 2025-07-29 ENCOUNTER — ANTICOAGULATION THERAPY VISIT (OUTPATIENT)
Dept: ANTICOAGULATION | Facility: CLINIC | Age: 80
End: 2025-07-29

## 2025-07-29 ENCOUNTER — RESULTS FOLLOW-UP (OUTPATIENT)
Dept: ANTICOAGULATION | Facility: CLINIC | Age: 80
End: 2025-07-29

## 2025-07-29 DIAGNOSIS — I26.99 ACUTE PULMONARY EMBOLISM, UNSPECIFIED PULMONARY EMBOLISM TYPE, UNSPECIFIED WHETHER ACUTE COR PULMONALE PRESENT (H): Primary | ICD-10-CM

## 2025-07-29 DIAGNOSIS — I48.0 PAROXYSMAL ATRIAL FIBRILLATION (H): ICD-10-CM

## 2025-07-29 DIAGNOSIS — I48.11 LONGSTANDING PERSISTENT ATRIAL FIBRILLATION (H): ICD-10-CM

## 2025-07-29 DIAGNOSIS — I26.99 ACUTE PULMONARY EMBOLISM, UNSPECIFIED PULMONARY EMBOLISM TYPE, UNSPECIFIED WHETHER ACUTE COR PULMONALE PRESENT (H): ICD-10-CM

## 2025-07-29 DIAGNOSIS — I48.91 A-FIB (H): ICD-10-CM

## 2025-07-29 LAB — INR BLD: 3.3 (ref 0.9–1.1)

## 2025-07-29 PROCEDURE — 36416 COLLJ CAPILLARY BLOOD SPEC: CPT

## 2025-07-29 PROCEDURE — 85610 PROTHROMBIN TIME: CPT

## 2025-07-29 NOTE — TELEPHONE ENCOUNTER
Patient Quality Outreach    Patient is due for the following:   Diabetes -  LDL (Fasting) and Microalbumin  Asthma  -  ACT needed and AAP  Colon Cancer Screening  Physical Annual Wellness Visit      Topic Date Due    Zoster (Shingles) Vaccine (1 of 2) Never done    Pneumococcal Vaccine (2 of 2 - PPSV23, PCV20, or PCV21) 12/24/2015    COVID-19 Vaccine (8 - 2024-25 season) 04/21/2025       Action(s) Taken:   Patient has upcoming appointment, these items will be addressed at that time.    Type of outreach:    Chart review performed, no outreach needed.    Questions for provider review:    None         Dayami Stafford, VF  Chart routed to None.

## 2025-07-29 NOTE — PROGRESS NOTES
ANTICOAGULATION MANAGEMENT     Ky Martinez 79 year old male is on warfarin with supratherapeutic INR result. (Goal INR 2.0-3.0)    Recent labs: (last 7 days)     07/29/25  0908   INR 3.3*       ASSESSMENT     Source(s): Chart Review and Patient/Caregiver Call     Warfarin doses taken: Warfarin taken as instructed  Diet: Decreased greens/vitamin K in diet; plans to resume previous intake  Medication/supplement changes: None noted  New illness, injury, or hospitalization: No  Signs or symptoms of bleeding or clotting: No  Previous result: Therapeutic last 2(+) visits  Additional findings: None       PLAN     Recommended plan for temporary change(s) affecting INR     Dosing Instructions: Continue your current warfarin dose with next INR in 2 weeks       Summary  As of 7/29/2025      Full warfarin instructions:  3.75 mg every Mon, Wed, Fri; 2.5 mg all other days   Next INR check:  8/19/2025               Telephone call with Tarik     Patient elected to schedule next visit 8/19/25    Education provided: Dietary considerations: importance of consistent vitamin K intake    Plan made per [facility] anticoagulation protocol    Lucia Canales RN  7/29/2025  Anticoagulation Clinic  [employer] for routing messages: thierno MEHTA  [facility] patient phone line: [phone]        _______________________________________________________________________     Anticoagulation Episode Summary       Current INR goal:  2.0-3.0   TTR:  52.2% (1 y)   Target end date:  Indefinite   Send INR reminders to:  BRANDY MEHTA    Indications    Acute pulmonary embolism  unspecified pulmonary embolism type  unspecified whether acute cor pulmonale present (H) [I26.99]  A-fib (H) [I48.91]  Paroxysmal atrial fibrillation (H) [I48.0]  Longstanding persistent atrial fibrillation (H) [I48.11]             Comments:  --             Anticoagulation Care Providers       Provider Role Specialty Phone number    Ky Major MD Referring [facility]      Marcin Rios MD [facility] [phone]

## 2025-08-05 DIAGNOSIS — I48.91 ATRIAL FIBRILLATION (H): ICD-10-CM

## 2025-08-05 DIAGNOSIS — Z51.81 ENCOUNTER FOR MONITORING AMIODARONE THERAPY: Primary | ICD-10-CM

## 2025-08-05 DIAGNOSIS — Z51.81 ENCOUNTER FOR THERAPEUTIC DRUG MONITORING: Primary | ICD-10-CM

## 2025-08-05 DIAGNOSIS — Z79.899 POLYPHARMACY: ICD-10-CM

## 2025-08-05 DIAGNOSIS — Z79.899 ENCOUNTER FOR MONITORING AMIODARONE THERAPY: Primary | ICD-10-CM

## 2025-08-11 ENCOUNTER — ANCILLARY PROCEDURE (OUTPATIENT)
Dept: GENERAL RADIOLOGY | Facility: CLINIC | Age: 80
End: 2025-08-11
Attending: INTERNAL MEDICINE
Payer: COMMERCIAL

## 2025-08-11 DIAGNOSIS — Z79.899 ENCOUNTER FOR MONITORING AMIODARONE THERAPY: ICD-10-CM

## 2025-08-11 DIAGNOSIS — Z51.81 ENCOUNTER FOR MONITORING AMIODARONE THERAPY: ICD-10-CM

## 2025-08-11 DIAGNOSIS — I48.91 ATRIAL FIBRILLATION, UNSPECIFIED TYPE (H): ICD-10-CM

## 2025-08-11 PROCEDURE — 71046 X-RAY EXAM CHEST 2 VIEWS: CPT | Mod: TC | Performed by: RADIOLOGY

## 2025-08-19 ENCOUNTER — ANTICOAGULATION THERAPY VISIT (OUTPATIENT)
Dept: ANTICOAGULATION | Facility: CLINIC | Age: 80
End: 2025-08-19

## 2025-08-19 ENCOUNTER — LAB (OUTPATIENT)
Dept: LAB | Facility: CLINIC | Age: 80
End: 2025-08-19
Payer: COMMERCIAL

## 2025-08-19 DIAGNOSIS — I48.11 LONGSTANDING PERSISTENT ATRIAL FIBRILLATION (H): ICD-10-CM

## 2025-08-19 DIAGNOSIS — I26.99 ACUTE PULMONARY EMBOLISM, UNSPECIFIED PULMONARY EMBOLISM TYPE, UNSPECIFIED WHETHER ACUTE COR PULMONALE PRESENT (H): ICD-10-CM

## 2025-08-19 DIAGNOSIS — I48.0 PAROXYSMAL ATRIAL FIBRILLATION (H): ICD-10-CM

## 2025-08-19 DIAGNOSIS — I26.99 ACUTE PULMONARY EMBOLISM, UNSPECIFIED PULMONARY EMBOLISM TYPE, UNSPECIFIED WHETHER ACUTE COR PULMONALE PRESENT (H): Primary | ICD-10-CM

## 2025-08-19 DIAGNOSIS — I48.91 A-FIB (H): ICD-10-CM

## 2025-08-19 LAB — INR BLD: 3.4 (ref 0.9–1.1)

## 2025-08-19 PROCEDURE — 36416 COLLJ CAPILLARY BLOOD SPEC: CPT

## 2025-08-19 PROCEDURE — 85610 PROTHROMBIN TIME: CPT

## 2025-08-26 ENCOUNTER — ANTICOAGULATION THERAPY VISIT (OUTPATIENT)
Dept: ANTICOAGULATION | Facility: CLINIC | Age: 80
End: 2025-08-26

## 2025-08-26 ENCOUNTER — RESULTS FOLLOW-UP (OUTPATIENT)
Dept: ANTICOAGULATION | Facility: CLINIC | Age: 80
End: 2025-08-26

## 2025-08-26 ENCOUNTER — LAB (OUTPATIENT)
Dept: LAB | Facility: CLINIC | Age: 80
End: 2025-08-26
Payer: COMMERCIAL

## 2025-08-26 DIAGNOSIS — I26.99 ACUTE PULMONARY EMBOLISM, UNSPECIFIED PULMONARY EMBOLISM TYPE, UNSPECIFIED WHETHER ACUTE COR PULMONALE PRESENT (H): ICD-10-CM

## 2025-08-26 DIAGNOSIS — I48.0 PAROXYSMAL ATRIAL FIBRILLATION (H): ICD-10-CM

## 2025-08-26 DIAGNOSIS — I26.99 ACUTE PULMONARY EMBOLISM, UNSPECIFIED PULMONARY EMBOLISM TYPE, UNSPECIFIED WHETHER ACUTE COR PULMONALE PRESENT (H): Primary | ICD-10-CM

## 2025-08-26 DIAGNOSIS — I48.11 LONGSTANDING PERSISTENT ATRIAL FIBRILLATION (H): ICD-10-CM

## 2025-08-26 DIAGNOSIS — I48.91 A-FIB (H): ICD-10-CM

## 2025-08-26 LAB — INR BLD: 2.2 (ref 0.9–1.1)

## 2025-08-26 PROCEDURE — 85610 PROTHROMBIN TIME: CPT

## 2025-08-26 PROCEDURE — 36416 COLLJ CAPILLARY BLOOD SPEC: CPT

## 2025-09-01 SDOH — HEALTH STABILITY: PHYSICAL HEALTH: ON AVERAGE, HOW MANY DAYS PER WEEK DO YOU ENGAGE IN MODERATE TO STRENUOUS EXERCISE (LIKE A BRISK WALK)?: 2 DAYS

## 2025-09-01 SDOH — HEALTH STABILITY: PHYSICAL HEALTH: ON AVERAGE, HOW MANY MINUTES DO YOU ENGAGE IN EXERCISE AT THIS LEVEL?: 30 MIN

## 2025-09-01 ASSESSMENT — ASTHMA QUESTIONNAIRES
QUESTION_5 LAST FOUR WEEKS HOW WOULD YOU RATE YOUR ASTHMA CONTROL: COMPLETELY CONTROLLED
ACT_TOTALSCORE: 25
QUESTION_1 LAST FOUR WEEKS HOW MUCH OF THE TIME DID YOUR ASTHMA KEEP YOU FROM GETTING AS MUCH DONE AT WORK, SCHOOL OR AT HOME: NONE OF THE TIME
QUESTION_2 LAST FOUR WEEKS HOW OFTEN HAVE YOU HAD SHORTNESS OF BREATH: NOT AT ALL
QUESTION_4 LAST FOUR WEEKS HOW OFTEN HAVE YOU USED YOUR RESCUE INHALER OR NEBULIZER MEDICATION (SUCH AS ALBUTEROL): NOT AT ALL
QUESTION_3 LAST FOUR WEEKS HOW OFTEN DID YOUR ASTHMA SYMPTOMS (WHEEZING, COUGHING, SHORTNESS OF BREATH, CHEST TIGHTNESS OR PAIN) WAKE YOU UP AT NIGHT OR EARLIER THAN USUAL IN THE MORNING: NOT AT ALL

## 2025-09-02 ENCOUNTER — RESULTS FOLLOW-UP (OUTPATIENT)
Dept: ANTICOAGULATION | Facility: CLINIC | Age: 80
End: 2025-09-02

## 2025-09-02 ENCOUNTER — LAB (OUTPATIENT)
Dept: LAB | Facility: CLINIC | Age: 80
End: 2025-09-02
Payer: COMMERCIAL

## 2025-09-02 ENCOUNTER — ANTICOAGULATION THERAPY VISIT (OUTPATIENT)
Dept: ANTICOAGULATION | Facility: CLINIC | Age: 80
End: 2025-09-02

## 2025-09-02 DIAGNOSIS — I48.91 A-FIB (H): ICD-10-CM

## 2025-09-02 DIAGNOSIS — I26.99 ACUTE PULMONARY EMBOLISM, UNSPECIFIED PULMONARY EMBOLISM TYPE, UNSPECIFIED WHETHER ACUTE COR PULMONALE PRESENT (H): ICD-10-CM

## 2025-09-02 DIAGNOSIS — I48.11 LONGSTANDING PERSISTENT ATRIAL FIBRILLATION (H): ICD-10-CM

## 2025-09-02 DIAGNOSIS — I26.99 ACUTE PULMONARY EMBOLISM, UNSPECIFIED PULMONARY EMBOLISM TYPE, UNSPECIFIED WHETHER ACUTE COR PULMONALE PRESENT (H): Primary | ICD-10-CM

## 2025-09-02 DIAGNOSIS — Z79.899 POLYPHARMACY: ICD-10-CM

## 2025-09-02 DIAGNOSIS — I48.0 PAROXYSMAL ATRIAL FIBRILLATION (H): ICD-10-CM

## 2025-09-02 DIAGNOSIS — I48.91 ATRIAL FIBRILLATION (H): ICD-10-CM

## 2025-09-02 DIAGNOSIS — Z51.81 ENCOUNTER FOR THERAPEUTIC DRUG MONITORING: ICD-10-CM

## 2025-09-02 LAB
ALT SERPL W P-5'-P-CCNC: 34 U/L (ref 0–70)
AST SERPL W P-5'-P-CCNC: 33 U/L (ref 0–45)
INR BLD: 2.7 (ref 0.9–1.1)
T4 FREE SERPL-MCNC: 1.38 NG/DL (ref 0.9–1.7)
TSH SERPL DL<=0.005 MIU/L-ACNC: 1.12 UIU/ML (ref 0.3–4.2)

## 2025-09-02 PROCEDURE — 84460 ALANINE AMINO (ALT) (SGPT): CPT

## 2025-09-02 PROCEDURE — 36415 COLL VENOUS BLD VENIPUNCTURE: CPT

## 2025-09-02 PROCEDURE — 84443 ASSAY THYROID STIM HORMONE: CPT

## 2025-09-02 PROCEDURE — 85610 PROTHROMBIN TIME: CPT

## 2025-09-02 PROCEDURE — 84450 TRANSFERASE (AST) (SGOT): CPT

## 2025-09-02 PROCEDURE — 84439 ASSAY OF FREE THYROXINE: CPT

## 2025-09-03 ENCOUNTER — OFFICE VISIT (OUTPATIENT)
Dept: INTERNAL MEDICINE | Facility: CLINIC | Age: 80
End: 2025-09-03
Payer: COMMERCIAL

## 2025-09-03 VITALS
HEART RATE: 97 BPM | BODY MASS INDEX: 38.88 KG/M2 | HEIGHT: 70 IN | DIASTOLIC BLOOD PRESSURE: 78 MMHG | RESPIRATION RATE: 17 BRPM | WEIGHT: 271.6 LBS | OXYGEN SATURATION: 97 % | TEMPERATURE: 97.6 F | SYSTOLIC BLOOD PRESSURE: 130 MMHG

## 2025-09-03 DIAGNOSIS — E78.5 HYPERLIPIDEMIA LDL GOAL <100: ICD-10-CM

## 2025-09-03 DIAGNOSIS — I50.22 CHRONIC SYSTOLIC HEART FAILURE (H): ICD-10-CM

## 2025-09-03 DIAGNOSIS — I50.43 ACUTE ON CHRONIC COMBINED SYSTOLIC AND DIASTOLIC HEART FAILURE (H): ICD-10-CM

## 2025-09-03 DIAGNOSIS — I25.10 CORONARY ARTERY DISEASE INVOLVING NATIVE CORONARY ARTERY OF NATIVE HEART WITHOUT ANGINA PECTORIS: ICD-10-CM

## 2025-09-03 DIAGNOSIS — E66.01 MORBID OBESITY (H): ICD-10-CM

## 2025-09-03 DIAGNOSIS — Z12.11 SCREEN FOR COLON CANCER: ICD-10-CM

## 2025-09-03 DIAGNOSIS — I48.0 PAROXYSMAL ATRIAL FIBRILLATION (H): ICD-10-CM

## 2025-09-03 DIAGNOSIS — E66.813 CLASS 3 SEVERE OBESITY DUE TO EXCESS CALORIES WITH BODY MASS INDEX (BMI) OF 40.0 TO 44.9 IN ADULT, UNSPECIFIED WHETHER SERIOUS COMORBIDITY PRESENT (H): ICD-10-CM

## 2025-09-03 DIAGNOSIS — N18.31 CHRONIC KIDNEY DISEASE, STAGE 3A (H): ICD-10-CM

## 2025-09-03 DIAGNOSIS — Z23 NEED FOR VACCINATION: ICD-10-CM

## 2025-09-03 DIAGNOSIS — E78.00 PURE HYPERCHOLESTEROLEMIA: ICD-10-CM

## 2025-09-03 DIAGNOSIS — I25.5 ISCHEMIC CARDIOMYOPATHY: ICD-10-CM

## 2025-09-03 DIAGNOSIS — Z95.810 S/P ICD (INTERNAL CARDIAC DEFIBRILLATOR) PROCEDURE: ICD-10-CM

## 2025-09-03 DIAGNOSIS — Z00.00 ENCOUNTER FOR MEDICARE ANNUAL WELLNESS EXAM: Primary | ICD-10-CM

## 2025-09-03 DIAGNOSIS — N40.0 BENIGN PROSTATIC HYPERPLASIA, UNSPECIFIED WHETHER LOWER URINARY TRACT SYMPTOMS PRESENT: ICD-10-CM

## 2025-09-03 LAB
ALBUMIN SERPL BCG-MCNC: 3.8 G/DL (ref 3.5–5.2)
ALP SERPL-CCNC: 95 U/L (ref 40–150)
ALT SERPL W P-5'-P-CCNC: 30 U/L (ref 0–70)
ANION GAP SERPL CALCULATED.3IONS-SCNC: 10 MMOL/L (ref 7–15)
AST SERPL W P-5'-P-CCNC: 26 U/L (ref 0–45)
BILIRUB SERPL-MCNC: 0.6 MG/DL
BUN SERPL-MCNC: 18.8 MG/DL (ref 8–23)
CALCIUM SERPL-MCNC: 8.7 MG/DL (ref 8.8–10.4)
CHLORIDE SERPL-SCNC: 104 MMOL/L (ref 98–107)
CHOLEST SERPL-MCNC: 133 MG/DL
CREAT SERPL-MCNC: 1.42 MG/DL (ref 0.67–1.17)
CREAT UR-MCNC: 223.7 MG/DL
EGFRCR SERPLBLD CKD-EPI 2021: 51 ML/MIN/1.73M2
ERYTHROCYTE [DISTWIDTH] IN BLOOD BY AUTOMATED COUNT: 12.9 % (ref 10–15)
FASTING STATUS PATIENT QL REPORTED: YES
FASTING STATUS PATIENT QL REPORTED: YES
GLUCOSE SERPL-MCNC: 97 MG/DL (ref 70–99)
HCO3 SERPL-SCNC: 24 MMOL/L (ref 22–29)
HCT VFR BLD AUTO: 39.2 % (ref 40–53)
HDLC SERPL-MCNC: 56 MG/DL
HGB BLD-MCNC: 13.4 G/DL (ref 13.3–17.7)
LDLC SERPL CALC-MCNC: 57 MG/DL
MCH RBC QN AUTO: 34 PG (ref 26.5–33)
MCHC RBC AUTO-ENTMCNC: 34.2 G/DL (ref 31.5–36.5)
MCV RBC AUTO: 99.5 FL (ref 78–100)
MICROALBUMIN UR-MCNC: <12 MG/L
MICROALBUMIN/CREAT UR: NORMAL MG/G{CREAT}
NONHDLC SERPL-MCNC: 77 MG/DL
PLATELET # BLD AUTO: 178 10E3/UL (ref 150–450)
POTASSIUM SERPL-SCNC: 4.5 MMOL/L (ref 3.4–5.3)
PROT SERPL-MCNC: 6.6 G/DL (ref 6.4–8.3)
RBC # BLD AUTO: 3.94 10E6/UL (ref 4.4–5.9)
SODIUM SERPL-SCNC: 138 MMOL/L (ref 135–145)
TRIGL SERPL-MCNC: 101 MG/DL
WBC # BLD AUTO: 5.03 10E3/UL (ref 4–11)

## 2025-09-03 PROCEDURE — 36415 COLL VENOUS BLD VENIPUNCTURE: CPT | Performed by: INTERNAL MEDICINE

## 2025-09-03 RX ORDER — POTASSIUM CHLORIDE 1500 MG/1
TABLET, EXTENDED RELEASE ORAL
Qty: 90 TABLET | Refills: 3 | Status: SHIPPED | OUTPATIENT
Start: 2025-09-03

## 2025-09-03 RX ORDER — TAMSULOSIN HYDROCHLORIDE 0.4 MG/1
CAPSULE ORAL
Qty: 90 CAPSULE | Refills: 3 | Status: SHIPPED | OUTPATIENT
Start: 2025-09-03

## 2025-09-03 RX ORDER — ATORVASTATIN CALCIUM 80 MG/1
TABLET, FILM COATED ORAL
Qty: 90 TABLET | Refills: 3 | Status: SHIPPED | OUTPATIENT
Start: 2025-09-03

## 2025-09-03 RX ORDER — ISOSORBIDE MONONITRATE 30 MG/1
TABLET, EXTENDED RELEASE ORAL
Qty: 90 TABLET | Refills: 3 | Status: SHIPPED | OUTPATIENT
Start: 2025-09-03

## 2025-09-03 RX ORDER — AMIODARONE HYDROCHLORIDE 200 MG/1
200 TABLET ORAL DAILY
COMMUNITY
Start: 2025-09-03

## 2025-09-03 RX ORDER — FINASTERIDE 5 MG/1
TABLET, FILM COATED ORAL
Qty: 90 TABLET | Refills: 3 | Status: SHIPPED | OUTPATIENT
Start: 2025-09-03

## 2025-09-03 RX ORDER — FUROSEMIDE 40 MG/1
TABLET ORAL
Qty: 90 TABLET | Refills: 3 | Status: SHIPPED | OUTPATIENT
Start: 2025-09-03

## 2025-09-03 RX ORDER — METOPROLOL SUCCINATE 50 MG/1
50 TABLET, EXTENDED RELEASE ORAL 2 TIMES DAILY
Qty: 180 TABLET | Refills: 3 | Status: SHIPPED | OUTPATIENT
Start: 2025-09-03

## 2025-09-03 ASSESSMENT — PAIN SCALES - GENERAL: PAINLEVEL_OUTOF10: NO PAIN (0)

## (undated) DEVICE — GLOVE BIOGEL INDICATOR 7.5 LF 41675

## (undated) DEVICE — CAST PADDING 6" WEBRIL STERILE

## (undated) DEVICE — CAST PADDING 6" UNSTERILE 9046

## (undated) DEVICE — POSITIONER OR LEG KIT DISPOSABLE 111618

## (undated) DEVICE — SU MONOCRYL 4-0 PS-2 18" UND Y496G

## (undated) DEVICE — HOOD FLYTE 0408-800-000

## (undated) DEVICE — SU DERMABOND ADVANCED .7ML DNX12

## (undated) DEVICE — SUCTION IRR SYSTEM W/O TIP INTERPULSE HANDPIECE 0210-100-000

## (undated) DEVICE — KIT DRAPE RIO 1 PIECE POCKET RIO 111320

## (undated) DEVICE — PACK TOTAL JOINT STD LATEX

## (undated) DEVICE — GLOVE BIOGEL PI INDICATOR 8.0 LF 41680

## (undated) DEVICE — CORD RETRACTION SILICON 111619

## (undated) DEVICE — SOL WATER IRRIG 1000ML BOTTLE 07139-09

## (undated) DEVICE — BNDG ESMARK 6" STERILE

## (undated) DEVICE — DRAPE POUCH INSTRUMENT 1018

## (undated) DEVICE — SU VICRYL 0 OS-6 18" UND J754T

## (undated) DEVICE — GLOVE BIOGEL PI ULTRATOUCH G SZ 7.0 42170

## (undated) DEVICE — DRAPE EXTREMITY W/ARMBOARD 29405

## (undated) DEVICE — NDL COUNTER 40CT  31142311

## (undated) DEVICE — BLADE SAW SAGITTAL MAKO STANDARD STERILE DISP 116170

## (undated) DEVICE — MARKER SURGICAL CHECKPOINT KIT STRL LF DISP 111645

## (undated) DEVICE — PEN MARKING SKIN

## (undated) DEVICE — SOL NACL 0.9% IRRIG 3000ML BAG 07972-08

## (undated) DEVICE — SU VICRYL 2-0 CP-2 18" UND J762D

## (undated) DEVICE — SUCTION TIP YANKAUER ORTHO SUPER SUCKER EFS-111

## (undated) DEVICE — DRAPE CONVERTORS U-DRAPE 60X72" 8476

## (undated) DEVICE — BONE CLEANING TIP INTERPULSE  0210-010-000

## (undated) DEVICE — GLOVE BIOGEL PI ULTRATOUCH G SZ 7.5 42175

## (undated) DEVICE — ESU PENCIL SMOKE EVAC W/ROCKER SWITCH 0703-047-000

## (undated) DEVICE — TOURNIQUET CUFF 34"

## (undated) DEVICE — MARKER SYS NAVIGATION VIZADISC KNEE TRACK KIT STRL LF 107120

## (undated) DEVICE — PREP CHLORAPREP 26ML TINTED ORANGE  260815

## (undated) DEVICE — SOL NACL 0.9% IRRIG 1000ML BOTTLE 07138-09

## (undated) DEVICE — BNDG COBAN 6"X5YDS STERILE

## (undated) RX ORDER — BUPIVACAINE HYDROCHLORIDE 5 MG/ML
INJECTION, SOLUTION EPIDURAL; INTRACAUDAL
Status: DISPENSED
Start: 2024-06-18

## (undated) RX ORDER — ONDANSETRON 2 MG/ML
INJECTION INTRAMUSCULAR; INTRAVENOUS
Status: DISPENSED
Start: 2024-06-18

## (undated) RX ORDER — LIDOCAINE HYDROCHLORIDE 10 MG/ML
INJECTION, SOLUTION EPIDURAL; INFILTRATION; INTRACAUDAL; PERINEURAL
Status: DISPENSED
Start: 2024-06-18

## (undated) RX ORDER — PROPOFOL 10 MG/ML
INJECTION, EMULSION INTRAVENOUS
Status: DISPENSED
Start: 2024-06-18

## (undated) RX ORDER — FENTANYL CITRATE-0.9 % NACL/PF 10 MCG/ML
PLASTIC BAG, INJECTION (ML) INTRAVENOUS
Status: DISPENSED
Start: 2024-06-18

## (undated) RX ORDER — FENTANYL CITRATE 50 UG/ML
INJECTION, SOLUTION INTRAMUSCULAR; INTRAVENOUS
Status: DISPENSED
Start: 2024-06-18

## (undated) RX ORDER — FENTANYL CITRATE 50 UG/ML
INJECTION, SOLUTION INTRAMUSCULAR; INTRAVENOUS
Status: DISPENSED
Start: 2019-05-22